# Patient Record
Sex: FEMALE | Race: WHITE | Employment: FULL TIME | URBAN - NONMETROPOLITAN AREA
[De-identification: names, ages, dates, MRNs, and addresses within clinical notes are randomized per-mention and may not be internally consistent; named-entity substitution may affect disease eponyms.]

---

## 2016-03-03 LAB — HBA1C MFR BLD: 6.3 %

## 2016-09-15 LAB
CHOLESTEROL, TOTAL: 173 MG/DL
CHOLESTEROL/HDL RATIO: 2.9
HDLC SERPL-MCNC: 60 MG/DL (ref 35–70)
LDL CHOLESTEROL CALCULATED: 87 MG/DL (ref 0–160)
TRIGL SERPL-MCNC: 128 MG/DL
VLDLC SERPL CALC-MCNC: NORMAL MG/DL

## 2017-01-17 ENCOUNTER — OFFICE VISIT (OUTPATIENT)
Dept: FAMILY MEDICINE CLINIC | Age: 57
End: 2017-01-17

## 2017-01-17 VITALS
HEIGHT: 63 IN | DIASTOLIC BLOOD PRESSURE: 86 MMHG | WEIGHT: 206.8 LBS | SYSTOLIC BLOOD PRESSURE: 128 MMHG | HEART RATE: 95 BPM | OXYGEN SATURATION: 93 % | BODY MASS INDEX: 36.64 KG/M2

## 2017-01-17 DIAGNOSIS — J20.9 ACUTE BRONCHITIS, UNSPECIFIED ORGANISM: Primary | ICD-10-CM

## 2017-01-17 DIAGNOSIS — E11.9 TYPE 2 DIABETES MELLITUS WITHOUT COMPLICATION, WITHOUT LONG-TERM CURRENT USE OF INSULIN (HCC): ICD-10-CM

## 2017-01-17 DIAGNOSIS — J44.9 CHRONIC OBSTRUCTIVE PULMONARY DISEASE, UNSPECIFIED COPD TYPE (HCC): ICD-10-CM

## 2017-01-17 DIAGNOSIS — Z87.891 SMOKING HISTORY: ICD-10-CM

## 2017-01-17 PROCEDURE — 99203 OFFICE O/P NEW LOW 30 MIN: CPT | Performed by: FAMILY MEDICINE

## 2017-01-17 RX ORDER — NICOTINE 21 MG/24HR
1 PATCH, TRANSDERMAL 24 HOURS TRANSDERMAL EVERY 24 HOURS
Qty: 30 PATCH | Refills: 0 | COMMUNITY
Start: 2017-01-17 | End: 2017-06-20 | Stop reason: ALTCHOICE

## 2017-06-20 ENCOUNTER — OFFICE VISIT (OUTPATIENT)
Dept: FAMILY MEDICINE CLINIC | Age: 57
End: 2017-06-20
Payer: COMMERCIAL

## 2017-06-20 VITALS
HEIGHT: 63 IN | HEART RATE: 84 BPM | SYSTOLIC BLOOD PRESSURE: 142 MMHG | OXYGEN SATURATION: 94 % | BODY MASS INDEX: 36.14 KG/M2 | DIASTOLIC BLOOD PRESSURE: 88 MMHG | WEIGHT: 204 LBS

## 2017-06-20 DIAGNOSIS — Z12.11 ENCOUNTER FOR SCREENING COLONOSCOPY: ICD-10-CM

## 2017-06-20 DIAGNOSIS — Z12.39 SCREENING FOR BREAST CANCER: ICD-10-CM

## 2017-06-20 DIAGNOSIS — N39.42 URINARY INCONTINENCE WITHOUT SENSORY AWARENESS: ICD-10-CM

## 2017-06-20 DIAGNOSIS — S60.131D CONTUSION OF RIGHT MIDDLE FINGER WITH DAMAGE TO NAIL, SUBSEQUENT ENCOUNTER: ICD-10-CM

## 2017-06-20 DIAGNOSIS — K58.0 IRRITABLE BOWEL SYNDROME WITH DIARRHEA: ICD-10-CM

## 2017-06-20 DIAGNOSIS — E04.9 THYROID GOITER: ICD-10-CM

## 2017-06-20 DIAGNOSIS — Z13.1 SCREENING FOR DIABETES MELLITUS: ICD-10-CM

## 2017-06-20 DIAGNOSIS — Z76.89 ENCOUNTER TO ESTABLISH CARE: ICD-10-CM

## 2017-06-20 DIAGNOSIS — J44.9 CHRONIC OBSTRUCTIVE PULMONARY DISEASE, UNSPECIFIED COPD TYPE (HCC): ICD-10-CM

## 2017-06-20 DIAGNOSIS — J45.20 MILD INTERMITTENT ASTHMA WITHOUT COMPLICATION: ICD-10-CM

## 2017-06-20 DIAGNOSIS — E11.9 TYPE 2 DIABETES MELLITUS WITHOUT COMPLICATION, WITHOUT LONG-TERM CURRENT USE OF INSULIN (HCC): ICD-10-CM

## 2017-06-20 DIAGNOSIS — Z72.0 TOBACCO ABUSE: ICD-10-CM

## 2017-06-20 DIAGNOSIS — Z00.00 ROUTINE HEALTH MAINTENANCE: Primary | ICD-10-CM

## 2017-06-20 DIAGNOSIS — J30.2 SEASONAL ALLERGIC RHINITIS, UNSPECIFIED ALLERGIC RHINITIS TRIGGER: ICD-10-CM

## 2017-06-20 PROCEDURE — 99215 OFFICE O/P EST HI 40 MIN: CPT | Performed by: NURSE PRACTITIONER

## 2017-06-20 RX ORDER — MONTELUKAST SODIUM 10 MG/1
10 TABLET ORAL NIGHTLY
Qty: 30 TABLET | Refills: 1 | Status: SHIPPED | OUTPATIENT
Start: 2017-06-20 | End: 2017-08-23 | Stop reason: SDUPTHER

## 2017-06-20 RX ORDER — VENLAFAXINE HYDROCHLORIDE 150 MG/1
150 TABLET, EXTENDED RELEASE ORAL
Qty: 30 TABLET | Refills: 1 | Status: SHIPPED | OUTPATIENT
Start: 2017-06-20 | End: 2017-07-25

## 2017-06-20 RX ORDER — VENLAFAXINE HYDROCHLORIDE 150 MG/1
150 TABLET, EXTENDED RELEASE ORAL
COMMUNITY
End: 2017-06-20 | Stop reason: SDUPTHER

## 2017-06-20 ASSESSMENT — ENCOUNTER SYMPTOMS
CHEST TIGHTNESS: 0
ABDOMINAL PAIN: 0
COUGH: 0
DIARRHEA: 1
VOMITING: 0
TROUBLE SWALLOWING: 0
CONSTIPATION: 0
SINUS PRESSURE: 0
EYES NEGATIVE: 1
NAUSEA: 0
SHORTNESS OF BREATH: 0
RESPIRATORY NEGATIVE: 1
ALLERGIC/IMMUNOLOGIC NEGATIVE: 1

## 2017-06-21 ENCOUNTER — TELEPHONE (OUTPATIENT)
Dept: FAMILY MEDICINE CLINIC | Age: 57
End: 2017-06-21

## 2017-06-21 DIAGNOSIS — E78.2 MIXED HYPERLIPIDEMIA: ICD-10-CM

## 2017-06-21 DIAGNOSIS — E11.9 CONTROLLED TYPE 2 DIABETES MELLITUS WITHOUT COMPLICATION, WITHOUT LONG-TERM CURRENT USE OF INSULIN (HCC): Primary | ICD-10-CM

## 2017-06-21 RX ORDER — EZETIMIBE 10 MG/1
10 TABLET ORAL DAILY
Qty: 30 TABLET | Refills: 3 | Status: SHIPPED | OUTPATIENT
Start: 2017-06-21 | End: 2017-08-23 | Stop reason: SDUPTHER

## 2017-07-19 DIAGNOSIS — Z12.39 BREAST CANCER SCREENING: Primary | ICD-10-CM

## 2017-07-25 ENCOUNTER — TELEPHONE (OUTPATIENT)
Dept: SURGERY | Age: 57
End: 2017-07-25

## 2017-07-25 ENCOUNTER — INITIAL CONSULT (OUTPATIENT)
Dept: SURGERY | Age: 57
End: 2017-07-25
Payer: COMMERCIAL

## 2017-07-25 VITALS
DIASTOLIC BLOOD PRESSURE: 80 MMHG | RESPIRATION RATE: 16 BRPM | SYSTOLIC BLOOD PRESSURE: 132 MMHG | TEMPERATURE: 98 F | BODY MASS INDEX: 35.4 KG/M2 | WEIGHT: 199.8 LBS | HEIGHT: 63 IN | HEART RATE: 80 BPM

## 2017-07-25 DIAGNOSIS — E11.9 TYPE 2 DIABETES MELLITUS WITHOUT COMPLICATION, WITHOUT LONG-TERM CURRENT USE OF INSULIN (HCC): ICD-10-CM

## 2017-07-25 DIAGNOSIS — K58.0 IRRITABLE BOWEL SYNDROME WITH DIARRHEA: ICD-10-CM

## 2017-07-25 DIAGNOSIS — J42 CHRONIC BRONCHITIS, UNSPECIFIED CHRONIC BRONCHITIS TYPE (HCC): ICD-10-CM

## 2017-07-25 DIAGNOSIS — K21.9 GASTROESOPHAGEAL REFLUX DISEASE, ESOPHAGITIS PRESENCE NOT SPECIFIED: Primary | ICD-10-CM

## 2017-07-25 PROCEDURE — 99204 OFFICE O/P NEW MOD 45 MIN: CPT | Performed by: SURGERY

## 2017-07-25 RX ORDER — VENLAFAXINE HYDROCHLORIDE 150 MG/1
CAPSULE, EXTENDED RELEASE ORAL
COMMUNITY
Start: 2017-06-20 | End: 2017-08-23 | Stop reason: SDUPTHER

## 2017-07-25 ASSESSMENT — ENCOUNTER SYMPTOMS
SHORTNESS OF BREATH: 0
ABDOMINAL DISTENTION: 1
DIARRHEA: 1
HEARTBURN: 1
EYES NEGATIVE: 1
VOICE CHANGE: 1
WHEEZING: 0
CHEST TIGHTNESS: 0
NAUSEA: 1
WATER BRASH: 1
TROUBLE SWALLOWING: 1
BACK PAIN: 1
HOARSE VOICE: 1
BLOOD IN STOOL: 0
SORE THROAT: 0
ABDOMINAL PAIN: 1
CHOKING: 1

## 2017-08-07 ENCOUNTER — OFFICE VISIT (OUTPATIENT)
Dept: OBGYN | Age: 57
End: 2017-08-07
Payer: COMMERCIAL

## 2017-08-07 VITALS
HEART RATE: 80 BPM | BODY MASS INDEX: 35.08 KG/M2 | DIASTOLIC BLOOD PRESSURE: 76 MMHG | SYSTOLIC BLOOD PRESSURE: 114 MMHG | WEIGHT: 198 LBS | HEIGHT: 63 IN

## 2017-08-07 DIAGNOSIS — K52.9 CHRONIC DIARRHEA: ICD-10-CM

## 2017-08-07 DIAGNOSIS — N39.46 MIXED INCONTINENCE: Primary | ICD-10-CM

## 2017-08-07 LAB
-: ABNORMAL
AMORPHOUS: ABNORMAL
BACTERIA: ABNORMAL
BILIRUBIN URINE: NEGATIVE
CASTS UA: ABNORMAL /LPF (ref 0–2)
COLOR: ABNORMAL
COMMENT UA: ABNORMAL
CRYSTALS, UA: ABNORMAL /HPF
EPITHELIAL CELLS UA: ABNORMAL /HPF (ref 0–5)
GLUCOSE URINE: NEGATIVE
KETONES, URINE: NEGATIVE
LEUKOCYTE ESTERASE, URINE: NEGATIVE
MUCUS: ABNORMAL
NITRITE, URINE: NEGATIVE
OTHER OBSERVATIONS UA: ABNORMAL
PH UA: 5.5 (ref 5–6)
PROTEIN UA: NEGATIVE
RBC UA: ABNORMAL /HPF (ref 0–4)
RENAL EPITHELIAL, UA: ABNORMAL /HPF
SPECIFIC GRAVITY UA: 1.02 (ref 1.01–1.02)
TRICHOMONAS: ABNORMAL
TURBIDITY: ABNORMAL
URINE HGB: ABNORMAL
UROBILINOGEN, URINE: NORMAL
WBC UA: ABNORMAL /HPF (ref 0–4)
YEAST: ABNORMAL

## 2017-08-07 PROCEDURE — 81003 URINALYSIS AUTO W/O SCOPE: CPT | Performed by: OBSTETRICS & GYNECOLOGY

## 2017-08-07 PROCEDURE — 81001 URINALYSIS AUTO W/SCOPE: CPT | Performed by: OBSTETRICS & GYNECOLOGY

## 2017-08-07 PROCEDURE — 99203 OFFICE O/P NEW LOW 30 MIN: CPT | Performed by: OBSTETRICS & GYNECOLOGY

## 2017-08-23 DIAGNOSIS — J45.20 MILD INTERMITTENT ASTHMA WITHOUT COMPLICATION: ICD-10-CM

## 2017-08-23 DIAGNOSIS — E11.9 CONTROLLED TYPE 2 DIABETES MELLITUS WITHOUT COMPLICATION, WITHOUT LONG-TERM CURRENT USE OF INSULIN (HCC): ICD-10-CM

## 2017-08-23 DIAGNOSIS — J30.2 SEASONAL ALLERGIC RHINITIS, UNSPECIFIED ALLERGIC RHINITIS TRIGGER: ICD-10-CM

## 2017-08-23 DIAGNOSIS — E78.2 MIXED HYPERLIPIDEMIA: ICD-10-CM

## 2017-08-23 DIAGNOSIS — J44.9 CHRONIC OBSTRUCTIVE PULMONARY DISEASE, UNSPECIFIED COPD TYPE (HCC): ICD-10-CM

## 2017-08-23 RX ORDER — MONTELUKAST SODIUM 10 MG/1
10 TABLET ORAL NIGHTLY
Qty: 30 TABLET | Refills: 3 | Status: SHIPPED | OUTPATIENT
Start: 2017-08-23 | End: 2017-08-24 | Stop reason: SDUPTHER

## 2017-08-23 RX ORDER — EZETIMIBE 10 MG/1
10 TABLET ORAL DAILY
Qty: 30 TABLET | Refills: 3 | Status: SHIPPED | OUTPATIENT
Start: 2017-08-23 | End: 2017-08-24 | Stop reason: SDUPTHER

## 2017-08-23 RX ORDER — VENLAFAXINE HYDROCHLORIDE 150 MG/1
150 CAPSULE, EXTENDED RELEASE ORAL DAILY
Qty: 30 CAPSULE | Refills: 3 | Status: SHIPPED | OUTPATIENT
Start: 2017-08-23 | End: 2017-11-06 | Stop reason: SDUPTHER

## 2017-08-24 DIAGNOSIS — J30.2 SEASONAL ALLERGIC RHINITIS, UNSPECIFIED ALLERGIC RHINITIS TRIGGER: ICD-10-CM

## 2017-08-24 DIAGNOSIS — E11.9 CONTROLLED TYPE 2 DIABETES MELLITUS WITHOUT COMPLICATION, WITHOUT LONG-TERM CURRENT USE OF INSULIN (HCC): ICD-10-CM

## 2017-08-24 DIAGNOSIS — J45.20 MILD INTERMITTENT ASTHMA WITHOUT COMPLICATION: ICD-10-CM

## 2017-08-24 DIAGNOSIS — E78.2 MIXED HYPERLIPIDEMIA: ICD-10-CM

## 2017-08-24 DIAGNOSIS — J44.9 CHRONIC OBSTRUCTIVE PULMONARY DISEASE, UNSPECIFIED COPD TYPE (HCC): ICD-10-CM

## 2017-08-24 RX ORDER — MONTELUKAST SODIUM 10 MG/1
10 TABLET ORAL NIGHTLY
Qty: 30 TABLET | Refills: 3 | Status: SHIPPED | OUTPATIENT
Start: 2017-08-24 | End: 2018-05-01 | Stop reason: ALTCHOICE

## 2017-08-24 RX ORDER — EZETIMIBE 10 MG/1
10 TABLET ORAL DAILY
Qty: 30 TABLET | Refills: 3 | Status: SHIPPED | OUTPATIENT
Start: 2017-08-24 | End: 2018-05-01

## 2017-10-19 ENCOUNTER — TELEPHONE (OUTPATIENT)
Dept: FAMILY MEDICINE CLINIC | Age: 57
End: 2017-10-19

## 2017-11-04 DIAGNOSIS — F32.A DEPRESSION, UNSPECIFIED DEPRESSION TYPE: Primary | ICD-10-CM

## 2017-11-06 PROBLEM — F32.A DEPRESSION: Status: ACTIVE | Noted: 2017-11-06

## 2017-11-06 RX ORDER — VENLAFAXINE HYDROCHLORIDE 150 MG/1
CAPSULE, EXTENDED RELEASE ORAL
Qty: 30 CAPSULE | Refills: 1 | Status: SHIPPED | OUTPATIENT
Start: 2017-11-21 | End: 2018-02-05 | Stop reason: SDUPTHER

## 2017-11-15 ENCOUNTER — TELEPHONE (OUTPATIENT)
Dept: FAMILY MEDICINE CLINIC | Age: 57
End: 2017-11-15

## 2018-02-05 DIAGNOSIS — F32.A DEPRESSION, UNSPECIFIED DEPRESSION TYPE: ICD-10-CM

## 2018-02-05 RX ORDER — VENLAFAXINE HYDROCHLORIDE 150 MG/1
CAPSULE, EXTENDED RELEASE ORAL
Qty: 30 CAPSULE | Refills: 1 | Status: SHIPPED | OUTPATIENT
Start: 2018-02-05 | End: 2018-04-26 | Stop reason: SDUPTHER

## 2018-03-21 ENCOUNTER — OFFICE VISIT (OUTPATIENT)
Dept: PRIMARY CARE CLINIC | Age: 58
End: 2018-03-21

## 2018-03-21 VITALS
TEMPERATURE: 98.1 F | SYSTOLIC BLOOD PRESSURE: 138 MMHG | HEART RATE: 80 BPM | WEIGHT: 185 LBS | DIASTOLIC BLOOD PRESSURE: 80 MMHG | HEIGHT: 63 IN | BODY MASS INDEX: 32.78 KG/M2 | OXYGEN SATURATION: 96 %

## 2018-03-21 DIAGNOSIS — J01.40 ACUTE NON-RECURRENT PANSINUSITIS: Primary | ICD-10-CM

## 2018-03-21 DIAGNOSIS — B37.31 VAGINAL YEAST INFECTION: ICD-10-CM

## 2018-03-21 PROCEDURE — 99213 OFFICE O/P EST LOW 20 MIN: CPT | Performed by: NURSE PRACTITIONER

## 2018-03-21 RX ORDER — PREDNISONE 20 MG/1
20 TABLET ORAL DAILY
Qty: 5 TABLET | Refills: 0 | Status: SHIPPED | OUTPATIENT
Start: 2018-03-21 | End: 2018-03-26

## 2018-03-21 RX ORDER — DOXYCYCLINE HYCLATE 100 MG
100 TABLET ORAL 2 TIMES DAILY
Qty: 20 TABLET | Refills: 0 | Status: SHIPPED | OUTPATIENT
Start: 2018-03-21 | End: 2018-03-31

## 2018-03-21 RX ORDER — FLUCONAZOLE 150 MG/1
150 TABLET ORAL ONCE
Qty: 1 TABLET | Refills: 1 | Status: SHIPPED | OUTPATIENT
Start: 2018-03-21 | End: 2018-03-21

## 2018-03-21 ASSESSMENT — ENCOUNTER SYMPTOMS
SHORTNESS OF BREATH: 0
DIARRHEA: 0
SORE THROAT: 1
RHINORRHEA: 1
NAUSEA: 0
SINUS COMPLAINT: 1
COUGH: 1
VOMITING: 0
WHEEZING: 1
SINUS PRESSURE: 1

## 2018-03-21 NOTE — PROGRESS NOTES
[facility]             [address] [facility], [place], [address]                        Telephone [phone]             Fax [phone]  1960  MRN:  [id number]   Date of visit:  3/21/2018    Subjective:    Claribel Agosto is a 61 y.o.  female who presents to [facility] today (3/21/2018) for evaluation of:    Chief Complaint   Patient presents with    Sinus Problem     x2 months. worsening. ear pain. head congestion. Sinus Problem   This is a new problem. The current episode started more than 1 month ago (X 2 months). The problem has been gradually worsening since onset. There has been no fever. Her pain is at a severity of 6/10. Associated symptoms include congestion, coughing, ear pain (left ear), headaches, sinus pressure, sneezing and a sore throat (scratchy). Pertinent negatives include no chills or shortness of breath. Treatments tried: tylenol sinus. The treatment provided mild relief.        She has the following problem list:  Patient Active Problem List   Diagnosis    Gastroesophageal reflux disease    Irritable bowel syndrome with diarrhea    Chronic bronchitis (HCC)    Type 2 diabetes mellitus without complication, without long-term current use of insulin (HCC)    Depression        Current medications are:  Current Outpatient Prescriptions   Medication Sig Dispense Refill    predniSONE (DELTASONE) 20 MG tablet Take 1 tablet by mouth daily for 5 days 5 tablet 0    doxycycline hyclate (VIBRA-TABS) 100 MG tablet Take 1 tablet by mouth 2 times daily for 10 days 20 tablet 0    fluconazole (DIFLUCAN) 150 MG tablet Take 1 tablet by mouth once for 1 dose 1 tablet 1    venlafaxine (EFFEXOR XR) 150 MG extended release capsule TAKE ONE CAPSULE BY MOUTH ONCE DAILY WITH BREAKFAST 30 capsule 1    polyethylene glycol (GOLYTELY) 236 g solution Take per instructions supplied by office 1 Bottle 0    albuterol sulfate HFA (PROVENTIL HFA) 108 (90 BASE) MCG/ACT inhaler Inhale 2 puffs into the lungs every 4 hours as needed for Wheezing or Shortness of Breath (Space out to every 6 hours as symptoms improve) Space out to every 6 hours as symptoms improve. 1 Inhaler 0    losartan (COZAAR) 50 MG tablet Take 50 mg by mouth daily      omeprazole (PRILOSEC) 20 MG capsule Take 40 mg by mouth daily      montelukast (SINGULAIR) 10 MG tablet Take 1 tablet by mouth nightly 30 tablet 3    metFORMIN (GLUCOPHAGE) 1000 MG tablet Take 1 tablet by mouth 2 times daily (with meals) 60 tablet 3    ezetimibe (ZETIA) 10 MG tablet Take 1 tablet by mouth daily 30 tablet 3     No current facility-administered medications for this visit. She is allergic to pcn [penicillins] and sulfa antibiotics. .    She  reports that she has been smoking Cigarettes. She has a 45.00 pack-year smoking history. She has never used smokeless tobacco.      Objective:    Vitals:    03/21/18 1454   BP: 138/80   Pulse: 80   Temp: 98.1 °F (36.7 °C)   SpO2: 96%     Body mass index is 32.77 kg/m². Review of Systems   Constitutional: Positive for appetite change and fatigue. Negative for chills and fever. HENT: Positive for congestion, ear pain (left ear), postnasal drip, rhinorrhea, sinus pressure, sneezing and sore throat (scratchy). Respiratory: Positive for cough and wheezing. Negative for shortness of breath. Cardiovascular: Negative. Gastrointestinal: Negative for diarrhea, nausea and vomiting. Musculoskeletal: Negative for myalgias. Neurological: Positive for headaches. Physical Exam   Constitutional: She is oriented to person, place, and time. She appears well-developed and well-nourished. HENT:   Head: Normocephalic. Right Ear: Tympanic membrane normal.   Left Ear: Tympanic membrane normal.   Nose: Mucosal edema and rhinorrhea present.  Right sinus exhibits maxillary sinus tenderness and frontal sinus

## 2018-04-26 DIAGNOSIS — F32.A DEPRESSION, UNSPECIFIED DEPRESSION TYPE: ICD-10-CM

## 2018-04-26 RX ORDER — VENLAFAXINE HYDROCHLORIDE 150 MG/1
CAPSULE, EXTENDED RELEASE ORAL
Qty: 30 CAPSULE | Refills: 1 | Status: SHIPPED | OUTPATIENT
Start: 2018-04-26 | End: 2018-06-28 | Stop reason: SDUPTHER

## 2018-05-01 ENCOUNTER — OFFICE VISIT (OUTPATIENT)
Dept: FAMILY MEDICINE CLINIC | Age: 58
End: 2018-05-01
Payer: COMMERCIAL

## 2018-05-01 VITALS
BODY MASS INDEX: 32.25 KG/M2 | HEART RATE: 86 BPM | OXYGEN SATURATION: 96 % | HEIGHT: 63 IN | WEIGHT: 182 LBS | DIASTOLIC BLOOD PRESSURE: 82 MMHG | SYSTOLIC BLOOD PRESSURE: 130 MMHG

## 2018-05-01 DIAGNOSIS — G56.03 BILATERAL CARPAL TUNNEL SYNDROME: ICD-10-CM

## 2018-05-01 DIAGNOSIS — Z11.59 ENCOUNTER FOR HEPATITIS C SCREENING TEST FOR LOW RISK PATIENT: ICD-10-CM

## 2018-05-01 DIAGNOSIS — Z11.4 ENCOUNTER FOR SCREENING FOR HIV: ICD-10-CM

## 2018-05-01 DIAGNOSIS — E11.9 TYPE 2 DIABETES MELLITUS WITHOUT COMPLICATION, WITHOUT LONG-TERM CURRENT USE OF INSULIN (HCC): Primary | ICD-10-CM

## 2018-05-01 DIAGNOSIS — R73.09 ELEVATED HEMOGLOBIN A1C: ICD-10-CM

## 2018-05-01 DIAGNOSIS — Z00.00 ROUTINE HEALTH MAINTENANCE: ICD-10-CM

## 2018-05-01 DIAGNOSIS — E78.00 ELEVATED CHOLESTEROL: ICD-10-CM

## 2018-05-01 PROCEDURE — 3017F COLORECTAL CA SCREEN DOC REV: CPT | Performed by: NURSE PRACTITIONER

## 2018-05-01 PROCEDURE — 2022F DILAT RTA XM EVC RTNOPTHY: CPT | Performed by: NURSE PRACTITIONER

## 2018-05-01 PROCEDURE — 4004F PT TOBACCO SCREEN RCVD TLK: CPT | Performed by: NURSE PRACTITIONER

## 2018-05-01 PROCEDURE — G8427 DOCREV CUR MEDS BY ELIG CLIN: HCPCS | Performed by: NURSE PRACTITIONER

## 2018-05-01 PROCEDURE — 99214 OFFICE O/P EST MOD 30 MIN: CPT | Performed by: NURSE PRACTITIONER

## 2018-05-01 PROCEDURE — 3046F HEMOGLOBIN A1C LEVEL >9.0%: CPT | Performed by: NURSE PRACTITIONER

## 2018-05-01 PROCEDURE — G8417 CALC BMI ABV UP PARAM F/U: HCPCS | Performed by: NURSE PRACTITIONER

## 2018-05-01 ASSESSMENT — ENCOUNTER SYMPTOMS
COUGH: 0
CONSTIPATION: 0
ABDOMINAL PAIN: 0
GASTROINTESTINAL NEGATIVE: 1
EYES NEGATIVE: 1
SHORTNESS OF BREATH: 0
VOMITING: 0
TROUBLE SWALLOWING: 0
DIARRHEA: 0
CHEST TIGHTNESS: 0
NAUSEA: 0
SINUS PRESSURE: 0
ALLERGIC/IMMUNOLOGIC NEGATIVE: 1

## 2018-05-01 ASSESSMENT — PATIENT HEALTH QUESTIONNAIRE - PHQ9
SUM OF ALL RESPONSES TO PHQ QUESTIONS 1-9: 2
1. LITTLE INTEREST OR PLEASURE IN DOING THINGS: 1
2. FEELING DOWN, DEPRESSED OR HOPELESS: 1
SUM OF ALL RESPONSES TO PHQ9 QUESTIONS 1 & 2: 2

## 2018-05-05 ENCOUNTER — HOSPITAL ENCOUNTER (OUTPATIENT)
Dept: LAB | Age: 58
Setting detail: SPECIMEN
Discharge: HOME | End: 2018-05-05
Payer: COMMERCIAL

## 2018-05-05 DIAGNOSIS — R73.09 ELEVATED HEMOGLOBIN A1C: ICD-10-CM

## 2018-05-05 DIAGNOSIS — Z11.59 ENCOUNTER FOR HEPATITIS C SCREENING TEST FOR LOW RISK PATIENT: ICD-10-CM

## 2018-05-05 DIAGNOSIS — E78.00 ELEVATED CHOLESTEROL: ICD-10-CM

## 2018-05-05 DIAGNOSIS — Z00.00 ROUTINE HEALTH MAINTENANCE: ICD-10-CM

## 2018-05-05 DIAGNOSIS — Z11.4 ENCOUNTER FOR SCREENING FOR HIV: ICD-10-CM

## 2018-05-05 DIAGNOSIS — E11.9 TYPE 2 DIABETES MELLITUS WITHOUT COMPLICATION, WITHOUT LONG-TERM CURRENT USE OF INSULIN (HCC): ICD-10-CM

## 2018-05-05 LAB
ALBUMIN SERPL-MCNC: 4.3 G/DL (ref 3.5–5.2)
ALBUMIN/GLOBULIN RATIO: 1.5 (ref 1–2.5)
ALP BLD-CCNC: 79 U/L (ref 35–104)
ALT SERPL-CCNC: 10 U/L (ref 5–33)
ANION GAP SERPL CALCULATED.3IONS-SCNC: 10 MMOL/L (ref 9–17)
AST SERPL-CCNC: 14 U/L
BILIRUB SERPL-MCNC: 0.36 MG/DL (ref 0.3–1.2)
BUN BLDV-MCNC: 11 MG/DL (ref 6–20)
BUN/CREAT BLD: 19 (ref 9–20)
CALCIUM SERPL-MCNC: 9.6 MG/DL (ref 8.6–10.4)
CHLORIDE BLD-SCNC: 101 MMOL/L (ref 98–107)
CHOLESTEROL/HDL RATIO: 2.9
CHOLESTEROL: 224 MG/DL
CO2: 28 MMOL/L (ref 20–31)
CREAT SERPL-MCNC: 0.57 MG/DL (ref 0.5–0.9)
CREATININE URINE: 212.3 MG/DL (ref 28–217)
ESTIMATED AVERAGE GLUCOSE: 126 MG/DL
GFR AFRICAN AMERICAN: >60 ML/MIN
GFR NON-AFRICAN AMERICAN: >60 ML/MIN
GFR SERPL CREATININE-BSD FRML MDRD: ABNORMAL ML/MIN/{1.73_M2}
GFR SERPL CREATININE-BSD FRML MDRD: ABNORMAL ML/MIN/{1.73_M2}
GLUCOSE BLD-MCNC: 111 MG/DL (ref 70–99)
HBA1C MFR BLD: 6 % (ref 4.8–5.9)
HCT VFR BLD CALC: 45.4 % (ref 36–46)
HDLC SERPL-MCNC: 76 MG/DL
HEMOGLOBIN: 15.4 G/DL (ref 12–16)
HEPATITIS C ANTIBODY: NONREACTIVE
HIV AG/AB: NONREACTIVE
LDL CHOLESTEROL: 132 MG/DL (ref 0–130)
MCH RBC QN AUTO: 30.7 PG (ref 26–34)
MCHC RBC AUTO-ENTMCNC: 33.9 G/DL (ref 31–37)
MCV RBC AUTO: 90.5 FL (ref 80–100)
MICROALBUMIN/CREAT 24H UR: 41 MG/L
MICROALBUMIN/CREAT UR-RTO: 19 MCG/MG CREAT
NRBC AUTOMATED: NORMAL PER 100 WBC
PDW BLD-RTO: 13.6 % (ref 11–14.5)
PLATELET # BLD: 246 K/UL (ref 140–450)
PMV BLD AUTO: 9.4 FL (ref 6–12)
POTASSIUM SERPL-SCNC: 4.6 MMOL/L (ref 3.7–5.3)
RBC # BLD: 5.01 M/UL (ref 4–5.2)
SODIUM BLD-SCNC: 139 MMOL/L (ref 135–144)
THYROXINE, FREE: 1.14 NG/DL (ref 0.93–1.7)
TOTAL PROTEIN: 7.2 G/DL (ref 6.4–8.3)
TRIGL SERPL-MCNC: 81 MG/DL
TSH SERPL DL<=0.05 MIU/L-ACNC: 0.82 MIU/L (ref 0.3–5)
VLDLC SERPL CALC-MCNC: ABNORMAL MG/DL (ref 1–30)
WBC # BLD: 7.4 K/UL (ref 3.5–11)

## 2018-05-05 PROCEDURE — 82570 ASSAY OF URINE CREATININE: CPT

## 2018-05-05 PROCEDURE — 84439 ASSAY OF FREE THYROXINE: CPT

## 2018-05-05 PROCEDURE — 83036 HEMOGLOBIN GLYCOSYLATED A1C: CPT

## 2018-05-05 PROCEDURE — 86803 HEPATITIS C AB TEST: CPT

## 2018-05-05 PROCEDURE — 36415 COLL VENOUS BLD VENIPUNCTURE: CPT

## 2018-05-05 PROCEDURE — 80061 LIPID PANEL: CPT

## 2018-05-05 PROCEDURE — 85027 COMPLETE CBC AUTOMATED: CPT

## 2018-05-05 PROCEDURE — 87389 HIV-1 AG W/HIV-1&-2 AB AG IA: CPT

## 2018-05-05 PROCEDURE — 80053 COMPREHEN METABOLIC PANEL: CPT

## 2018-05-05 PROCEDURE — 84443 ASSAY THYROID STIM HORMONE: CPT

## 2018-05-05 PROCEDURE — 82043 UR ALBUMIN QUANTITATIVE: CPT

## 2018-05-10 ENCOUNTER — OFFICE VISIT (OUTPATIENT)
Dept: PRIMARY CARE CLINIC | Age: 58
End: 2018-05-10
Payer: COMMERCIAL

## 2018-05-10 VITALS
RESPIRATION RATE: 12 BRPM | HEIGHT: 63 IN | WEIGHT: 181.6 LBS | OXYGEN SATURATION: 97 % | DIASTOLIC BLOOD PRESSURE: 60 MMHG | TEMPERATURE: 98.4 F | SYSTOLIC BLOOD PRESSURE: 120 MMHG | BODY MASS INDEX: 32.18 KG/M2 | HEART RATE: 90 BPM

## 2018-05-10 DIAGNOSIS — J32.9 VIRAL SINUSITIS: Primary | ICD-10-CM

## 2018-05-10 DIAGNOSIS — J02.9 SORE THROAT: ICD-10-CM

## 2018-05-10 DIAGNOSIS — B97.89 VIRAL SINUSITIS: Primary | ICD-10-CM

## 2018-05-10 LAB — S PYO AG THROAT QL: NORMAL

## 2018-05-10 PROCEDURE — 4004F PT TOBACCO SCREEN RCVD TLK: CPT | Performed by: NURSE PRACTITIONER

## 2018-05-10 PROCEDURE — G8427 DOCREV CUR MEDS BY ELIG CLIN: HCPCS | Performed by: NURSE PRACTITIONER

## 2018-05-10 PROCEDURE — 3017F COLORECTAL CA SCREEN DOC REV: CPT | Performed by: NURSE PRACTITIONER

## 2018-05-10 PROCEDURE — 99213 OFFICE O/P EST LOW 20 MIN: CPT | Performed by: NURSE PRACTITIONER

## 2018-05-10 PROCEDURE — 87880 STREP A ASSAY W/OPTIC: CPT | Performed by: NURSE PRACTITIONER

## 2018-05-10 PROCEDURE — G8417 CALC BMI ABV UP PARAM F/U: HCPCS | Performed by: NURSE PRACTITIONER

## 2018-05-10 RX ORDER — LORATADINE 10 MG/1
10 TABLET ORAL DAILY
Qty: 30 TABLET | Refills: 1 | Status: SHIPPED | OUTPATIENT
Start: 2018-05-10 | End: 2018-06-09

## 2018-05-10 RX ORDER — METHYLPREDNISOLONE 4 MG/1
TABLET ORAL
Qty: 1 KIT | Refills: 0 | Status: SHIPPED | OUTPATIENT
Start: 2018-05-10 | End: 2018-10-31 | Stop reason: ALTCHOICE

## 2018-05-10 ASSESSMENT — ENCOUNTER SYMPTOMS
RHINORRHEA: 1
VOMITING: 0
COUGH: 1
CHEST TIGHTNESS: 0
SINUS PRESSURE: 1
CONSTIPATION: 0
ABDOMINAL PAIN: 1
SHORTNESS OF BREATH: 0
SORE THROAT: 1
SINUS PAIN: 1
EYES NEGATIVE: 1
DIARRHEA: 0
ALLERGIC/IMMUNOLOGIC NEGATIVE: 1
NAUSEA: 0
TROUBLE SWALLOWING: 0

## 2018-06-28 DIAGNOSIS — F32.A DEPRESSION, UNSPECIFIED DEPRESSION TYPE: ICD-10-CM

## 2018-06-28 RX ORDER — VENLAFAXINE HYDROCHLORIDE 150 MG/1
CAPSULE, EXTENDED RELEASE ORAL
Qty: 30 CAPSULE | Refills: 1 | Status: SHIPPED | OUTPATIENT
Start: 2018-06-28 | End: 2018-08-29 | Stop reason: SDUPTHER

## 2018-07-19 DIAGNOSIS — E11.9 CONTROLLED TYPE 2 DIABETES MELLITUS WITHOUT COMPLICATION, WITHOUT LONG-TERM CURRENT USE OF INSULIN (HCC): ICD-10-CM

## 2018-08-29 DIAGNOSIS — F32.A DEPRESSION, UNSPECIFIED DEPRESSION TYPE: ICD-10-CM

## 2018-08-29 RX ORDER — VENLAFAXINE HYDROCHLORIDE 150 MG/1
CAPSULE, EXTENDED RELEASE ORAL
Qty: 30 CAPSULE | Refills: 1 | Status: SHIPPED | OUTPATIENT
Start: 2018-08-29 | End: 2018-10-31 | Stop reason: SDUPTHER

## 2018-10-31 ENCOUNTER — OFFICE VISIT (OUTPATIENT)
Dept: FAMILY MEDICINE CLINIC | Age: 58
End: 2018-10-31
Payer: COMMERCIAL

## 2018-10-31 VITALS
BODY MASS INDEX: 32.78 KG/M2 | WEIGHT: 185 LBS | HEIGHT: 63 IN | OXYGEN SATURATION: 96 % | DIASTOLIC BLOOD PRESSURE: 78 MMHG | HEART RATE: 91 BPM | SYSTOLIC BLOOD PRESSURE: 124 MMHG

## 2018-10-31 DIAGNOSIS — Z00.00 ROUTINE HEALTH MAINTENANCE: Primary | ICD-10-CM

## 2018-10-31 DIAGNOSIS — B96.89 ACUTE BACTERIAL SINUSITIS: ICD-10-CM

## 2018-10-31 DIAGNOSIS — G56.03 CARPAL TUNNEL SYNDROME, BILATERAL: ICD-10-CM

## 2018-10-31 DIAGNOSIS — F32.A DEPRESSION, UNSPECIFIED DEPRESSION TYPE: ICD-10-CM

## 2018-10-31 DIAGNOSIS — K21.9 GASTROESOPHAGEAL REFLUX DISEASE, ESOPHAGITIS PRESENCE NOT SPECIFIED: ICD-10-CM

## 2018-10-31 DIAGNOSIS — G89.29 CHRONIC MIDLINE THORACIC BACK PAIN: ICD-10-CM

## 2018-10-31 DIAGNOSIS — J30.9 ALLERGIC RHINITIS, UNSPECIFIED SEASONALITY, UNSPECIFIED TRIGGER: ICD-10-CM

## 2018-10-31 DIAGNOSIS — Z72.0 TOBACCO USE: ICD-10-CM

## 2018-10-31 DIAGNOSIS — Z23 FLU VACCINE NEED: ICD-10-CM

## 2018-10-31 DIAGNOSIS — J01.90 ACUTE BACTERIAL SINUSITIS: ICD-10-CM

## 2018-10-31 DIAGNOSIS — E11.9 TYPE 2 DIABETES MELLITUS WITHOUT COMPLICATION, WITHOUT LONG-TERM CURRENT USE OF INSULIN (HCC): ICD-10-CM

## 2018-10-31 DIAGNOSIS — M54.6 CHRONIC MIDLINE THORACIC BACK PAIN: ICD-10-CM

## 2018-10-31 DIAGNOSIS — T14.8XXA WOUND OF SKIN: ICD-10-CM

## 2018-10-31 DIAGNOSIS — M54.2 NECK PAIN: ICD-10-CM

## 2018-10-31 PROCEDURE — G8417 CALC BMI ABV UP PARAM F/U: HCPCS | Performed by: NURSE PRACTITIONER

## 2018-10-31 PROCEDURE — 3017F COLORECTAL CA SCREEN DOC REV: CPT | Performed by: NURSE PRACTITIONER

## 2018-10-31 PROCEDURE — 4004F PT TOBACCO SCREEN RCVD TLK: CPT | Performed by: NURSE PRACTITIONER

## 2018-10-31 PROCEDURE — 90471 IMMUNIZATION ADMIN: CPT | Performed by: NURSE PRACTITIONER

## 2018-10-31 PROCEDURE — 2022F DILAT RTA XM EVC RTNOPTHY: CPT | Performed by: NURSE PRACTITIONER

## 2018-10-31 PROCEDURE — G8484 FLU IMMUNIZE NO ADMIN: HCPCS | Performed by: NURSE PRACTITIONER

## 2018-10-31 PROCEDURE — G8427 DOCREV CUR MEDS BY ELIG CLIN: HCPCS | Performed by: NURSE PRACTITIONER

## 2018-10-31 PROCEDURE — 99203 OFFICE O/P NEW LOW 30 MIN: CPT | Performed by: NURSE PRACTITIONER

## 2018-10-31 PROCEDURE — 99386 PREV VISIT NEW AGE 40-64: CPT | Performed by: NURSE PRACTITIONER

## 2018-10-31 PROCEDURE — 3044F HG A1C LEVEL LT 7.0%: CPT | Performed by: NURSE PRACTITIONER

## 2018-10-31 PROCEDURE — 90686 IIV4 VACC NO PRSV 0.5 ML IM: CPT | Performed by: NURSE PRACTITIONER

## 2018-10-31 RX ORDER — VARENICLINE TARTRATE 25 MG
KIT ORAL
Qty: 1 EACH | Refills: 0 | Status: SHIPPED | OUTPATIENT
Start: 2018-10-31 | End: 2019-01-31

## 2018-10-31 RX ORDER — VENLAFAXINE HYDROCHLORIDE 150 MG/1
CAPSULE, EXTENDED RELEASE ORAL
Qty: 90 CAPSULE | Refills: 2 | Status: SHIPPED | OUTPATIENT
Start: 2018-10-31 | End: 2019-04-30 | Stop reason: SDUPTHER

## 2018-10-31 RX ORDER — CLARITHROMYCIN 500 MG/1
500 TABLET, COATED ORAL 2 TIMES DAILY
Qty: 20 TABLET | Refills: 0 | Status: SHIPPED | OUTPATIENT
Start: 2018-10-31 | End: 2018-11-10

## 2018-10-31 RX ORDER — FLUTICASONE PROPIONATE 50 MCG
2 SPRAY, SUSPENSION (ML) NASAL DAILY
Qty: 1 BOTTLE | Refills: 11 | Status: SHIPPED | OUTPATIENT
Start: 2018-10-31 | End: 2020-03-17 | Stop reason: SDUPTHER

## 2018-10-31 ASSESSMENT — ENCOUNTER SYMPTOMS
TROUBLE SWALLOWING: 0
SHORTNESS OF BREATH: 0
CONSTIPATION: 0
SINUS PRESSURE: 0
VOMITING: 0
ABDOMINAL PAIN: 0
CHEST TIGHTNESS: 0
ALLERGIC/IMMUNOLOGIC NEGATIVE: 1
EYES NEGATIVE: 1
COUGH: 1
GASTROINTESTINAL NEGATIVE: 1
NAUSEA: 0
DIARRHEA: 0

## 2018-12-27 ENCOUNTER — OFFICE VISIT (OUTPATIENT)
Dept: PRIMARY CARE CLINIC | Age: 58
End: 2018-12-27
Payer: COMMERCIAL

## 2018-12-27 VITALS
WEIGHT: 187 LBS | SYSTOLIC BLOOD PRESSURE: 134 MMHG | DIASTOLIC BLOOD PRESSURE: 80 MMHG | BODY MASS INDEX: 33.13 KG/M2 | HEIGHT: 63 IN | RESPIRATION RATE: 18 BRPM | TEMPERATURE: 98 F | HEART RATE: 86 BPM | OXYGEN SATURATION: 98 %

## 2018-12-27 DIAGNOSIS — K52.9 GASTROENTERITIS: Primary | ICD-10-CM

## 2018-12-27 PROCEDURE — G8417 CALC BMI ABV UP PARAM F/U: HCPCS | Performed by: NURSE PRACTITIONER

## 2018-12-27 PROCEDURE — G8427 DOCREV CUR MEDS BY ELIG CLIN: HCPCS | Performed by: NURSE PRACTITIONER

## 2018-12-27 PROCEDURE — G8482 FLU IMMUNIZE ORDER/ADMIN: HCPCS | Performed by: NURSE PRACTITIONER

## 2018-12-27 PROCEDURE — 4004F PT TOBACCO SCREEN RCVD TLK: CPT | Performed by: NURSE PRACTITIONER

## 2018-12-27 PROCEDURE — 99213 OFFICE O/P EST LOW 20 MIN: CPT | Performed by: NURSE PRACTITIONER

## 2018-12-27 PROCEDURE — 3017F COLORECTAL CA SCREEN DOC REV: CPT | Performed by: NURSE PRACTITIONER

## 2018-12-27 RX ORDER — ONDANSETRON 4 MG/1
4 TABLET, FILM COATED ORAL EVERY 8 HOURS PRN
Qty: 20 TABLET | Refills: 0 | Status: SHIPPED | OUTPATIENT
Start: 2018-12-27 | End: 2019-04-30 | Stop reason: ALTCHOICE

## 2018-12-27 ASSESSMENT — ENCOUNTER SYMPTOMS
DIARRHEA: 0
RESPIRATORY NEGATIVE: 1
NAUSEA: 1
ABDOMINAL PAIN: 1

## 2018-12-27 NOTE — LETTER
[address]  Phone: [phone]  Fax: 41 [facility], APRN - CNP        December 27, 2018     Patient: Benitez Cortez   YOB: 1960   Date of Visit: 12/27/2018       To Whom it May Concern:    Benitez Cortez was seen in my clinic on 12/27/2018. She may return to work on 12/28/18. Please excuse her absence on 12/26-12/28/18    If you have any questions or concerns, please don't hesitate to call.     Sincerely,         Ming Ochoa, ETHAN - CNP

## 2019-01-31 ENCOUNTER — OFFICE VISIT (OUTPATIENT)
Dept: FAMILY MEDICINE CLINIC | Age: 59
End: 2019-01-31
Payer: COMMERCIAL

## 2019-01-31 VITALS
HEART RATE: 76 BPM | RESPIRATION RATE: 12 BRPM | DIASTOLIC BLOOD PRESSURE: 80 MMHG | HEIGHT: 63 IN | TEMPERATURE: 96.9 F | WEIGHT: 189.2 LBS | OXYGEN SATURATION: 98 % | SYSTOLIC BLOOD PRESSURE: 138 MMHG | BODY MASS INDEX: 33.52 KG/M2

## 2019-01-31 DIAGNOSIS — G56.03 BILATERAL CARPAL TUNNEL SYNDROME: Primary | ICD-10-CM

## 2019-01-31 DIAGNOSIS — M54.12 CERVICAL RADICULOPATHY: ICD-10-CM

## 2019-01-31 PROCEDURE — 99214 OFFICE O/P EST MOD 30 MIN: CPT | Performed by: NURSE PRACTITIONER

## 2019-01-31 PROCEDURE — G8417 CALC BMI ABV UP PARAM F/U: HCPCS | Performed by: NURSE PRACTITIONER

## 2019-01-31 PROCEDURE — 4004F PT TOBACCO SCREEN RCVD TLK: CPT | Performed by: NURSE PRACTITIONER

## 2019-01-31 PROCEDURE — G8482 FLU IMMUNIZE ORDER/ADMIN: HCPCS | Performed by: NURSE PRACTITIONER

## 2019-01-31 PROCEDURE — 3017F COLORECTAL CA SCREEN DOC REV: CPT | Performed by: NURSE PRACTITIONER

## 2019-01-31 PROCEDURE — G8427 DOCREV CUR MEDS BY ELIG CLIN: HCPCS | Performed by: NURSE PRACTITIONER

## 2019-01-31 RX ORDER — METHYLPREDNISOLONE 4 MG/1
TABLET ORAL
Qty: 1 KIT | Refills: 0 | Status: SHIPPED | OUTPATIENT
Start: 2019-01-31 | End: 2019-04-23 | Stop reason: ALTCHOICE

## 2019-01-31 RX ORDER — CYCLOBENZAPRINE HCL 10 MG
10 TABLET ORAL NIGHTLY PRN
Qty: 30 TABLET | Refills: 0 | Status: SHIPPED | OUTPATIENT
Start: 2019-01-31 | End: 2019-02-10

## 2019-01-31 ASSESSMENT — ENCOUNTER SYMPTOMS
GASTROINTESTINAL NEGATIVE: 1
RESPIRATORY NEGATIVE: 1

## 2019-02-12 ENCOUNTER — HOSPITAL ENCOUNTER (OUTPATIENT)
Dept: PHYSICAL THERAPY | Age: 59
Setting detail: THERAPIES SERIES
Discharge: HOME | End: 2019-02-12
Payer: COMMERCIAL

## 2019-03-01 ENCOUNTER — HOSPITAL ENCOUNTER (EMERGENCY)
Age: 59
Discharge: HOME | End: 2019-03-01
Attending: EMERGENCY MEDICINE
Payer: COMMERCIAL

## 2019-03-01 VITALS
SYSTOLIC BLOOD PRESSURE: 128 MMHG | HEART RATE: 98 BPM | DIASTOLIC BLOOD PRESSURE: 77 MMHG | OXYGEN SATURATION: 97 % | TEMPERATURE: 97 F | RESPIRATION RATE: 14 BRPM

## 2019-03-01 DIAGNOSIS — R21 RASH AND OTHER NONSPECIFIC SKIN ERUPTION: Primary | ICD-10-CM

## 2019-03-01 PROCEDURE — 99282 EMERGENCY DEPT VISIT SF MDM: CPT

## 2019-03-01 PROCEDURE — 6370000000 HC RX 637 (ALT 250 FOR IP): Performed by: EMERGENCY MEDICINE

## 2019-03-01 RX ORDER — PREDNISONE 50 MG/1
50 TABLET ORAL DAILY
Qty: 4 TABLET | Refills: 0 | Status: SHIPPED | OUTPATIENT
Start: 2019-03-01 | End: 2019-03-05

## 2019-03-01 RX ORDER — HYDROXYZINE HYDROCHLORIDE 25 MG/1
25 TABLET, FILM COATED ORAL EVERY 6 HOURS PRN
Qty: 20 TABLET | Refills: 0 | Status: SHIPPED | OUTPATIENT
Start: 2019-03-01 | End: 2019-03-11

## 2019-03-01 RX ORDER — DIPHENHYDRAMINE HCL 25 MG
25 TABLET ORAL ONCE
Status: COMPLETED | OUTPATIENT
Start: 2019-03-01 | End: 2019-03-01

## 2019-03-01 RX ORDER — PREDNISONE 20 MG/1
60 TABLET ORAL ONCE
Status: COMPLETED | OUTPATIENT
Start: 2019-03-01 | End: 2019-03-01

## 2019-03-01 RX ADMIN — DIPHENHYDRAMINE HCL 25 MG: 25 TABLET ORAL at 00:26

## 2019-03-01 RX ADMIN — PREDNISONE 60 MG: 20 TABLET ORAL at 00:26

## 2019-03-01 ASSESSMENT — PAIN DESCRIPTION - ORIENTATION: ORIENTATION: RIGHT

## 2019-03-01 ASSESSMENT — PAIN DESCRIPTION - LOCATION: LOCATION: HEAD

## 2019-03-01 ASSESSMENT — PAIN DESCRIPTION - PAIN TYPE: TYPE: ACUTE PAIN

## 2019-03-01 ASSESSMENT — PAIN SCALES - GENERAL
PAINLEVEL_OUTOF10: 6
PAINLEVEL_OUTOF10: 7

## 2019-03-01 ASSESSMENT — PAIN - FUNCTIONAL ASSESSMENT: PAIN_FUNCTIONAL_ASSESSMENT: 0-10

## 2019-03-13 ENCOUNTER — HOSPITAL ENCOUNTER (OUTPATIENT)
Dept: PHYSICAL THERAPY | Age: 59
Setting detail: THERAPIES SERIES
Discharge: HOME | End: 2019-03-13
Payer: COMMERCIAL

## 2019-03-13 PROCEDURE — 97162 PT EVAL MOD COMPLEX 30 MIN: CPT | Performed by: PHYSICAL THERAPIST

## 2019-03-13 PROCEDURE — 97110 THERAPEUTIC EXERCISES: CPT | Performed by: PHYSICAL THERAPIST

## 2019-03-13 ASSESSMENT — PAIN - FUNCTIONAL ASSESSMENT: PAIN_FUNCTIONAL_ASSESSMENT: PREVENTS OR INTERFERES WITH MANY ACTIVE NOT PASSIVE ACTIVITIES

## 2019-03-13 ASSESSMENT — PAIN DESCRIPTION - FREQUENCY: FREQUENCY: INTERMITTENT

## 2019-03-13 ASSESSMENT — PAIN DESCRIPTION - PROGRESSION: CLINICAL_PROGRESSION: GRADUALLY WORSENING

## 2019-03-13 ASSESSMENT — PAIN DESCRIPTION - PAIN TYPE: TYPE: CHRONIC PAIN

## 2019-03-13 ASSESSMENT — PAIN DESCRIPTION - LOCATION: LOCATION: NECK;SHOULDER

## 2019-03-13 ASSESSMENT — PAIN SCALES - GENERAL: PAINLEVEL_OUTOF10: 7

## 2019-03-13 ASSESSMENT — PAIN DESCRIPTION - ONSET: ONSET: ON-GOING

## 2019-03-18 ENCOUNTER — HOSPITAL ENCOUNTER (OUTPATIENT)
Dept: PHYSICAL THERAPY | Age: 59
Setting detail: THERAPIES SERIES
Discharge: HOME | End: 2019-03-18
Payer: COMMERCIAL

## 2019-03-19 DIAGNOSIS — E11.9 CONTROLLED TYPE 2 DIABETES MELLITUS WITHOUT COMPLICATION, WITHOUT LONG-TERM CURRENT USE OF INSULIN (HCC): ICD-10-CM

## 2019-04-17 ENCOUNTER — HOSPITAL ENCOUNTER (OUTPATIENT)
Dept: NEUROLOGY | Age: 59
Discharge: HOME | End: 2019-04-17
Payer: COMMERCIAL

## 2019-04-17 DIAGNOSIS — G56.03 BILATERAL CARPAL TUNNEL SYNDROME: ICD-10-CM

## 2019-04-17 PROCEDURE — 95912 NRV CNDJ TEST 11-12 STUDIES: CPT

## 2019-04-17 PROCEDURE — 95886 MUSC TEST DONE W/N TEST COMP: CPT

## 2019-04-17 NOTE — PROGRESS NOTES
EMG/NCS bilateral Uppers Completed    PCP: ETHAN Solomon CNP    Ordering: ETHAN Solomon CNP    Interpreting Physician: Camille Davalos, Neurologist    Technician: Yessica Kim LPN

## 2019-04-18 NOTE — PROCEDURES
potentials appear within normal limits. Compound muscle action potentials of the right and left median nerves  show normal amplitude and borderline distal latencies and  low-to-borderline conduction velocities. Compound muscle action potentials of the right and left ulnar nerves  show low amplitude with normal distal latencies and conduction  velocities. Proximal conductions as measured by the F responses were slightly  prolonged in the median nerves and normal in the ulnar nerves  bilaterally. Nerve  Conductions   Results  Were  Personally  Reviewed and  Analysed. Abnormal  Nerve  Conductions  Were  Personally  Repeated,  Verified, reconfirmed  And  Updated as needed  appropriately. Please    See   Wave  Forms   And    Details  Of     Nerve  Conduction   Studies   For  Additional  Information          Extensive   Needle  Electromyography  Was personally  Performed  In  Both      Upper  Extremities     In  The  Following  Muscles :    Deltoid,  Biceps  Brachii,  Triceps . Pronator  Teres,  Flexor Carpi Ulnaris,    Ext. Digitorum Communis,  FDI (Hand)           Extensive  Needle  Electromyography  Shows  No Abnormality with :      A) Normal  insertional  Activity. There  Is  Absence  Of   P  Waves,  Fibrillations,  Fasciculations and        Other  Spontaneous   Activity. B) Voluntary  Motor unit  Potentials    Show :    Normal  Effort,  Recruitment, amplitude,  Duration. No Polyphasia  Noted. IMPRESSION:      1. There is electrodiagnostic evidence of moderate, median  mononeuropathy at wrist (carpal tunnel syndrome) bilaterally. 2.  There is electrodiagnostic evidence of mild ulnar motor neuropathies  at elbows bilaterally. 3.  There is no definite electrodiagnostic evidence of lower cervical  radiculopathy, plexopathy or peripheral polyneuropathy involving  examined both upper extremities.         Further clinical correlation and followup recommended.           Seema Fletcher MD  Board Certified Neurologist    D: 04/17/2019 10:08:46       T: 04/17/2019 11:05:06     [id number]  Job#: [id number]     Doc#: [id number]    CC:  Kelly Del Castillo

## 2019-04-19 DIAGNOSIS — G56.03 CARPAL TUNNEL SYNDROME, BILATERAL: Primary | ICD-10-CM

## 2019-04-23 ENCOUNTER — HOSPITAL ENCOUNTER (OUTPATIENT)
Dept: CT IMAGING | Age: 59
Discharge: HOME | End: 2019-04-25
Payer: COMMERCIAL

## 2019-04-23 ENCOUNTER — HOSPITAL ENCOUNTER (OUTPATIENT)
Dept: LAB | Age: 59
Discharge: HOME | End: 2019-04-23
Payer: COMMERCIAL

## 2019-04-23 ENCOUNTER — OFFICE VISIT (OUTPATIENT)
Dept: FAMILY MEDICINE CLINIC | Age: 59
End: 2019-04-23
Payer: COMMERCIAL

## 2019-04-23 VITALS
HEIGHT: 63 IN | SYSTOLIC BLOOD PRESSURE: 134 MMHG | RESPIRATION RATE: 20 BRPM | HEART RATE: 82 BPM | DIASTOLIC BLOOD PRESSURE: 88 MMHG | OXYGEN SATURATION: 97 % | WEIGHT: 182.8 LBS | BODY MASS INDEX: 32.39 KG/M2 | TEMPERATURE: 97.9 F

## 2019-04-23 DIAGNOSIS — R10.9 RIGHT SIDED ABDOMINAL PAIN: ICD-10-CM

## 2019-04-23 DIAGNOSIS — R30.0 DYSURIA: Primary | ICD-10-CM

## 2019-04-23 DIAGNOSIS — R10.9 RIGHT FLANK PAIN: ICD-10-CM

## 2019-04-23 DIAGNOSIS — R10.9 RIGHT FLANK PAIN: Primary | ICD-10-CM

## 2019-04-23 DIAGNOSIS — R10.2 PELVIC PAIN IN FEMALE: ICD-10-CM

## 2019-04-23 DIAGNOSIS — R30.0 DYSURIA: ICD-10-CM

## 2019-04-23 LAB
-: NORMAL
AMORPHOUS: NORMAL
BACTERIA: NORMAL
BILIRUBIN URINE: NEGATIVE
CASTS UA: NORMAL /LPF (ref 0–2)
COLOR: ABNORMAL
COMMENT UA: ABNORMAL
CRYSTALS, UA: NORMAL /HPF
EPITHELIAL CELLS UA: NORMAL /HPF (ref 0–5)
GLUCOSE URINE: NEGATIVE
KETONES, URINE: NEGATIVE
LEUKOCYTE ESTERASE, URINE: NEGATIVE
MUCUS: NORMAL
NITRITE, URINE: NEGATIVE
OTHER OBSERVATIONS UA: NORMAL
PH UA: 6 (ref 5–6)
PROTEIN UA: NEGATIVE
RBC UA: NORMAL /HPF (ref 0–4)
RENAL EPITHELIAL, UA: NORMAL /HPF
SPECIFIC GRAVITY UA: 1 (ref 1.01–1.02)
TRICHOMONAS: NORMAL
TURBIDITY: ABNORMAL
URINE HGB: ABNORMAL
UROBILINOGEN, URINE: NORMAL
WBC UA: NORMAL /HPF (ref 0–4)
YEAST: NORMAL

## 2019-04-23 PROCEDURE — 4004F PT TOBACCO SCREEN RCVD TLK: CPT | Performed by: NURSE PRACTITIONER

## 2019-04-23 PROCEDURE — 3017F COLORECTAL CA SCREEN DOC REV: CPT | Performed by: NURSE PRACTITIONER

## 2019-04-23 PROCEDURE — 99214 OFFICE O/P EST MOD 30 MIN: CPT | Performed by: NURSE PRACTITIONER

## 2019-04-23 PROCEDURE — G8417 CALC BMI ABV UP PARAM F/U: HCPCS | Performed by: NURSE PRACTITIONER

## 2019-04-23 PROCEDURE — G8427 DOCREV CUR MEDS BY ELIG CLIN: HCPCS | Performed by: NURSE PRACTITIONER

## 2019-04-23 PROCEDURE — 81001 URINALYSIS AUTO W/SCOPE: CPT

## 2019-04-23 PROCEDURE — 74176 CT ABD & PELVIS W/O CONTRAST: CPT

## 2019-04-23 ASSESSMENT — ENCOUNTER SYMPTOMS
NAUSEA: 0
VOMITING: 0
DIARRHEA: 0
GASTROINTESTINAL NEGATIVE: 1
CONSTIPATION: 0

## 2019-04-23 NOTE — LETTER
[address] [place] [address]  Phone: [phone]  Fax: [phone]    ETHAN Briceno CNP        April 23, 2019     Patient: Brandi Barrientos   YOB: 1960   Date of Visit: 4/23/2019       To Whom It May Concern: It is my medical opinion that Brandi Barrientos may return to work on 4/24/2019. If you have any questions or concerns, please don't hesitate to call.     Sincerely,        ETHAN Briceno CNP

## 2019-04-23 NOTE — PROGRESS NOTES
[facility]    Subjective:     Patient ID: Earline Mcdowell is a 60 y.o. y.o. female. Urinary Tract Infection    This is a new problem. The current episode started yesterday. The problem has been unchanged. The quality of the pain is described as aching and burning (cramping). The pain is at a severity of 6/10. The pain is moderate. There has been no fever. Associated symptoms include chills, flank pain (right side), frequency and urgency. Pertinent negatives include no hematuria, nausea or vomiting. Past Medical History:   Diagnosis Date    Allergic rhinitis     Arthritis     Asthma     Bronchitis     [facility] disease) [facility]     Depression     Diabetes mellitus ([facility]     Headache     Hyperlipidemia     Hypertension     Incontinence     Irritable bowel syndrome     Kidney stone     Osteoarthritis     Pneumonia     Type 2 diabetes mellitus without complication ([facility] 2298    Ulcer        Past Surgical History:   Procedure Laterality Date    CHOLECYSTECTOMY, LAPAROSCOPIC  1997    COLONOSCOPY  7/11/208    Serated polyp (1)    COLONOSCOPY  08/02/2017    [id number]    HYSTERECTOMY, TOTAL ABDOMINAL  1986    with Right oopherectomy still has left ovary    LAPAROSCOPY  2008    Diagnostic for pain - no findings    MECKEL DIVERTICULUM EXCISION  1970    TONSILLECTOMY      UPPER GASTROINTESTINAL ENDOSCOPY  08/02/2017    [id number]       Family History   Adopted: Yes   Family history unknown:  Yes          Allergies   Allergen Reactions    Pcn [Penicillins]      \"can't breath\"    Sulfa Antibiotics      \"can't breathe\"       Current Outpatient Medications   Medication Sig Dispense Refill    metFORMIN (GLUCOPHAGE) 1000 MG tablet TAKE 1 TABLET BY MOUTH TWICE DAILY WITH  MEALS 60 tablet 3    ondansetron (ZOFRAN) 4 MG tablet Take 1 tablet by mouth every 8 hours as needed for Nausea 20 tablet 0    venlafaxine (EFFEXOR XR) 150 MG extended release capsule TAKE 1 CAPSULE BY MOUTH ONCE DAILY WITH BREAKFAST 90 capsule 2    fluticasone (FLONASE) 50 MCG/ACT nasal spray 2 sprays by Nasal route daily 1 Bottle 11    omeprazole (PRILOSEC) 20 MG capsule Take 40 mg by mouth daily       No current facility-administered medications for this visit. Review of Systems   Constitutional: Positive for chills. Negative for fever. HENT: Negative for congestion. Gastrointestinal: Negative. Negative for constipation, diarrhea, nausea and vomiting. Genitourinary: Positive for dysuria, flank pain (right side), frequency, pelvic pain (and pressure. states that it feels like menstrual cramps. ) and urgency. Negative for dyspareunia and hematuria. Cramping  States that urine looks dark   Musculoskeletal: Negative for myalgias. Neurological: Negative. Psychiatric/Behavioral: Negative. Objective:       /88 (Site: Right Upper Arm, Position: Sitting, Cuff Size: Medium Adult)   Pulse 82   Temp 97.9 °F (36.6 °C) (Tympanic)   Resp 20   Ht 5' 2.99\" (1.6 m)   Wt 182 lb 12.8 oz (82.9 kg)   SpO2 97%   Breastfeeding? No   BMI 32.39 kg/m²     Physical Exam   Constitutional: She is oriented to person, place, and time. Vital signs are normal. She appears well-developed and well-nourished. She is active and cooperative. HENT:   Head: Normocephalic and atraumatic. Right Ear: External ear normal.   Left Ear: External ear normal.   Nose: Nose normal.   Eyes: Pupils are equal, round, and reactive to light. EOM are normal.   Cardiovascular: Normal rate and regular rhythm. Pulmonary/Chest: Effort normal and breath sounds normal.   Abdominal: Soft. Normal appearance and bowel sounds are normal.       Area of pain. Tenderness noted only right side of ABD no pelvic tenderness. . ABS all four quadrants. States that her bowel are normal     Musculoskeletal: Normal range of motion. Thoracic back: She exhibits tenderness. Back:    Neurological: She is alert and oriented to person, place, and time. Skin: Skin is warm and dry. Psychiatric: She has a normal mood and affect. Her behavior is normal.   Nursing note and vitals reviewed. Hospital Outpatient Visit on 04/23/2019   Component Date Value Ref Range Status    Color, UA 04/23/2019 NOT REPORTED  YELLOW Final    Turbidity UA 04/23/2019 NOT REPORTED  CLEAR Final    Glucose, Ur 04/23/2019 NEGATIVE  NEGATIVE Final    Bilirubin Urine 04/23/2019 NEGATIVE  NEGATIVE Final    Ketones, Urine 04/23/2019 NEGATIVE  NEGATIVE Final    Specific Gravity, UA 04/23/2019 1.005* 1.010 - 1.025 Final    Urine Hgb 04/23/2019 TRACE* NEGATIVE Final    pH, UA 04/23/2019 6.0  5.0 - 6.0 Final    Protein, UA 04/23/2019 NEGATIVE  NEGATIVE Final    Urobilinogen, Urine 04/23/2019 Normal  Normal Final    Nitrite, Urine 04/23/2019 NEGATIVE  NEGATIVE Final    Leukocyte Esterase, Urine 04/23/2019 NEGATIVE  NEGATIVE Final    Urinalysis Comments 04/23/2019 NOT REPORTED   Final    - 04/23/2019        Final    WBC, UA 04/23/2019 None  0 - 4 /HPF Final    RBC, UA 04/23/2019 0 TO 4  0 - 4 /HPF Final    Casts UA 04/23/2019 NOT REPORTED  0 - 2 /LPF Final    Crystals UA 04/23/2019 NOT REPORTED  None /HPF Final    Epithelial Cells UA 04/23/2019 None  0 - 5 /HPF Final    Renal Epithelial, Urine 04/23/2019 NOT REPORTED  0 /HPF Final    Bacteria, UA 04/23/2019 None  None Final    Mucus, UA 04/23/2019 NOT REPORTED  None Final    Trichomonas, UA 04/23/2019 NOT REPORTED  None Final    Amorphous, UA 04/23/2019 NOT REPORTED  None Final    Other Observations UA 04/23/2019 NOT REPORTED  NOT REQ.  Final    Yeast, UA 04/23/2019 NOT REPORTED  None Final     Ct Abdomen Pelvis Wo Contrast Additional Contrast? None    Result Date: 4/23/2019  EXAMINATION: CT OF THE ABDOMEN AND PELVIS WITHOUT CONTRAST 4/23/2019 12:48 pm TECHNIQUE: CT of the abdomen and pelvis was performed without the administration of intravenous contrast. Multiplanar reformatted images are provided for review. Dose modulation, iterative reconstruction, and/or weight based adjustment of the mA/kV was utilized to reduce the radiation dose to as low as reasonably achievable. COMPARISON: None. HISTORY: ORDERING SYSTEM PROVIDED HISTORY: Right flank pain TECHNOLOGIST PROVIDED HISTORY: flank pain right side, ABD pain on right side, history of kidney stones Ordering Physician Provided Reason for Exam: C/o right flank pain and mid lower abd pain Acuity: Acute Type of Exam: Initial FINDINGS: LOWER CHEST: No focal abnormality. KIDNEYS AND URINARY TRACT: No renal calculi are identified. There is no evidence for hydronephrosis. The ureters are of normal course and caliber. ORGANS: Lack of intravenous contrast limits evaluation of the solid organs and bowel. The gallbladder is surgically absent. The liver, pancreas, spleen, and adrenals reveal no acute abnormality. GI/BOWEL: No bowel dilatation or wall thickening. The appendix is not visualized, however no secondary signs of acute appendicitis are appreciated. Moderate stool burden. PELVIS: No acute findings. The uterus and ovaries are not visualized. PERITONEUM/RETROPERITONEUM: No lymphadenopathy is noted. No free air or free fluid. The aorta is normal in caliber. Calcified atheromatous plaque is present. BONES/SOFT TISSUES: No acute osseous abnormality is identified. Advanced facet arthropathy in the lower lumbar spine with degenerative minimal anterolisthesis of L4. No acute abnormality identified. No renal calculi. Assessment & Plan:      1. Right flank pain    - CT ABDOMEN PELVIS WO CONTRAST Additional Contrast? None; Future    2. Right sided abdominal pain    - CT ABDOMEN PELVIS WO CONTRAST Additional Contrast? None; Future    3. Pelvic pain in female    - CT ABDOMEN PELVIS WO CONTRAST Additional Contrast? None; Future    Discussed CT and testing results.  Did offer gen surgery consult she declined

## 2019-04-30 ENCOUNTER — OFFICE VISIT (OUTPATIENT)
Dept: FAMILY MEDICINE CLINIC | Age: 59
End: 2019-04-30
Payer: COMMERCIAL

## 2019-04-30 ENCOUNTER — OFFICE VISIT (OUTPATIENT)
Dept: ORTHOPEDIC SURGERY | Age: 59
End: 2019-04-30
Payer: COMMERCIAL

## 2019-04-30 VITALS
WEIGHT: 183.8 LBS | HEIGHT: 63 IN | DIASTOLIC BLOOD PRESSURE: 84 MMHG | TEMPERATURE: 97 F | RESPIRATION RATE: 12 BRPM | OXYGEN SATURATION: 98 % | BODY MASS INDEX: 32.57 KG/M2 | HEART RATE: 88 BPM | SYSTOLIC BLOOD PRESSURE: 132 MMHG

## 2019-04-30 VITALS
SYSTOLIC BLOOD PRESSURE: 132 MMHG | HEIGHT: 62 IN | DIASTOLIC BLOOD PRESSURE: 76 MMHG | WEIGHT: 189 LBS | BODY MASS INDEX: 34.78 KG/M2 | HEART RATE: 80 BPM

## 2019-04-30 DIAGNOSIS — Z87.891 PERSONAL HISTORY OF TOBACCO USE, PRESENTING HAZARDS TO HEALTH: ICD-10-CM

## 2019-04-30 DIAGNOSIS — E11.9 CONTROLLED TYPE 2 DIABETES MELLITUS WITHOUT COMPLICATION, WITHOUT LONG-TERM CURRENT USE OF INSULIN (HCC): Primary | ICD-10-CM

## 2019-04-30 DIAGNOSIS — F32.A DEPRESSION, UNSPECIFIED DEPRESSION TYPE: ICD-10-CM

## 2019-04-30 DIAGNOSIS — Z72.0 TOBACCO USE: ICD-10-CM

## 2019-04-30 DIAGNOSIS — G56.03 BILATERAL CARPAL TUNNEL SYNDROME: Primary | ICD-10-CM

## 2019-04-30 DIAGNOSIS — Z13.220 LIPID SCREENING: ICD-10-CM

## 2019-04-30 PROCEDURE — G8427 DOCREV CUR MEDS BY ELIG CLIN: HCPCS | Performed by: NURSE PRACTITIONER

## 2019-04-30 PROCEDURE — 99214 OFFICE O/P EST MOD 30 MIN: CPT | Performed by: NURSE PRACTITIONER

## 2019-04-30 PROCEDURE — 4004F PT TOBACCO SCREEN RCVD TLK: CPT | Performed by: NURSE PRACTITIONER

## 2019-04-30 PROCEDURE — 3017F COLORECTAL CA SCREEN DOC REV: CPT | Performed by: NURSE PRACTITIONER

## 2019-04-30 PROCEDURE — G0296 VISIT TO DETERM LDCT ELIG: HCPCS | Performed by: NURSE PRACTITIONER

## 2019-04-30 PROCEDURE — 3046F HEMOGLOBIN A1C LEVEL >9.0%: CPT | Performed by: NURSE PRACTITIONER

## 2019-04-30 PROCEDURE — 99203 OFFICE O/P NEW LOW 30 MIN: CPT | Performed by: NURSE PRACTITIONER

## 2019-04-30 PROCEDURE — G8417 CALC BMI ABV UP PARAM F/U: HCPCS | Performed by: NURSE PRACTITIONER

## 2019-04-30 PROCEDURE — 2022F DILAT RTA XM EVC RTNOPTHY: CPT | Performed by: NURSE PRACTITIONER

## 2019-04-30 RX ORDER — VARENICLINE TARTRATE 0.5 MG/1
.5-1 TABLET, FILM COATED ORAL SEE ADMIN INSTRUCTIONS
Qty: 57 TABLET | Refills: 0 | Status: SHIPPED | OUTPATIENT
Start: 2019-04-30 | End: 2019-07-11

## 2019-04-30 RX ORDER — VENLAFAXINE HYDROCHLORIDE 150 MG/1
CAPSULE, EXTENDED RELEASE ORAL
Qty: 90 CAPSULE | Refills: 3 | Status: SHIPPED | OUTPATIENT
Start: 2019-04-30 | End: 2019-05-03 | Stop reason: ALTCHOICE

## 2019-04-30 ASSESSMENT — PATIENT HEALTH QUESTIONNAIRE - PHQ9
SUM OF ALL RESPONSES TO PHQ QUESTIONS 1-9: 0
2. FEELING DOWN, DEPRESSED OR HOPELESS: 0
SUM OF ALL RESPONSES TO PHQ9 QUESTIONS 1 & 2: 0
1. LITTLE INTEREST OR PLEASURE IN DOING THINGS: 0
SUM OF ALL RESPONSES TO PHQ QUESTIONS 1-9: 0

## 2019-04-30 ASSESSMENT — ENCOUNTER SYMPTOMS
RESPIRATORY NEGATIVE: 1
GASTROINTESTINAL NEGATIVE: 1

## 2019-04-30 NOTE — PROGRESS NOTES
[facility]    Subjective:     Patient ID: Debra Lopez is a 60 y.o. y.o. female. Patient in for office for 6 month visit for depression, diabetes, and nicotine dependence. I have reviewed the patient's medical history in detail and updated the computerized patient record. She voices no concerns at this visit. She continues to smokes cigarettes. She is due for CT lung screening. She is interested in trying Chantix again. She continues on effexor for depression and is well controlled on this medication. Diabetes   She presents for her follow-up diabetic visit. She has type 2 diabetes mellitus. No MedicAlert identification noted. Her disease course has been stable (last A1c was 6. labs are due). There are no hypoglycemic associated symptoms. There are no diabetic associated symptoms. There are no hypoglycemic complications. Symptoms are stable. Risk factors for coronary artery disease include dyslipidemia. Current diabetic treatment includes oral agent (monotherapy). She is compliant with treatment all of the time. Her weight is stable. When asked about meal planning, she reported none. She has not had a previous visit with a dietitian. She rarely participates in exercise. Home blood sugar record trend: does not check. An ACE inhibitor/angiotensin II receptor blocker is not being taken (patient choice). She does not see a podiatrist (foot exam due today). Eye exam is not current (needs to schedule).         Past Medical History:   Diagnosis Date    Allergic rhinitis     Arthritis     Asthma     Bronchitis     COPD (chronic obstructive pulmonary disease) ([address]     Depression     Diabetes mellitus ([address]     Headache     Hyperlipidemia     Hypertension     Incontinence     Irritable bowel syndrome     Kidney stone     Osteoarthritis     Pneumonia     Type 2 diabetes mellitus without complication ([address] 2679    Ulcer        Past Surgical History:   Procedure Laterality Date    CHOLECYSTECTOMY, LAPAROSCOPIC  1997    COLONOSCOPY  7/11/208    Serated polyp (1)    COLONOSCOPY  08/02/2017    [id number]    HYSTERECTOMY, TOTAL ABDOMINAL  1986    with Right oopherectomy still has left ovary    LAPAROSCOPY  2008    Diagnostic for pain - no findings    MECKEL DIVERTICULUM EXCISION  1970    TONSILLECTOMY      UPPER GASTROINTESTINAL ENDOSCOPY  08/02/2017    [id number]       Family History   Adopted: Yes   Family history unknown: Yes          Allergies   Allergen Reactions    Pcn [Penicillins]      \"can't breath\"    Sulfa Antibiotics      \"can't breathe\"       Current Outpatient Medications   Medication Sig Dispense Refill    venlafaxine (EFFEXOR XR) 150 MG extended release capsule TAKE 1 CAPSULE BY MOUTH ONCE DAILY WITH BREAKFAST 90 capsule 3    metFORMIN (GLUCOPHAGE) 1000 MG tablet TAKE 1 TABLET BY MOUTH TWICE DAILY WITH  MEALS 180 tablet 1    varenicline (CHANTIX) 0.5 MG tablet Take 1-2 tablets by mouth See Admin Instructions 0.5mg DAILY for 3 days followed by 0.5mg TWICE DAILY for 4 days followed by 1mg DAILY 57 tablet 0    fluticasone (FLONASE) 50 MCG/ACT nasal spray 2 sprays by Nasal route daily 1 Bottle 11    omeprazole (PRILOSEC) 20 MG capsule Take 40 mg by mouth daily       No current facility-administered medications for this visit. Review of Systems   Constitutional: Negative. HENT: Negative. Eyes:        Wears glasses   Respiratory: Negative. Cardiovascular: Negative. Gastrointestinal: Negative. Musculoskeletal: Negative. Neurological: Negative. Psychiatric/Behavioral: Negative. Objective:       /84 (Site: Right Upper Arm, Position: Sitting, Cuff Size: Medium Adult)   Pulse 88   Temp 97 °F (36.1 °C) (Tympanic)   Resp 12   Ht 5' 2.99\" (1.6 m)   Wt 183 lb 12.8 oz (83.4 kg)   SpO2 98%   BMI 32.57 kg/m²     Physical Exam   Constitutional: She is oriented to person, place, and time.  Vital signs are normal. She appears well-developed and well-nourished. She is cooperative. HENT:   Head: Normocephalic and atraumatic. Right Ear: External ear normal.   Left Ear: External ear normal.   Nose: Nose normal.   Mouth/Throat: Oropharynx is clear and moist.   Eyes: Pupils are equal, round, and reactive to light. Conjunctivae and EOM are normal.   Neck: Normal range of motion. Cardiovascular: Normal rate and regular rhythm. Pulmonary/Chest: Effort normal and breath sounds normal.   Abdominal: Soft. Bowel sounds are normal.   Musculoskeletal: Normal range of motion. Patient had a diabetic foot exam today. No open areas or ulcerations noted. Fine filament testing to the entire dorsal and plantar aspect of the foot reveals good sensation in all areas. No cyanosis. +2/4 pedal pulses, symmetric bilaterally     Feet:   Right Foot:   Protective Sensation: 5 sites tested. 5 sites sensed. Skin Integrity: Negative for ulcer, blister, skin breakdown or erythema. Left Foot:   Protective Sensation: 5 sites tested. 5 sites sensed. Skin Integrity: Negative for ulcer, blister, skin breakdown or erythema. Neurological: She is alert and oriented to person, place, and time. Skin: Skin is warm and dry. Psychiatric: She has a normal mood and affect. Her behavior is normal. Judgment and thought content normal.   Nursing note and vitals reviewed. Alayna received counseling on the following healthy behaviors: nutrition, exercise, medication adherence and tobacco cessation  Reviewed prior labs and health maintenance  Continue current medications, diet and exercise. Discussed use, benefit, and side effects of prescribed medications. Barriers to medication compliance addressed. Patient given educational materials - see patient instructions  Was a self-tracking handout given in paper form or via [employer]?  No: trial chantix    Requested Prescriptions     Signed Prescriptions Disp Refills    venlafaxine (EFFEXOR XR) 150 MG extended release capsule 90 capsule 3     Sig: TAKE 1 CAPSULE BY MOUTH ONCE DAILY WITH BREAKFAST    metFORMIN (GLUCOPHAGE) 1000 MG tablet 180 tablet 1     Sig: TAKE 1 TABLET BY MOUTH TWICE DAILY WITH  MEALS    varenicline (CHANTIX) 0.5 MG tablet 57 tablet 0     Sig: Take 1-2 tablets by mouth See Admin Instructions 0.5mg DAILY for 3 days followed by 0.5mg TWICE DAILY for 4 days followed by 1mg DAILY       All patient questions answered. Patient voiced understanding. Quality Measures    Body mass index is 32.57 kg/m². Elevated. Weight control planned discussed Healthy diet and regular exercise. BP: 132/84 Blood pressure is normal. Treatment plan consists of No treatment change needed. Lab Results   Component Value Date    LDLCALC 87 09/15/2016    LDLCHOLESTEROL 132 (H) 05/05/2018    (goal LDL reduction with dx if diabetes is 50% LDL reduction)      PHQ Scores 4/30/2019 5/1/2018   PHQ2 Score 0 2   PHQ9 Score 0 2     Interpretation of Total Score Depression Severity: 1-4 = Minimal depression, 5-9 = Mild depression, 10-14 = Moderate depression, 15-19 = Moderately severe depression, 20-27 = Severe depression      Assessment & Plan:      1. Controlled type 2 diabetes mellitus without complication, without long-term current use of insulin (HCC)-chronic stable    - metFORMIN (GLUCOPHAGE) 1000 MG tablet; TAKE 1 TABLET BY MOUTH TWICE DAILY WITH  MEALS  Dispense: 180 tablet; Refill: 1  -  DIABETES FOOT EXAM  - CBC Auto Differential; Future  - Comprehensive Metabolic Panel; Future  - Hemoglobin A1C; Future  - Microalbumin, Ur; Future    2. Tobacco use-chronic    - SD VISIT TO DISCUSS LUNG CA SCREEN W LDCT  - CT Lung Screening (Annual); Future  - varenicline (CHANTIX) 0.5 MG tablet; Take 1-2 tablets by mouth See Admin Instructions 0.5mg DAILY for 3 days followed by 0.5mg TWICE DAILY for 4 days followed by 1mg DAILY  Dispense: 57 tablet; Refill: 0    3.  Depression, unspecified depression type-chronic stable on medication    - venlafaxine (EFFEXOR XR) 150 MG extended release capsule; TAKE 1 CAPSULE BY MOUTH ONCE DAILY WITH BREAKFAST  Dispense: 90 capsule; Refill: 3    4. Lipid screening    - Lipid Panel; Future    Answered all of the patient's questions. Agrees with plan of care. Follow up in 6 months or sooner if needed.       Naren Ramirez, ETHAN - CNP   4/30/2019 7:47 AM

## 2019-04-30 NOTE — LETTER
[address] ORTHOPEDICS  [facility]  Phone: [phone]  Fax: [phone]    ETHAN Paris CNP        April 30, 2019     Patient: Arpan Rangel   YOB: 1960   Date of Visit: 4/30/2019       To Whom it May Concern:    Arpan Rangel was seen in my clinic on 4/30/2019. If you have any questions or concerns, please don't hesitate to call.     Sincerely,         ETHAN Paris CNP

## 2019-04-30 NOTE — PROGRESS NOTES
Chronic Disease Visit Information    BP Readings from Last 3 Encounters:   04/23/19 134/88   03/01/19 128/77   01/31/19 138/80          Hemoglobin A1C (%)   Date Value   05/05/2018 6.0 (H)   06/21/2017 6.3 (H)   03/03/2016 6.3     Microalb/Crt. Ratio (mcg/mg creat)   Date Value   05/05/2018 19     LDL Cholesterol (mg/dL)   Date Value   05/05/2018 132 (H)     LDL Calculated (mg/dL)   Date Value   09/15/2016 87     HDL (mg/dL)   Date Value   05/05/2018 76     BUN (mg/dL)   Date Value   05/05/2018 11     CREATININE (mg/dL)   Date Value   05/05/2018 0.57     Glucose (mg/dL)   Date Value   05/05/2018 111 (H)            Have you changed or started any medications since your last visit including any over-the-counter medicines, vitamins, or herbal medicines? no   Are you having any side effects from any of your medications? -  no  Have you stopped taking any of your medications? Is so, why? -  no    Have you seen any other physician or provider since your last visit? No  Have you had any other diagnostic tests since your last visit? No  Have you been seen in the emergency room and/or had an admission to a hospital since we last saw you? No  Have you had your annual diabetic retinal (eye) exam? No  Have you had your routine dental cleaning in the past 6 months? no    Have you activated your [employer] account? If not, what are your barriers?  Yes     Patient Care Team:  ETHAN Sanchez CNP as PCP - General (Family Medicine)  ETHAN Sanchez CNP as Nurse Practitioner (Family Medicine)         Medical History Review  Past Medical, Family, and Social History reviewed and does contribute to the patient presenting condition    Health Maintenance   Topic Date Due    Diabetic retinal exam  11/13/1969    Hepatitis B Vaccine (1 of 3 - Risk 3-dose series) 11/13/1978    Low dose CT lung screening  11/13/2014    Diabetic foot exam  05/01/2019    A1C test (Diabetic or Prediabetic)  05/05/2019    Diabetic microalbuminuria test  05/05/2019    Lipid screen  05/05/2019    DTaP/Tdap/Td vaccine (1 - Tdap) 05/01/2019 (Originally 11/13/1978)    Shingles Vaccine (1 of 2) 05/01/2019 (Originally 11/13/2009)    Breast cancer screen  07/18/2019    Colon cancer screen colonoscopy  08/02/2022    Flu vaccine  Completed    Pneumococcal 0-64 years Vaccine  Completed    Hepatitis C screen  Completed    HIV screen  Completed       Low Dose CT (LDCT) Lung Screening criteria met   Age 55-77   Pack year smoking >30   Still smoking or less than 15 year since quit   No sign or symptoms of lung cancer   > 11 months since last LDCT     Risks and benefits of lung cancer screening with LDCT scans discussed:    Significance of positive screen - False-positive LDCT results often occur. 95% of all positive results do not lead to a diagnosis of cancer. Usually further imaging can resolve most false-positive results; however, some patients may require invasive procedures. Over diagnosis risk - 10% to 12% of screen-detected lung cancer cases are over diagnosed--that is, the cancer would not have been detected in the patient's lifetime without the screening. Need for follow up screens annually to continue lung cancer screening effectiveness     Risks associated with radiation from annual LDCT- Radiation exposure is about the same as for a mammogram, which is about 1/3 of the annual background radiation exposure from everyday life. Starting screening at age 54 is not likely to increase cancer risk from radiation exposure. Patients with comorbidities resulting in life expectancy of < 10 years, or that would preclude treatment of an abnormality identified on CT, should not be screened due to lack of benefit.     To obtain maximal benefit from this screening, smoking cessation and long-term abstinence from smoking is critical

## 2019-04-30 NOTE — PATIENT INSTRUCTIONS
Patient Education        Deciding About Using Medicines To Quit Smoking  How can you decide about using medicines to quit smoking? What are the medicines you can use? Your doctor may prescribe varenicline (Chantix) or bupropion (Zyban). These medicines can help you cope with cravings for tobacco. They are pills that don't contain nicotine. You also can use nicotine replacement products. These do contain nicotine. There are many types. · Gum and lozenges slowly release nicotine into your mouth. · Patches stick to your skin. They slowly release nicotine into your bloodstream.  · An inhaler has a novak that contains nicotine. You breathe in a puff of nicotine vapor through your mouth and throat. · Nasal spray releases a mist that contains nicotine. What are key points about this decision? · Using medicines can double your chances of quitting smoking. They can ease cravings and withdrawal symptoms. · Getting counseling along with using medicine can raise your chances of quitting even more. · If you smoke fewer than 5 cigarettes a day, you may not need medicines to help you quit smoking. · These medicines have less nicotine than cigarettes. And by itself, nicotine is not nearly as harmful as smoking. The tars, carbon monoxide, and other toxic chemicals in tobacco cause the harmful effects. · The side effects of nicotine replacement products depend on the type of product. For example, a patch can make your skin red and itchy. Medicines in pill form can make you sick to your stomach. They can also cause dry mouth and trouble sleeping. For most people, the side effects are not bad enough to make them stop using the products. Why might you choose to use medicines to quit smoking? · You have tried on your own to stop smoking, but you were not able to stop. · You smoke more than 5 cigarettes a day. · You want to increase your chances of quitting smoking.   · You want to reduce your cravings and withdrawal symptoms. · You feel the benefits of medicine outweigh the side effects. Why might you choose not to use medicine? · You want to try quitting on your own by stopping all at once (\"cold turkey\"). · You want to cut back slowly on the number of cigarettes you smoke. · You smoke fewer than 5 cigarettes a day. · You do not like using medicine. · You feel the side effects of medicines outweigh the benefits. · You are worried about the cost of medicines. Your decision  Thinking about the facts and your feelings can help you make a decision that is right for you. Be sure you understand the benefits and risks of your options, and think about what else you need to do before you make the decision. Where can you learn more? Go to https://[email or web address].[employer]. org and sign in to your [employer] account. Enter E384 in the [employer] box to learn more about \"Deciding About Using Medicines To Quit Smoking. \"     If you do not have an account, please click on the \"Sign Up Now\" link. Current as of: September 26, 2018  Content Version: 11.9  © 2602-0168 [employer], [employer]. Care instructions adapted under license by [facility] ([place] [facility]). If you have questions about a medical condition or this instruction, always ask your healthcare professional. [address] any warranty or liability for your use of this information. What is lung cancer screening? Lung cancer screening is a way in which doctors check the lungs for early signs of cancer in people who have no symptoms of lung cancer. A low-dose CT scan uses much less radiation than a normal CT scan and shows a more detailed image of the lungs than a standard X-ray. The goal of lung cancer screening is to find cancer early, before it has a chance to grow, spread, or cause problems.   One large study found that smokers who were screened with low-dose CT scans were less likely to die of lung cancer than those who were screened with standard X-ray. Below is a summary of the things you need to know regarding screening for lung cancer with low-dose computed tomography (LDCT). This is a screening program that involves routine annual screening with LDCT studies of the lung. The LDCTs are done using low-dose radiation that is not thought to increase your cancer risk. If you have other serious medical conditions (other cancers, congestive heart failure) that limit your life expectancy to less than 10 years, you should not undergo lung cancer screening with LDCT. The chance is 20%-60% that the LDCT result will show abnormalities. This would require additional testing which could include repeat imaging or even invasive procedures. Most (about 95%) of \"abnormal\" LDCT results are false in the sense that no lung cancer is ultimately found. Additionally, some (about 10%) of the cancers found would not affect your life expectancy, even if undetected and untreated. If you are still smoking, the single most important thing that you can do to reduce your risk of dying of lung cancer is to quit. For this screening to be covered by Medicare and most other insurers, strict criteria must be met. If you do not meet these criteria, but still wish to undergo LDCT testing, you will be required to sign a waiver indicating your willingness to pay for the scan.

## 2019-05-01 NOTE — PROGRESS NOTES
[facility] [address]  Count includes the [facility]  Dept: [phone]  Loc: [phone]    Date of Service:  4/30/2019    Oliver Shin  YOB: 1960  MRN: [id number]    HISTORY AND PHYSICAL     CHIEF COMPLAINT:  The patient is here for bilateral carpal tunnel syndrome. Oliver Shin is a 60 y.o. female who comes in today with complaints of bilateral carpal tunnel syndrome. She states that this has been ongoing for years now. It has recently worsened. She was seen by her family doctor where she has tried some wrist braces. She also had an EMG done of the right upper extremity which did show evidence of moderate median mononeuropathy at the wrists bilaterally. She is right-handed. She states that the right hand has numbness and tingling pain worse than the left. She does describe some numbness, tingling and pain through the first through third fingers. She does state the pain wakes her up at nighttime. She also has symptoms while driving. She does do repetitive motions at work which she feels is also a factor. The patient states she would like to proceed with a surgery done under a local and would like this done next week. The patient states she is to the point where she is dropping things and has altered sensations to bilateral hands. She does note pain, numbness and tingling that will radiate up the arm and notices mainly through fingers one through three.     Allergies   Allergen Reactions    Pcn [Penicillins]      \"can't breath\"    Sulfa Antibiotics      \"can't breathe\"     Current Outpatient Medications   Medication Sig Dispense Refill    venlafaxine (EFFEXOR XR) 150 MG extended release capsule TAKE 1 CAPSULE BY MOUTH ONCE DAILY WITH BREAKFAST 90 capsule 3    metFORMIN (GLUCOPHAGE) 1000 MG tablet TAKE 1 TABLET BY MOUTH TWICE DAILY WITH  MEALS 180 tablet 1    varenicline (CHANTIX) 0.5 MG tablet Take 1-2 tablets by mouth See Admin Instructions 0.5mg chest pain or palpitations. Lungs:  Denies any wheezing or asthma. GI: Denies any nausea or vomiting. : Denies any hematuria or dysuria. PHYSICAL EXAM:  General:  This is a well-developed, well-nourished female, alert and oriented x3, calm, cooperative, in no acute distress. HEENT:  Atraumatic, normocephalic. Heart:  Regular rate and rhythm. Lungs:  No audible rhonchi or wheezes. Abdomen:  Soft, nontender. On orthopedic exam of bilateral upper extremities, the patient does have positive Tinel's, positive Phalen's worse on right than left. Wrist flexion and extension is intact. Finger flexion and extension is intact. Altered sensation noted to bilateral hands. IMPRESSION:     Bilateral carpal tunnel syndrome    -  Primary     PLAN:  At this time, options were discussed with the patient. She does have a positive EMG. The patient is not interested in further use of braces or injections. She would like to proceed with bilateral carpal tunnel syndrome. She would like to proceed with the right side first.  We will plan to get her scheduled on May 7 for carpal tunnel release under a local.  Risks and benefits were discussed with the patient. Consent obtained. We will see her back for right carpal tunnel release. Tonja Abbasi am personally transcribing for ETHAN Akins CNP 5/1/19 at 9:23 AM.    [facility] ETHAN Rodarte CNP, personally performed the services described in this document as transcribed by Comfort Barrios, and it is both accurate and complete.     Electronically signed by ETHAN Akins CNP on 5/2/2019 at 7:53 AM

## 2019-05-03 ENCOUNTER — OFFICE VISIT (OUTPATIENT)
Dept: FAMILY MEDICINE CLINIC | Age: 59
End: 2019-05-03
Payer: COMMERCIAL

## 2019-05-03 VITALS
SYSTOLIC BLOOD PRESSURE: 136 MMHG | HEART RATE: 82 BPM | DIASTOLIC BLOOD PRESSURE: 88 MMHG | HEIGHT: 63 IN | TEMPERATURE: 98.5 F | OXYGEN SATURATION: 97 % | BODY MASS INDEX: 32.11 KG/M2 | RESPIRATION RATE: 16 BRPM | WEIGHT: 181.2 LBS

## 2019-05-03 DIAGNOSIS — F32.A DEPRESSION, UNSPECIFIED DEPRESSION TYPE: Primary | ICD-10-CM

## 2019-05-03 DIAGNOSIS — M54.5 CHRONIC MIDLINE LOW BACK PAIN, WITH SCIATICA PRESENCE UNSPECIFIED: ICD-10-CM

## 2019-05-03 DIAGNOSIS — R15.9 URINARY AND BOWEL INCONTINENCE: ICD-10-CM

## 2019-05-03 DIAGNOSIS — G89.29 CHRONIC MIDLINE LOW BACK PAIN, WITH SCIATICA PRESENCE UNSPECIFIED: ICD-10-CM

## 2019-05-03 DIAGNOSIS — R32 URINARY AND BOWEL INCONTINENCE: ICD-10-CM

## 2019-05-03 PROCEDURE — 4004F PT TOBACCO SCREEN RCVD TLK: CPT | Performed by: NURSE PRACTITIONER

## 2019-05-03 PROCEDURE — 3017F COLORECTAL CA SCREEN DOC REV: CPT | Performed by: NURSE PRACTITIONER

## 2019-05-03 PROCEDURE — G8427 DOCREV CUR MEDS BY ELIG CLIN: HCPCS | Performed by: NURSE PRACTITIONER

## 2019-05-03 PROCEDURE — G8417 CALC BMI ABV UP PARAM F/U: HCPCS | Performed by: NURSE PRACTITIONER

## 2019-05-03 PROCEDURE — 99214 OFFICE O/P EST MOD 30 MIN: CPT | Performed by: NURSE PRACTITIONER

## 2019-05-03 RX ORDER — DULOXETIN HYDROCHLORIDE 60 MG/1
60 CAPSULE, DELAYED RELEASE ORAL DAILY
Qty: 90 CAPSULE | Refills: 1 | Status: SHIPPED | OUTPATIENT
Start: 2019-05-03 | End: 2019-07-11

## 2019-05-03 ASSESSMENT — PATIENT HEALTH QUESTIONNAIRE - PHQ9
SUM OF ALL RESPONSES TO PHQ9 QUESTIONS 1 & 2: 2
1. LITTLE INTEREST OR PLEASURE IN DOING THINGS: 0
SUM OF ALL RESPONSES TO PHQ QUESTIONS 1-9: 2
SUM OF ALL RESPONSES TO PHQ QUESTIONS 1-9: 2
2. FEELING DOWN, DEPRESSED OR HOPELESS: 2

## 2019-05-03 ASSESSMENT — ENCOUNTER SYMPTOMS: BACK PAIN: 1

## 2019-05-03 NOTE — PROGRESS NOTES
[facility]    Subjective:     Patient ID: Sary Boyd is a 60 y.o. y.o. female. HPI Patient in for office for concerns with depression. She takes effexor for depression. She feels like this is not helping anymore. She states that she used to be on Cymbalta and that worked better but it was more expensive so that is why she was put on the Effexor. She is very tearful today. She recently lost her mother in Feb and is having some relationship stressors and it have really brought her down. She also has a concern regarding her bowel and bladder control. She cannot control either of these. It affect her at work. She cannot feel when she urinates and defecates. She states that she has to wear an attends every day. She has had EMG testing in her back years ago. She wonders if this is stemming from her chronic back pain. She saw OB/GYN for this and she was supposed to see urology and GI. Past Medical History:   Diagnosis Date    Allergic rhinitis     Arthritis     Asthma     Bronchitis     COPD (chronic obstructive pulmonary disease) ([address]     Depression     Diabetes mellitus ([address]     Headache     Hyperlipidemia     Hypertension     Incontinence     Irritable bowel syndrome     Kidney stone     Osteoarthritis     Pneumonia     Type 2 diabetes mellitus without complication ([address] 3396    Ulcer        Past Surgical History:   Procedure Laterality Date    CHOLECYSTECTOMY, LAPAROSCOPIC  1997    COLONOSCOPY  7/11/208    Serated polyp (1)    COLONOSCOPY  08/02/2017    [id number]    HYSTERECTOMY, TOTAL ABDOMINAL  1986    with Right oopherectomy still has left ovary    LAPAROSCOPY  2008    Diagnostic for pain - no findings    MECKEL DIVERTICULUM EXCISION  1970    TONSILLECTOMY      UPPER GASTROINTESTINAL ENDOSCOPY  08/02/2017    [id number]       Family History   Adopted: Yes   Family history unknown:  Yes          Allergies   Allergen Reactions    Pcn [Penicillins] \"can't breath\"    Sulfa Antibiotics      \"can't breathe\"       Current Outpatient Medications   Medication Sig Dispense Refill    DULoxetine (CYMBALTA) 60 MG extended release capsule Take 1 capsule by mouth daily 90 capsule 1    metFORMIN (GLUCOPHAGE) 1000 MG tablet TAKE 1 TABLET BY MOUTH TWICE DAILY WITH  MEALS 180 tablet 1    Naproxen Sodium (ALEVE PO) Take by mouth daily as needed      fluticasone (FLONASE) 50 MCG/ACT nasal spray 2 sprays by Nasal route daily 1 Bottle 11    omeprazole (PRILOSEC) 20 MG capsule Take 40 mg by mouth daily      varenicline (CHANTIX) 0.5 MG tablet Take 1-2 tablets by mouth See Admin Instructions 0.5mg DAILY for 3 days followed by 0.5mg TWICE DAILY for 4 days followed by 1mg DAILY 57 tablet 0     No current facility-administered medications for this visit. Review of Systems   Constitutional: Negative. Genitourinary:        Bowel and bladder incontinence     Musculoskeletal: Positive for back pain and gait problem. Psychiatric/Behavioral: Negative for self-injury, sleep disturbance and suicidal ideas. Feels more down and depressed           Objective:       /88 (Site: Right Upper Arm, Position: Sitting, Cuff Size: Medium Adult)   Pulse 82   Temp 98.5 °F (36.9 °C) (Tympanic)   Resp 16   Ht 5' 3\" (1.6 m)   Wt 181 lb 3.2 oz (82.2 kg)   SpO2 97%   BMI 32.10 kg/m²     Physical Exam   Constitutional: She is oriented to person, place, and time. Vital signs are normal. She appears well-developed and well-nourished. She is cooperative. HENT:   Head: Normocephalic and atraumatic. Nose: Nose normal.   Eyes: Pupils are equal, round, and reactive to light. EOM are normal.   Neck: Normal range of motion. Pulmonary/Chest: Effort normal and breath sounds normal.   Musculoskeletal: Normal range of motion. Lumbar back: She exhibits pain and spasm. Back:         Legs:  Areas of pain, states that it feels like spasms and cramping at times.  She denies sciatica pain. Neurological: She is alert and oriented to person, place, and time. Skin: Skin is warm and dry. Psychiatric: Her speech is normal and behavior is normal. Judgment and thought content normal. Her mood appears anxious. Cognition and memory are normal. She exhibits a depressed mood. Very tearful today. She does not like to talk about her problems. Nursing note and vitals reviewed. Assessment & Plan:      1. Depression, unspecified depression type-  Will stop effexor. Offered referral to behavioral health. She will think about this and let us know  - DULoxetine (CYMBALTA) 60 MG extended release capsule; Take 1 capsule by mouth daily  Dispense: 90 capsule; Refill: 1    2. Urinary and bowel incontinence-chronic worsening  Will start with back referral. She will consider going back to OB/GYN if this is not related to her back. - Zahraa Bentley MD, Orthopedic Surgery, [place]    3.  Chronic midline low back pain, with sciatica presence unspecified-    - Zahraa Bentley MD, Orthopedic Surgery, [address], APRN - CNP   5/3/2019 11:55 AM

## 2019-05-07 ENCOUNTER — HOSPITAL ENCOUNTER (OUTPATIENT)
Age: 59
Setting detail: OUTPATIENT SURGERY
Discharge: HOME | End: 2019-05-07
Attending: ORTHOPAEDIC SURGERY | Admitting: ORTHOPAEDIC SURGERY
Payer: COMMERCIAL

## 2019-05-07 VITALS
DIASTOLIC BLOOD PRESSURE: 75 MMHG | TEMPERATURE: 98.2 F | RESPIRATION RATE: 16 BRPM | WEIGHT: 181.25 LBS | HEIGHT: 63 IN | SYSTOLIC BLOOD PRESSURE: 148 MMHG | BODY MASS INDEX: 32.11 KG/M2 | HEART RATE: 80 BPM | OXYGEN SATURATION: 96 %

## 2019-05-07 DIAGNOSIS — Z98.890 S/P CARPAL TUNNEL RELEASE: Primary | ICD-10-CM

## 2019-05-07 PROBLEM — G56.01 CARPAL TUNNEL SYNDROME OF RIGHT WRIST: Status: ACTIVE | Noted: 2019-05-07

## 2019-05-07 LAB — GLUCOSE BLD-MCNC: 125 MG/DL (ref 65–105)

## 2019-05-07 PROCEDURE — 82947 ASSAY GLUCOSE BLOOD QUANT: CPT

## 2019-05-07 PROCEDURE — 7100000010 HC PHASE II RECOVERY - FIRST 15 MIN: Performed by: ORTHOPAEDIC SURGERY

## 2019-05-07 PROCEDURE — 2709999900 HC NON-CHARGEABLE SUPPLY: Performed by: ORTHOPAEDIC SURGERY

## 2019-05-07 PROCEDURE — 3600000002 HC SURGERY LEVEL 2 BASE: Performed by: ORTHOPAEDIC SURGERY

## 2019-05-07 PROCEDURE — 7100000011 HC PHASE II RECOVERY - ADDTL 15 MIN: Performed by: ORTHOPAEDIC SURGERY

## 2019-05-07 PROCEDURE — 3600000012 HC SURGERY LEVEL 2 ADDTL 15MIN: Performed by: ORTHOPAEDIC SURGERY

## 2019-05-07 PROCEDURE — 64721 CARPAL TUNNEL SURGERY: CPT | Performed by: ORTHOPAEDIC SURGERY

## 2019-05-07 PROCEDURE — 2500000003 HC RX 250 WO HCPCS: Performed by: ORTHOPAEDIC SURGERY

## 2019-05-07 RX ORDER — HYDROCODONE BITARTRATE AND ACETAMINOPHEN 5; 325 MG/1; MG/1
1-2 TABLET ORAL
Qty: 30 TABLET | Refills: 0 | Status: SHIPPED | OUTPATIENT
Start: 2019-05-07 | End: 2019-05-14

## 2019-05-07 RX ORDER — ONDANSETRON 2 MG/ML
4 INJECTION INTRAMUSCULAR; INTRAVENOUS EVERY 6 HOURS PRN
Status: CANCELLED | OUTPATIENT
Start: 2019-05-07

## 2019-05-07 RX ORDER — MORPHINE SULFATE 4 MG/ML
4 INJECTION, SOLUTION INTRAMUSCULAR; INTRAVENOUS
Status: CANCELLED | OUTPATIENT
Start: 2019-05-07

## 2019-05-07 RX ORDER — HYDROCODONE BITARTRATE AND ACETAMINOPHEN 5; 325 MG/1; MG/1
1 TABLET ORAL EVERY 4 HOURS PRN
Status: CANCELLED | OUTPATIENT
Start: 2019-05-07

## 2019-05-07 RX ORDER — HYDROCODONE BITARTRATE AND ACETAMINOPHEN 5; 325 MG/1; MG/1
2 TABLET ORAL EVERY 4 HOURS PRN
Status: CANCELLED | OUTPATIENT
Start: 2019-05-07

## 2019-05-07 RX ORDER — MORPHINE SULFATE 2 MG/ML
2 INJECTION, SOLUTION INTRAMUSCULAR; INTRAVENOUS
Status: CANCELLED | OUTPATIENT
Start: 2019-05-07

## 2019-05-07 ASSESSMENT — PAIN SCALES - GENERAL
PAINLEVEL_OUTOF10: 0
PAINLEVEL_OUTOF10: 0

## 2019-05-07 ASSESSMENT — PAIN - FUNCTIONAL ASSESSMENT: PAIN_FUNCTIONAL_ASSESSMENT: 0-10

## 2019-05-07 NOTE — OP NOTE
[facility]  Post-Operative Note    Patient Name:  Angela Steele  MRN:  [id number] YOB: 1960  Admission Date:  5/7/2019    Surgery Date:  5/7/2019    Pre-operative Diagnosis: Right   Carpal Tunnel Syndrome      Post-operative Diagnosis: Same    Procedure: Carpal Tunnel release    Surgeon: Carmen Franco MD    Assistants:      Anesthesia:   Local      Estimated Blood Loss: Mininal    Complications:  None    Specimens: None       INDICATIONS: Angela Steele is a 60y.o.-year-old who has had wrist pain and numbness in the Right  hand for quite some time. There is EMG proven carpal tunnel syndrome. Night splinting and other non-operative care has failed. We have discussed a carpal tunnel release and patient has elected to proceed.     NARRATIVE SUMMARY: The patient taken to the operative suite after receiving an injection of buffered lidocaine in the pre-op holding area. The Right  upper extremity was then prepped and draped in normal sterile fashion with a non-sterile tourniquet. Timeout was taken. Consent was confirmed. Additional local anesthetic was injected. Tourniquet was inflated to 250 mmHg. I made a 2.5 cm incision over the transverse carpal ligament with skin knife, followed by electrocautery, very superficially. Dissection continued down to the transverse carpal ligament. It was divided along with palmaris brevis. A complete release was performed proximally and distally with Metzenbaum scissors. There was no recurrent motor branch noted within the field. Wounds were irrigated, closed with nylon, Dermabond and dry dressings. The patient was then returned to the recovery room in good condition.     POSTOPERATIVE PLAN: Weightbearing as tolerated. Activities as tolerated.  See the patient back in 2-3 weeks for suture removal.           Flash Shaw MD

## 2019-05-07 NOTE — PROGRESS NOTES
Discharge instructions reviewed with patient and spouse - both voice understanding of instructions. Stood @ bedside without weakness or difficulty. Preparing for discharge.

## 2019-05-09 DIAGNOSIS — M54.50 LUMBAR SPINE PAIN: Primary | ICD-10-CM

## 2019-05-15 ENCOUNTER — TELEPHONE (OUTPATIENT)
Dept: FAMILY MEDICINE CLINIC | Age: 59
End: 2019-05-15

## 2019-05-22 ENCOUNTER — TELEPHONE (OUTPATIENT)
Dept: FAMILY MEDICINE CLINIC | Age: 59
End: 2019-05-22

## 2019-05-28 ENCOUNTER — OFFICE VISIT (OUTPATIENT)
Dept: ORTHOPEDIC SURGERY | Age: 59
End: 2019-05-28
Payer: COMMERCIAL

## 2019-05-28 VITALS
WEIGHT: 181 LBS | DIASTOLIC BLOOD PRESSURE: 80 MMHG | HEIGHT: 63 IN | OXYGEN SATURATION: 94 % | SYSTOLIC BLOOD PRESSURE: 130 MMHG | HEART RATE: 80 BPM | BODY MASS INDEX: 32.07 KG/M2

## 2019-05-28 DIAGNOSIS — M19.049 CMC ARTHRITIS: Primary | ICD-10-CM

## 2019-05-28 PROCEDURE — 99212 OFFICE O/P EST SF 10 MIN: CPT | Performed by: PHYSICIAN ASSISTANT

## 2019-05-28 PROCEDURE — G8427 DOCREV CUR MEDS BY ELIG CLIN: HCPCS | Performed by: PHYSICIAN ASSISTANT

## 2019-05-28 PROCEDURE — G8417 CALC BMI ABV UP PARAM F/U: HCPCS | Performed by: PHYSICIAN ASSISTANT

## 2019-05-28 PROCEDURE — 20550 NJX 1 TENDON SHEATH/LIGAMENT: CPT | Performed by: PHYSICIAN ASSISTANT

## 2019-05-28 PROCEDURE — 3017F COLORECTAL CA SCREEN DOC REV: CPT | Performed by: PHYSICIAN ASSISTANT

## 2019-05-28 PROCEDURE — 4004F PT TOBACCO SCREEN RCVD TLK: CPT | Performed by: PHYSICIAN ASSISTANT

## 2019-05-29 NOTE — PROGRESS NOTES
[facility] [address]  [facility]  Dept: Alejandro Avalos: [phone]    Date of Service:  5/28/2019    Carlton Post  YOB: 1960  MRN: [id number]      SUBJECTIVE:  Carlton Post is a 60 y.o. well known to our practice who is a couple weeks out from right hand carpal tunnel release. She states that the numbness in her hand is completely gone. Now she has this pain at the base of her thumb. She states that she has had this for a long time. She has a diagnosis of basilar thumb joint arthritis from years ago. Her major complaint is thumb pain. Anytime she tries to reach for things or grasp things she has a severe sharp stabbing thumb pain. She is satisfied that the recent carpal tunnel surgery has helped her though with the numbness. On exam today, this is a 60 y.o. in no acute distress. She is alert, oriented, pleasant, cooperative. Right hand was inspected. Carpal tunnel incision is well healed. Stitches removed without complications or difficulty. Dry sterile bandage was applied. She does have severe pain to palpation at the base of the thumb. Positive basilar thumb grind test here today. She has pain in the first dorsal compartment. She has a mildly positive Finkelstein's test here today as well. RADIOLOGY:  X-rays reviewed from three years ago. She does have significant basilar thumb joint arthritis in the right hand thumb. IMPRESSION:    1. Stable a couple weeks out from right hand carpal tunnel release. 2. Right thumb basilar thumb joint osteoarthritis. PLAN:  At this point, I think she would benefit from a basilar thumb joint injection. She agreed. PROCEDURE NOTE:  After verbal consent was obtained, utilizing sterile technique the base of the right thumb was painted with Betadine solution and wiped free with an alcohol pad.   A 25-gauge needle was used to introduce 1 mL of 0.5% Marcaine and 40 mg of Kenalog into the basilar thumb joint. This was well tolerated. Good hemostasis noted. Dry sterile bandage was applied. See how she does following this carpal tunnel release and this basilar thumb joint injection. If she does not completely improve we may recommend a hand-based thumb spica splint to help calm down her basilar thumb joint arthritis. Bandar Downey am personally transcribing for Ford Moore PA-C 5/29/19 at 1:51 PM.    I, Ford Moore PA-C, personally performed the services described in this document as transcribed by Valentina Barkley, and it is both accurate and complete.     Electronically signed by Ford Moore PA-C on 6/4/2019 at 10:50 AM

## 2019-06-04 RX ORDER — TRIAMCINOLONE ACETONIDE 40 MG/ML
40 INJECTION, SUSPENSION INTRA-ARTICULAR; INTRAMUSCULAR ONCE
Status: COMPLETED | OUTPATIENT
Start: 2019-06-04 | End: 2019-06-04

## 2019-06-04 RX ORDER — BUPIVACAINE HYDROCHLORIDE 5 MG/ML
1 INJECTION, SOLUTION PERINEURAL ONCE
Status: COMPLETED | OUTPATIENT
Start: 2019-06-04 | End: 2019-06-04

## 2019-06-04 RX ADMIN — BUPIVACAINE HYDROCHLORIDE 5 MG: 5 INJECTION, SOLUTION PERINEURAL at 13:15

## 2019-06-04 RX ADMIN — TRIAMCINOLONE ACETONIDE 40 MG: 40 INJECTION, SUSPENSION INTRA-ARTICULAR; INTRAMUSCULAR at 13:16

## 2019-06-11 ENCOUNTER — OFFICE VISIT (OUTPATIENT)
Dept: ORTHOPEDIC SURGERY | Age: 59
End: 2019-06-11

## 2019-06-11 VITALS
HEIGHT: 63 IN | DIASTOLIC BLOOD PRESSURE: 70 MMHG | WEIGHT: 181 LBS | BODY MASS INDEX: 32.07 KG/M2 | SYSTOLIC BLOOD PRESSURE: 122 MMHG | HEART RATE: 88 BPM

## 2019-06-11 DIAGNOSIS — M19.049 CMC ARTHRITIS: Primary | ICD-10-CM

## 2019-06-11 DIAGNOSIS — G56.03 BILATERAL CARPAL TUNNEL SYNDROME: ICD-10-CM

## 2019-06-11 PROCEDURE — 99024 POSTOP FOLLOW-UP VISIT: CPT | Performed by: PHYSICIAN ASSISTANT

## 2019-06-11 RX ORDER — METHYLPREDNISOLONE 4 MG/1
TABLET ORAL
Qty: 1 KIT | Refills: 0 | Status: SHIPPED | OUTPATIENT
Start: 2019-06-11 | End: 2019-06-17

## 2019-06-11 NOTE — LETTER
[address] ORTHOPEDICS  [facility]  Phone: [phone]  Fax: [phone]    Dyana Szymanski        June 11, 2019     Patient: Jessica Orantes   YOB: 1960   Date of Visit: 6/11/2019       To Whom it May Concern:    Jessica Orantes was seen in my clinic on 6/11/2019. She may return to work on 6-12-19. Please excuse from work on 6-11-19. If you have any questions or concerns, please don't hesitate to call.     Sincerely,         Frankie Gonsalez PA-C

## 2019-06-12 NOTE — PROGRESS NOTES
Subjective:      Patient ID: Vicky Lam is a 60 y.o. female.     HPI    Review of Systems    Objective:   Physical Exam    Assessment:      ***      Plan:      ***        Norah Smith

## 2019-06-12 NOTE — PROGRESS NOTES
[facility] Marce Kahn  [facility]  Dept: [phone]  Loc: [phone]    Date of Service:  6/11/2019    Brianna Silva  YOB: 1960  MRN: [id number]      Brianna Silva is a pleasant 60 y.o. female who comes in today. She is approximately 6 weeks out from right carpal tunnel release. She stated that the numbness is completely gone at this time, but she is still noted to have significant pain within the [facility] joint. She did undergo injection at last visit on 05/28/2019 which she stated did not help at all. She has been having some increasing pain in this thumb and hand with pain radiating down the first dorsal compartment. PHYSICAL EXAM:  This is a 60 y.o. female, alert and oriented x3, cooperative, in no acute distress. Sensation intact. Neurovascularly intact. She does have the ability to make a full fist at this time. She does have significant tenderness to palpation around the first dorsal compartment as well as the base of the right thumb. Pain with Perfecto Robert. Pain with [facility] joint grind. She does have pain along the incision. X-RAYS:  None obtained at this time. IMPRESSION:    1. Six weeks out from right carpal tunnel release, status post pillar pain. 2. Right basilar thumb joint osteoarthritis. 3. First dorsal compartment synovitis. PLAN:  At this time, I feel that this patient would benefit from undergoing a Medrol Dosepak, hand therapy as well as getting set up for an ortho splint for [facility] joint arthritis. If this is not improving her pain we will discuss further treatment options at that time. Alicia García am personally transcribing for Carson Bentley PA-C 6/12/19 at 1:38 PM.    I, Carson Bentley PA-C, personally performed the services described in this document as transcribed by Noe López, and it is both accurate and complete.     Electronically signed by Carson Bentley PA-C on 6/13/2019

## 2019-07-11 ENCOUNTER — OFFICE VISIT (OUTPATIENT)
Dept: FAMILY MEDICINE CLINIC | Age: 59
End: 2019-07-11
Payer: COMMERCIAL

## 2019-07-11 VITALS
SYSTOLIC BLOOD PRESSURE: 130 MMHG | OXYGEN SATURATION: 98 % | RESPIRATION RATE: 12 BRPM | TEMPERATURE: 97.8 F | HEART RATE: 76 BPM | DIASTOLIC BLOOD PRESSURE: 84 MMHG | BODY MASS INDEX: 31.54 KG/M2 | WEIGHT: 178 LBS | HEIGHT: 63 IN

## 2019-07-11 DIAGNOSIS — R32 URINARY AND BOWEL INCONTINENCE: Primary | ICD-10-CM

## 2019-07-11 DIAGNOSIS — K52.9 CHRONIC DIARRHEA OF UNKNOWN ORIGIN: ICD-10-CM

## 2019-07-11 DIAGNOSIS — B96.89 ACUTE BACTERIAL SINUSITIS: ICD-10-CM

## 2019-07-11 DIAGNOSIS — J01.90 ACUTE BACTERIAL SINUSITIS: ICD-10-CM

## 2019-07-11 DIAGNOSIS — R15.9 URINARY AND BOWEL INCONTINENCE: Primary | ICD-10-CM

## 2019-07-11 DIAGNOSIS — R61 EXCESSIVE SWEATING: ICD-10-CM

## 2019-07-11 DIAGNOSIS — T36.95XA ANTIBIOTIC-INDUCED YEAST INFECTION: ICD-10-CM

## 2019-07-11 DIAGNOSIS — B37.9 ANTIBIOTIC-INDUCED YEAST INFECTION: ICD-10-CM

## 2019-07-11 PROCEDURE — G8417 CALC BMI ABV UP PARAM F/U: HCPCS | Performed by: NURSE PRACTITIONER

## 2019-07-11 PROCEDURE — 4004F PT TOBACCO SCREEN RCVD TLK: CPT | Performed by: NURSE PRACTITIONER

## 2019-07-11 PROCEDURE — 3017F COLORECTAL CA SCREEN DOC REV: CPT | Performed by: NURSE PRACTITIONER

## 2019-07-11 PROCEDURE — G8427 DOCREV CUR MEDS BY ELIG CLIN: HCPCS | Performed by: NURSE PRACTITIONER

## 2019-07-11 PROCEDURE — 99214 OFFICE O/P EST MOD 30 MIN: CPT | Performed by: NURSE PRACTITIONER

## 2019-07-11 RX ORDER — FLUCONAZOLE 150 MG/1
TABLET ORAL
Qty: 2 TABLET | Refills: 0 | Status: SHIPPED | OUTPATIENT
Start: 2019-07-11 | End: 2019-07-24 | Stop reason: ALTCHOICE

## 2019-07-11 RX ORDER — AZITHROMYCIN 250 MG/1
TABLET, FILM COATED ORAL
Qty: 1 PACKET | Refills: 0 | Status: SHIPPED | OUTPATIENT
Start: 2019-07-11 | End: 2019-07-24 | Stop reason: ALTCHOICE

## 2019-07-11 ASSESSMENT — PATIENT HEALTH QUESTIONNAIRE - PHQ9
2. FEELING DOWN, DEPRESSED OR HOPELESS: 0
SUM OF ALL RESPONSES TO PHQ QUESTIONS 1-9: 1
1. LITTLE INTEREST OR PLEASURE IN DOING THINGS: 1
SUM OF ALL RESPONSES TO PHQ QUESTIONS 1-9: 1
SUM OF ALL RESPONSES TO PHQ9 QUESTIONS 1 & 2: 1

## 2019-07-11 ASSESSMENT — ENCOUNTER SYMPTOMS
SORE THROAT: 1
SINUS PAIN: 1
DIARRHEA: 1
RHINORRHEA: 1
SINUS PRESSURE: 1
ABDOMINAL PAIN: 1

## 2019-07-11 NOTE — PROGRESS NOTES
Right Upper Arm, Position: Sitting, Cuff Size: Medium Adult)   Pulse 76   Temp 97.8 °F (36.6 °C) (Tympanic)   Resp 12   Ht 5' 2.99\" (1.6 m)   Wt 178 lb (80.7 kg)   SpO2 98%   Breastfeeding? No   BMI 31.54 kg/m²     Physical Exam   Constitutional: She is oriented to person, place, and time. Vital signs are normal. She appears well-developed and well-nourished. She is active and cooperative. HENT:   Head: Normocephalic and atraumatic. Right Ear: Hearing and external ear normal. A middle ear effusion is present. Left Ear: Hearing and external ear normal. A middle ear effusion is present. Nose: Right sinus exhibits maxillary sinus tenderness and frontal sinus tenderness. Left sinus exhibits maxillary sinus tenderness and frontal sinus tenderness. Mouth/Throat: Uvula is midline and mucous membranes are normal. Posterior oropharyngeal erythema present. Fluid noted behind TM bilat, left worse than right, PND+   Eyes: EOM are normal.   Neck: Normal range of motion. Cardiovascular: Normal rate, regular rhythm and normal heart sounds. Pulmonary/Chest: Effort normal and breath sounds normal.   Abdominal: Soft. Bowel sounds are normal. She exhibits no distension and no mass. There is no tenderness. There is no rebound. No hernia. Musculoskeletal: Normal range of motion. Neurological: She is alert and oriented to person, place, and time. Skin: Skin is warm and dry. Psychiatric: She has a normal mood and affect. Her behavior is normal. Judgment and thought content normal.   Nursing note and vitals reviewed. Assessment & Plan:      1. Urinary and bowel incontinence    - Culture Stool; Future  - Enriqueta Dan MD, Gastroenterology, [address] [place] [address]    2. Chronic diarrhea of unknown origin    - Culture Stool; Future  - Enriqueta Dan MD, Gastroenterology, [address] [place] [address]    3. Acute bacterial sinusitis-new  Advised patient to continue supportive care.  They also need to increase fluids and rest.

## 2019-07-23 ENCOUNTER — TELEPHONE (OUTPATIENT)
Dept: FAMILY MEDICINE CLINIC | Age: 59
End: 2019-07-23

## 2019-07-23 NOTE — TELEPHONE ENCOUNTER
Spoke with pt, she states FMLA was brought to insurance office. Pt wants to know why she cannot get FMLA paperwork sent in. Writer advised that this would not be filled out, that if pt is having issues she can come to office and have a visit and we can give her a work excuse for that day. Pt is due to see specialist next week. Pt requesting work dated for all of last week and to return to work tomorrow. Spoke with [facility], agreeable to write note excusing pt from July 15-22nd. Return 23rd of July. Advised for further notes, patient will need office visits. Pt agreeable and voiced understanding. Thank you!

## 2019-07-24 ENCOUNTER — OFFICE VISIT (OUTPATIENT)
Dept: INTERNAL MEDICINE CLINIC | Age: 59
End: 2019-07-24
Payer: COMMERCIAL

## 2019-07-24 ENCOUNTER — NURSE ONLY (OUTPATIENT)
Dept: LAB | Age: 59
End: 2019-07-24

## 2019-07-24 VITALS
HEIGHT: 63 IN | SYSTOLIC BLOOD PRESSURE: 142 MMHG | HEART RATE: 100 BPM | DIASTOLIC BLOOD PRESSURE: 88 MMHG | WEIGHT: 182.5 LBS | BODY MASS INDEX: 32.34 KG/M2

## 2019-07-24 DIAGNOSIS — R19.7 DIARRHEA, UNSPECIFIED TYPE: Primary | ICD-10-CM

## 2019-07-24 DIAGNOSIS — R19.7 DIARRHEA, UNSPECIFIED TYPE: ICD-10-CM

## 2019-07-24 LAB — SEDIMENTATION RATE, ERYTHROCYTE: 15 MM/HR (ref 0–20)

## 2019-07-24 PROCEDURE — G8417 CALC BMI ABV UP PARAM F/U: HCPCS | Performed by: INTERNAL MEDICINE

## 2019-07-24 PROCEDURE — G8427 DOCREV CUR MEDS BY ELIG CLIN: HCPCS | Performed by: INTERNAL MEDICINE

## 2019-07-24 PROCEDURE — 99203 OFFICE O/P NEW LOW 30 MIN: CPT | Performed by: INTERNAL MEDICINE

## 2019-07-24 PROCEDURE — 3017F COLORECTAL CA SCREEN DOC REV: CPT | Performed by: INTERNAL MEDICINE

## 2019-07-24 PROCEDURE — 4004F PT TOBACCO SCREEN RCVD TLK: CPT | Performed by: INTERNAL MEDICINE

## 2019-07-24 RX ORDER — SUCRALFATE 1 G/1
1 TABLET ORAL 4 TIMES DAILY
Qty: 120 TABLET | Refills: 3 | Status: SHIPPED | OUTPATIENT
Start: 2019-07-24 | End: 2021-01-06

## 2019-07-27 LAB — ENDOMYSIAL IGA ANTIBODY: NORMAL

## 2019-07-29 ENCOUNTER — TELEPHONE (OUTPATIENT)
Dept: FAMILY MEDICINE CLINIC | Age: 59
End: 2019-07-29

## 2019-08-13 ENCOUNTER — OFFICE VISIT (OUTPATIENT)
Dept: FAMILY MEDICINE CLINIC | Age: 59
End: 2019-08-13
Payer: COMMERCIAL

## 2019-08-13 VITALS
SYSTOLIC BLOOD PRESSURE: 142 MMHG | HEART RATE: 78 BPM | DIASTOLIC BLOOD PRESSURE: 84 MMHG | BODY MASS INDEX: 31.18 KG/M2 | WEIGHT: 176 LBS | HEIGHT: 63 IN | TEMPERATURE: 98.5 F

## 2019-08-13 DIAGNOSIS — H61.23 BILATERAL IMPACTED CERUMEN: Primary | ICD-10-CM

## 2019-08-13 DIAGNOSIS — H66.002 ACUTE SUPPURATIVE OTITIS MEDIA OF LEFT EAR WITHOUT SPONTANEOUS RUPTURE OF TYMPANIC MEMBRANE, RECURRENCE NOT SPECIFIED: ICD-10-CM

## 2019-08-13 DIAGNOSIS — R51.9 SINUS HEADACHE: ICD-10-CM

## 2019-08-13 PROCEDURE — 4004F PT TOBACCO SCREEN RCVD TLK: CPT | Performed by: NURSE PRACTITIONER

## 2019-08-13 PROCEDURE — G0444 DEPRESSION SCREEN ANNUAL: HCPCS | Performed by: NURSE PRACTITIONER

## 2019-08-13 PROCEDURE — 99214 OFFICE O/P EST MOD 30 MIN: CPT | Performed by: NURSE PRACTITIONER

## 2019-08-13 PROCEDURE — 3017F COLORECTAL CA SCREEN DOC REV: CPT | Performed by: NURSE PRACTITIONER

## 2019-08-13 PROCEDURE — 69209 REMOVE IMPACTED EAR WAX UNI: CPT | Performed by: NURSE PRACTITIONER

## 2019-08-13 PROCEDURE — G8427 DOCREV CUR MEDS BY ELIG CLIN: HCPCS | Performed by: NURSE PRACTITIONER

## 2019-08-13 PROCEDURE — G8417 CALC BMI ABV UP PARAM F/U: HCPCS | Performed by: NURSE PRACTITIONER

## 2019-08-13 RX ORDER — OFLOXACIN 3 MG/ML
5 SOLUTION AURICULAR (OTIC) 2 TIMES DAILY
Qty: 1 BOTTLE | Refills: 0 | Status: SHIPPED | OUTPATIENT
Start: 2019-08-13 | End: 2019-09-23 | Stop reason: ALTCHOICE

## 2019-08-13 RX ORDER — VENLAFAXINE HYDROCHLORIDE 150 MG/1
150 CAPSULE, EXTENDED RELEASE ORAL DAILY
COMMUNITY
Start: 2019-07-30 | End: 2019-11-21 | Stop reason: SDUPTHER

## 2019-08-13 RX ORDER — CETIRIZINE HYDROCHLORIDE, PSEUDOEPHEDRINE HYDROCHLORIDE 5; 120 MG/1; MG/1
1 TABLET, FILM COATED, EXTENDED RELEASE ORAL 2 TIMES DAILY
Qty: 60 TABLET | Refills: 0 | Status: SHIPPED | OUTPATIENT
Start: 2019-08-13 | End: 2019-09-12

## 2019-08-13 ASSESSMENT — PATIENT HEALTH QUESTIONNAIRE - PHQ9
1. LITTLE INTEREST OR PLEASURE IN DOING THINGS: 0
8. MOVING OR SPEAKING SO SLOWLY THAT OTHER PEOPLE COULD HAVE NOTICED. OR THE OPPOSITE, BEING SO FIGETY OR RESTLESS THAT YOU HAVE BEEN MOVING AROUND A LOT MORE THAN USUAL: 0
5. POOR APPETITE OR OVEREATING: 0
6. FEELING BAD ABOUT YOURSELF - OR THAT YOU ARE A FAILURE OR HAVE LET YOURSELF OR YOUR FAMILY DOWN: 0
9. THOUGHTS THAT YOU WOULD BE BETTER OFF DEAD, OR OF HURTING YOURSELF: 0
4. FEELING TIRED OR HAVING LITTLE ENERGY: 3
3. TROUBLE FALLING OR STAYING ASLEEP: 3
SUM OF ALL RESPONSES TO PHQ QUESTIONS 1-9: 9
7. TROUBLE CONCENTRATING ON THINGS, SUCH AS READING THE NEWSPAPER OR WATCHING TELEVISION: 0
SUM OF ALL RESPONSES TO PHQ QUESTIONS 1-9: 9
SUM OF ALL RESPONSES TO PHQ9 QUESTIONS 1 & 2: 3
10. IF YOU CHECKED OFF ANY PROBLEMS, HOW DIFFICULT HAVE THESE PROBLEMS MADE IT FOR YOU TO DO YOUR WORK, TAKE CARE OF THINGS AT HOME, OR GET ALONG WITH OTHER PEOPLE: 0
2. FEELING DOWN, DEPRESSED OR HOPELESS: 3

## 2019-08-13 ASSESSMENT — ENCOUNTER SYMPTOMS
RESPIRATORY NEGATIVE: 1
SWOLLEN GLANDS: 1

## 2019-08-13 NOTE — PROGRESS NOTES
(FLOXIN OTIC) 0.3 % otic solution Place 5 drops in ear(s) 2 times daily 1 Bottle 0    metFORMIN (GLUCOPHAGE) 1000 MG tablet TAKE 1 TABLET BY MOUTH TWICE DAILY WITH  MEALS 180 tablet 1    Naproxen Sodium (ALEVE PO) Take by mouth daily as needed      fluticasone (FLONASE) 50 MCG/ACT nasal spray 2 sprays by Nasal route daily 1 Bottle 11    venlafaxine (EFFEXOR XR) 150 MG extended release capsule Take 150 mg by mouth daily      sucralfate (CARAFATE) 1 GM tablet Take 1 tablet by mouth 4 times daily 120 tablet 3     No current facility-administered medications for this visit. Review of Systems   Constitutional: Positive for chills (one day). Negative for activity change, appetite change and fever. HENT: Positive for ear pain (fullness bilat). Respiratory: Negative. Cardiovascular: Negative. Neurological: Positive for headaches. Objective:       BP (!) 142/84 (Site: Right Upper Arm, Position: Sitting, Cuff Size: Large Adult)   Pulse 78   Temp 98.5 °F (36.9 °C) (Tympanic)   Ht 5' 3\" (1.6 m)   Wt 176 lb (79.8 kg)   BMI 31.18 kg/m²     Physical Exam   Constitutional: She is oriented to person, place, and time. Vital signs are normal. She appears well-developed and well-nourished. She is active and cooperative. Cerumen impaction bilat   HENT:   Head: Normocephalic and atraumatic. Right Ear: External ear normal. Decreased hearing is noted. Left Ear: External ear normal. Decreased hearing is noted. Nose: Nose normal.   Mouth/Throat: Oropharynx is clear and moist.   She does mention that she has noticed decreased hearing at work. After irrigation, patient canal and TM are tender and erythema noted (left side). right WNL. Eyes: Pupils are equal, round, and reactive to light. Conjunctivae and EOM are normal.   Neck: Normal range of motion. Cardiovascular: Normal rate, regular rhythm and normal heart sounds.    Pulmonary/Chest: Effort normal and breath sounds normal.

## 2019-08-20 ENCOUNTER — TELEPHONE (OUTPATIENT)
Dept: FAMILY MEDICINE CLINIC | Age: 59
End: 2019-08-20

## 2019-09-12 ENCOUNTER — OFFICE VISIT (OUTPATIENT)
Dept: PRIMARY CARE CLINIC | Age: 59
End: 2019-09-12
Payer: COMMERCIAL

## 2019-09-12 VITALS
BODY MASS INDEX: 31.21 KG/M2 | DIASTOLIC BLOOD PRESSURE: 78 MMHG | SYSTOLIC BLOOD PRESSURE: 126 MMHG | TEMPERATURE: 98.6 F | WEIGHT: 176.2 LBS | OXYGEN SATURATION: 98 % | HEART RATE: 84 BPM

## 2019-09-12 DIAGNOSIS — J01.40 ACUTE PANSINUSITIS, RECURRENCE NOT SPECIFIED: Primary | ICD-10-CM

## 2019-09-12 DIAGNOSIS — Z87.42 HISTORY OF VAGINITIS: ICD-10-CM

## 2019-09-12 PROCEDURE — G8417 CALC BMI ABV UP PARAM F/U: HCPCS | Performed by: FAMILY MEDICINE

## 2019-09-12 PROCEDURE — 99213 OFFICE O/P EST LOW 20 MIN: CPT | Performed by: FAMILY MEDICINE

## 2019-09-12 PROCEDURE — G8427 DOCREV CUR MEDS BY ELIG CLIN: HCPCS | Performed by: FAMILY MEDICINE

## 2019-09-12 PROCEDURE — 4004F PT TOBACCO SCREEN RCVD TLK: CPT | Performed by: FAMILY MEDICINE

## 2019-09-12 PROCEDURE — 3017F COLORECTAL CA SCREEN DOC REV: CPT | Performed by: FAMILY MEDICINE

## 2019-09-12 RX ORDER — AZITHROMYCIN 250 MG/1
250 TABLET, FILM COATED ORAL SEE ADMIN INSTRUCTIONS
Qty: 6 TABLET | Refills: 0 | Status: SHIPPED | OUTPATIENT
Start: 2019-09-12 | End: 2019-09-17

## 2019-09-12 RX ORDER — PREDNISONE 20 MG/1
20 TABLET ORAL 2 TIMES DAILY
Qty: 6 TABLET | Refills: 0 | Status: SHIPPED | OUTPATIENT
Start: 2019-09-12 | End: 2019-09-15

## 2019-09-12 RX ORDER — FLUCONAZOLE 150 MG/1
TABLET ORAL
Qty: 2 TABLET | Refills: 0 | Status: SHIPPED | OUTPATIENT
Start: 2019-09-12 | End: 2019-09-15

## 2019-09-16 ENCOUNTER — OFFICE VISIT (OUTPATIENT)
Dept: PRIMARY CARE CLINIC | Age: 59
End: 2019-09-16
Payer: COMMERCIAL

## 2019-09-16 VITALS
BODY MASS INDEX: 31.4 KG/M2 | SYSTOLIC BLOOD PRESSURE: 114 MMHG | WEIGHT: 177.2 LBS | OXYGEN SATURATION: 97 % | HEIGHT: 63 IN | TEMPERATURE: 98.5 F | HEART RATE: 83 BPM | DIASTOLIC BLOOD PRESSURE: 78 MMHG | RESPIRATION RATE: 16 BRPM

## 2019-09-16 DIAGNOSIS — H91.90 HEARING LOSS, UNSPECIFIED HEARING LOSS TYPE, UNSPECIFIED LATERALITY: ICD-10-CM

## 2019-09-16 DIAGNOSIS — R09.89 CHRONIC SINUS COMPLAINTS: Primary | ICD-10-CM

## 2019-09-16 DIAGNOSIS — H92.03 OTALGIA OF BOTH EARS: ICD-10-CM

## 2019-09-16 PROCEDURE — 4004F PT TOBACCO SCREEN RCVD TLK: CPT | Performed by: NURSE PRACTITIONER

## 2019-09-16 PROCEDURE — G8417 CALC BMI ABV UP PARAM F/U: HCPCS | Performed by: NURSE PRACTITIONER

## 2019-09-16 PROCEDURE — G8427 DOCREV CUR MEDS BY ELIG CLIN: HCPCS | Performed by: NURSE PRACTITIONER

## 2019-09-16 PROCEDURE — 99214 OFFICE O/P EST MOD 30 MIN: CPT | Performed by: NURSE PRACTITIONER

## 2019-09-16 PROCEDURE — 3017F COLORECTAL CA SCREEN DOC REV: CPT | Performed by: NURSE PRACTITIONER

## 2019-09-16 RX ORDER — VARENICLINE TARTRATE
KIT
Refills: 0 | COMMUNITY
Start: 2019-09-12 | End: 2020-01-15 | Stop reason: ALTCHOICE

## 2019-09-16 ASSESSMENT — ENCOUNTER SYMPTOMS
SINUS COMPLAINT: 1
RESPIRATORY NEGATIVE: 1
COUGH: 0

## 2019-09-20 ENCOUNTER — HOSPITAL ENCOUNTER (OUTPATIENT)
Age: 59
Setting detail: SPECIMEN
Discharge: HOME | End: 2019-09-20
Payer: COMMERCIAL

## 2019-09-20 PROCEDURE — 87449 NOS EACH ORGANISM AG IA: CPT

## 2019-09-20 PROCEDURE — 83993 ASSAY FOR CALPROTECTIN FECAL: CPT

## 2019-09-20 PROCEDURE — 87324 CLOSTRIDIUM AG IA: CPT

## 2019-09-21 ENCOUNTER — HOSPITAL ENCOUNTER (OUTPATIENT)
Age: 59
Setting detail: SPECIMEN
Discharge: HOME | End: 2019-09-21
Payer: COMMERCIAL

## 2019-09-21 DIAGNOSIS — R19.7 DIARRHEA, UNSPECIFIED TYPE: ICD-10-CM

## 2019-09-21 LAB
C DIFF AG + TOXIN: NEGATIVE
SPECIMEN DESCRIPTION: NORMAL

## 2019-09-23 ENCOUNTER — OFFICE VISIT (OUTPATIENT)
Dept: INTERNAL MEDICINE CLINIC | Age: 59
End: 2019-09-23
Payer: COMMERCIAL

## 2019-09-23 VITALS
HEIGHT: 63 IN | HEART RATE: 76 BPM | BODY MASS INDEX: 31.36 KG/M2 | WEIGHT: 177 LBS | DIASTOLIC BLOOD PRESSURE: 80 MMHG | SYSTOLIC BLOOD PRESSURE: 144 MMHG

## 2019-09-23 DIAGNOSIS — K21.9 GASTROESOPHAGEAL REFLUX DISEASE WITHOUT ESOPHAGITIS: Primary | ICD-10-CM

## 2019-09-23 DIAGNOSIS — D12.6 ADENOMATOUS POLYP OF COLON, UNSPECIFIED PART OF COLON: ICD-10-CM

## 2019-09-23 PROCEDURE — 99213 OFFICE O/P EST LOW 20 MIN: CPT | Performed by: INTERNAL MEDICINE

## 2019-09-23 PROCEDURE — G8417 CALC BMI ABV UP PARAM F/U: HCPCS | Performed by: INTERNAL MEDICINE

## 2019-09-23 PROCEDURE — G8427 DOCREV CUR MEDS BY ELIG CLIN: HCPCS | Performed by: INTERNAL MEDICINE

## 2019-09-23 PROCEDURE — 4004F PT TOBACCO SCREEN RCVD TLK: CPT | Performed by: INTERNAL MEDICINE

## 2019-09-23 PROCEDURE — 3017F COLORECTAL CA SCREEN DOC REV: CPT | Performed by: INTERNAL MEDICINE

## 2019-09-23 RX ORDER — PANTOPRAZOLE SODIUM 40 MG/1
40 TABLET, DELAYED RELEASE ORAL DAILY
Qty: 90 TABLET | Refills: 3 | Status: SHIPPED | OUTPATIENT
Start: 2019-09-23 | End: 2021-01-07 | Stop reason: SDUPTHER

## 2019-09-23 RX ORDER — POLYETHYLENE GLYCOL 3350 17 G/17G
250 POWDER, FOR SOLUTION ORAL DAILY
Qty: 250 BOTTLE | Refills: 0 | Status: SHIPPED | OUTPATIENT
Start: 2019-09-23 | End: 2019-09-24

## 2019-09-23 NOTE — PROGRESS NOTES
systemic disease with no functional limitations       ASSESSMENT:     Assessment  Patient is a 60 y.o. female here for colonoscopy with anesthesia for diarrhea and colon polyps  And EGD for gerd       PLAN:     1 Plan Procedure options, risks and benefits reviewed with patient who  expresses understanding. EGD and Colonoscopy 10/8/19 at 1230h      2   Stop Asprin and Vit E 4 days before and use 1/2 dose of DM meds day of prep and procedure If glucose 100-150 on day of prep or procedure use 1/2 usual dose of diabetic meds, If greater than 150 use full dose and if less than 100 do not take any diabetic meds.

## 2019-09-24 ENCOUNTER — OFFICE VISIT (OUTPATIENT)
Dept: PRIMARY CARE CLINIC | Age: 59
End: 2019-09-24
Payer: COMMERCIAL

## 2019-09-24 VITALS
HEIGHT: 63 IN | DIASTOLIC BLOOD PRESSURE: 64 MMHG | RESPIRATION RATE: 18 BRPM | SYSTOLIC BLOOD PRESSURE: 122 MMHG | BODY MASS INDEX: 30.87 KG/M2 | OXYGEN SATURATION: 96 % | TEMPERATURE: 98.6 F | WEIGHT: 174.2 LBS | HEART RATE: 93 BPM

## 2019-09-24 DIAGNOSIS — R11.0 NAUSEA: ICD-10-CM

## 2019-09-24 DIAGNOSIS — K52.9 ACUTE GASTROENTERITIS: Primary | ICD-10-CM

## 2019-09-24 LAB — CALPROTECTIN, FECAL: <16 UG/G

## 2019-09-24 PROCEDURE — G8427 DOCREV CUR MEDS BY ELIG CLIN: HCPCS | Performed by: NURSE PRACTITIONER

## 2019-09-24 PROCEDURE — 4004F PT TOBACCO SCREEN RCVD TLK: CPT | Performed by: NURSE PRACTITIONER

## 2019-09-24 PROCEDURE — 3017F COLORECTAL CA SCREEN DOC REV: CPT | Performed by: NURSE PRACTITIONER

## 2019-09-24 PROCEDURE — G8417 CALC BMI ABV UP PARAM F/U: HCPCS | Performed by: NURSE PRACTITIONER

## 2019-09-24 PROCEDURE — 99213 OFFICE O/P EST LOW 20 MIN: CPT | Performed by: NURSE PRACTITIONER

## 2019-09-24 RX ORDER — ONDANSETRON 4 MG/1
4 TABLET, ORALLY DISINTEGRATING ORAL EVERY 8 HOURS PRN
Qty: 10 TABLET | Refills: 0 | Status: SHIPPED | OUTPATIENT
Start: 2019-09-24 | End: 2019-09-29

## 2019-09-24 ASSESSMENT — ENCOUNTER SYMPTOMS
NAUSEA: 1
DIARRHEA: 1
RHINORRHEA: 0
WHEEZING: 0
SHORTNESS OF BREATH: 0
SORE THROAT: 0
SINUS PRESSURE: 0
VOMITING: 0
CHEST TIGHTNESS: 0
BLOATING: 0
COUGH: 1
ABDOMINAL PAIN: 1

## 2019-09-24 ASSESSMENT — PATIENT HEALTH QUESTIONNAIRE - PHQ9
2. FEELING DOWN, DEPRESSED OR HOPELESS: 0
1. LITTLE INTEREST OR PLEASURE IN DOING THINGS: 0
SUM OF ALL RESPONSES TO PHQ QUESTIONS 1-9: 0
SUM OF ALL RESPONSES TO PHQ9 QUESTIONS 1 & 2: 0
SUM OF ALL RESPONSES TO PHQ QUESTIONS 1-9: 0

## 2019-09-24 NOTE — PROGRESS NOTES
Phenylephrine-Acetaminophen (TYLENOL SINUS CONGESTION/PAIN PO) Take by mouth      pantoprazole (PROTONIX) 40 MG tablet Take 1 tablet by mouth daily 90 tablet 3    CHANTIX STARTING MONTH ISHA 0.5 MG X 11 & 1 MG X 42 tablet TAKE AS DIRECTED BY MOUTH  0    venlafaxine (EFFEXOR XR) 150 MG extended release capsule Take 150 mg by mouth daily      sucralfate (CARAFATE) 1 GM tablet Take 1 tablet by mouth 4 times daily 120 tablet 3    metFORMIN (GLUCOPHAGE) 1000 MG tablet TAKE 1 TABLET BY MOUTH TWICE DAILY WITH  MEALS 180 tablet 1    Naproxen Sodium (ALEVE PO) Take by mouth daily as needed      fluticasone (FLONASE) 50 MCG/ACT nasal spray 2 sprays by Nasal route daily 1 Bottle 11    bisacodyl (DULCOLAX) 5 MG EC tablet Take 2 tablets by mouth daily Take day before colonoscopy in the afternoon (Patient not taking: Reported on 9/24/2019) 2 tablet 0    polyethylene glycol (GLYCOLAX) powder Take 250 g by mouth daily for 1 dose Mix in 2 quarts water Drink 8 oz  x 4 starting at 4pm day before and again  at 5 am day of exam . (Patient not taking: Reported on 9/24/2019) 250 Bottle 0     No current facility-administered medications for this visit. She is allergic to pcn [penicillins] and sulfa antibiotics. .    She  reports that she has been smoking cigarettes. She started smoking about 48 years ago. She has a 45.00 pack-year smoking history. She has never used smokeless tobacco.      Objective:    Vitals:    09/24/19 1748   BP: 122/64   Pulse: 93   Resp: 18   Temp: 98.6 °F (37 °C)   SpO2: 96%     Body mass index is 30.86 kg/m². Review of Systems   Constitutional: Positive for appetite change, chills and fatigue. Negative for fever. HENT: Positive for congestion. Negative for postnasal drip, rhinorrhea, sinus pressure and sore throat. Respiratory: Positive for cough. Negative for chest tightness, shortness of breath and wheezing. Cardiovascular: Negative.     Gastrointestinal: Positive for abdominal pain (low

## 2019-09-26 ENCOUNTER — TELEPHONE (OUTPATIENT)
Dept: PRIMARY CARE CLINIC | Age: 59
End: 2019-09-26

## 2019-10-08 ENCOUNTER — ANESTHESIA EVENT (OUTPATIENT)
Dept: ENDOSCOPY | Age: 59
End: 2019-10-08
Payer: COMMERCIAL

## 2019-10-08 ENCOUNTER — HOSPITAL ENCOUNTER (OUTPATIENT)
Age: 59
Setting detail: OUTPATIENT SURGERY
Discharge: HOME | End: 2019-10-08
Attending: INTERNAL MEDICINE | Admitting: INTERNAL MEDICINE
Payer: COMMERCIAL

## 2019-10-08 ENCOUNTER — ANESTHESIA (OUTPATIENT)
Dept: ENDOSCOPY | Age: 59
End: 2019-10-08
Payer: COMMERCIAL

## 2019-10-08 VITALS
SYSTOLIC BLOOD PRESSURE: 180 MMHG | OXYGEN SATURATION: 100 % | RESPIRATION RATE: 6 BRPM | DIASTOLIC BLOOD PRESSURE: 99 MMHG

## 2019-10-08 VITALS
BODY MASS INDEX: 31.18 KG/M2 | WEIGHT: 176 LBS | SYSTOLIC BLOOD PRESSURE: 155 MMHG | DIASTOLIC BLOOD PRESSURE: 87 MMHG | TEMPERATURE: 97.4 F | HEART RATE: 72 BPM | OXYGEN SATURATION: 99 % | RESPIRATION RATE: 16 BRPM | HEIGHT: 63 IN

## 2019-10-08 PROCEDURE — 2709999900 HC NON-CHARGEABLE SUPPLY: Performed by: INTERNAL MEDICINE

## 2019-10-08 PROCEDURE — 88305 TISSUE EXAM BY PATHOLOGIST: CPT

## 2019-10-08 PROCEDURE — 86677 HELICOBACTER PYLORI ANTIBODY: CPT

## 2019-10-08 PROCEDURE — 6360000002 HC RX W HCPCS: Performed by: SPECIALIST

## 2019-10-08 PROCEDURE — 2500000003 HC RX 250 WO HCPCS: Performed by: SPECIALIST

## 2019-10-08 PROCEDURE — 3700000001 HC ADD 15 MINUTES (ANESTHESIA): Performed by: INTERNAL MEDICINE

## 2019-10-08 PROCEDURE — 45380 COLONOSCOPY AND BIOPSY: CPT | Performed by: INTERNAL MEDICINE

## 2019-10-08 PROCEDURE — 7100000001 HC PACU RECOVERY - ADDTL 15 MIN: Performed by: INTERNAL MEDICINE

## 2019-10-08 PROCEDURE — 2580000003 HC RX 258: Performed by: INTERNAL MEDICINE

## 2019-10-08 PROCEDURE — 3700000000 HC ANESTHESIA ATTENDED CARE: Performed by: INTERNAL MEDICINE

## 2019-10-08 PROCEDURE — 43239 EGD BIOPSY SINGLE/MULTIPLE: CPT | Performed by: INTERNAL MEDICINE

## 2019-10-08 PROCEDURE — 3609012400 HC EGD TRANSORAL BIOPSY SINGLE/MULTIPLE: Performed by: INTERNAL MEDICINE

## 2019-10-08 PROCEDURE — 3609010300 HC COLONOSCOPY W/BIOPSY SINGLE/MULTIPLE: Performed by: INTERNAL MEDICINE

## 2019-10-08 PROCEDURE — 3609017100 HC EGD: Performed by: INTERNAL MEDICINE

## 2019-10-08 PROCEDURE — 7100000000 HC PACU RECOVERY - FIRST 15 MIN: Performed by: INTERNAL MEDICINE

## 2019-10-08 RX ORDER — LIDOCAINE HYDROCHLORIDE 20 MG/ML
INJECTION, SOLUTION INFILTRATION; PERINEURAL PRN
Status: DISCONTINUED | OUTPATIENT
Start: 2019-10-08 | End: 2019-10-08 | Stop reason: SDUPTHER

## 2019-10-08 RX ORDER — GLYCOPYRROLATE 1 MG/5 ML
SYRINGE (ML) INTRAVENOUS PRN
Status: DISCONTINUED | OUTPATIENT
Start: 2019-10-08 | End: 2019-10-08 | Stop reason: SDUPTHER

## 2019-10-08 RX ORDER — SODIUM CHLORIDE 450 MG/100ML
INJECTION, SOLUTION INTRAVENOUS CONTINUOUS
Status: DISCONTINUED | OUTPATIENT
Start: 2019-10-08 | End: 2019-10-08 | Stop reason: HOSPADM

## 2019-10-08 RX ORDER — PROPOFOL 10 MG/ML
INJECTION, EMULSION INTRAVENOUS PRN
Status: DISCONTINUED | OUTPATIENT
Start: 2019-10-08 | End: 2019-10-08 | Stop reason: SDUPTHER

## 2019-10-08 RX ORDER — ONDANSETRON 2 MG/ML
INJECTION INTRAMUSCULAR; INTRAVENOUS PRN
Status: DISCONTINUED | OUTPATIENT
Start: 2019-10-08 | End: 2019-10-08 | Stop reason: SDUPTHER

## 2019-10-08 RX ADMIN — PROPOFOL 100 MG: 10 INJECTION, EMULSION INTRAVENOUS at 12:47

## 2019-10-08 RX ADMIN — PROPOFOL 200 MG: 10 INJECTION, EMULSION INTRAVENOUS at 12:22

## 2019-10-08 RX ADMIN — LIDOCAINE HYDROCHLORIDE 100 MG: 20 INJECTION, SOLUTION INFILTRATION; PERINEURAL at 12:22

## 2019-10-08 RX ADMIN — SODIUM CHLORIDE: 4.5 INJECTION, SOLUTION INTRAVENOUS at 11:32

## 2019-10-08 RX ADMIN — Medication 0.2 MG: at 12:21

## 2019-10-08 RX ADMIN — ONDANSETRON 4 MG: 2 INJECTION INTRAMUSCULAR; INTRAVENOUS at 12:59

## 2019-10-08 RX ADMIN — PROPOFOL 100 MG: 10 INJECTION, EMULSION INTRAVENOUS at 12:32

## 2019-10-08 ASSESSMENT — PAIN - FUNCTIONAL ASSESSMENT: PAIN_FUNCTIONAL_ASSESSMENT: 0-10

## 2019-10-08 ASSESSMENT — ENCOUNTER SYMPTOMS
DYSPNEA ACTIVITY LEVEL: AFTER AMBULATING 1 FLIGHT OF STAIRS
SHORTNESS OF BREATH: 1

## 2019-10-08 ASSESSMENT — PAIN SCALES - GENERAL
PAINLEVEL_OUTOF10: 0
PAINLEVEL_OUTOF10: 0

## 2019-10-08 ASSESSMENT — COPD QUESTIONNAIRES: CAT_SEVERITY: MILD

## 2019-10-08 ASSESSMENT — LIFESTYLE VARIABLES: SMOKING_STATUS: 1

## 2019-10-09 LAB — UREASE-CLO-C. PYLORI: NEGATIVE

## 2019-10-10 ENCOUNTER — TELEPHONE (OUTPATIENT)
Dept: INTERNAL MEDICINE CLINIC | Age: 59
End: 2019-10-10

## 2019-10-11 ENCOUNTER — TELEPHONE (OUTPATIENT)
Dept: INTERNAL MEDICINE CLINIC | Age: 59
End: 2019-10-11

## 2019-10-21 ENCOUNTER — OFFICE VISIT (OUTPATIENT)
Dept: INTERNAL MEDICINE CLINIC | Age: 59
End: 2019-10-21
Payer: COMMERCIAL

## 2019-10-21 VITALS
HEIGHT: 63 IN | DIASTOLIC BLOOD PRESSURE: 80 MMHG | WEIGHT: 178.4 LBS | HEART RATE: 91 BPM | SYSTOLIC BLOOD PRESSURE: 148 MMHG | BODY MASS INDEX: 31.61 KG/M2

## 2019-10-21 DIAGNOSIS — K58.0 IRRITABLE BOWEL SYNDROME WITH DIARRHEA: Primary | ICD-10-CM

## 2019-10-21 PROCEDURE — G8484 FLU IMMUNIZE NO ADMIN: HCPCS | Performed by: INTERNAL MEDICINE

## 2019-10-21 PROCEDURE — 99213 OFFICE O/P EST LOW 20 MIN: CPT | Performed by: INTERNAL MEDICINE

## 2019-10-21 PROCEDURE — G8427 DOCREV CUR MEDS BY ELIG CLIN: HCPCS | Performed by: INTERNAL MEDICINE

## 2019-10-21 PROCEDURE — G8417 CALC BMI ABV UP PARAM F/U: HCPCS | Performed by: INTERNAL MEDICINE

## 2019-10-21 PROCEDURE — 3017F COLORECTAL CA SCREEN DOC REV: CPT | Performed by: INTERNAL MEDICINE

## 2019-10-21 PROCEDURE — 4004F PT TOBACCO SCREEN RCVD TLK: CPT | Performed by: INTERNAL MEDICINE

## 2019-10-21 RX ORDER — DICYCLOMINE HCL 20 MG
20 TABLET ORAL EVERY 6 HOURS PRN
Qty: 120 TABLET | Refills: 3 | Status: SHIPPED | OUTPATIENT
Start: 2019-10-21 | End: 2021-04-30

## 2019-10-21 RX ORDER — ONDANSETRON 8 MG/1
8 TABLET, ORALLY DISINTEGRATING ORAL EVERY 8 HOURS PRN
COMMUNITY
End: 2021-04-30

## 2019-10-21 SDOH — HEALTH STABILITY: MENTAL HEALTH: HOW OFTEN DO YOU HAVE A DRINK CONTAINING ALCOHOL?: NEVER

## 2019-11-21 ENCOUNTER — OFFICE VISIT (OUTPATIENT)
Dept: FAMILY MEDICINE CLINIC | Age: 59
End: 2019-11-21
Payer: COMMERCIAL

## 2019-11-21 VITALS
HEIGHT: 63 IN | DIASTOLIC BLOOD PRESSURE: 72 MMHG | TEMPERATURE: 97.1 F | RESPIRATION RATE: 16 BRPM | HEART RATE: 93 BPM | SYSTOLIC BLOOD PRESSURE: 138 MMHG | OXYGEN SATURATION: 96 % | BODY MASS INDEX: 31.93 KG/M2 | WEIGHT: 180.2 LBS

## 2019-11-21 DIAGNOSIS — F32.A DEPRESSION, UNSPECIFIED DEPRESSION TYPE: Primary | ICD-10-CM

## 2019-11-21 PROCEDURE — G8484 FLU IMMUNIZE NO ADMIN: HCPCS | Performed by: NURSE PRACTITIONER

## 2019-11-21 PROCEDURE — 3017F COLORECTAL CA SCREEN DOC REV: CPT | Performed by: NURSE PRACTITIONER

## 2019-11-21 PROCEDURE — G0444 DEPRESSION SCREEN ANNUAL: HCPCS | Performed by: NURSE PRACTITIONER

## 2019-11-21 PROCEDURE — G8417 CALC BMI ABV UP PARAM F/U: HCPCS | Performed by: NURSE PRACTITIONER

## 2019-11-21 PROCEDURE — 99214 OFFICE O/P EST MOD 30 MIN: CPT | Performed by: NURSE PRACTITIONER

## 2019-11-21 PROCEDURE — 4004F PT TOBACCO SCREEN RCVD TLK: CPT | Performed by: NURSE PRACTITIONER

## 2019-11-21 PROCEDURE — G8427 DOCREV CUR MEDS BY ELIG CLIN: HCPCS | Performed by: NURSE PRACTITIONER

## 2019-11-21 RX ORDER — VENLAFAXINE HYDROCHLORIDE 37.5 MG/1
37.5 CAPSULE, EXTENDED RELEASE ORAL DAILY
Qty: 30 CAPSULE | Refills: 5 | Status: SHIPPED | OUTPATIENT
Start: 2019-11-21 | End: 2020-06-05

## 2019-11-21 RX ORDER — VENLAFAXINE HYDROCHLORIDE 150 MG/1
150 CAPSULE, EXTENDED RELEASE ORAL DAILY
Qty: 30 CAPSULE | Refills: 5 | Status: SHIPPED | OUTPATIENT
Start: 2019-11-21 | End: 2020-03-17

## 2019-11-21 ASSESSMENT — PATIENT HEALTH QUESTIONNAIRE - PHQ9
9. THOUGHTS THAT YOU WOULD BE BETTER OFF DEAD, OR OF HURTING YOURSELF: 0
SUM OF ALL RESPONSES TO PHQ QUESTIONS 1-9: 18
1. LITTLE INTEREST OR PLEASURE IN DOING THINGS: 3
2. FEELING DOWN, DEPRESSED OR HOPELESS: 3
SUM OF ALL RESPONSES TO PHQ9 QUESTIONS 1 & 2: 6
10. IF YOU CHECKED OFF ANY PROBLEMS, HOW DIFFICULT HAVE THESE PROBLEMS MADE IT FOR YOU TO DO YOUR WORK, TAKE CARE OF THINGS AT HOME, OR GET ALONG WITH OTHER PEOPLE: 2
8. MOVING OR SPEAKING SO SLOWLY THAT OTHER PEOPLE COULD HAVE NOTICED. OR THE OPPOSITE, BEING SO FIGETY OR RESTLESS THAT YOU HAVE BEEN MOVING AROUND A LOT MORE THAN USUAL: 3
7. TROUBLE CONCENTRATING ON THINGS, SUCH AS READING THE NEWSPAPER OR WATCHING TELEVISION: 3
5. POOR APPETITE OR OVEREATING: 0
6. FEELING BAD ABOUT YOURSELF - OR THAT YOU ARE A FAILURE OR HAVE LET YOURSELF OR YOUR FAMILY DOWN: 0
3. TROUBLE FALLING OR STAYING ASLEEP: 3
4. FEELING TIRED OR HAVING LITTLE ENERGY: 3
SUM OF ALL RESPONSES TO PHQ QUESTIONS 1-9: 18

## 2020-01-07 ENCOUNTER — OFFICE VISIT (OUTPATIENT)
Dept: PRIMARY CARE CLINIC | Age: 60
End: 2020-01-07
Payer: COMMERCIAL

## 2020-01-07 VITALS
OXYGEN SATURATION: 96 % | RESPIRATION RATE: 18 BRPM | HEART RATE: 88 BPM | TEMPERATURE: 97.9 F | BODY MASS INDEX: 31.89 KG/M2 | SYSTOLIC BLOOD PRESSURE: 130 MMHG | WEIGHT: 180 LBS | DIASTOLIC BLOOD PRESSURE: 90 MMHG | HEIGHT: 63 IN

## 2020-01-07 PROCEDURE — G8427 DOCREV CUR MEDS BY ELIG CLIN: HCPCS | Performed by: PHYSICIAN ASSISTANT

## 2020-01-07 PROCEDURE — 99213 OFFICE O/P EST LOW 20 MIN: CPT | Performed by: PHYSICIAN ASSISTANT

## 2020-01-07 PROCEDURE — 3017F COLORECTAL CA SCREEN DOC REV: CPT | Performed by: PHYSICIAN ASSISTANT

## 2020-01-07 PROCEDURE — 4004F PT TOBACCO SCREEN RCVD TLK: CPT | Performed by: PHYSICIAN ASSISTANT

## 2020-01-07 PROCEDURE — 99212 OFFICE O/P EST SF 10 MIN: CPT | Performed by: PHYSICIAN ASSISTANT

## 2020-01-07 PROCEDURE — G8417 CALC BMI ABV UP PARAM F/U: HCPCS | Performed by: PHYSICIAN ASSISTANT

## 2020-01-07 PROCEDURE — G8484 FLU IMMUNIZE NO ADMIN: HCPCS | Performed by: PHYSICIAN ASSISTANT

## 2020-01-07 RX ORDER — DOXYCYCLINE HYCLATE 100 MG
100 TABLET ORAL 2 TIMES DAILY
Qty: 20 TABLET | Refills: 0 | Status: SHIPPED | OUTPATIENT
Start: 2020-01-07 | End: 2020-03-17 | Stop reason: SDUPTHER

## 2020-01-07 RX ORDER — BENZONATATE 200 MG/1
200 CAPSULE ORAL 3 TIMES DAILY PRN
Qty: 15 CAPSULE | Refills: 1 | Status: SHIPPED | OUTPATIENT
Start: 2020-01-07 | End: 2020-01-14

## 2020-01-07 RX ORDER — FLUCONAZOLE 150 MG/1
TABLET ORAL
Qty: 2 TABLET | Refills: 0 | Status: SHIPPED | OUTPATIENT
Start: 2020-01-07 | End: 2020-02-17

## 2020-01-07 ASSESSMENT — ENCOUNTER SYMPTOMS
COUGH: 1
SINUS COMPLAINT: 1
SINUS PRESSURE: 1
GASTROINTESTINAL NEGATIVE: 1

## 2020-01-07 NOTE — LETTER
[facility]  [address]  Phone: [phone]  Fax:   [place], [place]        January 7, 2020     Patient: Ceci Rubin   YOB: 1960   Date of Visit: 1/7/2020       To Whom It May Concern: It is my medical opinion that Allegra Barber may return to work on 1/9/2020. If you have any questions or concerns, please don't hesitate to call.     Sincerely,        Deep Ok, PA

## 2020-01-07 NOTE — PATIENT INSTRUCTIONS

## 2020-01-15 ENCOUNTER — OFFICE VISIT (OUTPATIENT)
Dept: PRIMARY CARE CLINIC | Age: 60
End: 2020-01-15
Payer: COMMERCIAL

## 2020-01-15 VITALS
BODY MASS INDEX: 31.89 KG/M2 | TEMPERATURE: 98.2 F | OXYGEN SATURATION: 93 % | HEART RATE: 76 BPM | HEIGHT: 63 IN | SYSTOLIC BLOOD PRESSURE: 118 MMHG | WEIGHT: 180 LBS | DIASTOLIC BLOOD PRESSURE: 66 MMHG

## 2020-01-15 PROCEDURE — G8484 FLU IMMUNIZE NO ADMIN: HCPCS | Performed by: FAMILY MEDICINE

## 2020-01-15 PROCEDURE — 99213 OFFICE O/P EST LOW 20 MIN: CPT | Performed by: FAMILY MEDICINE

## 2020-01-15 PROCEDURE — G8427 DOCREV CUR MEDS BY ELIG CLIN: HCPCS | Performed by: FAMILY MEDICINE

## 2020-01-15 PROCEDURE — 3017F COLORECTAL CA SCREEN DOC REV: CPT | Performed by: FAMILY MEDICINE

## 2020-01-15 PROCEDURE — G8417 CALC BMI ABV UP PARAM F/U: HCPCS | Performed by: FAMILY MEDICINE

## 2020-01-15 PROCEDURE — 4004F PT TOBACCO SCREEN RCVD TLK: CPT | Performed by: FAMILY MEDICINE

## 2020-01-15 ASSESSMENT — ENCOUNTER SYMPTOMS
EYES NEGATIVE: 1
SORE THROAT: 0
GASTROINTESTINAL NEGATIVE: 1
ALLERGIC/IMMUNOLOGIC NEGATIVE: 1
RESPIRATORY NEGATIVE: 1
SHORTNESS OF BREATH: 0
RHINORRHEA: 1
WHEEZING: 0
COUGH: 0

## 2020-01-15 NOTE — PROGRESS NOTES
TONSILLECTOMY      UPPER GASTROINTESTINAL ENDOSCOPY  08/02/2017    [id number]    UPPER GASTROINTESTINAL ENDOSCOPY Left 10/8/2019    EGD BIOPSY performed by Mandy Hernandez MD at [facility]         Review of Systems   Constitutional: Negative. HENT: Positive for congestion, postnasal drip and rhinorrhea. Negative for sore throat. Eyes: Negative. Respiratory: Negative. Negative for cough, shortness of breath and wheezing. Cardiovascular: Positive for chest pain (sharp, stabbing, left sided pain, intermittent). Gastrointestinal: Negative. Endocrine: Negative. Genitourinary: Negative. Musculoskeletal: Negative. Skin: Negative. Allergic/Immunologic: Negative. Neurological: Positive for headaches. Negative for light-headedness and numbness. Hematological: Negative. Negative for adenopathy. Psychiatric/Behavioral: Negative. Prior to Visit Medications    Medication Sig Taking? Authorizing Provider   doxycycline hyclate (VIBRA-TABS) 100 MG tablet Take 1 tablet by mouth 2 times daily for 10 days Yes ARMINDA Thorpe   fluconazole (DIFLUCAN) 150 MG tablet Take 1 po now. Repeta in 72 hours PRN vaginitis.  Yes ARMINDA Thorpe   venlafaxine (EFFEXOR XR) 37.5 MG extended release capsule Take 1 capsule by mouth daily Yes Latoya Ledbetter, APRN - CNP   ondansetron (ZOFRAN-ODT) 8 MG TBDP disintegrating tablet Place 8 mg under the tongue every 8 hours as needed for Nausea or Vomiting Yes Historical Provider, MD   dicyclomine (BENTYL) 20 MG tablet Take 1 tablet by mouth every 6 hours as needed (bowel spasm) Yes Mandy Hernandez MD   pantoprazole (PROTONIX) 40 MG tablet Take 1 tablet by mouth daily Yes Mandy Hernandez MD   sucralfate (CARAFATE) 1 GM tablet Take 1 tablet by mouth 4 times daily  Patient taking differently: Take 1 g by mouth 2 times daily  Yes aMndy Hernandez MD   metFORMIN (GLUCOPHAGE) 1000 MG tablet TAKE 1 TABLET BY MOUTH TWICE DAILY WITH  MEALS Yes Tenderness: There is no tenderness. There is no rebound. Musculoskeletal: Normal range of motion. General: No tenderness. Skin:     General: Skin is warm and dry. Findings: No erythema or rash. Neurological:      Mental Status: She is alert and oriented to person, place, and time. Psychiatric:         Behavior: Behavior normal.         Thought Content: Thought content normal.         Judgment: Judgment normal.     /66 (Site: Left Upper Arm, Position: Sitting, Cuff Size: Large Adult)   Pulse 76   Temp 98.2 °F (36.8 °C) (Tympanic)   Ht 5' 2.99\" (1.6 m)   Wt 180 lb (81.6 kg)   SpO2 93%   BMI 31.89 kg/m²       ASSESSMENT/PLAN:  Encounter Diagnosis   Name Primary?  New daily persistent headache Yes     5 days of headache over the top of her head. No nausea or vomiting. Sense of excessive fatigue/tiredness. Recent uri with sinusitis symptoms , treating with doxycycline at present. Suspect inflammatory symptoms for recent uri and complicating sinusitis symptoms. Cont. antibiotic and start aleve bid with food. Increase fluids and rest.  Work note.  (she has missed work mon. -wed due to headache). Recheck prn persistent or progressing headache, worsening sinus pain and drainage, fever, or other concerns. An electronic signature was used to authenticate this note.     --Didi Garza MD on 1/15/2020 at 10:41 AM

## 2020-02-17 ENCOUNTER — OFFICE VISIT (OUTPATIENT)
Dept: FAMILY MEDICINE CLINIC | Age: 60
End: 2020-02-17
Payer: COMMERCIAL

## 2020-02-17 VITALS
HEART RATE: 96 BPM | HEIGHT: 62 IN | RESPIRATION RATE: 16 BRPM | BODY MASS INDEX: 32.94 KG/M2 | SYSTOLIC BLOOD PRESSURE: 142 MMHG | OXYGEN SATURATION: 98 % | TEMPERATURE: 97.8 F | DIASTOLIC BLOOD PRESSURE: 70 MMHG | WEIGHT: 179 LBS

## 2020-02-17 LAB
INFLUENZA A ANTIBODY: ABNORMAL
INFLUENZA B ANTIBODY: ABNORMAL

## 2020-02-17 PROCEDURE — G8427 DOCREV CUR MEDS BY ELIG CLIN: HCPCS | Performed by: FAMILY MEDICINE

## 2020-02-17 PROCEDURE — G8417 CALC BMI ABV UP PARAM F/U: HCPCS | Performed by: FAMILY MEDICINE

## 2020-02-17 PROCEDURE — G8484 FLU IMMUNIZE NO ADMIN: HCPCS | Performed by: FAMILY MEDICINE

## 2020-02-17 PROCEDURE — 4004F PT TOBACCO SCREEN RCVD TLK: CPT | Performed by: FAMILY MEDICINE

## 2020-02-17 PROCEDURE — 87804 INFLUENZA ASSAY W/OPTIC: CPT | Performed by: FAMILY MEDICINE

## 2020-02-17 PROCEDURE — 99213 OFFICE O/P EST LOW 20 MIN: CPT | Performed by: FAMILY MEDICINE

## 2020-02-17 PROCEDURE — 3017F COLORECTAL CA SCREEN DOC REV: CPT | Performed by: FAMILY MEDICINE

## 2020-02-17 RX ORDER — FLUCONAZOLE 150 MG/1
150 TABLET ORAL ONCE
Qty: 1 TABLET | Refills: 0 | Status: SHIPPED | OUTPATIENT
Start: 2020-02-17 | End: 2020-02-17

## 2020-02-17 RX ORDER — AZITHROMYCIN 250 MG/1
250 TABLET, FILM COATED ORAL SEE ADMIN INSTRUCTIONS
Qty: 6 TABLET | Refills: 0 | Status: SHIPPED | OUTPATIENT
Start: 2020-02-17 | End: 2020-02-22

## 2020-02-17 NOTE — PROGRESS NOTES
Take by mouth daily as needed      fluticasone (FLONASE) 50 MCG/ACT nasal spray 2 sprays by Nasal route daily (Patient taking differently: 2 sprays by Nasal route as needed ) 1 Bottle 11    venlafaxine (EFFEXOR XR) 150 MG extended release capsule Take 1 capsule by mouth daily 30 capsule 5     No current facility-administered medications for this visit. Allergies   Allergen Reactions    Pcn [Penicillins]      \"can't breath\"    Sulfa Antibiotics      \"can't breathe\"       PHYSICAL EXAM:  VS:  BP (!) 142/70   Pulse 96   Temp 97.8 °F (36.6 °C)   Resp 16   Ht 5' 2\" (1.575 m)   Wt 179 lb (81.2 kg)   SpO2 98%   BMI 32.74 kg/m²   General:  Alert and oriented, NAD  HEENT:  TMs, ELIZABETH, EOMI, Conjunctivae clear,Throat currently clear. NECK:  Supple without adenopathy or thyromegaly, no carotid bruits  LUNGS:  CTA all fields  HEART:  RRR without Murmur  ABDOMEN:  Soft and nontender without palpable abnormalities  EXTREMITIES:  No edema, no calf tenderness    ASSESSMENT/PLAN:     Diagnosis Orders   1. Bronchitis     2. Cough  POCT Influenza A/B       Orders Placed This Encounter   Procedures    POCT Influenza A/B     Requested Prescriptions      No prescriptions requested or ordered in this encounter   Rafat Sat. OTC cough meds  Diflucan single dose to use if has vaginal yeast infection. Rapid flu was negative. Quit smoking  F/U with PCP  Return if symptoms worsen or fail to improve.     Electronically signed by Nori Gibson MD

## 2020-02-17 NOTE — LETTER
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 If you have any questions or concerns, please don't hesitate to call.     Sincerely,        Emmanuelle Durham MD

## 2020-03-17 ENCOUNTER — E-VISIT (OUTPATIENT)
Dept: FAMILY MEDICINE CLINIC | Age: 60
End: 2020-03-17
Payer: COMMERCIAL

## 2020-03-17 ENCOUNTER — OFFICE VISIT (OUTPATIENT)
Dept: PRIMARY CARE CLINIC | Age: 60
End: 2020-03-17
Payer: COMMERCIAL

## 2020-03-17 VITALS
HEART RATE: 93 BPM | SYSTOLIC BLOOD PRESSURE: 138 MMHG | WEIGHT: 181 LBS | RESPIRATION RATE: 16 BRPM | HEIGHT: 63 IN | TEMPERATURE: 98.1 F | DIASTOLIC BLOOD PRESSURE: 80 MMHG | BODY MASS INDEX: 32.07 KG/M2 | OXYGEN SATURATION: 96 %

## 2020-03-17 PROCEDURE — 3017F COLORECTAL CA SCREEN DOC REV: CPT | Performed by: FAMILY MEDICINE

## 2020-03-17 PROCEDURE — G8417 CALC BMI ABV UP PARAM F/U: HCPCS | Performed by: FAMILY MEDICINE

## 2020-03-17 PROCEDURE — G8484 FLU IMMUNIZE NO ADMIN: HCPCS | Performed by: FAMILY MEDICINE

## 2020-03-17 PROCEDURE — 99214 OFFICE O/P EST MOD 30 MIN: CPT | Performed by: FAMILY MEDICINE

## 2020-03-17 PROCEDURE — G8427 DOCREV CUR MEDS BY ELIG CLIN: HCPCS | Performed by: FAMILY MEDICINE

## 2020-03-17 PROCEDURE — 99422 OL DIG E/M SVC 11-20 MIN: CPT | Performed by: NURSE PRACTITIONER

## 2020-03-17 PROCEDURE — 4004F PT TOBACCO SCREEN RCVD TLK: CPT | Performed by: FAMILY MEDICINE

## 2020-03-17 RX ORDER — DOXYCYCLINE HYCLATE 100 MG
100 TABLET ORAL 2 TIMES DAILY
Qty: 20 TABLET | Refills: 0 | Status: SHIPPED | OUTPATIENT
Start: 2020-03-17 | End: 2020-06-02 | Stop reason: ALTCHOICE

## 2020-03-17 RX ORDER — FLUCONAZOLE 150 MG/1
TABLET ORAL
Qty: 2 TABLET | Refills: 0 | Status: SHIPPED | OUTPATIENT
Start: 2020-03-17 | End: 2020-06-02 | Stop reason: ALTCHOICE

## 2020-03-17 RX ORDER — FLUTICASONE PROPIONATE 50 MCG
2 SPRAY, SUSPENSION (ML) NASAL DAILY
Qty: 1 BOTTLE | Refills: 11 | Status: SHIPPED | OUTPATIENT
Start: 2020-03-17 | End: 2021-01-26 | Stop reason: SDUPTHER

## 2020-03-17 RX ORDER — VENLAFAXINE HYDROCHLORIDE 150 MG/1
CAPSULE, EXTENDED RELEASE ORAL
COMMUNITY
Start: 2020-03-02 | End: 2020-06-05

## 2020-03-17 RX ORDER — DOXYCYCLINE HYCLATE 100 MG
100 TABLET ORAL 2 TIMES DAILY
Qty: 20 TABLET | Refills: 0 | Status: SHIPPED | OUTPATIENT
Start: 2020-03-17 | End: 2020-03-17

## 2020-03-17 ASSESSMENT — ENCOUNTER SYMPTOMS
WHEEZING: 0
HOARSE VOICE: 1
CONSTIPATION: 0
COUGH: 1
NAUSEA: 0
DIARRHEA: 0
TROUBLE SWALLOWING: 0
SINUS PRESSURE: 1
SHORTNESS OF BREATH: 0
CHEST TIGHTNESS: 0
SORE THROAT: 1
CHOKING: 0
SINUS COMPLAINT: 1
EYE DISCHARGE: 1

## 2020-03-17 ASSESSMENT — LIFESTYLE VARIABLES
SMOKING_YEARS: 45
SMOKING_STATUS: YES

## 2020-03-17 NOTE — PROGRESS NOTES
diarrhea and nausea. Skin: Negative for rash. Allergic/Immunologic: Negative for environmental allergies. Neurological: Positive for headaches. Objective:      Physical Exam  Vitals signs and nursing note reviewed. Constitutional:       General: She is not in acute distress. Appearance: She is well-developed. HENT:      Right Ear: Tympanic membrane, ear canal and external ear normal.      Left Ear: Tympanic membrane, ear canal and external ear normal.      Nose: Mucosal edema present. Right Sinus: No maxillary sinus tenderness or frontal sinus tenderness. Left Sinus: No maxillary sinus tenderness or frontal sinus tenderness. Mouth/Throat:      Pharynx: No oropharyngeal exudate. Eyes:      Conjunctiva/sclera: Conjunctivae normal.   Neck:      Musculoskeletal: Normal range of motion and neck supple. Cardiovascular:      Rate and Rhythm: Normal rate and regular rhythm. Heart sounds: Normal heart sounds. No murmur. Pulmonary:      Effort: Pulmonary effort is normal. No respiratory distress. Breath sounds: Normal breath sounds. No wheezing or rales. Lymphadenopathy:      Cervical: No cervical adenopathy. Skin:     General: Skin is warm and dry. Findings: No rash. Neurological:      Mental Status: She is alert and oriented to person, place, and time. Psychiatric:         Behavior: Behavior normal.         Judgment: Judgment normal.       /80   Pulse 93   Temp 98.1 °F (36.7 °C)   Resp 16   Ht 5' 3\" (1.6 m)   Wt 181 lb (82.1 kg)   SpO2 96%   BMI 32.06 kg/m²     Assessment:       Diagnosis Orders   1. Acute bacterial sinusitis               Plan:        Sinusitis: New; I will treat with doxycycline. she was also advised to use flonase, nasal saline and mucinex for her congestion. Return if symptoms worsen or fail to improve.     Orders Placed This Encounter   Medications    doxycycline hyclate (VIBRA-TABS) 100 MG tablet     Sig: Take 1 tablet by mouth 2 times daily     Dispense:  20 tablet     Refill:  0    fluconazole (DIFLUCAN) 150 MG tablet     Sig: Take 1 pill now, 1 pill in a week. Dispense:  2 tablet     Refill:  0       Patientgiven educational materials - see patient instructions. Discussed use, benefit,and side effects of prescribed medications. All patient questions answered. Ptvoiced understanding. Reviewed health maintenance. Instructed to continue currentmedications, diet and exercise. Patient agreed with treatment plan. Follow up asdirected.      Electronically signed by Eren Baugh MD on 3/17/2020 at 10:40 AM

## 2020-06-02 ENCOUNTER — HOSPITAL ENCOUNTER (OUTPATIENT)
Dept: GENERAL RADIOLOGY | Age: 60
Discharge: HOME | End: 2020-06-04
Payer: COMMERCIAL

## 2020-06-02 ENCOUNTER — OFFICE VISIT (OUTPATIENT)
Dept: FAMILY MEDICINE CLINIC | Age: 60
End: 2020-06-02
Payer: COMMERCIAL

## 2020-06-02 ENCOUNTER — HOSPITAL ENCOUNTER (OUTPATIENT)
Dept: LAB | Age: 60
Discharge: HOME | End: 2020-06-02
Payer: COMMERCIAL

## 2020-06-02 VITALS
HEIGHT: 63 IN | OXYGEN SATURATION: 98 % | SYSTOLIC BLOOD PRESSURE: 132 MMHG | BODY MASS INDEX: 33.7 KG/M2 | HEART RATE: 80 BPM | DIASTOLIC BLOOD PRESSURE: 80 MMHG | TEMPERATURE: 97.5 F | WEIGHT: 190.2 LBS | RESPIRATION RATE: 18 BRPM

## 2020-06-02 LAB
ABSOLUTE EOS #: 0.13 K/UL (ref 0–0.44)
ABSOLUTE IMMATURE GRANULOCYTE: 0.03 K/UL (ref 0–0.3)
ABSOLUTE LYMPH #: 2.36 K/UL (ref 1.1–3.7)
ABSOLUTE MONO #: 0.53 K/UL (ref 0.1–1.2)
ALBUMIN SERPL-MCNC: 4.3 G/DL (ref 3.5–5.2)
ALBUMIN/GLOBULIN RATIO: 1.6 (ref 1–2.5)
ALP BLD-CCNC: 85 U/L (ref 35–104)
ALT SERPL-CCNC: 9 U/L (ref 5–33)
AMYLASE: 22 U/L (ref 28–100)
ANION GAP SERPL CALCULATED.3IONS-SCNC: 16 MMOL/L (ref 9–17)
AST SERPL-CCNC: 11 U/L
BASOPHILS # BLD: 1 % (ref 0–2)
BASOPHILS ABSOLUTE: 0.09 K/UL (ref 0–0.2)
BILIRUB SERPL-MCNC: 0.14 MG/DL (ref 0.3–1.2)
BUN BLDV-MCNC: 13 MG/DL (ref 8–23)
BUN/CREAT BLD: 20 (ref 9–20)
CALCIUM SERPL-MCNC: 10 MG/DL (ref 8.6–10.4)
CHLORIDE BLD-SCNC: 96 MMOL/L (ref 98–107)
CHOLESTEROL/HDL RATIO: 2.9
CHOLESTEROL: 227 MG/DL
CO2: 26 MMOL/L (ref 20–31)
CREAT SERPL-MCNC: 0.66 MG/DL (ref 0.5–0.9)
DIFFERENTIAL TYPE: ABNORMAL
EOSINOPHILS RELATIVE PERCENT: 1 % (ref 1–4)
GFR AFRICAN AMERICAN: >60 ML/MIN
GFR NON-AFRICAN AMERICAN: >60 ML/MIN
GFR SERPL CREATININE-BSD FRML MDRD: ABNORMAL ML/MIN/{1.73_M2}
GFR SERPL CREATININE-BSD FRML MDRD: ABNORMAL ML/MIN/{1.73_M2}
GLUCOSE BLD-MCNC: 128 MG/DL (ref 70–99)
HCT VFR BLD CALC: 44.7 % (ref 36.3–47.1)
HDLC SERPL-MCNC: 78 MG/DL
HEMOGLOBIN: 14.7 G/DL (ref 11.9–15.1)
IMMATURE GRANULOCYTES: 0 %
LDL CHOLESTEROL: 134 MG/DL (ref 0–130)
LIPASE: 36 U/L (ref 13–60)
LYMPHOCYTES # BLD: 26 % (ref 24–43)
MCH RBC QN AUTO: 30.2 PG (ref 25.2–33.5)
MCHC RBC AUTO-ENTMCNC: 32.9 G/DL (ref 25.2–33.5)
MCV RBC AUTO: 91.8 FL (ref 82.6–102.9)
MONOCYTES # BLD: 6 % (ref 3–12)
NRBC AUTOMATED: 0 PER 100 WBC
PDW BLD-RTO: 13.1 % (ref 11.8–14.4)
PLATELET # BLD: 290 K/UL (ref 138–453)
PLATELET ESTIMATE: ABNORMAL
PMV BLD AUTO: 10.9 FL (ref 8.1–13.5)
POTASSIUM SERPL-SCNC: 4.5 MMOL/L (ref 3.7–5.3)
RBC # BLD: 4.87 M/UL (ref 3.95–5.11)
RBC # BLD: ABNORMAL 10*6/UL
SEG NEUTROPHILS: 66 % (ref 36–65)
SEGMENTED NEUTROPHILS ABSOLUTE COUNT: 5.95 K/UL (ref 1.5–8.1)
SODIUM BLD-SCNC: 138 MMOL/L (ref 135–144)
TOTAL PROTEIN: 7 G/DL (ref 6.4–8.3)
TRIGL SERPL-MCNC: 74 MG/DL
VLDLC SERPL CALC-MCNC: ABNORMAL MG/DL (ref 1–30)
WBC # BLD: 9.1 K/UL (ref 3.5–11.3)
WBC # BLD: ABNORMAL 10*3/UL

## 2020-06-02 PROCEDURE — 82150 ASSAY OF AMYLASE: CPT

## 2020-06-02 PROCEDURE — 99214 OFFICE O/P EST MOD 30 MIN: CPT | Performed by: NURSE PRACTITIONER

## 2020-06-02 PROCEDURE — 3017F COLORECTAL CA SCREEN DOC REV: CPT | Performed by: NURSE PRACTITIONER

## 2020-06-02 PROCEDURE — 4004F PT TOBACCO SCREEN RCVD TLK: CPT | Performed by: NURSE PRACTITIONER

## 2020-06-02 PROCEDURE — 80061 LIPID PANEL: CPT

## 2020-06-02 PROCEDURE — 74019 RADEX ABDOMEN 2 VIEWS: CPT

## 2020-06-02 PROCEDURE — 85025 COMPLETE CBC W/AUTO DIFF WBC: CPT

## 2020-06-02 PROCEDURE — G8427 DOCREV CUR MEDS BY ELIG CLIN: HCPCS | Performed by: NURSE PRACTITIONER

## 2020-06-02 PROCEDURE — 80053 COMPREHEN METABOLIC PANEL: CPT

## 2020-06-02 PROCEDURE — 36415 COLL VENOUS BLD VENIPUNCTURE: CPT

## 2020-06-02 PROCEDURE — G8417 CALC BMI ABV UP PARAM F/U: HCPCS | Performed by: NURSE PRACTITIONER

## 2020-06-02 PROCEDURE — 83690 ASSAY OF LIPASE: CPT

## 2020-06-02 ASSESSMENT — ENCOUNTER SYMPTOMS
CONSTIPATION: 0
RESPIRATORY NEGATIVE: 1
NAUSEA: 0
DIARRHEA: 1
VOMITING: 0

## 2020-06-02 ASSESSMENT — PATIENT HEALTH QUESTIONNAIRE - PHQ9
SUM OF ALL RESPONSES TO PHQ QUESTIONS 1-9: 1
SUM OF ALL RESPONSES TO PHQ QUESTIONS 1-9: 1
SUM OF ALL RESPONSES TO PHQ9 QUESTIONS 1 & 2: 1
1. LITTLE INTEREST OR PLEASURE IN DOING THINGS: 1
2. FEELING DOWN, DEPRESSED OR HOPELESS: 0

## 2020-06-05 RX ORDER — VENLAFAXINE HYDROCHLORIDE 37.5 MG/1
CAPSULE, EXTENDED RELEASE ORAL
Qty: 30 CAPSULE | Refills: 3 | Status: SHIPPED | OUTPATIENT
Start: 2020-06-05 | End: 2020-10-12

## 2020-06-05 RX ORDER — VENLAFAXINE HYDROCHLORIDE 150 MG/1
CAPSULE, EXTENDED RELEASE ORAL
Qty: 30 CAPSULE | Refills: 3 | Status: SHIPPED | OUTPATIENT
Start: 2020-06-05 | End: 2020-10-12

## 2020-06-05 NOTE — TELEPHONE ENCOUNTER
Alayna called requesting a refill of the below medication which has been pended for you:     Requested Prescriptions     Pending Prescriptions Disp Refills    venlafaxine (EFFEXOR XR) 37.5 MG extended release capsule [Pharmacy Med Name: Venlafaxine HCl ER 37.5 MG Oral Capsule Extended Release 24 Hour] 30 capsule 0     Sig: Take 1 capsule by mouth once daily    venlafaxine (EFFEXOR XR) 150 MG extended release capsule [Pharmacy Med Name: Venlafaxine HCl  MG Oral Capsule Extended Release 24 Hour] 30 capsule 0     Sig: Take 1 capsule by mouth once daily       Last Appointment Date: 6/2/2020  Next Appointment Date: Visit date not found    Allergies   Allergen Reactions    Pcn [Penicillins] Anaphylaxis     \"can't breath\"    Sulfa Antibiotics Anaphylaxis     \"can't breathe\"

## 2020-07-20 ENCOUNTER — OFFICE VISIT (OUTPATIENT)
Dept: FAMILY MEDICINE CLINIC | Age: 60
End: 2020-07-20
Payer: COMMERCIAL

## 2020-07-20 VITALS
WEIGHT: 184.4 LBS | OXYGEN SATURATION: 97 % | SYSTOLIC BLOOD PRESSURE: 138 MMHG | DIASTOLIC BLOOD PRESSURE: 74 MMHG | TEMPERATURE: 97.3 F | HEART RATE: 104 BPM | RESPIRATION RATE: 20 BRPM | HEIGHT: 68 IN | BODY MASS INDEX: 27.95 KG/M2

## 2020-07-20 PROCEDURE — 4004F PT TOBACCO SCREEN RCVD TLK: CPT | Performed by: NURSE PRACTITIONER

## 2020-07-20 PROCEDURE — G8417 CALC BMI ABV UP PARAM F/U: HCPCS | Performed by: NURSE PRACTITIONER

## 2020-07-20 PROCEDURE — 99213 OFFICE O/P EST LOW 20 MIN: CPT | Performed by: NURSE PRACTITIONER

## 2020-07-20 PROCEDURE — G8427 DOCREV CUR MEDS BY ELIG CLIN: HCPCS | Performed by: NURSE PRACTITIONER

## 2020-07-20 PROCEDURE — 3017F COLORECTAL CA SCREEN DOC REV: CPT | Performed by: NURSE PRACTITIONER

## 2020-07-20 RX ORDER — METHYLPREDNISOLONE 4 MG/1
TABLET ORAL
Qty: 1 KIT | Refills: 0 | Status: SHIPPED | OUTPATIENT
Start: 2020-07-20 | End: 2020-10-20

## 2020-07-20 ASSESSMENT — ENCOUNTER SYMPTOMS
RESPIRATORY NEGATIVE: 1
GASTROINTESTINAL NEGATIVE: 1

## 2020-07-20 ASSESSMENT — PATIENT HEALTH QUESTIONNAIRE - PHQ9
SUM OF ALL RESPONSES TO PHQ QUESTIONS 1-9: 0
SUM OF ALL RESPONSES TO PHQ QUESTIONS 1-9: 0
SUM OF ALL RESPONSES TO PHQ9 QUESTIONS 1 & 2: 0
1. LITTLE INTEREST OR PLEASURE IN DOING THINGS: 0
2. FEELING DOWN, DEPRESSED OR HOPELESS: 0

## 2020-07-20 NOTE — PROGRESS NOTES
Anaphylaxis     \"can't breath\"    Sulfa Antibiotics Anaphylaxis     \"can't breathe\"       Current Outpatient Medications   Medication Sig Dispense Refill    triamcinolone (KENALOG) 0.1 % ointment Apply topically 2 times daily for 7 days 1 Tube 0    methylPREDNISolone (MEDROL DOSEPACK) 4 MG tablet Take as directed 1 kit 0    venlafaxine (EFFEXOR XR) 37.5 MG extended release capsule Take 1 capsule by mouth once daily 30 capsule 3    venlafaxine (EFFEXOR XR) 150 MG extended release capsule Take 1 capsule by mouth once daily 30 capsule 3    fluticasone (FLONASE) 50 MCG/ACT nasal spray 2 sprays by Nasal route daily 1 Bottle 11    ondansetron (ZOFRAN-ODT) 8 MG TBDP disintegrating tablet Place 8 mg under the tongue every 8 hours as needed for Nausea or Vomiting      dicyclomine (BENTYL) 20 MG tablet Take 1 tablet by mouth every 6 hours as needed (bowel spasm) 120 tablet 3    pantoprazole (PROTONIX) 40 MG tablet Take 1 tablet by mouth daily 90 tablet 3    sucralfate (CARAFATE) 1 GM tablet Take 1 tablet by mouth 4 times daily (Patient taking differently: Take 1 g by mouth 2 times daily ) 120 tablet 3    metFORMIN (GLUCOPHAGE) 1000 MG tablet TAKE 1 TABLET BY MOUTH TWICE DAILY WITH  MEALS 180 tablet 1    Naproxen Sodium (ALEVE PO) Take by mouth daily as needed       No current facility-administered medications for this visit. Review of Systems   Constitutional: Negative. Negative for activity change, appetite change and fever. Respiratory: Negative. Cardiovascular: Negative. Gastrointestinal: Negative. Musculoskeletal: Negative. Skin: Positive for rash (red raised on inner thighs and arms and neck, itchiness ).          Objective:       /74 (Site: Right Upper Arm, Position: Sitting, Cuff Size: Medium Adult)   Pulse 104   Temp 97.3 °F (36.3 °C) (Infrared)   Resp 20   Ht 5' 8\" (1.727 m)   Wt 184 lb 6.4 oz (83.6 kg)   SpO2 97%   Breastfeeding No   BMI 28.04 kg/m²     Physical

## 2020-07-24 ENCOUNTER — E-VISIT (OUTPATIENT)
Dept: FAMILY MEDICINE CLINIC | Age: 60
End: 2020-07-24
Payer: COMMERCIAL

## 2020-07-24 PROCEDURE — 99421 OL DIG E/M SVC 5-10 MIN: CPT | Performed by: NURSE PRACTITIONER

## 2020-10-12 ENCOUNTER — OFFICE VISIT (OUTPATIENT)
Dept: PRIMARY CARE CLINIC | Age: 60
End: 2020-10-12
Payer: COMMERCIAL

## 2020-10-12 VITALS
DIASTOLIC BLOOD PRESSURE: 86 MMHG | HEIGHT: 63 IN | BODY MASS INDEX: 32.5 KG/M2 | RESPIRATION RATE: 18 BRPM | WEIGHT: 183.4 LBS | OXYGEN SATURATION: 96 % | TEMPERATURE: 97.4 F | SYSTOLIC BLOOD PRESSURE: 130 MMHG | HEART RATE: 104 BPM

## 2020-10-12 PROCEDURE — G8484 FLU IMMUNIZE NO ADMIN: HCPCS | Performed by: NURSE PRACTITIONER

## 2020-10-12 PROCEDURE — G8926 SPIRO NO PERF OR DOC: HCPCS | Performed by: NURSE PRACTITIONER

## 2020-10-12 PROCEDURE — 3023F SPIROM DOC REV: CPT | Performed by: NURSE PRACTITIONER

## 2020-10-12 PROCEDURE — 99213 OFFICE O/P EST LOW 20 MIN: CPT | Performed by: NURSE PRACTITIONER

## 2020-10-12 PROCEDURE — G8417 CALC BMI ABV UP PARAM F/U: HCPCS | Performed by: NURSE PRACTITIONER

## 2020-10-12 PROCEDURE — 3017F COLORECTAL CA SCREEN DOC REV: CPT | Performed by: NURSE PRACTITIONER

## 2020-10-12 PROCEDURE — G8427 DOCREV CUR MEDS BY ELIG CLIN: HCPCS | Performed by: NURSE PRACTITIONER

## 2020-10-12 PROCEDURE — 4004F PT TOBACCO SCREEN RCVD TLK: CPT | Performed by: NURSE PRACTITIONER

## 2020-10-12 RX ORDER — ALBUTEROL SULFATE 90 UG/1
2 AEROSOL, METERED RESPIRATORY (INHALATION) EVERY 4 HOURS PRN
Qty: 1 INHALER | Refills: 0 | Status: SHIPPED | OUTPATIENT
Start: 2020-10-12 | End: 2021-06-14 | Stop reason: SDUPTHER

## 2020-10-12 RX ORDER — PREDNISONE 20 MG/1
20 TABLET ORAL 2 TIMES DAILY
Qty: 10 TABLET | Refills: 0 | Status: SHIPPED | OUTPATIENT
Start: 2020-10-12 | End: 2020-10-17

## 2020-10-12 RX ORDER — VENLAFAXINE HYDROCHLORIDE 150 MG/1
CAPSULE, EXTENDED RELEASE ORAL
Qty: 30 CAPSULE | Refills: 0 | Status: SHIPPED | OUTPATIENT
Start: 2020-10-12 | End: 2020-11-13 | Stop reason: SDUPTHER

## 2020-10-12 RX ORDER — VENLAFAXINE HYDROCHLORIDE 37.5 MG/1
CAPSULE, EXTENDED RELEASE ORAL
Qty: 30 CAPSULE | Refills: 0 | Status: SHIPPED | OUTPATIENT
Start: 2020-10-12 | End: 2020-11-13 | Stop reason: SDUPTHER

## 2020-10-12 ASSESSMENT — ENCOUNTER SYMPTOMS
SINUS PRESSURE: 0
WHEEZING: 1
GASTROINTESTINAL NEGATIVE: 1
COUGH: 1
SORE THROAT: 1
SHORTNESS OF BREATH: 0
RHINORRHEA: 1
CHEST TIGHTNESS: 0

## 2020-10-12 ASSESSMENT — PATIENT HEALTH QUESTIONNAIRE - PHQ9
SUM OF ALL RESPONSES TO PHQ QUESTIONS 1-9: 0
1. LITTLE INTEREST OR PLEASURE IN DOING THINGS: 0
2. FEELING DOWN, DEPRESSED OR HOPELESS: 0
SUM OF ALL RESPONSES TO PHQ9 QUESTIONS 1 & 2: 0
SUM OF ALL RESPONSES TO PHQ QUESTIONS 1-9: 0

## 2020-10-12 NOTE — LETTER
[address] [facility]  [address] [facility] [address]  Phone: [phone]  Fax: [phone]    ETHAN Bravo CNP        October 12, 2020     Patient: Kay Frey   YOB: 1960   Date of Visit: 10/12/2020       To Whom it May Concern:    Maryam Orellana was seen in my clinic on 10/12/2020. She may return to work on 10/14/20. If you have any questions or concerns, please don't hesitate to call.     Sincerely,         ETHAN Bravo CNP

## 2020-10-12 NOTE — PATIENT INSTRUCTIONS
Patient Education        Chronic Obstructive Pulmonary Disease (COPD): Care Instructions  Your Care Instructions     Chronic obstructive pulmonary disease (COPD) is a general term for a group of lung diseases, including emphysema and chronic bronchitis. People with COPD have decreased airflow in and out of the lungs, which makes it hard to breathe. The airways also can get clogged with thick mucus. Cigarette smoking is a major cause of COPD. Although there is no cure for COPD, you can slow its progress. Following your treatment plan and taking care of yourself can help you feel better and live longer. Follow-up care is a key part of your treatment and safety. Be sure to make and go to all appointments, and call your doctor if you are having problems. It's also a good idea to know your test results and keep a list of the medicines you take. How can you care for yourself at home? Staying healthy    · Do not smoke. This is the most important step you can take to prevent more damage to your lungs. If you need help quitting, talk to your doctor about stop-smoking programs and medicines. These can increase your chances of quitting for good.     · Avoid colds and flu. Get a pneumococcal vaccine shot. If you have had one before, ask your doctor whether you need a second dose. Get the flu vaccine every fall. If you must be around people with colds or the flu, wash your hands often.     · Avoid secondhand smoke, air pollution, and high altitudes. Also avoid cold, dry air and hot, humid air. Stay at home with your windows closed when air pollution is bad. Medicines and oxygen therapy    · Take your medicines exactly as prescribed. Call your doctor if you think you are having a problem with your medicine. You may be taking medicines such as:  ? Bronchodilators. These help open your airways and make breathing easier. They are either short-acting (work for 6 to 9 hours) or long-acting (work for 24 hours).  You inhale most bronchodilators, so they start to act quickly. Always carry your quick-relief inhaler with you in case you need it while you are away from home. ? Corticosteroids (prednisone, budesonide). These reduce airway inflammation. They come in pill or inhaled form. You must take these medicines every day for them to work well.     · Ask your doctor or pharmacist if a spacer is right for you. A spacer may help you get more inhaled medicine to your lungs. If you use one, ask how to use it properly.     · Do not take any vitamins, over-the-counter medicine, or herbal products without talking to your doctor first.     · If your doctor prescribed antibiotics, take them as directed. Do not stop taking them just because you feel better. You need to take the full course of antibiotics.     · If you use oxygen therapy, use the flow rate your doctor has recommended. Don't change it without talking to your doctor first. Oxygen therapy boosts the amount of oxygen in your blood and helps you breathe easier. Activity    · Get regular exercise. Walking is an easy way to get exercise. Start out slowly, and walk a little more each day.     · Pay attention to your breathing. You are exercising too hard if you can't talk while you exercise.     · Take short rest breaks when doing household chores and other activities.     · Learn breathing methods--such as breathing through pursed lips--to help you become less short of breath.     · If your doctor has not set you up with a pulmonary rehabilitation program, ask if rehab is right for you. Rehab includes exercise programs, education about your disease and how to manage it, help with diet and other changes, and emotional support. Diet    · Eat regular, healthy meals. Use bronchodilators about 1 hour before you eat to make it easier to eat. Eat several small meals instead of three large ones.  Drink beverages at the end of the meal. Avoid foods that are hard to chew.     · Eat foods that contain protein so you don't lose muscle mass.     · Talk with your doctor if you gain too much weight or if you lose weight without trying. Mental health    · Talk to your family, friends, or a therapist about your feelings. Some people feel frightened, angry, hopeless, helpless, and even guilty. Talking openly about bad feelings can help you cope. If these feelings last, talk to your doctor. When should you call for help? Call 911 anytime you think you may need emergency care. For example, call if:    · You have severe trouble breathing. Call your doctor now or seek immediate medical care if:    · You have new or worse trouble breathing.     · You cough up blood.     · You have a fever. Watch closely for changes in your health, and be sure to contact your doctor if:    · You cough more deeply or more often, especially if you notice more mucus or a change in the color of your mucus.     · You have new or worse swelling in your legs or belly.     · You are not getting better as expected. Where can you learn more? Go to https://[employer].mSnap. org and sign in to your [employer] account. Enter N728 in the Cleankeys box to learn more about \"Chronic Obstructive Pulmonary Disease (COPD): Care Instructions. \"     If you do not have an account, please click on the \"Sign Up Now\" link. Current as of: February 24, 2020               Content Version: 12.6  © 4433-7793 Healthwise, Incorporated. Care instructions adapted under license by [facility] ([facility]). If you have questions about a medical condition or this instruction, always ask your healthcare professional. [address] any warranty or liability for your use of this information. Patient Education        Learning About Coronavirus [phone])  Coronavirus [phone]): Overview  What is coronavirus ([id number])? The coronavirus disease (COVID-19) is caused by a virus.  It is an illness that was first found in December 2019. It has since spread worldwide. The virus can cause fever, cough, and trouble breathing. In severe cases, it can cause pneumonia and make it hard to breathe without help. It can cause death. This virus spreads person-to-person through droplets from coughing and sneezing. It can also spread when you are close to someone who is infected. And it can spread when you touch something that has the virus on it, such as a doorknob or a tabletop. Coronaviruses are a large group of viruses. They cause the common cold. They also cause more serious illnesses like [place] respiratory syndrome (MERS) and severe acute respiratory syndrome (SARS). COVID-19 is caused by a novel coronavirus. That means it's a new type that has not been seen in people before. How is COVID-19 treated? Mild illness can be treated at home, but more serious illness needs to be treated in the hospital. Treatment may include medicines to reduce symptoms, plus breathing support such as oxygen therapy or a ventilator. Other treatments, such as antiviral medicines, may help people who have COVID-19. What can you do to protect yourself from COVID-19? The best way to protect yourself from getting sick is to:  · Avoid areas where there is an outbreak. · Avoid contact with people who may be infected. · Avoid crowds and try to stay at least 6 feet away from other people. · Wash your hands often, especially after you cough or sneeze. Use soap and water, and scrub for at least 20 seconds. If soap and water aren't available, use an alcohol-based hand . · Avoid touching your mouth, nose, and eyes. What can you do to avoid spreading the virus to others? To help avoid spreading the virus to others:  · Wash your hands often with soap or alcohol-based hand sanitizers. · Cover your mouth with a tissue when you cough or sneeze. Then throw the tissue in the trash. · Use a disinfectant to clean things that you touch often.  These include doorknobs, remote controls, phones, and handles on your refrigerator and microwave. And don't forget countertops, tabletops, bathrooms, and computer keyboards. · Wear a cloth face cover if you have to go to public areas. If you know or suspect that you have COVID-19:  · Stay home. Don't go to school, work, or public areas. And don't use public transportation, ride-shares, or taxis unless you have no choice. · Leave your home only if you need to get medical care or testing. But call the doctor's office first so they know you're coming. And wear a face cover. · Limit contact with people in your home. If possible, stay in a separate bedroom and use a separate bathroom. · Wear a face cover whenever you're around other people. It can help stop the spread of the virus when you cough or sneeze. · Clean and disinfect your home every day. Use household  and disinfectant wipes or sprays. Take special care to clean things that you grab with your hands. · Self-isolate until it's safe to be around others again. ? If you have symptoms, it's safe when you haven't had a fever for 3 days and your symptoms have improved and it's been at least 10 days since your symptoms started. ? If you were exposed to the virus but don't have symptoms, it's safe to be around others 14 days after exposure. ? Talk to your doctor about whether you also need testing, especially if you have a weakened immune system. When to call for help  Call 911 anytime you think you may need emergency care. For example, call if:  · You have severe trouble breathing. (You can't talk at all.)  · You have constant chest pain or pressure. · You are severely dizzy or lightheaded. · You are confused or can't think clearly. · Your face and lips have a blue color. · You passed out (lost consciousness) or are very hard to wake up. Call your doctor now if you develop symptoms such as:  · Shortness of breath. · Fever. · Cough.   If you need to get care, call ahead to the doctor's office for instructions before you go. Make sure you wear a face cover to prevent exposing other people to the virus. Where can you get the latest information? The following health organizations are tracking and studying this virus. Their websites contain the most up-to-date information. Rodger Cole also learn what to do if you think you may have been exposed to the virus. · U.S. Centers for Disease Control and Prevention (CDC): The CDC provides updated news about the disease and travel advice. The website also tells you how to prevent the spread of infection. www.cdc.gov  · World Health Organization [facility]): WHO offers information about the virus outbreaks. WHO also has travel advice. www.who.int  Current as of: July 10, 2020               Content Version: 12.6  © 2006-2020 [employer]. Care instructions adapted under license by [facility] ([facility]). If you have questions about a medical condition or this instruction, always ask your healthcare professional. [place] [address] any warranty or liability for your use of this information. Patient Education        Coronavirus ([id number]-85): Care Instructions  Overview  The coronavirus disease (COVID-19) is caused by a virus. Symptoms may include a fever, a cough, and shortness of breath. It mainly spreads person-to-person through droplets from coughing and sneezing. The virus also can spread when people are in close contact with someone who is infected. Most people have mild symptoms and can take care of themselves at home. If their symptoms get worse, they may need care in a hospital. Treatment may include medicines to reduce symptoms, plus breathing support such as oxygen therapy or a ventilator. It's important to not spread the virus to others. If you have COVID-19, wear a face cover anytime you are around other people. You need to isolate yourself while you are sick.  Leave your home only if you need to get medical care or testing. Follow-up care is a key part of your treatment and safety. Be sure to make and go to all appointments, and call your doctor if you are having problems. It's also a good idea to know your test results and keep a list of the medicines you take. How can you care for yourself at home? · Get extra rest. It can help you feel better. · Drink plenty of fluids. This helps replace fluids lost from fever. Fluids also help ease a scratchy throat. Water, soup, fruit juice, and hot tea with lemon are good choices. · Take acetaminophen (such as Tylenol) to reduce a fever. It may also help with muscle aches. Read and follow all instructions on the label. · Use petroleum jelly on sore skin. This can help if the skin around your nose and lips becomes sore from rubbing a lot with tissues. Tips for self-isolation  · Limit contact with people in your home. If possible, stay in a separate bedroom and use a separate bathroom. · Wear a cloth face cover when you are around other people. It can help stop the spread of the virus when you cough or sneeze. · If you have to leave home, avoid crowds and try to stay at least 6 feet away from other people. · Avoid contact with pets and other animals. · Cover your mouth and nose with a tissue when you cough or sneeze. Then throw it in the trash right away. · Wash your hands often, especially after you cough or sneeze. Use soap and water, and scrub for at least 20 seconds. If soap and water aren't available, use an alcohol-based hand . · Don't share personal household items. These include bedding, towels, cups and glasses, and eating utensils. · [address] in the warmest water allowed for the fabric type, and dry it completely. It's okay to wash other people's laundry with yours. · Clean and disinfect your home every day. Use household  and disinfectant wipes or sprays. Take special care to clean things that you grab with your hands.  These include doorknobs, remote controls, phones, and handles on your refrigerator and microwave. And don't forget countertops, tabletops, bathrooms, and computer keyboards. When you can end self-isolation  · If you know or suspect that you have COVID-19, stay in self-isolation until:  ? You haven't had a fever for 3 days, and  ? Your symptoms have improved, and  ? It's been at least 10 days since your symptoms started. · Talk to your doctor about whether you also need testing, especially if you have a weakened immune system. When should you call for help? Call 911 anytime you think you may need emergency care. For example, call if you have life-threatening symptoms, such as:    · You have severe trouble breathing. (You can't talk at all.)     · You have constant chest pain or pressure.     · You are severely dizzy or lightheaded.     · You are confused or can't think clearly.     · Your face and lips have a blue color.     · You pass out (lose consciousness) or are very hard to wake up. Call your doctor now or seek immediate medical care if:    · You have moderate trouble breathing. (You can't speak a full sentence.)     · You are coughing up blood (more than about 1 teaspoon).     · You have signs of low blood pressure. These include feeling lightheaded; being too weak to stand; and having cold, pale, clammy skin. Watch closely for changes in your health, and be sure to contact your doctor if:    · Your symptoms get worse.     · You are not getting better as expected. Call before you go to the doctor's office. Follow their instructions. And wear a cloth face cover. Current as of: July 10, 2020               Content Version: 12.6  © 4535-2515 [employer]. Care instructions adapted under license by [facility].  If you have questions about a medical condition or this instruction, always ask your healthcare professional. [place] [address] any warranty or liability for your use of this information. Patient Education        Viral Respiratory Infection: Care Instructions  Your Care Instructions     Viruses are very small organisms. They grow in number after they enter your body. There are many types that cause different illnesses, such as colds and the mumps. The symptoms of a viral respiratory infection often start quickly. They include a fever, sore throat, and runny nose. You may also just not feel well. Or you may not want to eat much. Most viral respiratory infections are not serious. They usually get better with time and self-care. Antibiotics are not used to treat a viral infection. That's because antibiotics will not help cure a viral illness. In some cases, antiviral medicine can help your body fight a serious viral infection. Follow-up care is a key part of your treatment and safety. Be sure to make and go to all appointments, and call your doctor if you are having problems. It's also a good idea to know your test results and keep a list of the medicines you take. How can you care for yourself at home? · Rest as much as possible until you feel better. · Be safe with medicines. Take your medicine exactly as prescribed. Call your doctor if you think you are having a problem with your medicine. You will get more details on the specific medicine your doctor prescribes. · Take an over-the-counter pain medicine, such as acetaminophen (Tylenol), ibuprofen (Advil, Motrin), or naproxen (Aleve), as needed for pain and fever. Read and follow all instructions on the label. Do not give aspirin to anyone younger than 20. It has been linked to Reye syndrome, a serious illness. · Drink plenty of fluids, enough so that your urine is light yellow or clear like water. Hot fluids, such as tea or soup, may help relieve congestion in your nose and throat.  If you have kidney, heart, or liver disease and have to limit fluids, talk with your doctor before you increase the amount of fluids your use of this information.

## 2020-10-12 NOTE — PROGRESS NOTES
[facility]             [address], [address]                        Telephone [phone]             Fax [phone]  1960  QWR:[id number]   Date of visit:  10/12/2020    Subjective:    Boo Baeza is a 60 y.o.  female who presents to [facility] today (10/12/2020) for evaluation of:    Chief Complaint   Patient presents with    Cough     Productive, St, congestion, runny nose, fever, headache all started late friday night        Cough   This is a new problem. The current episode started in the past 7 days (10/09/20). The problem has been gradually worsening. The problem occurs every few minutes. The cough is productive of sputum. Associated symptoms include chills, ear pain (bilateral), a fever (low grade for 1 day), headaches (sinus headache), nasal congestion, postnasal drip, rhinorrhea, a sore throat and wheezing. Pertinent negatives include no chest pain, myalgias, rash or shortness of breath. Nothing aggravates the symptoms. Treatments tried: Tylenol cold and sinus medicine. The treatment provided mild relief. Her past medical history is significant for COPD and environmental allergies.        She has the following problem list:  Patient Active Problem List   Diagnosis    Gastroesophageal reflux disease    Irritable bowel syndrome with diarrhea    [facility] bronch[facility] ([facility])    Type 2 diabetes mellitus without complication, without long-term current use of insulin ([facility])    Depression    Tobacco use    Allergic rhinitis    Carpal tunnel syndrome of right wrist        Current medications are:  Current Outpatient Medications   Medication Sig Dispense Refill    albuterol sulfate HFA (PROVENTIL HFA) 108 (90 Base) MCG/ACT inhaler Inhale 2 puffs into the lungs every 4 hours as needed for Wheezing 1 Inhaler 0    Spacer/Aero-Holding Chambers KLEBER 1 Device by Does not apply route daily as needed (as needed with inhaler) 1 Device 0    predniSONE (DELTASONE) 20 MG tablet Take 1 tablet by mouth 2 times daily for 5 days 10 tablet 0    venlafaxine (EFFEXOR XR) 37.5 MG extended release capsule Take 1 capsule by mouth once daily 30 capsule 3    venlafaxine (EFFEXOR XR) 150 MG extended release capsule Take 1 capsule by mouth once daily 30 capsule 3    fluticasone (FLONASE) 50 MCG/ACT nasal spray 2 sprays by Nasal route daily 1 Bottle 11    ondansetron (ZOFRAN-ODT) 8 MG TBDP disintegrating tablet Place 8 mg under the tongue every 8 hours as needed for Nausea or Vomiting      dicyclomine (BENTYL) 20 MG tablet Take 1 tablet by mouth every 6 hours as needed (bowel spasm) 120 tablet 3    pantoprazole (PROTONIX) 40 MG tablet Take 1 tablet by mouth daily 90 tablet 3    Naproxen Sodium (ALEVE PO) Take by mouth daily as needed      methylPREDNISolone (MEDROL DOSEPACK) 4 MG tablet Take as directed (Patient not taking: Reported on 10/12/2020) 1 kit 0    sucralfate (CARAFATE) 1 GM tablet Take 1 tablet by mouth 4 times daily (Patient not taking: Reported on 10/12/2020) 120 tablet 3    metFORMIN (GLUCOPHAGE) 1000 MG tablet TAKE 1 TABLET BY MOUTH TWICE DAILY WITH  MEALS (Patient not taking: Reported on 10/12/2020) 180 tablet 1     No current facility-administered medications for this visit. She is allergic to pcn [penicillins] and sulfa antibiotics. .    She  reports that she has been smoking cigarettes. She started smoking about 49 years ago. She has a 45.00 pack-year smoking history. She has never used smokeless tobacco.      Objective:    Vitals:    10/12/20 1011   BP: 130/86   Pulse: 104   Resp: 18   Temp: 97.4 °F (36.3 °C)   TempSrc: Tympanic   SpO2: 96%   Weight: 183 lb 6.4 oz (83.2 kg)   Height: 5' 3\" (1.6 m)     Body mass index is 32.49 kg/m². Review of Systems   Constitutional: Positive for appetite change, chills, fatigue and fever (low grade for 1 day).    HENT: Positive for congestion, ear pain (bilateral), postnasal drip, rhinorrhea and sore throat. Negative for sinus pressure. Respiratory: Positive for cough and wheezing. Negative for chest tightness and shortness of breath. Cardiovascular: Negative. Negative for chest pain. Gastrointestinal: Negative. Musculoskeletal: Negative for myalgias. Skin: Negative for rash. Allergic/Immunologic: Positive for environmental allergies. Neurological: Positive for headaches (sinus headache). Physical Exam  Vitals signs and nursing note reviewed. Constitutional:       Appearance: She is well-developed. HENT:      Head: Normocephalic. Jaw: There is normal jaw occlusion. Right Ear: Ear canal and external ear normal. A middle ear effusion is present. Left Ear: Ear canal and external ear normal. A middle ear effusion is present. Nose: Congestion and rhinorrhea present. Rhinorrhea is clear. Right Turbinates: Swollen (erythema). Left Turbinates: Swollen (erythema). Right Sinus: Frontal sinus tenderness present. No maxillary sinus tenderness. Left Sinus: Frontal sinus tenderness present. No maxillary sinus tenderness. Mouth/Throat:      Lips: Pink. Mouth: Mucous membranes are moist.      Pharynx: Uvula midline. Posterior oropharyngeal erythema present. Eyes:      Pupils: Pupils are equal, round, and reactive to light. Neck:      Musculoskeletal: Normal range of motion and neck supple. Cardiovascular:      Rate and Rhythm: Normal rate and regular rhythm. Heart sounds: Normal heart sounds. Pulmonary:      Effort: Pulmonary effort is normal.      Breath sounds: Normal air entry. Examination of the right-upper field reveals wheezing. Examination of the left-upper field reveals wheezing. Wheezing and rhonchi (scattered bilateral) present. Comments: Moist cough noted  Lymphadenopathy:      Cervical: No cervical adenopathy.    Skin:     General: Skin is warm and

## 2020-10-20 ENCOUNTER — OFFICE VISIT (OUTPATIENT)
Dept: PRIMARY CARE CLINIC | Age: 60
End: 2020-10-20
Payer: COMMERCIAL

## 2020-10-20 ENCOUNTER — HOSPITAL ENCOUNTER (OUTPATIENT)
Dept: CT IMAGING | Age: 60
Discharge: HOME | End: 2020-10-22
Payer: COMMERCIAL

## 2020-10-20 VITALS
TEMPERATURE: 98.3 F | RESPIRATION RATE: 18 BRPM | WEIGHT: 183 LBS | OXYGEN SATURATION: 97 % | HEIGHT: 63 IN | BODY MASS INDEX: 32.43 KG/M2 | DIASTOLIC BLOOD PRESSURE: 84 MMHG | SYSTOLIC BLOOD PRESSURE: 136 MMHG | HEART RATE: 92 BPM

## 2020-10-20 PROCEDURE — G8417 CALC BMI ABV UP PARAM F/U: HCPCS | Performed by: FAMILY MEDICINE

## 2020-10-20 PROCEDURE — 99214 OFFICE O/P EST MOD 30 MIN: CPT | Performed by: FAMILY MEDICINE

## 2020-10-20 PROCEDURE — G8427 DOCREV CUR MEDS BY ELIG CLIN: HCPCS | Performed by: FAMILY MEDICINE

## 2020-10-20 PROCEDURE — 74176 CT ABD & PELVIS W/O CONTRAST: CPT

## 2020-10-20 PROCEDURE — 6360000004 HC RX CONTRAST MEDICATION: Performed by: FAMILY MEDICINE

## 2020-10-20 PROCEDURE — 4004F PT TOBACCO SCREEN RCVD TLK: CPT | Performed by: FAMILY MEDICINE

## 2020-10-20 PROCEDURE — 3017F COLORECTAL CA SCREEN DOC REV: CPT | Performed by: FAMILY MEDICINE

## 2020-10-20 PROCEDURE — G8484 FLU IMMUNIZE NO ADMIN: HCPCS | Performed by: FAMILY MEDICINE

## 2020-10-20 RX ORDER — GABAPENTIN 100 MG/1
100 CAPSULE ORAL 2 TIMES DAILY
Qty: 60 CAPSULE | Refills: 0 | Status: SHIPPED | OUTPATIENT
Start: 2020-10-20 | End: 2021-01-06

## 2020-10-20 RX ADMIN — IOHEXOL 50 ML: 240 INJECTION, SOLUTION INTRATHECAL; INTRAVASCULAR; INTRAVENOUS; ORAL at 10:02

## 2020-10-20 ASSESSMENT — ENCOUNTER SYMPTOMS
EYE REDNESS: 0
DIARRHEA: 1
COUGH: 0
RHINORRHEA: 0
SHORTNESS OF BREATH: 0
NAUSEA: 1
SINUS PRESSURE: 0
BACK PAIN: 1
ABDOMINAL PAIN: 1
CONSTIPATION: 0
TROUBLE SWALLOWING: 0
SORE THROAT: 0
WHEEZING: 0
EYE DISCHARGE: 0
VOMITING: 0

## 2020-10-20 ASSESSMENT — PATIENT HEALTH QUESTIONNAIRE - PHQ9
SUM OF ALL RESPONSES TO PHQ QUESTIONS 1-9: 0
1. LITTLE INTEREST OR PLEASURE IN DOING THINGS: 0
SUM OF ALL RESPONSES TO PHQ9 QUESTIONS 1 & 2: 0
SUM OF ALL RESPONSES TO PHQ QUESTIONS 1-9: 0
2. FEELING DOWN, DEPRESSED OR HOPELESS: 0
SUM OF ALL RESPONSES TO PHQ QUESTIONS 1-9: 0

## 2020-10-20 NOTE — LETTER
[facility] A department of [place] [address]  Phone: [phone]  Fax: [phone] [address]ra Flowers,       October 20, 2020    Patient:   Mynor Ford  Date of Birth   1960  Date of visit   10/20/2020        To Whom it May Concern:    \  Luis Alonzo was seen in my clinic on 10/20/2020. Please excuse from work 10/20/20 due to acute medical issues. If you have any questions or concerns, please don't hesitate to call.       Sincerely,      Karmen Roca DO

## 2020-10-20 NOTE — PROGRESS NOTES
10/20/2020     Rob Hunter (:  1960) is a 60 y.o. female, here for evaluation of the following medical concerns:    Abdominal Pain   This is a new problem. The current episode started 1 to 4 weeks ago (for 2 weeks has had pinching type pain to the left upper quadrant of the abdomen. Then yesterday developed diarrhea. Has had at least 10 episodes over the last 24 hours). The problem occurs constantly. The problem has been gradually worsening. The pain is located in the LUQ. Quality: pinching burning pain. Associated symptoms include anorexia (yesterday developed decreased appetite), diarrhea, myalgias and nausea (started yesterday). Pertinent negatives include no arthralgias, constipation, dysuria, fever, frequency, headaches, hematuria or vomiting. Treatments tried: aleve for the pain in the left upper quadrant. The treatment provided mild relief. Her past medical history is significant for irritable bowel syndrome. Did review patient's med list, allergies, social history,pmhx and pshx today as noted in the record. Review of Systems   Constitutional: Negative for chills, fatigue and fever. HENT: Negative for congestion, ear pain, postnasal drip, rhinorrhea, sinus pressure, sore throat and trouble swallowing. Eyes: Negative for discharge and redness. Respiratory: Negative for cough, shortness of breath and wheezing. Cardiovascular: Negative for chest pain. Gastrointestinal: Positive for abdominal pain, anorexia (yesterday developed decreased appetite), diarrhea and nausea (started yesterday). Negative for constipation and vomiting. Genitourinary: Negative for dysuria, flank pain, frequency, hematuria and urgency. Musculoskeletal: Positive for back pain and myalgias. Negative for arthralgias and neck pain. Skin: Negative for rash and wound. Allergic/Immunologic: Negative for environmental allergies.    Neurological: Negative for dizziness, weakness, light-headedness and headaches. Hematological: Negative for adenopathy. Psychiatric/Behavioral: Negative. Prior to Visit Medications    Medication Sig Taking?  Authorizing Provider   venlafaxine (EFFEXOR XR) 150 MG extended release capsule Take 1 capsule by mouth once daily Yes ARMINDA Lara   venlafaxine (EFFEXOR XR) 37.5 MG extended release capsule Take 1 capsule by mouth once daily Yes ARMINDA Lara   albuterol sulfate HFA (PROVENTIL HFA) 108 (90 Base) MCG/ACT inhaler Inhale 2 puffs into the lungs every 4 hours as needed for Wheezing Yes ETHAN Amos CNP   Spacer/Aero-Holding Gattis Boss 1 Device by Does not apply route daily as needed (as needed with inhaler) Yes ETHAN Amos CNP   fluticasone (FLONASE) 50 MCG/ACT nasal spray 2 sprays by Nasal route daily Yes ETHAN Madden CNP   ondansetron (ZOFRAN-ODT) 8 MG TBDP disintegrating tablet Place 8 mg under the tongue every 8 hours as needed for Nausea or Vomiting Yes Historical Provider, MD   dicyclomine (BENTYL) 20 MG tablet Take 1 tablet by mouth every 6 hours as needed (bowel spasm) Yes Manjula Vazquez MD   pantoprazole (PROTONIX) 40 MG tablet Take 1 tablet by mouth daily Yes Manjula Vazquez MD   Naproxen Sodium (ALEVE PO) Take by mouth daily as needed Yes Historical Provider, MD   sucralfate (CARAFATE) 1 GM tablet Take 1 tablet by mouth 4 times daily  Patient not taking: Reported on 10/12/2020  Manjula Vazquez MD   metFORMIN (GLUCOPHAGE) 1000 MG tablet TAKE 1 TABLET BY MOUTH TWICE DAILY WITH  MEALS  Patient not taking: Reported on 10/12/2020  Gerardine ETHAN Martinez CNP        Social History     Tobacco Use    Smoking status: Current Every Day Smoker     Packs/day: 1.50     Years: 30.00     Pack years: 45.00     Types: Cigarettes     Start date: 1/1/1971    Smokeless tobacco: Never Used   Substance Use Topics    Alcohol use: No     Frequency: Never        Vitals:    10/20/20 0843   BP: 136/84   Site: Left Upper Arm   Position: Sitting   Cuff Size: Medium Adult   Pulse: 92   Resp: 18   Temp: 98.3 °F (36.8 °C)   TempSrc: Tympanic   SpO2: 97%   Weight: 183 lb (83 kg)   Height: 5' 3\" (1.6 m)     Estimated body mass index is 32.42 kg/m² as calculated from the following:    Height as of this encounter: 5' 3\" (1.6 m). Weight as of this encounter: 183 lb (83 kg). Physical Exam  Vitals signs and nursing note reviewed. Constitutional:       General: She is not in acute distress. Appearance: Normal appearance. She is well-developed. She is not diaphoretic. HENT:      Head: Normocephalic and atraumatic. Nose: Nose normal.   Eyes:      General:         Right eye: No discharge. Left eye: No discharge. Conjunctiva/sclera: Conjunctivae normal.   Neck:      Musculoskeletal: Normal range of motion and neck supple. No neck rigidity. Cardiovascular:      Rate and Rhythm: Normal rate and regular rhythm. Pulmonary:      Effort: Pulmonary effort is normal. No respiratory distress. Breath sounds: Normal breath sounds. No wheezing. Abdominal:      General: Bowel sounds are normal. There is no distension. Palpations: Abdomen is soft. There is no mass. Tenderness: There is abdominal tenderness (left upper and lower quadrant pain with palpation. Moderate in severity. ). There is no guarding or rebound. Musculoskeletal:         General: Tenderness present. No swelling, deformity or signs of injury. Comments: Left sided paravertebral muscular tenderness and tensionwith palpation to the lower thoracic and upper lumbar region. Lymphadenopathy:      Cervical: No cervical adenopathy. Skin:     General: Skin is warm and dry. Neurological:      Mental Status: She is alert and oriented to person, place, and time. Psychiatric:         Behavior: Behavior normal.         Thought Content:  Thought content normal.         Judgment: Judgment normal.         ASSESSMENT/PLAN:  Encounter Diagnoses   Name Primary?  Partial small bowel obstruction (HCC) Yes    Left sided abdominal pain     Diarrhea, unspecified type        Orders Placed This Encounter   Medications    gabapentin (NEURONTIN) 100 MG capsule     Sig: Take 1 capsule by mouth 2 times daily for 30 days. Intended supply: 30 days     Dispense:  60 capsule     Refill:  0     Orders Placed This Encounter   Procedures    CT ABDOMEN PELVIS WO CONTRAST Additional Contrast? Oral     Standing Status:   Future     Number of Occurrences:   1     Standing Expiration Date:   10/20/2021     Order Specific Question:   Additional Contrast?     Answer:   Oral     Order Specific Question:   Reason for exam:     Answer:   left sided abdominal pain     CT ABDOMEN PELVIS WO CONTRAST Additional Contrast? Oral  Narrative: EXAMINATION:  CT OF THE ABDOMEN AND PELVIS WITHOUT CONTRAST 10/20/2020 10:18 am    TECHNIQUE:  CT of the abdomen and pelvis was performed without the administration of  intravenous contrast. Multiplanar reformatted images are provided for review. Dose modulation, iterative reconstruction, and/or weight based adjustment of  the mA/kV was utilized to reduce the radiation dose to as low as reasonably  achievable. COMPARISON:  23 April 2019    HISTORY:  ORDERING SYSTEM PROVIDED HISTORY: Left sided abdominal pain  TECHNOLOGIST PROVIDED HISTORY:    Reason for Exam: C/o LUQ pain with diarrhea. Hx hysterectomy,  cholecystectomy, partial bowel removed  Acuity: Acute  Type of Exam: Initial    FINDINGS:  Lower Chest: No acute abnormality in the included lung bases. Organs: The liver, spleen, pancreas, and kidneys demonstrate no acute  abnormality. There is no change in a hypodense lesion in the right adrenal,  2.7 x 1.6 cm, or bulky-appearing left adrenal gland. Hounsfield numbers over  the right adrenal mass are negative consistent with adenoma. Gallbladder  surgically absent. GI/Bowel: A few scattered colonic diverticula are present. No free fluid or  free air. Stomach is empty, limiting assessment. No bowel wall thickening  is evident. Mild dilatation of jejunal loops is noted with transition zone  in the left lower quadrant in the left mid pelvis. Findings compatible with  low-grade partial small bowel obstruction. Pelvis: No bladder abnormality. No appendicitis. No free pelvic fluid. No  inguinal adenopathy. Peritoneum/Retroperitoneum: No aortic aneurysm, retroperitoneal or mesenteric  adenopathy. Bones/Soft Tissues: Mild facet arthropathy lower lumbar spine. No acute  osseous or soft tissue abnormality. Lower Chest: No acute abnormality in the included lung bases. Impression: Mild distension of proximal small bowel loops with slight transition zone  left mid pelvis consistent with low-grade partial small bowel obstruction. No bowel wall thickening. Stomach is decompressed limiting assessment. I opted not to do a CBC today as this likely would be skewed due to recent course of steroid. CT of the abdomen and pelvis did show low grade partial bowel obstruction. Would recommend bowel rest.  Would hydrate and take tylenol as needed for abdominal pain. Question if pain in left flank and upper quadrant may be more musculoskeletal in nature. This has been persistent for 3 weeks and is worse with standing and activity. Partial small bowel obstruction is likely related to the acute GI symptoms that have developed in the last 24 hours, but the pinching burning pain may be more of a nerve impingement. I believe there are two separate issues. Will treat this pain with gabapentin as ordered to see if this will calm down any nerve irritation. Controlled Substance Monitoring:    Acute and Chronic Pain Monitoring:   RX Monitoring 10/20/2020   Periodic Controlled Substance Monitoring Possible medication side effects, risk of tolerance/dependence & alternative treatments discussed. ;No signs of potential drug abuse or diversion identified. Patient is to return if further problems or symptoms arise or if no improvement in any of her symptoms at this time. (Please note that portions of this note were completed with a voice recognition program. Efforts were made to edit the dictations but occasionally words are mis-transcribed.)        No follow-ups on file. An electronic signature was used to authenticate this note.     --Devorah Olsen, DO on 10/20/2020 at 8:50 AM

## 2020-10-21 ENCOUNTER — E-VISIT (OUTPATIENT)
Dept: FAMILY MEDICINE CLINIC | Age: 60
End: 2020-10-21

## 2020-10-26 ENCOUNTER — TELEPHONE (OUTPATIENT)
Dept: FAMILY MEDICINE CLINIC | Age: 60
End: 2020-10-26

## 2020-11-13 ENCOUNTER — TELEPHONE (OUTPATIENT)
Dept: FAMILY MEDICINE CLINIC | Age: 60
End: 2020-11-13

## 2020-11-13 RX ORDER — VENLAFAXINE HYDROCHLORIDE 37.5 MG/1
CAPSULE, EXTENDED RELEASE ORAL
Qty: 30 CAPSULE | Refills: 3 | Status: SHIPPED | OUTPATIENT
Start: 2020-11-13 | End: 2021-03-22 | Stop reason: SDUPTHER

## 2020-11-13 RX ORDER — VENLAFAXINE HYDROCHLORIDE 150 MG/1
CAPSULE, EXTENDED RELEASE ORAL
Qty: 30 CAPSULE | Refills: 3 | Status: SHIPPED | OUTPATIENT
Start: 2020-11-13 | End: 2021-03-22 | Stop reason: SDUPTHER

## 2020-11-17 NOTE — TELEPHONE ENCOUNTER
Last Appt:  6/20/2017  Next Appt:   No Showed 12/20/2017-called pt to reschedule follow up appt.-pt states that she has no insurance at this time, that's why she has no showed her speciality appts as well as ours, and has not completed her labs as well. Please advise, thank you!     Med verified in Epic
[[email or web address]] : 32 year old male here with complaints of chest discomfort for one day.  Patients active medications, allergies and issues were all reviewed with the patient at time of visit.\par \par

## 2021-01-06 ENCOUNTER — HOSPITAL ENCOUNTER (OUTPATIENT)
Dept: GENERAL RADIOLOGY | Age: 61
Discharge: HOME | End: 2021-01-08
Payer: COMMERCIAL

## 2021-01-06 ENCOUNTER — OFFICE VISIT (OUTPATIENT)
Dept: PRIMARY CARE CLINIC | Age: 61
End: 2021-01-06
Payer: COMMERCIAL

## 2021-01-06 VITALS
HEIGHT: 63 IN | DIASTOLIC BLOOD PRESSURE: 84 MMHG | HEART RATE: 76 BPM | OXYGEN SATURATION: 95 % | BODY MASS INDEX: 31.89 KG/M2 | SYSTOLIC BLOOD PRESSURE: 130 MMHG | RESPIRATION RATE: 18 BRPM | TEMPERATURE: 97 F | WEIGHT: 180 LBS

## 2021-01-06 DIAGNOSIS — M25.561 ACUTE BILATERAL KNEE PAIN: ICD-10-CM

## 2021-01-06 DIAGNOSIS — M25.532 ACUTE PAIN OF LEFT WRIST: Primary | ICD-10-CM

## 2021-01-06 DIAGNOSIS — S80.01XA CONTUSION OF RIGHT KNEE, INITIAL ENCOUNTER: ICD-10-CM

## 2021-01-06 DIAGNOSIS — S80.02XA CONTUSION OF LEFT KNEE, INITIAL ENCOUNTER: ICD-10-CM

## 2021-01-06 DIAGNOSIS — M25.562 ACUTE BILATERAL KNEE PAIN: ICD-10-CM

## 2021-01-06 DIAGNOSIS — S63.502A LEFT WRIST SPRAIN, INITIAL ENCOUNTER: ICD-10-CM

## 2021-01-06 DIAGNOSIS — M25.532 ACUTE PAIN OF LEFT WRIST: ICD-10-CM

## 2021-01-06 DIAGNOSIS — S16.1XXA ACUTE STRAIN OF NECK MUSCLE, INITIAL ENCOUNTER: ICD-10-CM

## 2021-01-06 PROCEDURE — 3017F COLORECTAL CA SCREEN DOC REV: CPT | Performed by: NURSE PRACTITIONER

## 2021-01-06 PROCEDURE — G8427 DOCREV CUR MEDS BY ELIG CLIN: HCPCS | Performed by: NURSE PRACTITIONER

## 2021-01-06 PROCEDURE — 73562 X-RAY EXAM OF KNEE 3: CPT

## 2021-01-06 PROCEDURE — 4004F PT TOBACCO SCREEN RCVD TLK: CPT | Performed by: NURSE PRACTITIONER

## 2021-01-06 PROCEDURE — 99214 OFFICE O/P EST MOD 30 MIN: CPT | Performed by: NURSE PRACTITIONER

## 2021-01-06 PROCEDURE — 73110 X-RAY EXAM OF WRIST: CPT

## 2021-01-06 PROCEDURE — G8482 FLU IMMUNIZE ORDER/ADMIN: HCPCS | Performed by: NURSE PRACTITIONER

## 2021-01-06 PROCEDURE — L3908 WHO COCK-UP NONMOLDE PRE OTS: HCPCS | Performed by: NURSE PRACTITIONER

## 2021-01-06 PROCEDURE — 99214 OFFICE O/P EST MOD 30 MIN: CPT

## 2021-01-06 PROCEDURE — G8417 CALC BMI ABV UP PARAM F/U: HCPCS | Performed by: NURSE PRACTITIONER

## 2021-01-06 ASSESSMENT — PATIENT HEALTH QUESTIONNAIRE - PHQ9
1. LITTLE INTEREST OR PLEASURE IN DOING THINGS: 0
SUM OF ALL RESPONSES TO PHQ9 QUESTIONS 1 & 2: 0

## 2021-01-06 ASSESSMENT — ENCOUNTER SYMPTOMS: SHORTNESS OF BREATH: 0

## 2021-01-06 NOTE — PROGRESS NOTES
[address]  [address]  Dept: [phone]  Dept Fax: [phone]: [phone]        CHIEF COMPLAINT       Chief Complaint   Patient presents with    Fall     at work this afternoon [facility]; bilateral knees, left heel of hand and wrist, neck and right ankle pain; tried to catch self with left hand       Nurses Notes reviewed and I agree except as noted in the HPI. HISTORY OF PRESENT ILLNESS   Birdie Conley is a 63 y.o. female who presents to [facility] today (1/6/2021) for evaluation of:   Pt here for evaluation of bilateral knee pain, left wrist pain, and neck pain. Pt states she fell at work, was bent over pushing acosta cart when cart caught on doorway; pt continued forward, hitting knees on ground, catching self with wrist.     Fall  The accident occurred 1 to 3 hours ago. The fall occurred while walking. She landed on concrete. There was no blood loss. The point of impact was the left knee, right knee and left wrist. The pain is present in the neck, left knee, right knee and left wrist. The pain is at a severity of 7/10. The symptoms are aggravated by ambulation and movement. Pertinent negatives include no numbness or tingling. She has tried nothing for the symptoms. The treatment provided no relief. REVIEW OF SYSTEMS     Review of Systems   Respiratory: Negative for shortness of breath. Cardiovascular: Negative for chest pain. Musculoskeletal: Positive for arthralgias (left wrist pain, bilateral knee pain), neck pain and neck stiffness. Neurological: Negative for tingling and numbness.        PAST MEDICAL HISTORY         Diagnosis Date    Allergic rhinitis     Arthritis     Asthma     Bronchitis     COPD [facility]chronic obstructive [facility] disease) [facility]     Depression     Diabetes mellitus ([facility]     Headache     Hyperlipidemia     Hypertension     Incontinence     Irritable bowel syndrome     Kidney stone     Osteoarthritis     Pneumonia     Type 2 diabetes mellitus without complication [facility] 0593    Ulcer        SURGICAL HISTORY     Patient  has a past surgical history that includes Tonsillectomy; Colonoscopy (7/11/208); Meckel's diverticulum excision (1970); Cholecystectomy, laparoscopic (1997); Hysterectomy, total abdominal (1986); laparoscopy (2008); Colonoscopy (08/02/2017); Upper gastrointestinal endoscopy (08/02/2017); Carpal tunnel release (Right, 5/7/2019); Colonoscopy (Left, 10/8/2019); and Upper gastrointestinal endoscopy (Left, 10/8/2019). CURRENT MEDICATIONS       Outpatient Medications Prior to Visit   Medication Sig Dispense Refill    venlafaxine (EFFEXOR XR) 150 MG extended release capsule Take 1 capsule by mouth once daily 30 capsule 3    venlafaxine (EFFEXOR XR) 37.5 MG extended release capsule Take 1 capsule by mouth once daily 30 capsule 3    albuterol sulfate HFA (PROVENTIL HFA) 108 (90 Base) MCG/ACT inhaler Inhale 2 puffs into the lungs every 4 hours as needed for Wheezing 1 Inhaler 0    Spacer/Aero-Holding Chambers KLEBER 1 Device by Does not apply route daily as needed (as needed with inhaler) 1 Device 0    fluticasone (FLONASE) 50 MCG/ACT nasal spray 2 sprays by Nasal route daily 1 Bottle 11    ondansetron (ZOFRAN-ODT) 8 MG TBDP disintegrating tablet Place 8 mg under the tongue every 8 hours as needed for Nausea or Vomiting      dicyclomine (BENTYL) 20 MG tablet Take 1 tablet by mouth every 6 hours as needed (bowel spasm) 120 tablet 3    pantoprazole (PROTONIX) 40 MG tablet Take 1 tablet by mouth daily 90 tablet 3    Naproxen Sodium (ALEVE PO) Take by mouth daily as needed      gabapentin (NEURONTIN) 100 MG capsule Take 1 capsule by mouth 2 times daily for 30 days.  Intended supply: 30 days (Patient not taking: Reported on 1/6/2021) 60 capsule 0    sucralfate (CARAFATE) 1 GM tablet Take 1 tablet by mouth 4 times daily (Patient not taking: Reported on 10/12/2020) 120 tablet 3    metFORMIN (GLUCOPHAGE) 1000 MG tablet TAKE 1 TABLET BY MOUTH TWICE DAILY WITH  MEALS (Patient not taking: Reported on 10/12/2020) 180 tablet 1     No facility-administered medications prior to visit. ALLERGIES     Patient is is allergic to pcn [penicillins] and sulfa antibiotics. FAMILY HISTORY     Patient's She was adopted. Family history is unknown by patient. SOCIAL HISTORY     Patient  reports that she has been smoking cigarettes. She started smoking about 50 years ago. She has a 30.00 pack-year smoking history. She has never used smokeless tobacco. She reports that she does not drink alcohol or use drugs. PHYSICAL EXAM     VITALS  BP: 130/84, Temp: 97 °F (36.1 °C), Pulse: 76, Resp: 18, SpO2: 95 %  Physical Exam  Vitals signs reviewed. Constitutional:       General: She is not in acute distress. Appearance: Normal appearance. She is not ill-appearing. Neck:      Musculoskeletal: Normal range of motion. Cardiovascular:      Rate and Rhythm: Normal rate and regular rhythm. Heart sounds: Normal heart sounds. No murmur. Pulmonary:      Effort: Pulmonary effort is normal. No respiratory distress. Breath sounds: Normal breath sounds. No rhonchi. Musculoskeletal:      Left wrist: She exhibits tenderness (Tenderness with palpation palmar side base of left thumb). She exhibits normal range of motion, no crepitus and no deformity. Right knee: She exhibits normal range of motion, no effusion, no ecchymosis, no deformity, no erythema, no LCL laxity, normal patellar mobility and no MCL laxity. Tenderness found. Lateral joint line and patellar tendon tenderness noted. Left knee: She exhibits normal range of motion, no effusion, no ecchymosis, no deformity, no erythema, no LCL laxity, normal patellar mobility and no MCL laxity. Tenderness found.  Lateral joint line and patellar tendon tenderness noted.      Cervical back: She exhibits tenderness (Tenderness noted with palpation over bilateral cervical musculature). Arms:         Legs:       Comments: Abrasion noted to right knee. Right knee tender with palpation over lateral aspect, patellar tendon area, and directly over patella. Left knee tender over lateral aspect, patellar tendon area, and directly over patella. Skin:     General: Skin is warm and dry. Capillary Refill: Capillary refill takes less than 2 seconds. Neurological:      General: No focal deficit present. Mental Status: She is alert. DIAGNOSTIC RESULTS   Labs:No results found for this visit on 01/06/21.     IMAGING:  Narrative   EXAMINATION:   3 XRAY VIEWS OF THE LEFT WRIST; THREE XRAY VIEWS OF THE LEFT KNEE; THREE XRAY   VIEWS OF THE RIGHT KNEE       1/6/2021 4:58 pm       COMPARISON:   None.       HISTORY:   ORDERING SYSTEM PROVIDED HISTORY: Acute pain of left wrist   TECHNOLOGIST PROVIDED HISTORY:   Left wrist pain, bilateral knee pain.  Fall at work appx 2:30p   Reason for Exam: Pt fell landing on knees and tried to catch herself with   left hand; states radial sided left wrist pain; bilateral knee pain around   patella   Acuity: Acute   Type of Exam: Initial; ORDERING SYSTEM PROVIDED HISTORY: Acute bilateral knee   pain   TECHNOLOGIST PROVIDED HISTORY:   Bilateral knee pain post fall at work appx 2:30p   Reason for Exam: Pt fell landing on knees and tried to catch herself with   left hand; states radial sided left wrist pain; bilateral knee pain around   patella   Acuity: Acute   Type of Exam: Initial       FINDINGS:   Left wrist: No acute fracture.  Severe narrowing of the 1st carpometacarpal   joint with subchondral sclerosis and osteophytes.       Right knee: No acute fracture.  No significant joint space narrowing.  No   joint effusion.       Left knee: No acute fracture.  No significant joint space narrowing.  No   joint effusion.         sure to make and go to all appointments, and call your doctor if you are having problems. It's also a good idea to know your test results and keep a list of the medicines you take. How can you care for yourself at home? · If you were given a neck brace (cervical collar) to limit neck motion, wear it as instructed for as many days as your doctor tells you to. Do not wear it longer than you were told to. Wearing a brace for too long can make neck stiffness worse and weaken the neck muscles. · You can try using heat or ice to see if it helps. ? Try using a heating pad on a low or medium setting for 15 to 20 minutes every 2 to 3 hours. Try a warm shower in place of one session with the heating pad. You can also buy single-use heat wraps that last up to 8 hours. ? You can also try an ice pack for 10 to 15 minutes every 2 to 3 hours. · Take pain medicines exactly as directed. ? If the doctor gave you a prescription medicine for pain, take it as prescribed. ? If you are not taking a prescription pain medicine, ask your doctor if you can take an over-the-counter medicine. · Gently rub the area to relieve pain and help with blood flow. Do not massage the area if it hurts to do so. · Do not do anything that makes the pain worse. Take it easy for a couple of days. You can do your usual activities if they do not hurt your neck or put it at risk for more stress or injury. · Try sleeping on a special neck pillow. Place it under your neck, not under your head. Placing a tightly rolled-up towel under your neck while you sleep will also work. If you use a neck pillow or rolled towel, do not use your regular pillow at the same time. · To prevent future neck pain, do exercises to stretch and strengthen your neck and back. Learn how to use good posture, safe lifting techniques, and proper body mechanics. When should you call for help? Call 911 anytime you think you may need emergency care.  For example, call if:    · You are unable to move an arm or a leg at all. Call your doctor now or seek immediate medical care if:    · You have new or worse symptoms in your arms, legs, chest, belly, or buttocks. Symptoms may include:  ? Numbness or tingling. ? Weakness. ? Pain.     · You lose bladder or bowel control. Watch closely for changes in your health, and be sure to contact your doctor if:    · You are not getting better as expected. Where can you learn more? Go to https://Stop Being Watched.Worldcoo. org and sign in to your [employer] account. Enter M253 in the [facility] box to learn more about \"Neck Strain: Care Instructions. \"     If you do not have an account, please click on the \"Sign Up Now\" link. Current as of: March 2, 2020               Content Version: 12.6  © 7811-0724 [employer]. Care instructions adapted under license by [facility] If you have questions about a medical condition or this instruction, always ask your healthcare professional. [place] [address] any warranty or liability for your use of this information. Patient Education        Neck Strain or Sprain: Rehab Exercises  Introduction  Here are some examples of exercises for you to try. The exercises may be suggested for a condition or for rehabilitation. Start each exercise slowly. Ease off the exercises if you start to have pain. You will be told when to start these exercises and which ones will work best for you. How to do the exercises  Neck rotation   1. Sit in a firm chair, or stand up straight. 2. Keeping your chin level, turn your head to the right, and hold for 15 to 30 seconds. 3. Turn your head to the left and hold for 15 to 30 seconds. 4. Repeat 2 to 4 times to each side. Neck stretches   1. Look straight ahead, and tip your right ear to your right shoulder. Do not let your left shoulder rise up as you tip your head to the right. 2. Hold for 15 to 30 seconds.   3. Tilt your head to the left. Do not let your right shoulder rise up as you tip your head to the left. 4. Hold for 15 to 30 seconds. 5. Repeat 2 to 4 times to each side. Forward neck flexion   1. Sit in a firm chair, or stand up straight. 2. Bend your head forward. 3. Hold for 15 to 30 seconds. 4. Repeat 2 to 4 times. Lateral (side) bend strengthening   1. With your right hand, place your first two fingers on your right temple. 2. Start to bend your head to the side while using gentle pressure from your fingers to keep your head from bending. 3. Hold for about 6 seconds. 4. Repeat 8 to 12 times. 5. Switch hands and repeat the same exercise on your left side. Forward bend strengthening   1. Place your first two fingers of either hand on your forehead. 2. Start to bend your head forward while using gentle pressure from your fingers to keep your head from bending. 3. Hold for about 6 seconds. 4. Repeat 8 to 12 times. Neutral position strengthening   1. Using one hand, place your fingertips on the back of your head at the top of your neck. 2. Start to bend your head backward while using gentle pressure from your fingers to keep your head from bending. 3. Hold for about 6 seconds. 4. Repeat 8 to 12 times. Chin tuck   1. Lie on the floor with a rolled-up towel under your neck. Your head should be touching the floor. 2. Slowly bring your chin toward your chest.  3. Hold for a count of 6, and then relax for up to 10 seconds. 4. Repeat 8 to 12 times. Follow-up care is a key part of your treatment and safety. Be sure to make and go to all appointments, and call your doctor if you are having problems. It's also a good idea to know your test results and keep a list of the medicines you take. Where can you learn more? Go to https://Quixhopermelindaeb.Real Food Blends. org and sign in to your [employer]) account. Enter M679 in the [employer]er box to learn more about \"Neck Strain or Sprain: Rehab Exercises. \" If you do not have an account, please click on the \"Sign Up Now\" link. Current as of: March 2, 2020               Content Version: 12.6  © 2006-2020 [employer] Uset[employer]. Care instructions adapted under license by [facility] ([facility]). If you have questions about a medical condition or this instruction, always ask your healthcare professional. [address] any warranty or liability for your use of this information. Patient Education        Knee Pain or Injury: Care Instructions  Your Care Instructions     Injuries are a common cause of knee problems. Sudden (acute) injuries may be caused by a direct blow to the knee. They can also be caused by abnormal twisting, bending, or falling on the knee. Pain, bruising, or swelling may be severe, and may start within minutes of the injury. Overuse is another cause of knee pain. Other causes are climbing stairs, kneeling, and other activities that use the knee. Everyday wear and tear, especially as you get older, also can cause knee pain. Rest, along with home treatment, often relieves pain and allows your knee to heal. If you have a serious knee injury, you may need tests and treatment. Follow-up care is a key part of your treatment and safety. Be sure to make and go to all appointments, and call your doctor if you are having problems. It's also a good idea to know your test results and keep a list of the medicines you take. How can you care for yourself at home? · Be safe with medicines. Read and follow all instructions on the label. ? If the doctor gave you a prescription medicine for pain, take it as prescribed. ? If you are not taking a prescription pain medicine, ask your doctor if you can take an over-the-counter medicine. · Rest and protect your knee. Take a break from any activity that may cause pain. · Put ice or a cold pack on your knee for 10 to 20 minutes at a time. Put a thin cloth between the ice and your skin.   · Prop up a sore knee on a pillow when you ice it or anytime you sit or lie down for the next 3 days. Try to keep it above the level of your heart. This will help reduce swelling. · If your knee is not swollen, you can put moist heat, a heating pad, or a warm cloth on your knee. · If your doctor recommends an elastic bandage, sleeve, or other type of support for your knee, wear it as directed. · Follow your doctor's instructions about how much weight you can put on your leg. Use a cane, crutches, or a walker as instructed. · Follow your doctor's instructions about activity during your healing process. If you can do mild exercise, slowly increase your activity. · Reach and stay at a healthy weight. Extra weight can strain the joints, especially the knees and hips, and make the pain worse. Losing even a few pounds may help. When should you call for help? Call 911 anytime you think you may need emergency care. For example, call if:    · You have symptoms of a blood clot in your lung (called a pulmonary embolism). These may include:  ? Sudden chest pain. ? Trouble breathing. ? Coughing up blood. Call your doctor now or seek immediate medical care if:    · You have severe or increasing pain.     · Your leg or foot turns cold or changes color.     · You cannot stand or put weight on your knee.     · Your knee looks twisted or bent out of shape.     · You cannot move your knee.     · You have signs of infection, such as:  ? Increased pain, swelling, warmth, or redness. ? Red streaks leading from the knee. ? Pus draining from a place on your knee. ? A fever.     · You have signs of a blood clot in your leg (called a deep vein thrombosis), such as:  ? Pain in your calf, back of the knee, thigh, or groin. ? Redness and swelling in your leg or groin.    Watch closely for changes in your health, and be sure to contact your doctor if:    · You have tingling, weakness, or numbness in your knee.     · You have any new symptoms, such as swelling.     · You have bruises from a knee injury that last longer than 2 weeks.     · You do not get better as expected. Where can you learn more? Go to https://WebSafetypeottoReaqua Systems.Group-IB. org and sign in to your [employer] account. Enter K195 in the [employer] box to learn more about \"Knee Pain or Injury: Care Instructions. \"     If you do not have an account, please click on the \"Sign Up Now\" link. Current as of: June 26, 2019               Content Version: 12.6  © 1141-4254 [employer]. Care instructions adapted under license by [facility] ([facility]). If you have questions about a medical condition or this instruction, always ask your healthcare professional. [address] any warranty or liability for your use of this information. Patient Education        Wrist Sprain: Care Instructions  Your Care Instructions     Your wrist hurts because you have stretched or torn ligaments, which connect the bones in your wrist.  Wrist sprains usually take from 2 to 10 weeks to heal, but some take longer. Usually, the more pain you have, the more severe your wrist sprain is and the longer it will take to heal. You can heal faster and regain strength in your wrist with good home treatment. Follow-up care is a key part of your treatment and safety. Be sure to make and go to all appointments, and call your doctor if you are having problems. It's also a good idea to know your test results and keep a list of the medicines you take. How can you care for yourself at home? · Prop up your arm on a pillow when you ice it or anytime you sit or lie down for the next 3 days. Try to keep your wrist above the level of your heart. This will help reduce swelling. · Put ice or cold packs on your wrist for 10 to 20 minutes at a time. Try to do this every 1 to 2 hours for the next 3 days (when you are awake) or until the swelling goes down.  Put a thin cloth between the ice pack and your skin. · After 2 or 3 days, if your swelling is gone, apply a heating pad set on low or a warm cloth to your wrist. This helps keep your wrist flexible. Some doctors suggest that you go back and forth between hot and cold. · If you have an elastic bandage, keep it on for the next 24 to 36 hours. The bandage should be snug but not so tight that it causes numbness or tingling. To rewrap the wrist, wrap the bandage around the hand a few times, beginning at the fingers. Then wrap it around the hand between the thumb and index finger, ending by circling the wrist several times. · If your doctor gave you a splint or brace, wear it as directed to protect your wrist until it has healed. · Take pain medicines exactly as directed. ? If the doctor gave you a prescription medicine for pain, take it as prescribed. ? If you are not taking a prescription pain medicine, ask your doctor if you can take an over-the-counter medicine. · Try not to use your injured wrist and hand. When should you call for help? Call your doctor now or seek immediate medical care if:    · Your hand or fingers are cool or pale or change color. Watch closely for changes in your health, and be sure to contact your doctor if:    · Your pain gets worse.     · Your wrist has not improved after 1 week. Where can you learn more? Go to https://Cyberaermelindaeb.TBi Connect. org and sign in to your Dropbox account. Enter P002 in the [facility] box to learn more about \"Wrist Sprain: Care Instructions. \"     If you do not have an account, please click on the \"Sign Up Now\" link. Current as of: March 2, 2020               Content Version: 12.6  © 5513-3711 [employer]fernum Productions [employer], Incorporated. Care instructions adapted under license by [facility] [employer] [facility] ([facility]).  If you have questions about a medical condition or this instruction, always ask your healthcare professional. [address] any warranty or liability for your use of this information. Patient Education        Wrist Sprain: Rehab Exercises  Introduction  Here are some examples of exercises for you to try. The exercises may be suggested for a condition or for rehabilitation. Start each exercise slowly. Ease off the exercises if you start to have pain. You will be told when to start these exercises and which ones will work best for you. How to do the exercises  Resisted wrist extension   1. Sit leaning forward with your legs slightly spread. Then place your forearm on your thigh with your affected hand and wrist in front of your knee. 2. Grasp one end of an exercise band with your palm down. Step on the other end.  3. Slowly bend your wrist upward for a count of 2. Then lower your wrist slowly to a count of 5.  4. Repeat 8 to 12 times. Resisted wrist flexion   1. Sit leaning forward with your legs slightly spread. Then place your forearm on your thigh with your affected hand and wrist in front of your knee. 2. Grasp one end of an exercise band with your palm up. Step on the other end.  3. Slowly bend your wrist upward for a count of 2. Then lower your wrist slowly to a count of 5.  4. Repeat 8 to 12 times. Resisted radial deviation   1. Sit leaning forward with your legs slightly spread. Then place your forearm on your thigh with your affected hand and wrist in front of your knee. 2. Grasp one end of an exercise band with your hand facing toward your other thigh. Step on the other end.  3. Slowly bend your wrist upward for a count of 2. Then lower your wrist slowly to a count of 5.  4. Repeat 8 to 12 times. Resisted ulnar deviation   1. Sit leaning forward with your legs slightly spread. Then place your forearm on your thigh with your affected hand and wrist by the inside of your knee. 2. Grasp one end of an exercise band with your palm down. Step on the other end with the foot opposite the hand holding the band.   3. Slowly bend your wrist outward and toward your knee for a count of 2. Then slowly move your wrist back to the starting position to a count of 5.  4. Repeat 8 to 12 times. Resisted forearm pronation   1. Sit leaning forward with your legs slightly spread. Then place your forearm on your thigh with your affected hand and wrist in front of your knee. 2. Grasp one end of an exercise band with your palm up. Step on the other end. 3. Keeping your wrist straight, roll your palm inward toward your thigh for a count of 2. Then slowly move your wrist back to the starting position to a count of 5.  4. Repeat 8 to 12 times. Resisted supination   1. Sit leaning forward with your legs slightly spread. Then place your forearm on your thigh with your affected hand and wrist in front of your knee. 2. Grasp one end of an exercise band with your palm down. Step on the other end. 3. Keeping your wrist straight, roll your palm outward and away from your thigh for a count of 2. Then slowly move your wrist back to the starting position to a count of 5.  4. Repeat 8 to 12 times. Follow-up care is a key part of your treatment and safety. Be sure to make and go to all appointments, and call your doctor if you are having problems. It's also a good idea to know your test results and keep a list of the medicines you take. Where can you learn more? Go to [email or web address]://[email or web address].[email or web address] org and sign in to your 3D Forms account. Enter S110 in the [employer] box to learn more about \"Wrist Sprain: Rehab Exercises. \"     If you do not have an account, please click on the \"Sign Up Now\" link. Current as of: March 2, 2020               Content Version: 12.6  © 3445-2694 [employer] Inc[employer]. Care instructions adapted under license by [facility] ([facility]). If you have questions about a medical condition or this instruction, always ask your healthcare professional. [address] any warranty or liability for your use of this information. Orders Placed This Encounter   Procedures    XR WRIST LEFT (MIN 3 VIEWS)     Standing Status:   Future     Number of Occurrences:   1     Standing Expiration Date:   2/6/2021     Order Specific Question:   Reason for exam:     Answer:   Left wrist pain, bilateral knee pain.   Fall at work appx 2:30p    XR KNEE LEFT (3 VIEWS)     Standing Status:   Future     Number of Occurrences:   1     Standing Expiration Date:   2/6/2021     Order Specific Question:   Reason for exam:     Answer:   Bilateral knee pain post fall at work appx 2:30p    XR KNEE RIGHT (3 VIEWS)     Standing Status:   Future     Number of Occurrences:   1     Standing Expiration Date:   2/6/2021     Order Specific Question:   Reason for exam:     Answer:   Bilateral knee pain post fall at work appx 2:30p     Outpatient Encounter Medications as of 1/6/2021   Medication Sig Dispense Refill    venlafaxine (EFFEXOR XR) 150 MG extended release capsule Take 1 capsule by mouth once daily 30 capsule 3    venlafaxine (EFFEXOR XR) 37.5 MG extended release capsule Take 1 capsule by mouth once daily 30 capsule 3    albuterol sulfate HFA (PROVENTIL HFA) 108 (90 Base) MCG/ACT inhaler Inhale 2 puffs into the lungs every 4 hours as needed for Wheezing 1 Inhaler 0    Spacer/Aero-Holding Chambers KLEBER 1 Device by Does not apply route daily as needed (as needed with inhaler) 1 Device 0    fluticasone (FLONASE) 50 MCG/ACT nasal spray 2 sprays by Nasal route daily 1 Bottle 11    ondansetron (ZOFRAN-ODT) 8 MG TBDP disintegrating tablet Place 8 mg under the tongue every 8 hours as needed for Nausea or Vomiting      dicyclomine (BENTYL) 20 MG tablet Take 1 tablet by mouth every 6 hours as needed (bowel spasm) 120 tablet 3    pantoprazole (PROTONIX) 40 MG tablet Take 1 tablet by mouth daily 90 tablet 3    Naproxen Sodium (ALEVE PO) Take by mouth daily as needed      [DISCONTINUED] gabapentin (NEURONTIN) 100 MG capsule Take 1 capsule by mouth 2 times daily for 30 days. Intended supply: 30 days (Patient not taking: Reported on 1/6/2021) 60 capsule 0    [DISCONTINUED] sucralfate (CARAFATE) 1 GM tablet Take 1 tablet by mouth 4 times daily (Patient not taking: Reported on 10/12/2020) 120 tablet 3    [DISCONTINUED] metFORMIN (GLUCOPHAGE) 1000 MG tablet TAKE 1 TABLET BY MOUTH TWICE DAILY WITH  MEALS (Patient not taking: Reported on 10/12/2020) 180 tablet 1     No facility-administered encounter medications on file as of 1/6/2021. Return if symptoms worsen or fail to improve.                 Electronically signed by ETHAN Thacker NP on 1/6/2021 at 5:58 PM

## 2021-01-06 NOTE — PATIENT INSTRUCTIONS
No fractures seen on xray of left wrist and bilateral knees. Start naproxen every 12 hours for pain. May use tylenol also for breakthrough pain. Wear splint on wrist to provide comfort and support. May wear ace wrap or neoprene knee sleeves for comfort and support. Ice areas 3-4 times daily 15 minutes at a time. Rest.  Gentle activity and stretches. If symptoms worsen, please follow up with PCP or return to clinic. Patient Education        Neck Strain: Care Instructions  Your Care Instructions     You have strained the muscles and ligaments in your neck. A sudden, awkward movement can strain the neck. This often occurs with falls or car accidents or during certain sports. Everyday activities like working on a computer or sleeping can also cause neck strain if they force you to hold your neck in an awkward position for a long time. It is common for neck pain to get worse for a day or two after an injury, but it should start to feel better after that. You may have more pain and stiffness for several days before it gets better. This is expected. It may take a few weeks or longer for it to heal completely. Good home treatment can help you get better faster and avoid future neck problems. Follow-up care is a key part of your treatment and safety. Be sure to make and go to all appointments, and call your doctor if you are having problems. It's also a good idea to know your test results and keep a list of the medicines you take. How can you care for yourself at home? · If you were given a neck brace (cervical collar) to limit neck motion, wear it as instructed for as many days as your doctor tells you to. Do not wear it longer than you were told to. Wearing a brace for too long can make neck stiffness worse and weaken the neck muscles. · You can try using heat or ice to see if it helps. ? Try using a heating pad on a low or medium setting for 15 to 20 minutes every 2 to 3 hours.  Try a warm shower in place please click on the \"Sign Up Now\" link. Current as of: March 2, 2020               Content Version: 12.6  © 2006-2020 [employer]. Care instructions adapted under license by [facility] ([facility]). If you have questions about a medical condition or this instruction, always ask your healthcare professional. [address] any warranty or liability for your use of this information. Patient Education        Neck Strain or Sprain: Rehab Exercises  Introduction  Here are some examples of exercises for you to try. The exercises may be suggested for a condition or for rehabilitation. Start each exercise slowly. Ease off the exercises if you start to have pain. You will be told when to start these exercises and which ones will work best for you. How to do the exercises  Neck rotation   1. Sit in a firm chair, or stand up straight. 2. Keeping your chin level, turn your head to the right, and hold for 15 to 30 seconds. 3. Turn your head to the left and hold for 15 to 30 seconds. 4. Repeat 2 to 4 times to each side. Neck stretches   1. Look straight ahead, and tip your right ear to your right shoulder. Do not let your left shoulder rise up as you tip your head to the right. 2. Hold for 15 to 30 seconds. 3. Tilt your head to the left. Do not let your right shoulder rise up as you tip your head to the left. 4. Hold for 15 to 30 seconds. 5. Repeat 2 to 4 times to each side. Forward neck flexion   1. Sit in a firm chair, or stand up straight. 2. Bend your head forward. 3. Hold for 15 to 30 seconds. 4. Repeat 2 to 4 times. Lateral (side) bend strengthening   1. With your right hand, place your first two fingers on your right temple. 2. Start to bend your head to the side while using gentle pressure from your fingers to keep your head from bending. 3. Hold for about 6 seconds. 4. Repeat 8 to 12 times. 5. Switch hands and repeat the same exercise on your left side.     Forward and stay at a healthy weight. Extra weight can strain the joints, especially the knees and hips, and make the pain worse. Losing even a few pounds may help. When should you call for help? Call 911 anytime you think you may need emergency care. For example, call if:    · You have symptoms of a blood clot in your lung (called a pulmonary embolism). These may include:  ? Sudden chest pain. ? Trouble breathing. ? Coughing up blood. Call your doctor now or seek immediate medical care if:    · You have severe or increasing pain.     · Your leg or foot turns cold or changes color.     · You cannot stand or put weight on your knee.     · Your knee looks twisted or bent out of shape.     · You cannot move your knee.     · You have signs of infection, such as:  ? Increased pain, swelling, warmth, or redness. ? Red streaks leading from the knee. ? Pus draining from a place on your knee. ? A fever.     · You have signs of a blood clot in your leg (called a deep vein thrombosis), such as:  ? Pain in your calf, back of the knee, thigh, or groin. ? Redness and swelling in your leg or groin. Watch closely for changes in your health, and be sure to contact your doctor if:    · You have tingling, weakness, or numbness in your knee.     · You have any new symptoms, such as swelling.     · You have bruises from a knee injury that last longer than 2 weeks.     · You do not get better as expected. Where can you learn more? Go to https://Seven Generations Energymisty.[employer]. org and sign in to your VirtualSharp Software account. Enter K195 in the [facility] box to learn more about \"Knee Pain or Injury: Care Instructions. \"     If you do not have an account, please click on the \"Sign Up Now\" link. Current as of: June 26, 2019               Content Version: 12.6  © 8746-6300 [employer], Incorporated. Care instructions adapted under license by [facility] ([facility]).  If you have questions about a medical condition or this instruction, always ask your healthcare professional. [place] [address] any warranty or liability for your use of this information. Patient Education        Wrist Sprain: Care Instructions  Your Care Instructions     Your wrist hurts because you have stretched or torn ligaments, which connect the bones in your wrist.  Wrist sprains usually take from 2 to 10 weeks to heal, but some take longer. Usually, the more pain you have, the more severe your wrist sprain is and the longer it will take to heal. You can heal faster and regain strength in your wrist with good home treatment. Follow-up care is a key part of your treatment and safety. Be sure to make and go to all appointments, and call your doctor if you are having problems. It's also a good idea to know your test results and keep a list of the medicines you take. How can you care for yourself at home? · Prop up your arm on a pillow when you ice it or anytime you sit or lie down for the next 3 days. Try to keep your wrist above the level of your heart. This will help reduce swelling. · Put ice or cold packs on your wrist for 10 to 20 minutes at a time. Try to do this every 1 to 2 hours for the next 3 days (when you are awake) or until the swelling goes down. Put a thin cloth between the ice pack and your skin. · After 2 or 3 days, if your swelling is gone, apply a heating pad set on low or a warm cloth to your wrist. This helps keep your wrist flexible. Some doctors suggest that you go back and forth between hot and cold. · If you have an elastic bandage, keep it on for the next 24 to 36 hours. The bandage should be snug but not so tight that it causes numbness or tingling. To rewrap the wrist, wrap the bandage around the hand a few times, beginning at the fingers. Then wrap it around the hand between the thumb and index finger, ending by circling the wrist several times.   · If your doctor gave you a splint or brace, wear it as directed to protect your wrist until it has healed. · Take pain medicines exactly as directed. ? If the doctor gave you a prescription medicine for pain, take it as prescribed. ? If you are not taking a prescription pain medicine, ask your doctor if you can take an over-the-counter medicine. · Try not to use your injured wrist and hand. When should you call for help? Call your doctor now or seek immediate medical care if:    · Your hand or fingers are cool or pale or change color. Watch closely for changes in your health, and be sure to contact your doctor if:    · Your pain gets worse.     · Your wrist has not improved after 1 week. Where can you learn more? Go to https://Social Realitypepiceweb."Lumesis[employer] Inc.". org and sign in to your [employer] account. Enter B076 in the [employer](M[employer]) box to learn more about \"Wrist Sprain: Care Instructions. \"     If you do not have an account, please click on the \"Sign Up Now\" link. Current as of: March 2, 2020               Content Version: 12.6  © 2006-2020 Sun[employer]. Care instructions adapted under license by [facility] ([facility]). If you have questions about a medical condition or this instruction, always ask your healthcare professional. [place] [address] any warranty or liability for your use of this information. Patient Education        Wrist Sprain: Rehab Exercises  Introduction  Here are some examples of exercises for you to try. The exercises may be suggested for a condition or for rehabilitation. Start each exercise slowly. Ease off the exercises if you start to have pain. You will be told when to start these exercises and which ones will work best for you. How to do the exercises  Resisted wrist extension   1. Sit leaning forward with your legs slightly spread. Then place your forearm on your thigh with your affected hand and wrist in front of your knee. 2. Grasp one end of an exercise band with your palm down.  Step on the other end.  3. Slowly bend your wrist upward for a count of 2. Then lower your wrist slowly to a count of 5.  4. Repeat 8 to 12 times. Resisted wrist flexion   1. Sit leaning forward with your legs slightly spread. Then place your forearm on your thigh with your affected hand and wrist in front of your knee. 2. Grasp one end of an exercise band with your palm up. Step on the other end.  3. Slowly bend your wrist upward for a count of 2. Then lower your wrist slowly to a count of 5.  4. Repeat 8 to 12 times. Resisted radial deviation   1. Sit leaning forward with your legs slightly spread. Then place your forearm on your thigh with your affected hand and wrist in front of your knee. 2. Grasp one end of an exercise band with your hand facing toward your other thigh. Step on the other end.  3. Slowly bend your wrist upward for a count of 2. Then lower your wrist slowly to a count of 5.  4. Repeat 8 to 12 times. Resisted ulnar deviation   1. Sit leaning forward with your legs slightly spread. Then place your forearm on your thigh with your affected hand and wrist by the inside of your knee. 2. Grasp one end of an exercise band with your palm down. Step on the other end with the foot opposite the hand holding the band. 3. Slowly bend your wrist outward and toward your knee for a count of 2. Then slowly move your wrist back to the starting position to a count of 5.  4. Repeat 8 to 12 times. Resisted forearm pronation   1. Sit leaning forward with your legs slightly spread. Then place your forearm on your thigh with your affected hand and wrist in front of your knee. 2. Grasp one end of an exercise band with your palm up. Step on the other end. 3. Keeping your wrist straight, roll your palm inward toward your thigh for a count of 2. Then slowly move your wrist back to the starting position to a count of 5.  4. Repeat 8 to 12 times. Resisted supination   1. Sit leaning forward with your legs slightly spread.  Then place your forearm on your thigh with your affected hand and wrist in front of your knee. 2. Grasp one end of an exercise band with your palm down. Step on the other end. 3. Keeping your wrist straight, roll your palm outward and away from your thigh for a count of 2. Then slowly move your wrist back to the starting position to a count of 5.  4. Repeat 8 to 12 times. Follow-up care is a key part of your treatment and safety. Be sure to make and go to all appointments, and call your doctor if you are having problems. It's also a good idea to know your test results and keep a list of the medicines you take. Where can you learn more? Go to [email or web address]. org and sign in to your [employer] account. Enter S110 in the [employer] box to learn more about \"Wrist Sprain: Rehab Exercises. \"     If you do not have an account, please click on the \"Sign Up Now\" link. Current as of: March 2, 2020               Content Version: 12.6  © 7915-6692 Ecrio, Incorporated. Care instructions adapted under license by [facility] ([facility]). If you have questions about a medical condition or this instruction, always ask your healthcare professional. [place] [address] any warranty or liability for your use of this information.

## 2021-01-07 ENCOUNTER — HOSPITAL ENCOUNTER (OUTPATIENT)
Dept: LAB | Age: 61
Discharge: HOME | End: 2021-01-07
Payer: COMMERCIAL

## 2021-01-07 ENCOUNTER — OFFICE VISIT (OUTPATIENT)
Dept: FAMILY MEDICINE CLINIC | Age: 61
End: 2021-01-07
Payer: COMMERCIAL

## 2021-01-07 VITALS
DIASTOLIC BLOOD PRESSURE: 70 MMHG | HEART RATE: 83 BPM | SYSTOLIC BLOOD PRESSURE: 138 MMHG | OXYGEN SATURATION: 98 % | WEIGHT: 190 LBS | HEIGHT: 63 IN | TEMPERATURE: 98.1 F | BODY MASS INDEX: 33.66 KG/M2

## 2021-01-07 DIAGNOSIS — Z23 NEED FOR TDAP VACCINATION: ICD-10-CM

## 2021-01-07 DIAGNOSIS — E11.9 TYPE 2 DIABETES MELLITUS WITHOUT COMPLICATION, WITHOUT LONG-TERM CURRENT USE OF INSULIN (HCC): ICD-10-CM

## 2021-01-07 DIAGNOSIS — M62.838 TRAPEZIUS MUSCLE SPASM: ICD-10-CM

## 2021-01-07 DIAGNOSIS — R29.6 FREQUENT FALLS: ICD-10-CM

## 2021-01-07 DIAGNOSIS — Z12.31 VISIT FOR SCREENING MAMMOGRAM: ICD-10-CM

## 2021-01-07 DIAGNOSIS — M21.379 FOOT-DROP, UNSPECIFIED LATERALITY: Primary | ICD-10-CM

## 2021-01-07 DIAGNOSIS — R53.82 CHRONIC FATIGUE: ICD-10-CM

## 2021-01-07 DIAGNOSIS — G47.19 EXCESSIVE DAYTIME SLEEPINESS: ICD-10-CM

## 2021-01-07 DIAGNOSIS — R13.19 ESOPHAGEAL DYSPHAGIA: ICD-10-CM

## 2021-01-07 LAB
ABSOLUTE EOS #: 0.18 K/UL (ref 0–0.44)
ABSOLUTE IMMATURE GRANULOCYTE: 0.03 K/UL (ref 0–0.3)
ABSOLUTE LYMPH #: 2.76 K/UL (ref 1.1–3.7)
ABSOLUTE MONO #: 0.6 K/UL (ref 0.1–1.2)
ANION GAP SERPL CALCULATED.3IONS-SCNC: 7 MMOL/L (ref 9–17)
BASOPHILS # BLD: 1 % (ref 0–2)
BASOPHILS ABSOLUTE: 0.07 K/UL (ref 0–0.2)
BUN BLDV-MCNC: 12 MG/DL (ref 8–23)
BUN/CREAT BLD: 18 (ref 9–20)
CALCIUM SERPL-MCNC: 9.7 MG/DL (ref 8.6–10.4)
CHLORIDE BLD-SCNC: 101 MMOL/L (ref 98–107)
CHOLESTEROL/HDL RATIO: 3.4
CHOLESTEROL: 237 MG/DL
CO2: 31 MMOL/L (ref 20–31)
CREAT SERPL-MCNC: 0.67 MG/DL (ref 0.5–0.9)
CREATININE URINE: 174.4 MG/DL (ref 28–217)
DIFFERENTIAL TYPE: NORMAL
EOSINOPHILS RELATIVE PERCENT: 2 % (ref 1–4)
GFR AFRICAN AMERICAN: >60 ML/MIN
GFR NON-AFRICAN AMERICAN: >60 ML/MIN
GFR SERPL CREATININE-BSD FRML MDRD: ABNORMAL ML/MIN/{1.73_M2}
GFR SERPL CREATININE-BSD FRML MDRD: ABNORMAL ML/MIN/{1.73_M2}
GLUCOSE BLD-MCNC: 102 MG/DL (ref 70–99)
HCT VFR BLD CALC: 44.2 % (ref 36.3–47.1)
HDLC SERPL-MCNC: 69 MG/DL
HEMOGLOBIN: 14.2 G/DL (ref 11.9–15.1)
IMMATURE GRANULOCYTES: 0 %
LDL CHOLESTEROL: 152 MG/DL (ref 0–130)
LYMPHOCYTES # BLD: 34 % (ref 24–43)
MCH RBC QN AUTO: 30.2 PG (ref 25.2–33.5)
MCHC RBC AUTO-ENTMCNC: 32.1 G/DL (ref 25.2–33.5)
MCV RBC AUTO: 94 FL (ref 82.6–102.9)
MICROALBUMIN/CREAT 24H UR: 16 MG/L
MICROALBUMIN/CREAT UR-RTO: 9 MCG/MG CREAT
MONOCYTES # BLD: 8 % (ref 3–12)
NRBC AUTOMATED: 0 PER 100 WBC
PDW BLD-RTO: 12.5 % (ref 11.8–14.4)
PLATELET # BLD: 273 K/UL (ref 138–453)
PLATELET ESTIMATE: NORMAL
PMV BLD AUTO: 10.6 FL (ref 8.1–13.5)
POTASSIUM SERPL-SCNC: 4.6 MMOL/L (ref 3.7–5.3)
RBC # BLD: 4.7 M/UL (ref 3.95–5.11)
RBC # BLD: NORMAL 10*6/UL
SEG NEUTROPHILS: 55 % (ref 36–65)
SEGMENTED NEUTROPHILS ABSOLUTE COUNT: 4.4 K/UL (ref 1.5–8.1)
SODIUM BLD-SCNC: 139 MMOL/L (ref 135–144)
TRIGL SERPL-MCNC: 82 MG/DL
TSH SERPL DL<=0.05 MIU/L-ACNC: 0.46 MIU/L (ref 0.3–5)
VITAMIN D 25-HYDROXY: 13.2 NG/ML (ref 30–100)
VLDLC SERPL CALC-MCNC: ABNORMAL MG/DL (ref 1–30)
WBC # BLD: 8 K/UL (ref 3.5–11.3)
WBC # BLD: NORMAL 10*3/UL

## 2021-01-07 PROCEDURE — 82043 UR ALBUMIN QUANTITATIVE: CPT

## 2021-01-07 PROCEDURE — 90471 IMMUNIZATION ADMIN: CPT | Performed by: FAMILY MEDICINE

## 2021-01-07 PROCEDURE — G8482 FLU IMMUNIZE ORDER/ADMIN: HCPCS | Performed by: FAMILY MEDICINE

## 2021-01-07 PROCEDURE — 20552 NJX 1/MLT TRIGGER POINT 1/2: CPT | Performed by: FAMILY MEDICINE

## 2021-01-07 PROCEDURE — 3017F COLORECTAL CA SCREEN DOC REV: CPT | Performed by: FAMILY MEDICINE

## 2021-01-07 PROCEDURE — 82570 ASSAY OF URINE CREATININE: CPT

## 2021-01-07 PROCEDURE — 2022F DILAT RTA XM EVC RTNOPTHY: CPT | Performed by: FAMILY MEDICINE

## 2021-01-07 PROCEDURE — 90715 TDAP VACCINE 7 YRS/> IM: CPT | Performed by: FAMILY MEDICINE

## 2021-01-07 PROCEDURE — 99214 OFFICE O/P EST MOD 30 MIN: CPT | Performed by: FAMILY MEDICINE

## 2021-01-07 PROCEDURE — 80048 BASIC METABOLIC PNL TOTAL CA: CPT

## 2021-01-07 PROCEDURE — 3044F HG A1C LEVEL LT 7.0%: CPT | Performed by: FAMILY MEDICINE

## 2021-01-07 PROCEDURE — G8427 DOCREV CUR MEDS BY ELIG CLIN: HCPCS | Performed by: FAMILY MEDICINE

## 2021-01-07 PROCEDURE — 80061 LIPID PANEL: CPT

## 2021-01-07 PROCEDURE — 36415 COLL VENOUS BLD VENIPUNCTURE: CPT

## 2021-01-07 PROCEDURE — 84443 ASSAY THYROID STIM HORMONE: CPT

## 2021-01-07 PROCEDURE — 83036 HEMOGLOBIN GLYCOSYLATED A1C: CPT

## 2021-01-07 PROCEDURE — G8417 CALC BMI ABV UP PARAM F/U: HCPCS | Performed by: FAMILY MEDICINE

## 2021-01-07 PROCEDURE — 4004F PT TOBACCO SCREEN RCVD TLK: CPT | Performed by: FAMILY MEDICINE

## 2021-01-07 PROCEDURE — 82306 VITAMIN D 25 HYDROXY: CPT

## 2021-01-07 PROCEDURE — 85025 COMPLETE CBC W/AUTO DIFF WBC: CPT

## 2021-01-07 RX ORDER — TRIAMCINOLONE ACETONIDE 40 MG/ML
20 INJECTION, SUSPENSION INTRA-ARTICULAR; INTRAMUSCULAR ONCE
Status: COMPLETED | OUTPATIENT
Start: 2021-01-07 | End: 2021-01-07

## 2021-01-07 RX ORDER — PANTOPRAZOLE SODIUM 40 MG/1
40 TABLET, DELAYED RELEASE ORAL DAILY
Qty: 90 TABLET | Refills: 3 | Status: SHIPPED | OUTPATIENT
Start: 2021-01-07 | End: 2022-09-20 | Stop reason: SDUPTHER

## 2021-01-07 RX ADMIN — TRIAMCINOLONE ACETONIDE 20 MG: 40 INJECTION, SUSPENSION INTRA-ARTICULAR; INTRAMUSCULAR at 12:09

## 2021-01-07 ASSESSMENT — ENCOUNTER SYMPTOMS
SHORTNESS OF BREATH: 0
TROUBLE SWALLOWING: 1
COUGH: 0
WHEEZING: 0
BACK PAIN: 1
CHEST TIGHTNESS: 0

## 2021-01-07 NOTE — PROGRESS NOTES
Procedures    Who cock-up nonmolde pre ots     Patient was prescribed a Procare Comfort Form Wrist brace. The left wrist will require stabilization / immobilization from this semi-rigid / rigid orthosis to improve their function. The orthosis will assist in protecting the affected area, provide functional support and facilitate healing. The patient was educated and fit by a healthcare professional with expert knowledge and specialization in brace application while under the direct supervision of the treating physician. Verbal and written instructions for the use of and application of this item were provided. They were instructed to contact the office immediately should the brace result in increased pain, decreased sensation, increased swelling or worsening of the condition.

## 2021-01-07 NOTE — PROGRESS NOTES
SH[address]  [address] [address]  Dept: [phone]  Dept Fax: [phone]  Loc: [phone]    Janette Mondragon is a 63 y.o. female who presents today for her medical conditions/complaints as noted below. Janette Mondragon is c/o of   Chief Complaint   Patient presents with    Establish Care     [facility] pt    Diabetes     pre-diabetes       HPI:      Here today to establish care. She would like to discuss multiple medical concerns listed below    Shoulder Pain   The pain is present in the left shoulder and neck. This is a chronic problem. The current episode started more than 1 year ago (2 years). There has been no history of extremity trauma. The problem occurs constantly. The problem has been gradually worsening. The quality of the pain is described as aching. The pain is at a severity of 4/10. The pain is moderate. Associated symptoms include a limited range of motion, numbness, stiffness and tingling. Pertinent negatives include no fever or joint locking. She has tried NSAIDS and acetaminophen (chiropractor, she did PT without much help) for the symptoms. The treatment provided no relief. Prediabetes: stable; she was on metformin in the past but she stopped it about 5 months ago because it was causing diarrhea. She has been checking her blood sugars occasionally and the last month and it was 104 fasting. She does not eat a very low carb diet. She occasionally gets hypoglycemic. She tends to go all day at work without eating. She is not having any issues with chest pain or shortness of breath. She gets a lot of exercise at work. She works for [employer] so she does a lot of walking and bending. She does not have any issues with numbness or tingling in her feet. She has a sebaceous cyst on the top of her head that she would like removed. Falling: new; she is falling a lot.  This  Sulfa Antibiotics Anaphylaxis     \"can't breathe\"    Metformin And Related Other (See Comments)     Sweating and diarrhea       Subjective:     Review of Systems   Constitutional: Positive for fatigue. Negative for activity change, appetite change, chills and fever. HENT: Positive for trouble swallowing. Eyes: Negative for visual disturbance. Respiratory: Negative for cough, chest tightness, shortness of breath and wheezing. Cardiovascular: Negative for chest pain, palpitations and leg swelling. Genitourinary: Negative for difficulty urinating. Musculoskeletal: Positive for arthralgias, back pain, gait problem and stiffness. Frequent falls   Neurological: Positive for tingling and numbness. Negative for dizziness, syncope, weakness (in right hand), light-headedness and headaches. Psychiatric/Behavioral: Negative for sleep disturbance. Objective:      Physical Exam  Vitals signs and nursing note reviewed. Constitutional:       General: She is not in acute distress. Appearance: She is well-developed. HENT:      Head:     Eyes:      Conjunctiva/sclera: Conjunctivae normal.   Neck:      Musculoskeletal: Normal range of motion and neck supple. Thyroid: No thyromegaly. Cardiovascular:      Rate and Rhythm: Normal rate and regular rhythm. Heart sounds: Normal heart sounds. No murmur. Pulmonary:      Effort: Pulmonary effort is normal. No respiratory distress. Breath sounds: Normal breath sounds. No wheezing. Musculoskeletal:      Cervical back: She exhibits decreased range of motion and tenderness. Back:       Right hand: She exhibits normal range of motion, no tenderness and no swelling. Decreased strength noted. Lymphadenopathy:      Cervical: No cervical adenopathy. Skin:     General: Skin is warm and dry. Findings: No erythema or rash. Neurological:      Mental Status: She is alert and oriented to person, place, and time. Monofilament Exam Reveals:  Pulses: normal  Edema:absent  Skin Lesions:normal    Right Foot:      Normal sensation at 1, 2, 3, 4, 5, 6, 7, 8, 9 and 10  Diminished sensation at 0  No sensation at 0       Left Foot:  Normal sensation at 1, 2, 3, 4, 5, 6, 7, 8, 9 and 10  Diminished sensation at 0  No sensation at 0            /70   Pulse 83   Temp 98.1 °F (36.7 °C)   Ht 5' 3\" (1.6 m)   Wt 190 lb (86.2 kg)   SpO2 98%   BMI 33.66 kg/m²     Assessment:       Diagnosis Orders   1. Foot-drop, unspecified laterality  MRI LUMBAR SPINE WO CONTRAST    MRI THORACIC SPINE WO CONTRAST   2. Frequent falls  MRI LUMBAR SPINE WO CONTRAST    MRI THORACIC SPINE WO CONTRAST   3. Type 2 diabetes mellitus without complication, without long-term current use of insulin (HCC)  Lipid Panel    Hemoglobin F8X    Basic Metabolic Panel    Microalbumin, Ur    HM DIABETES FOOT EXAM    Microalbumin, Ur   4. Chronic fatigue  CBC Auto Differential    TSH With Reflex Ft4    Vitamin D 25 Hydroxy   5. Excessive daytime sleepiness  Baseline Diagnostic Sleep Study    [address]   6. Trapezius muscle spasm  ME INJECT TRIGGER POINT, 1 OR 2    triamcinolone acetonide (KENALOG-40) injection 20 mg   7. Esophageal dysphagia  [facility] Speech Therapy - [place]    [place] MODIFIED BARIUM SWALLOW W VIDEO   8. Visit for screening mammogram  pantoprazole (PROTONIX) 40 MG tablet    SOURAV DIGITAL SCREEN W OR WO CAD BILATERAL   9. Need for Tdap vaccination  Tdap (age 6y and older) IM (Boostrix)             Plan:        Foot drop/frequent falls: new; I am very concerned that she has nerve damage in her low back causing this. I am even more concerned since she is also having incontience of bladder and bowel. I ordered an MRI to evaluate and will consider an EMG based on results. DM: stable; her a1c has been good recently so I will check labs and adjust dose as needed. Her foot exam was done today.      Fatigue: new; could be due to thyroid dysfunction, anemia, vit d deficiency or it could also be due to sleep apena so I ordered labs and put in a referral to the sleep center. Trapezius muscle spasm: new; seems to be the cause of her shoulder pain. I recommended using heat on the neck for 10 minutes three times a day and then doing her neck exercises after she uses the heat. I then told her to ice the neck afterwards. she was given both neck and shoulder exercises and given trigger point injections. A trigger point injection was performed at the site of maximal tenderness using 2% plain Lidocaine and 20mg of Kenalog in each trapezius muscle. This was well tolerated, and followed by moderate relief of pain. Dysphagia: new; I ordered a swallow study and speech therapy referral.     Health Maintenance reviewed - mammogram ordered, patient to schedule appointment, tetanus booster given. Return in about 2 weeks (around 1/21/2021) for sebaceous cyst removal.    Orders Placed This Encounter   Procedures    SUORAV DIGITAL SCREEN W OR WO CAD BILATERAL     Standing Status:   Future     Standing Expiration Date:   1/7/2022     Scheduling Instructions: On the day of the exam, the patient should not wear underarm deoderant, lotions, or powders on the breast or underarm areas. She should also wear a two piece outfit and be prepared to give information about prior breast procedures including mammograms, biopsies, or surgeries.     MRI LUMBAR SPINE WO CONTRAST     Standing Status:   Future     Standing Expiration Date:   1/7/2022     Order Specific Question:   Reason for exam:     Answer:   low back pain    MRI THORACIC SPINE WO CONTRAST     Standing Status:   Future     Standing Expiration Date:   1/7/2022    FL MODIFIED BARIUM SWALLOW W VIDEO     Standing Status:   Future     Standing Expiration Date:   1/9/2022     Order Specific Question:   Reason for exam:     Answer:   dysphagia    Tdap (age 6y and older) IM (Boostrix)    Lipid Panel     Standing Status:   Future     Number of Occurrences:   1     Standing Expiration Date:   1/7/2022     Order Specific Question:   Is Patient Fasting?/# of Hours     Answer:   12    Hemoglobin A1C     Standing Status:   Future     Number of Occurrences:   1     Standing Expiration Date:   7/7/2021    Basic Metabolic Panel     Please fast for 12 - 14 hours prior to blood draw. May take medication with a sip of water.      Standing Status:   Future     Number of Occurrences:   1     Standing Expiration Date:   7/7/2021    Microalbumin, Ur     Standing Status:   Future     Number of Occurrences:   1     Standing Expiration Date:   7/7/2021    CBC Auto Differential     Standing Status:   Future     Number of Occurrences:   1     Standing Expiration Date:   1/7/2022    TSH With Reflex Ft4     Standing Status:   Future     Number of Occurrences:   1     Standing Expiration Date:   1/7/2022    Vitamin D 25 Hydroxy     Standing Status:   Future     Number of Occurrences:   1     Standing Expiration Date:   1/7/2022   [facility] Speech Therapy - [place]     Referral Priority:   Routine     Referral Type:   Eval and Treat     Referral Reason:   Specialty Services Required     Requested Specialty:   Speech Pathology     Number of Visits Requested:   1    [facility]     Referral Priority:   Routine     Referral Type:   Eval and Treat     Referral Reason:   Specialty Services Required     Requested Specialty:   Sleep Medicine     Number of Visits Requested:   1    HM DIABETES FOOT EXAM    Baseline Diagnostic Sleep Study     Standing Status:   Future     Standing Expiration Date:   1/7/2022     Order Specific Question:   Adult or Pediatric     Answer:   Adult Study (>7 Years)     Order Specific Question:   Location For Sleep Study     Answer:   [place]     Order Specific Question:   Select Sleep Lab Location     Answer:   [facility]    ID INJECT TRIGGER POINT, 1 OR 2 Orders Placed This Encounter   Medications    pantoprazole (PROTONIX) 40 MG tablet     Sig: Take 1 tablet by mouth daily     Dispense:  90 tablet     Refill:  3    triamcinolone acetonide (KENALOG-40) injection 20 mg       Patientgiven educational materials - see patient instructions. Discussed use, benefit,and side effects of prescribed medications. All patient questions answered. Ptvoiced understanding. Reviewed health maintenance. Instructed to continue currentmedications, diet and exercise. Patient agreed with treatment plan. Follow up asdirected.      Electronically signed by Skylar Hernandez MD on 1/9/2021 at 1:10 PM

## 2021-01-08 LAB
ESTIMATED AVERAGE GLUCOSE: 137 MG/DL
HBA1C MFR BLD: 6.4 % (ref 4–6)

## 2021-01-09 RX ORDER — ERGOCALCIFEROL 1.25 MG/1
50000 CAPSULE ORAL WEEKLY
Qty: 12 CAPSULE | Refills: 1 | Status: SHIPPED | OUTPATIENT
Start: 2021-01-09 | End: 2021-09-02

## 2021-01-15 ENCOUNTER — HOSPITAL ENCOUNTER (OUTPATIENT)
Dept: MRI IMAGING | Age: 61
Discharge: HOME | End: 2021-01-17
Payer: COMMERCIAL

## 2021-01-15 ENCOUNTER — HOSPITAL ENCOUNTER (OUTPATIENT)
Dept: MAMMOGRAPHY | Age: 61
Discharge: HOME | End: 2021-01-17
Payer: COMMERCIAL

## 2021-01-15 DIAGNOSIS — R29.6 FREQUENT FALLS: ICD-10-CM

## 2021-01-15 DIAGNOSIS — Z12.31 VISIT FOR SCREENING MAMMOGRAM: ICD-10-CM

## 2021-01-15 DIAGNOSIS — M21.379 FOOT-DROP, UNSPECIFIED LATERALITY: ICD-10-CM

## 2021-01-15 PROCEDURE — 72146 MRI CHEST SPINE W/O DYE: CPT

## 2021-01-15 PROCEDURE — 72148 MRI LUMBAR SPINE W/O DYE: CPT

## 2021-01-15 PROCEDURE — 77063 BREAST TOMOSYNTHESIS BI: CPT

## 2021-01-18 DIAGNOSIS — R29.6 FREQUENT FALLS: ICD-10-CM

## 2021-01-18 DIAGNOSIS — M21.379 FOOT-DROP, UNSPECIFIED LATERALITY: Primary | ICD-10-CM

## 2021-01-19 ENCOUNTER — TELEPHONE (OUTPATIENT)
Dept: FAMILY MEDICINE CLINIC | Age: 61
End: 2021-01-19

## 2021-01-21 ENCOUNTER — HOSPITAL ENCOUNTER (OUTPATIENT)
Dept: SPEECH THERAPY | Age: 61
Setting detail: THERAPIES SERIES
Discharge: HOME | End: 2021-01-21
Payer: COMMERCIAL

## 2021-01-21 ENCOUNTER — HOSPITAL ENCOUNTER (OUTPATIENT)
Dept: GENERAL RADIOLOGY | Age: 61
Discharge: HOME | End: 2021-01-23
Payer: COMMERCIAL

## 2021-01-21 DIAGNOSIS — R13.10 DYSPHAGIA, UNSPECIFIED TYPE: Primary | ICD-10-CM

## 2021-01-21 DIAGNOSIS — R13.19 ESOPHAGEAL DYSPHAGIA: ICD-10-CM

## 2021-01-21 DIAGNOSIS — R13.13 PHARYNGEAL DYSPHAGIA: Primary | ICD-10-CM

## 2021-01-21 PROCEDURE — 2500000003 HC RX 250 WO HCPCS: Performed by: FAMILY MEDICINE

## 2021-01-21 PROCEDURE — 92611 MOTION FLUOROSCOPY/SWALLOW: CPT

## 2021-01-21 PROCEDURE — 74230 X-RAY XM SWLNG FUNCJ C+: CPT

## 2021-01-21 RX ADMIN — BARIUM SULFATE 140 ML: 980 POWDER, FOR SUSPENSION ORAL at 14:25

## 2021-01-21 NOTE — PROGRESS NOTES
SPEECH THERAPY  MODIFIED BARIUM SWALLOW STUDY    [x] Outpatient [facility]  [] Inpatient- [facility]    Patient: Birdie Conley  YOB: 1960  Gender: female  MRN:  [id number]  Referring Physician: Adal Deutsch Md  [address],  [address]  Diagnosis:  Dysphagia      Date of Study: 1/21/2021    History of Present Illness/Injury: Patient presents with concerns of coughing on thin liquids only. Per patient report, her symptoms have worsened over the past few months.      Past Medical History:   Diagnosis Date    Allergic rhinitis     Arthritis     Asthma     Bronchitis     [facility]     Depression     Diabetes mellitus [facility]     Headache     Hyperlipidemia     Hypertension     Incontinence     Irritable bowel syndrome     Kidney stone     Osteoarthritis     Pneumonia     Type 2 diabetes mellitus without complication ([facility] 1333    Ulcer        Reported Dysphagia Symptoms:  [x]Coughing []Food catching in throat  []Drooling  []Reflux               []Wet Vocal Quality  []Other:     Reason for referral:   [x]Assess physiology of swallow   [x]Determine appropriate diet, posture, or compensatory strategies  [x]Rule out aspiration    []Other:      Prior MBSS:  [x]No   []Yes    Date:      Results:      Pain  [x]No     []Yes      Location: N/A  Pain Rating (0-10 pain scale):   Pain Description: N/A      Current Diet: [] NPO [x] Regular diet [] Soft and bite sized (Dysphagia III)  [] Minced and moist (Dysphagia II)   [] Pureed (Dysphagia I)  [] Liquidised       Current Liquids: [x] Thin [] Mildly Thick (Nectar thick)  [] Moderately Thick (Honey thick) [] Extremely Thick (Spoon-Pudding)    Respiratory Status: [x] Independent [] Nasal Cannula [] Oxygen Mask        [] Tracheostomy [] Other:       [] Ventilator/Settings:    Behavioral Observation: [x] Alert   [x] Oriented   [] Confused  [] Lethargic      [] Dysarthric  [] Limited Direction Following  [] Agitated  [] Other:    Patient was evaluated using:   [x] Thin liquid   [] Mildly thick (Nectar thick) liquid  [] Moderately thick (Honey thick) liquid/Liquidised   [] Extremely thick (Pudding thick) liquid      [x] Solid [x] Soft and bite sized [] Minced and moist [] Puree  [] barium tablet      Oral Peripheral/Pharyngeal Mechanism Exam     Lingual Function   ROM [x]WFL              []Limited:    []elevation     []depression     []protrusion       []retraction     []lateralization     Strength [x]WFL  []Weak:   []left  []right      Sensation: [x]WFL  []Other:      Symmetry [x]WFL  []Deviation:     []left             []right      Coordination [x]WFL  []Other:      Labial Function   ROM [x]WFL    []Limited:    []protrusion      []retraction     []rounding     Strength [x]WFL  []Weak:   []left  []right      Sensation: [x]WFL  []Other:      Symmetry [x]WFL  []Deviation:     []left             []right      Coordination [x]WFL  []Other:       Drooling:  [x]No  []Yes:   []left []right    Facial Appearance   Facial Weakness  [x]No []Yes:   []left []right      Dentition   [x]natural, condition:    [x]good  []fair  []poor   []edentulous   []partials:   []upper []lower []loose []not worn for MBSS   [x]dentures:   [x]upper []lower []loose []not worn for MBSS        ORAL PREPARATION PHASE: [x] WFL  [] Impaired/Reduced   [] Slow mastication     [] Uncoordinated mastication    [] Decreased bolus control   [] Decreased bolus formation   [] Loss of bolus from lips / Anterior spillage   [] OTHER:       ORAL PHASE: [x] WFL              [] Impaired/Reduced   [] Residue in the anterior sulcus                   [] Residue in the lateral sulcus - right   [] Residue in the lateral sulcus - left               [] Uncoordinated AP movement   [] Slow AP movement                             [] Decreased lingual elevation   [x] Decreased tongue base retraction- inconsistent with all consistencies               [] Uncontrolled bolus/diffuse fall over tongue base   [] Piecemeal deglutition     [x] Base of tongue residue- Trace base of tongue residue with solids. Residue noted to be inconsistent with trials of different textures and liquids. [] OTHER:     ORAL PHASE MARKELL SCORE: (Dysphagia outcome and severity scale)  [] 7 = Normal in all situations  [x] 6 = WFL / Mod I; Normal diet; May have mild oral delay  [] 5 = Mild dysphagia; May have one diet consistency restricted; Mild oral residue -clears. [] 4 = Mild-Moderate dysphagia; May have one or two diet consistencies restricted; Oral residue clears with cue; Intermittent supervision or cueing. [] 3 = Moderate dysphagia; Total assist with strategies; Two or more consistencies restricted; Moderate oral residue clears with cue;   [] 2 = Moderately Severe dysphagia; Tolerates at least one consistency safely with total use of strategies; Severe oral residue and unable to clear or needs multiple cues; Non-oral nutrition. [] 1 = Severe dysphagia; NPO; Unable to tolerate any po safely; Severe oral loss of bolus or oral residue; Non-oral nutrition. PHARYNGEAL PHASE: [x] [facility]  [] Impaired   [] Absent Swallow                [] Delayed Swallow               [] Decreased airway protection   [] Decreased epiglottic inversion     [x] Decreased hyolaryngeal elevation- reduced hyoid protrusion. Inconsistent with trials of different textures and liquids. [x] Residue in the valleculae- trace residue in vallecula with thin liquids. [] Residue in the pyriform sinus    [] Cricopharyngeal dysfunction              [] Residue along posterior pharyngeal wall     [] Residue along the ariepiglottic folds    [x] Decreased pharyngeal contraction- inconsistent with thin liquids   [] OTHER:    PHARYNGEAL PHASE MARKELL SCORE: (Dysphagia outcome and severity scale)  [] 7 = Normal in all situations; Normal diet;  No strategies  [x] 6 = WFL / Mod I; May have mild pharyngeal delay or residue but clears spontaneously; No aspiration or laryngeal penetration  [] 5 = Mild dysphagia:  May need one consistency restricted; May have one or more of the following:  Aspiration with thin - cough to clear; Airway penetration midway to the vocal cords with one or more consistency or to the vocal folds with one consistency, but clears spontaneously; Residue in the pharynx clears spontaneously. [] 4 = Mild - Moderate dysphagia: One or two consistencies restricted; May exhibit one or more of the following:  Residue clears with cue; Aspiration of one consistency with weak or no reflexive cough; Laryngeal penetration to the vocal cords with cough with two consistencies; Laryngeal penetration to the vocal cords without cough on one consistency. [] 3 = Moderate dysphagia:  Two or more diet consistencies restricted; May exhibit one or more of the following: Moderate residue clears with cue; Airway penetration to the level of the vocal folds without cough with two or more consistencies; Aspiration with two consistencies with weak or no reflexive cough; Aspiration of one consistency, no cough and airway penetration with one consistency, no cough. [] 2 = Moderately Severe dysphagia: Tolerates at least one consistency safely with total use of strategies; Non-oral nutrition; Severe residue unable to clear; Aspiration with two or more consistencies with no cough; Aspiration with one or more consistency, no cough and airway penetration to the vocal folds with one or more consistency, no cough. [] 1 = Severe dysphagia:  NPO; Unable to tolerate any po safely; Severe residue unable to clear; Silent aspiration with two or more consistencies, non-functional cough;  OR Unable to achieve a swallow.         SIGNS AND SYMPTOMS OF LARYNGEAL PENETRATION / ASPIRATION:  [x] No evidence of laryngeal penetration  [x] No evidence of aspiration  [] Laryngeal penetration evident with:  [] Audible aspiration evident with:  [] Silent aspiration evident with:    PENETRATION-ASPIRATION SCALE (PAS):  [x] 1 = Material does not enter the airway  [] 2 = Material enters the airway, remains above the vocal folds, and is ejected from the airway  [] 3 = Material enters the airway, remains above the vocal folds, and is not ejected from airway  [] 4 = Material enters the airway, contacts the vocal folds, and is ejected from the airway  [] 5 = Material enters the airway, contacts the vocal folds, and is not ejected from the airway  [] 6 = Material enters the airway, passes below the vocal folds, and is ejected into the larynx or out of the airway  [] 7 = Material enters the airway, passes below the vocal folds, and is not ejected from the trachea despite effort  [] 8 = Material enters the airway, passes below the vocal folds, and no effort is made to eject    ESOPHAGEAL PHASE:   [] No significant findings  [] See Radiology Report for details  [x] Recommend further esophageal testing    ATTEMPTED TECHNIQUES:                                 Comments:  []Small bolus size [] Effective []Not Effective    []Straw [] Effective []Not Effective    []Cup [] Effective []Not Effective    []Chin tuck [] Effective []Not Effective    []Head Turn [] Effective []Not Effective    []Spoon presentations [] Effective []Not Effective    [] Volitional cough [] Effective []Not Effective    []Spontaneous cough [] Effective []Not Effective    []OTHER: [] Effective []Not Effective      DIAGNOSTIC IMPRESSIONS:   Patient presents with swallow WFL. Patient presents with inconsistent base of tongue retraction with solids, inconsistent hyoid protrusion with all consistencies and inconsistent pharyngeal contraction with thin liquids. This results in trace pharyngeal stasis. No penetration. No aspiration. Patient at minimal risk of aspiration and pulmonary compromise.  Esophagram is recommended to further assess esophageal swallow based on patient's symptoms regarding coughing immediately on liquids only and negative modified report. DIET RECOMMENDATIONS:    Diet:  [] NPO [x] Regular diet   [] Soft and bite sized (Dysphagia III)  [] Minced and moist (Dysphagia II)   [] Pureed (Dysphagia I)  [] Liquidised       Liquids: [x] Thin [] Mildly Thick (Nectar thick)  [] Moderately Thick (Honey thick) [] Extremely Thick (Spoon-Pudding)        STRATEGIES:   [x] Full upright position [x] Small bite/sip   [] No Straw   [] Multiple Swallow  [] Chin tuck   [] Head turn   [] Pulmonary monitoring [] Oral care after all meals  [] Supervision  [] Medication in applesauce   [] Direct 1:1 Supervision [] Spoon all liquids   [] Alternate solid / liquid [] Limit distractions   [] Monitor for fatigue [] PMV in place for all po   [] OTHER:    PATIENT STRENGTHS:  [x] Motivated [x] Cooperative   [] Good family support    [] Prior level of function  [] OTHER:    REHAB POTENTIAL:   [] Excellent [x] Good [] Fair  [] Poor    EDUCATION:   Learner: [x] Patient            [] Significant other                                   [] Son/Daughter [] Parent [] Other:     Education: [x] Reviewed results and recommendations of this evaluation     [] Reviewed diet and strategies     [] Reviewed signs, symptoms and risk of aspiration     [] Demonstrated how to thick liquid appropriately. [] Reviewed goals and Plan of Care     [] OTHER:     Method: [x] Discussion  [] Demonstration [] Hand-out     [] Other:     Evaluation of Education:     [x] Verbalizes understanding      [] Demonstrates with assistance     [] Demonstrates without assistance                                  []Needs further instruction      [] No evidence of learning                                    [] Family not present    PATIENT GOALS: [] Pt did not state; will further assess during treatment.      [] Return to the least restricted diet possible     [x] Return to previous level of function     [] Other:    PLAN / TREATMENT RECOMMENDATIONS:  [x] No further speech therapy services indicated. [] Speech Therapy evaluation to assess speech, language, cognition and/or voice  [] Skilled dysphagia treatment ___ times per week for ___ weeks. [] OTHER: Recommend esophagram to further assess esophageal swallow.   If esophagram negative, may consider ENT referral.         Electronically signed by: Brooklyn Catalan MA, CCC-SLP        Date: 1/21/2021

## 2021-01-25 ENCOUNTER — OFFICE VISIT (OUTPATIENT)
Dept: FAMILY MEDICINE CLINIC | Age: 61
End: 2021-01-25
Payer: COMMERCIAL

## 2021-01-25 VITALS
OXYGEN SATURATION: 98 % | HEIGHT: 63 IN | DIASTOLIC BLOOD PRESSURE: 80 MMHG | TEMPERATURE: 98 F | HEART RATE: 88 BPM | WEIGHT: 189 LBS | SYSTOLIC BLOOD PRESSURE: 132 MMHG | BODY MASS INDEX: 33.49 KG/M2

## 2021-01-25 DIAGNOSIS — L72.3 SEBACEOUS CYST: Primary | ICD-10-CM

## 2021-01-25 PROCEDURE — 3017F COLORECTAL CA SCREEN DOC REV: CPT | Performed by: FAMILY MEDICINE

## 2021-01-25 PROCEDURE — 11422 EXC H-F-NK-SP B9+MARG 1.1-2: CPT | Performed by: FAMILY MEDICINE

## 2021-01-25 PROCEDURE — G8482 FLU IMMUNIZE ORDER/ADMIN: HCPCS | Performed by: FAMILY MEDICINE

## 2021-01-25 PROCEDURE — 4004F PT TOBACCO SCREEN RCVD TLK: CPT | Performed by: FAMILY MEDICINE

## 2021-01-25 PROCEDURE — 99212 OFFICE O/P EST SF 10 MIN: CPT | Performed by: FAMILY MEDICINE

## 2021-01-25 PROCEDURE — G8427 DOCREV CUR MEDS BY ELIG CLIN: HCPCS | Performed by: FAMILY MEDICINE

## 2021-01-25 PROCEDURE — G8417 CALC BMI ABV UP PARAM F/U: HCPCS | Performed by: FAMILY MEDICINE

## 2021-01-25 NOTE — PROGRESS NOTES
[address] [address]  [address] [address]  Dept: [phone]  Dept Fax: [phone]  Loc: [phone]    Ishmael Wright is a 63 y.o. female who presents today for her medical conditions/complaints as noted below. Ishmael Wright is c/o of   Chief Complaint   Patient presents with    Cyst     sebaceous cyst removal; top of head, left sided       HPI:     HPI Here today for a sebaceous cyst removal.     She has a large sebaceous cyst on the top of her head that is bothering her. It is not painful or inflamed, but it is in the way. She would like to have it removed.       Past Medical History:   Diagnosis Date    Allergic rhinitis     Arthritis     Asthma     Bronchitis     [facility]     Depression     Diabetes mellitus ([facility]     Headache     Hyperlipidemia     Hypertension     Incontinence     Irritable bowel syndrome     Kidney stone     Osteoarthritis     Pneumonia     Type 2 diabetes mellitus without complication ([facility] 5378    Ulcer           Social History     Tobacco Use    Smoking status: Current Every Day Smoker     Packs/day: 1.00     Years: 30.00     Pack years: 30.00     Types: Cigarettes     Start date: 1/1/1971    Smokeless tobacco: Never Used   Substance Use Topics    Alcohol use: No     Frequency: Never     Current Outpatient Medications   Medication Sig Dispense Refill    vitamin D (ERGOCALCIFEROL) 1.25 MG (00439 UT) CAPS capsule Take 1 capsule by mouth once a week 12 capsule 1    pantoprazole (PROTONIX) 40 MG tablet Take 1 tablet by mouth daily 90 tablet 3    venlafaxine (EFFEXOR XR) 150 MG extended release capsule Take 1 capsule by mouth once daily 30 capsule 3    venlafaxine (EFFEXOR XR) 37.5 MG extended release capsule Take 1 capsule by mouth once daily 30 capsule 3    albuterol sulfate HFA (PROVENTIL HFA) 108 (90 Base) MCG/ACT inhaler Inhale 2 puffs into the lungs every 4 hours as needed for Wheezing 1 Inhaler 0    Spacer/Aero-Holding Chambers KLEBER 1 Device by Does not apply route daily as needed (as needed with inhaler) 1 Device 0    fluticasone (FLONASE) 50 MCG/ACT nasal spray 2 sprays by Nasal route daily 1 Bottle 11    ondansetron (ZOFRAN-ODT) 8 MG TBDP disintegrating tablet Place 8 mg under the tongue every 8 hours as needed for Nausea or Vomiting      dicyclomine (BENTYL) 20 MG tablet Take 1 tablet by mouth every 6 hours as needed (bowel spasm) 120 tablet 3    Naproxen Sodium (ALEVE PO) Take by mouth daily as needed       No current facility-administered medications for this visit. Allergies   Allergen Reactions    Pcn [Penicillins] Anaphylaxis     \"can't breath\"    Sulfa Antibiotics Anaphylaxis     \"can't breathe\"    Metformin And Related Other (See Comments)     Sweating and diarrhea       Subjective:     Review of Systems   Constitutional: Negative for activity change, appetite change, fatigue and fever. Skin:        Large sebaceous cyst on her head. Objective:      Physical Exam  Vitals signs and nursing note reviewed. Constitutional:       General: She is not in acute distress. Appearance: Normal appearance. She is obese. HENT:      Head:     Neurological:      Mental Status: She is alert. /80   Pulse 88   Temp 98 °F (36.7 °C)   Ht 5' 3\" (1.6 m)   Wt 189 lb (85.7 kg)   SpO2 98%   BMI 33.48 kg/m²     Assessment:       Diagnosis Orders   1. Sebaceous cyst  40677 - [place] EXC SKIN BENIG 1.1-2CM REMAINDR BODY             Plan:        Sebaceous cyst: it was removed today. Procedure: sebaceous cyst removal    Preop diagnosis: sebaceous cyst    Postop diagnosis: same    Description: A 1.5cm sebaceous cyst of the head was removed. The area was cleansed with betadine and alcohol. The area was anesthetized with 5ml of 1% lidocaine with epi.  An incision was made across the cyst using a 15mm blade. Hemostats were then used to extract the cyst and the entire capsule was removed. The wound was closed with 3 sutures. Suture material was a 4-O prolyene. The closed wound measured 1.3cm long. There were no complications and she tolerated the procedure well. Return in about 3 weeks (around 2/15/2021). Orders Placed This Encounter   Procedures    53655 - [place] EXC SKIN BENIG 1.1-2CM REMAINDR BODY       Patientgiven educational materials - see patient instructions. Discussed use, benefit,and side effects of prescribed medications. All patient questions answered. Ptvoiced understanding. Reviewed health maintenance. Instructed to continue currentmedications, diet and exercise. Patient agreed with treatment plan. Follow up asdirected.      Electronically signed by Severiano Bussing, MD on 1/25/2021 at 1:24 PM

## 2021-01-26 ENCOUNTER — HOSPITAL ENCOUNTER (OUTPATIENT)
Dept: NEUROLOGY | Age: 61
Discharge: HOME | End: 2021-01-26
Payer: COMMERCIAL

## 2021-01-26 DIAGNOSIS — R29.6 FREQUENT FALLS: ICD-10-CM

## 2021-01-26 DIAGNOSIS — M21.379 FOOT-DROP, UNSPECIFIED LATERALITY: ICD-10-CM

## 2021-01-26 DIAGNOSIS — J30.9 ALLERGIC RHINITIS, UNSPECIFIED SEASONALITY, UNSPECIFIED TRIGGER: ICD-10-CM

## 2021-01-26 PROCEDURE — 95886 MUSC TEST DONE W/N TEST COMP: CPT

## 2021-01-26 PROCEDURE — 95911 NRV CNDJ TEST 9-10 STUDIES: CPT

## 2021-01-26 RX ORDER — FLUTICASONE PROPIONATE 50 MCG
2 SPRAY, SUSPENSION (ML) NASAL DAILY
Qty: 1 BOTTLE | Refills: 3 | Status: SHIPPED | OUTPATIENT
Start: 2021-01-26 | End: 2021-04-30 | Stop reason: SDUPTHER

## 2021-01-26 NOTE — TELEPHONE ENCOUNTER
Alayna called requesting a refill of the below medication which has been pended for you:     Requested Prescriptions     Pending Prescriptions Disp Refills    fluticasone (FLONASE) 50 MCG/ACT nasal spray 1 Bottle 3     Si sprays by Nasal route daily       Last Appointment Date: 2021  Next Appointment Date: 3/18/2021    Allergies   Allergen Reactions    Pcn [Penicillins] Anaphylaxis     \"can't breath\"    Sulfa Antibiotics Anaphylaxis     \"can't breathe\"    Metformin And Related Other (See Comments)     Sweating and diarrhea

## 2021-01-26 NOTE — PROGRESS NOTES
EMG/NCS Bilateral    lower Completed    PCP: MD Ksenia Ambrocio Officer, MD    Interpreting Physician: Carine Helton.  Kelli Castano, Neurologist    Technician: Anjelica Cruz RN

## 2021-01-26 NOTE — PROCEDURES
Jhon [address]                 [address] [place] [address] Drive [address]                        EMG/NERVE CONDUCTION STUDIES REPORT      PATIENT NAME: Judie Espinal                    :        1960  MED REC NO:   [id number]                             ROOM:  ACCOUNT NO:   [de-identified]                           ADMIT DATE: 2021  PROVIDER:     Zulma Jones MD, F.A. A. N    DATE OF EM2021      REFERRING PROVIDER:  Nanette Groves MD      TECHNICAL SUMMARY:  The nature, purpose, goals, expectations and process  involved in the procedures of nerve conduction studies and needle  electromyography were reviewed, discussed, explained and verbal consent  was obtained from the patient. The patient's questions were answered  with reference to the above processes and procedures. There were no significant technical difficulties encountered during this  study and nerve conduction studies were performed under temperature  monitoring. CLINICAL DATA:        The patient is 63years old with a history of chronic  lower back pain, getting worse in the last 6 to 8 months, history of  right footdrop for the last 2 years, history of falling, gait  difficulties, balance problem, muscle cramps, bladder problems. The  patient also has a history of diet-controlled diabetes. The purpose of the study is to evaluate for mononeuropathy,  radiculopathy, plexopathy, peripheral polyneuropathy, myopathy. SUMMARY:        The sensory nerve action potentials of the right and left  sural and superficial peroneal nerves were not recordable bilaterally. Compound muscle action potentials of the right and left peroneal and  tibial nerves shows normal amplitude, distal latencies and low  conduction velocities bilaterally. Proximal conductions as measured by the F responses were not recordable  in both peroneal and tibial nerves. Tibial H responses were also not recordable. Nerve  Conductions   Results  Were  Personally  Reviewed and  Analysed. Abnormal  Nerve  Conductions  Were  Personally  Repeated,  Verified, reconfirmed  And  Updated as needed  appropriately. Please    See   Wave  Forms   And    Details  Of     Nerve  Conduction   Studies   For  Additional  Information             Extensive   Needle  Electromyography  Was personally  Performed  In  Both        Lower  Extremities     In  The  Following  Muscles :      Gluteus  Medius,   Vastus  Lateralis,  Internal  Hamstring,    External  Hamstring,   Anterior Tibialis,  Medial  Gastrocnemius,            Extensive  Needle  Electromyography  Shows  :      A) Normal  insertional  Activity. There  Is  Absence  Of   P  Waves,  Fibrillations,  Fasciculations and        Other  Spontaneous   Activity. B) Voluntary  Motor unit  Potentials    Show :    Normal  Effort. Decreased   Recruitment, Increased amplitude &  Duration. Polyphasic features  Noted  In  The  Above  Examined  muscles                  IMPRESSION:          1. There is electrodiagnostic evidence of mild-to-moderate, sensory       motor, peripheral polyneuropathy involving examined both lower        Extremities. 2. There is electrodiagnostic evidence of chronic         L4-L5 and L5-S1  Radiculopathy   bilaterally. Needle electromyography shows no active denervation changes. 3.  There is no definite electrodiagnostic evidence of inflammatory       myopathy involving examined both lower extremities. Further clinical correlation and followup recommended. Mckayla Delarosa MD, [address] [place] [address]     Board Certified in Neurology  & in  [address] [facility] ([employer]).         D: 01/26/2021 10:26:41       T: 01/26/2021 11:37:22     [email or web address]  Job#: [id number]     Doc#: [id number]    CC:  Kylee Uriarte

## 2021-01-27 ENCOUNTER — TELEPHONE (OUTPATIENT)
Dept: FAMILY MEDICINE CLINIC | Age: 61
End: 2021-01-27

## 2021-01-27 DIAGNOSIS — M21.379 FOOT-DROP, UNSPECIFIED LATERALITY: Primary | ICD-10-CM

## 2021-01-27 DIAGNOSIS — G89.29 CHRONIC MIDLINE LOW BACK PAIN WITHOUT SCIATICA: ICD-10-CM

## 2021-01-27 DIAGNOSIS — M54.50 CHRONIC MIDLINE LOW BACK PAIN WITHOUT SCIATICA: ICD-10-CM

## 2021-02-04 ENCOUNTER — OFFICE VISIT (OUTPATIENT)
Dept: PRIMARY CARE CLINIC | Age: 61
End: 2021-02-04
Payer: COMMERCIAL

## 2021-02-04 ENCOUNTER — HOSPITAL ENCOUNTER (OUTPATIENT)
Age: 61
Setting detail: SPECIMEN
Discharge: HOME | End: 2021-02-04
Payer: COMMERCIAL

## 2021-02-04 VITALS
WEIGHT: 191 LBS | HEART RATE: 71 BPM | DIASTOLIC BLOOD PRESSURE: 98 MMHG | TEMPERATURE: 97.1 F | SYSTOLIC BLOOD PRESSURE: 162 MMHG | BODY MASS INDEX: 33.83 KG/M2 | OXYGEN SATURATION: 98 %

## 2021-02-04 DIAGNOSIS — R09.81 SINUS CONGESTION: ICD-10-CM

## 2021-02-04 DIAGNOSIS — Z20.822 EXPOSURE TO COVID-19 VIRUS: ICD-10-CM

## 2021-02-04 DIAGNOSIS — R43.2 DECREASED SENSE OF TASTE: ICD-10-CM

## 2021-02-04 DIAGNOSIS — H92.03 ACUTE OTALGIA, BILATERAL: ICD-10-CM

## 2021-02-04 DIAGNOSIS — R68.83 CHILLS: ICD-10-CM

## 2021-02-04 DIAGNOSIS — R51.9 ACUTE NONINTRACTABLE HEADACHE, UNSPECIFIED HEADACHE TYPE: ICD-10-CM

## 2021-02-04 DIAGNOSIS — R53.83 FATIGUE, UNSPECIFIED TYPE: ICD-10-CM

## 2021-02-04 DIAGNOSIS — J44.9 CHRONIC OBSTRUCTIVE PULMONARY DISEASE, UNSPECIFIED COPD TYPE (HCC): ICD-10-CM

## 2021-02-04 DIAGNOSIS — R52 GENERALIZED BODY ACHES: ICD-10-CM

## 2021-02-04 DIAGNOSIS — Z20.822 EXPOSURE TO COVID-19 VIRUS: Primary | ICD-10-CM

## 2021-02-04 PROCEDURE — U0005 INFEC AGEN DETEC AMPLI PROBE: HCPCS

## 2021-02-04 PROCEDURE — G8926 SPIRO NO PERF OR DOC: HCPCS | Performed by: NURSE PRACTITIONER

## 2021-02-04 PROCEDURE — G8482 FLU IMMUNIZE ORDER/ADMIN: HCPCS | Performed by: NURSE PRACTITIONER

## 2021-02-04 PROCEDURE — U0003 INFECTIOUS AGENT DETECTION BY NUCLEIC ACID (DNA OR RNA); SEVERE ACUTE RESPIRATORY SYNDROME CORONAVIRUS 2 (SARS-COV-2) (CORONAVIRUS DISEASE [COVID-19]), AMPLIFIED PROBE TECHNIQUE, MAKING USE OF HIGH THROUGHPUT TECHNOLOGIES AS DESCRIBED BY CMS-2020-01-R: HCPCS

## 2021-02-04 PROCEDURE — 99214 OFFICE O/P EST MOD 30 MIN: CPT | Performed by: NURSE PRACTITIONER

## 2021-02-04 PROCEDURE — 3023F SPIROM DOC REV: CPT | Performed by: NURSE PRACTITIONER

## 2021-02-04 PROCEDURE — 3017F COLORECTAL CA SCREEN DOC REV: CPT | Performed by: NURSE PRACTITIONER

## 2021-02-04 PROCEDURE — G8427 DOCREV CUR MEDS BY ELIG CLIN: HCPCS | Performed by: NURSE PRACTITIONER

## 2021-02-04 PROCEDURE — G8417 CALC BMI ABV UP PARAM F/U: HCPCS | Performed by: NURSE PRACTITIONER

## 2021-02-04 PROCEDURE — 4004F PT TOBACCO SCREEN RCVD TLK: CPT | Performed by: NURSE PRACTITIONER

## 2021-02-04 RX ORDER — ALBUTEROL SULFATE 90 UG/1
2 AEROSOL, METERED RESPIRATORY (INHALATION) 4 TIMES DAILY PRN
Qty: 1 INHALER | Refills: 0 | Status: SHIPPED | OUTPATIENT
Start: 2021-02-04 | End: 2021-02-26

## 2021-02-04 NOTE — PROGRESS NOTES
[facility]  [facility]  [address] [facility] [address] [facility] [address], [address] Kristie Cook, [facility]  Phone: [phone]   Phone: [phone]  Fax: [phone]   Fax: [phone]      Date: 2/4/2021   Patient:  Vickey Branch   YOB: 1960 Age: 63 y.o. MRN: [id number]   PCP: Carla Vazquez MD       Subjective:    Chief Complaint   Patient presents with    Headache     Symptoms began 2/3/21, severe headache,chills, myalgia, nasal congestion, bilateral ear pain. Patient has tried otc sinus medicaiton and nasal spray, no relief. HPI: Patient presents with complaints of headache, chills, myalgia, sinus congestion, and bilateral otalgia for the past 1-2 days. They report associated fatigue and decreased sense of taste. They have tried otc sinus medication and a nasal spray without relief. She was exposed to someone who has been diagnosed with covid-19. They deny cough, shortness of breath, chest congestion, chest pain, loss of sense of smell, sore throat, eye irritation, mouth/lip sores or irritation, swelling of hands or feet, nausea, vomiting, diarrhea, abdominal pain, or new rash. They deny any underlying chronic: heart disease, kidney disease, or liver disease. They have not been tested for Covid-19 before. She has underlying COPD and is an active smoker. She uses an albuterol inhaler prn and has not needed it recently. All other review of systems negative. History is obtained from the patient and previous medical records, including any pertinent recent lab/imaging results in EMR and/or Care Everywhere (external results), encounter notes available in EMR, and encounter notes available in Care Everywhere (external notes). Significant family, medical, surgical, and social history reviewed.        Patient Active Problem List   Diagnosis    Gastroesophageal reflux disease    Irritable bowel syndrome with diarrhea    Chronic bronchitis ([facility]  Type 2 diabetes mellitus without complication, without long-term current use of insulin (HCC)    Depression    Tobacco use    Allergic rhinitis    Carpal tunnel syndrome of right wrist       Past Medical History:   Diagnosis Date    Allergic rhinitis     Arthritis     Asthma     Bronchitis     [facility]     Depression     Diabetes mellitus ([facility]     Headache     Hyperlipidemia     Hypertension     Incontinence     Irritable bowel syndrome     Kidney stone     Osteoarthritis     Pneumonia     Type 2 diabetes mellitus without complication ([facility] 8993    Ulcer          Objective:    Physical Exam:  Vitals: BP (!) 162/98   Pulse 71   Temp 97.1 °F (36.2 °C) (Temporal)   Wt 191 lb (86.6 kg)   SpO2 98%   BMI 33.83 kg/m²     LABS:  CBC:  No results for input(s): WBC, HGB, PLT in the last 72 hours. BMP:  No results for input(s): NA, K, CL, CO2, BUN, CREATININE, GLUCOSE in the last 72 hours. Hepatic:  No results for input(s): AST, ALT, ALB, BILITOT, ALKPHOS in the last 72 hours. Pertinent lab and radiology results reviewed.      General Appearance: well developed, well nourished, and in no acute distress  Skin: warm and dry, no rash, no erythema  Head: normocephalic and atraumatic  Eyes: sclerae white, conjunctivae normal  ENT: left external ear normal, right external ear normal, TMs wnl  nose without deformity, nasal mucosa and turbinates congested, sinuses non-tender  pharynx normal, no lymphadenopathy  Pulmonary/Chest: slightly diminished bilateral bases, otherwise clear to auscultation bilaterally -- no wheezes, rales or rhonchi, normal air movement, no respiratory distress  Cardiovascular: normal rate, regular rhythm, normal S1 and S2, distal pulses intact  Abdomen: rounded, soft, non-tender, non-distended, normal bowel sounds, no masses or organomegaly  Extremities: no cyanosis, no clubbing  Neurologic: no acute gross cranial nerve deficit, gait normal, and speech normal  Psychologic: alert, appropriate mood and affect for situation    Assessment and Plan:     Diagnosis Orders   1. Exposure to COVID-19 virus  COVID-19   2. Chills  COVID-19   3. Generalized body aches  COVID-19   4. Acute nonintractable headache, unspecified headache type  COVID-19   5. Sinus congestion  COVID-19   6. Acute otalgia, bilateral  COVID-19   7. Fatigue, unspecified type  COVID-19   8. Decreased sense of taste  COVID-19   9. Chronic obstructive pulmonary disease, unspecified COPD type ([facility]       Orders Placed This Encounter   Medications    albuterol sulfate HFA (VENTOLIN HFA) 108 (90 Base) MCG/ACT inhaler     Sig: Inhale 2 puffs into the lungs 4 times daily as needed for Wheezing     Dispense:  1 Inhaler     Refill:  0     Appears viral. COPD currently stable. Requesting a refill on her albuterol. Nasopharyngeal swab obtained today to test for COVID-19. Patient education provided including necessary self-quarantine until results received. Typical illness duration and progression reviewed. Treatment options discussed. Patient/caregiver will be called with COVID-19 results. Supportive care encouraged (hydrate, humidifier, tylenol prn following package instructions, saline nasal spray/sinus rinses prn, and warm salt water gargles prn). Encouraged hand hygiene, cover cough/sneeze, avoid touching face, and sanitize high-touch surfaces frequently. To ER if significant shortness of breath, signs of respiratory distress/failure develop, or unable to remain hydrated. Follow up with PCP, sooner as needed. Return or go to an urgent care or emergency room if symptoms worsen, fail to improve, or new symptoms arise. The use, risks, benefits, and side effects of prescribed or recommended medications were discussed. If any testing was ordered at this visit, the patient was educated regarding result interpretation.  All questions were answered and the patient/caregiver voiced understanding.          Electronically signed by AUSTIN Ibrahim, ROBERTO-BC on 2/4/2021 at 11:42 AM  Internal Medicine

## 2021-02-04 NOTE — PATIENT INSTRUCTIONS
Patient Education        Coronavirus (RYURJ-79): Care Instructions  Overview  The coronavirus disease (COVID-19) is caused by a virus. Symptoms may include a fever, a cough, and shortness of breath. It mainly spreads person-to-person through droplets from coughing and sneezing. The virus also can spread when people are in close contact with someone who is infected. Most people have mild symptoms and can take care of themselves at home. If their symptoms get worse, they may need care in a hospital. Treatment may include medicines to reduce symptoms, plus breathing support such as oxygen therapy or a ventilator. It's important to not spread the virus to others. If you have COVID-19, wear a face cover anytime you are around other people. You need to isolate yourself while you are sick. Leave your home only if you need to get medical care or testing. Follow-up care is a key part of your treatment and safety. Be sure to make and go to all appointments, and call your doctor if you are having problems. It's also a good idea to know your test results and keep a list of the medicines you take. How can you care for yourself at home? · Get extra rest. It can help you feel better. · Drink plenty of fluids. This helps replace fluids lost from fever. Fluids also help ease a scratchy throat. Water, soup, fruit juice, and hot tea with lemon are good choices. · Take acetaminophen (such as Tylenol) to reduce a fever. It may also help with muscle aches. Read and follow all instructions on the label. · Use petroleum jelly on sore skin. This can help if the skin around your nose and lips becomes sore from rubbing a lot with tissues. Tips for self-isolation  · Limit contact with people in your home. If possible, stay in a separate bedroom and use a separate bathroom. · Wear a cloth face cover when you are around other people. It can help stop the spread of the virus when you cough or sneeze.   · If you have to leave home, avoid crowds and try to stay at least 6 feet away from other people. · Avoid contact with pets and other animals. · Cover your mouth and nose with a tissue when you cough or sneeze. Then throw it in the trash right away. · Wash your hands often, especially after you cough or sneeze. Use soap and water, and scrub for at least 20 seconds. If soap and water aren't available, use an alcohol-based hand . · Don't share personal household items. These include bedding, towels, cups and glasses, and eating utensils. · [address] in the warmest water allowed for the fabric type, and dry it completely. It's okay to wash other people's laundry with yours. · Clean and disinfect your home every day. Use household  and disinfectant wipes or sprays. Take special care to clean things that you grab with your hands. These include doorknobs, remote controls, phones, and handles on your refrigerator and microwave. And don't forget countertops, tabletops, bathrooms, and computer keyboards. When you can end self-isolation  · If you know or suspect that you have COVID-19, stay in self-isolation until:  ? You haven't had a fever for 24 hours while not taking medicines to lower the fever, and  ? Your symptoms have improved, and  ? It's been at least 10 days since your symptoms started. · Talk to your doctor about whether you also need testing, especially if you have a weakened immune system. When should you call for help? Call 911 anytime you think you may need emergency care. For example, call if you have life-threatening symptoms, such as:    · You have severe trouble breathing. (You can't talk at all.)     · You have constant chest pain or pressure.     · You are severely dizzy or lightheaded.     · You are confused or can't think clearly.     · Your face and lips have a blue color.     · You pass out (lose consciousness) or are very hard to wake up.    Call your doctor now or seek immediate medical care if:    · You have moderate trouble breathing. (You can't speak a full sentence.)     · You are coughing up blood (more than about 1 teaspoon).     · You have signs of low blood pressure. These include feeling lightheaded; being too weak to stand; and having cold, pale, clammy skin. Watch closely for changes in your health, and be sure to contact your doctor if:    · Your symptoms get worse.     · You are not getting better as expected. Call before you go to the doctor's office. Follow their instructions. And wear a cloth face cover. Current as of: December 18, 2020               Content Version: 12.7  © 3565-7559 [employer]. Care instructions adapted under license by [facility] ([facility]). If you have questions about a medical condition or this instruction, always ask your healthcare professional. [address] any warranty or liability for your use of this information. Patient Education        Learning About Coronavirus [phone])  What is coronavirus (COVID-19)? COVID-19 is a disease caused by a new type of coronavirus. This illness was first found in December 2019. It has since spread worldwide. Coronaviruses are a large group of viruses. They cause the common cold. They also cause more serious illnesses like [place] respiratory syndrome (MERS) and severe acute respiratory syndrome (SARS). COVID-19 is caused by a novel coronavirus. That means it's a new type that has not been seen in people before. What are the symptoms? Coronavirus (COVID-19) symptoms may include:  · Fever. · Cough. · Trouble breathing. · Chills or repeated shaking with chills. · Muscle pain. · Headache. · Sore throat. · New loss of taste or smell. · Vomiting. · Diarrhea. In severe cases, COVID-19 can cause pneumonia and make it hard to breathe without help from a machine. It can cause death. How is it diagnosed?   COVID-19 is diagnosed with a viral test. This may also be called a PCR test or antigen test. It looks for evidence of the virus in your breathing passages or lungs (respiratory system). The test is most often done on a sample from the nose, throat, or lungs. It's sometimes done on a sample of saliva. One way a sample is collected is by putting a long swab into the back of your nose. How is it treated? Mild cases of COVID-19 can be treated at home. Serious cases need treatment in the hospital. Treatment may include medicines to reduce symptoms, plus breathing support such as oxygen therapy or a ventilator. Some people may be placed on their belly to help their oxygen levels. Treatments that may help people who have COVID-19 include:  Antiviral medicines. These medicines treat viral infections. Remdesivir is an example. Immune-based therapy. These medicines help the immune system fight COVID-19. One example is bamlanivimab. It's a monoclonal antibody. Blood thinners. These medicines help prevent blood clots. People with severe illness are at risk for blood clots. How can you protect yourself and others? The best way to protect yourself from getting sick is to:  · Avoid areas where there is an outbreak. · Avoid contact with people who may be infected. · Avoid crowds and try to stay at least 6 feet away from other people. · Wash your hands often, especially after you cough or sneeze. Use soap and water, and scrub for at least 20 seconds. If soap and water aren't available, use an alcohol-based hand . · Avoid touching your mouth, nose, and eyes. To help avoid spreading the virus to others:  · Stay home if you are sick or have been exposed to the virus. Don't go to school, work, or public areas. And don't use public transportation, ride-shares, or taxis unless you have no choice. · Wear a cloth face cover if you have to go to public areas. · Cover your mouth with a tissue when you cough or sneeze.  Then throw the tissue in the trash and wash your hands right away.  · If you're sick:  ? Leave your home only if you need to get medical care. But call the doctor's office first so they know you're coming. And wear a face cover. ? Wear the face cover whenever you're around other people. It can help stop the spread of the virus when you cough or sneeze. ? Limit contact with pets and people in your home. If possible, stay in a separate bedroom and use a separate bathroom. ? Clean and disinfect your home every day. Use household  and disinfectant wipes or sprays. Take special care to clean things that you grab with your hands. These include doorknobs, remote controls, phones, and handles on your refrigerator and microwave. And don't forget countertops, tabletops, bathrooms, and computer keyboards. When should you call for help? Call 911 anytime you think you may need emergency care. For example, call if you have life-threatening symptoms, such as:    · You have severe trouble breathing. (You can't talk at all.)     · You have constant chest pain or pressure.     · You are severely dizzy or lightheaded.     · You are confused or can't think clearly.     · Your face and lips have a blue color.     · You pass out (lose consciousness) or are very hard to wake up. Call your doctor now or seek immediate medical care if:    · You have moderate trouble breathing. (You can't speak a full sentence.)     · You are coughing up blood (more than about 1 teaspoon).     · You have signs of low blood pressure. These include feeling lightheaded; being too weak to stand; and having cold, pale, clammy skin. Watch closely for changes in your health, and be sure to contact your doctor if:    · Your symptoms get worse.     · You are not getting better as expected. Call before you go to the doctor's office. Follow their instructions. And wear a cloth face cover. Current as of: December 18, 2020               Content Version: 12.7  © 5180-1095 [employer], [facility].    Care instructions adapted under license by [facility] ([facility]). If you have questions about a medical condition or this instruction, always ask your healthcare professional. [address] any warranty or liability for your use of this information. Patient Education        COPD Exacerbation Plan: Care Instructions  Your Care Instructions     If you have chronic obstructive pulmonary disease (COPD), your usual shortness of breath could suddenly get worse. You may start coughing more and have more mucus. This flare-up is called a COPD exacerbation (say \"va-WGJ-[id number]-octavio\"). A lung infection or air pollution could set off an exacerbation. Sometimes it can happen after a quick change in temperature or being around chemicals. Work with your doctor to make a plan for dealing with an exacerbation. You can better manage it if you plan ahead. Follow-up care is a key part of your treatment and safety. Be sure to make and go to all appointments, and call your doctor if you are having problems. It's also a good idea to know your test results and keep a list of the medicines you take. How can you care for yourself at home? During an exacerbation  · Do not panic if you start to have one. Quick treatment at home may help you prevent serious breathing problems. If you have a COPD exacerbation plan that you developed with your doctor, follow it. · Take your medicines exactly as your doctor tells you.  ? Use your inhaler as directed by your doctor. If your symptoms do not get better after you use your medicine, have someone take you to the emergency room. Call an ambulance if necessary. ? With inhaled medicines, a spacer or a nebulizer may help you get more medicine to your lungs. Ask your doctor or pharmacist how to use them properly. Practice using the spacer in front of a mirror before you have an exacerbation. This may help you get the medicine into your lungs quickly.   ? If your doctor has given you steroid pills, take them as directed. ? Your doctor may have given you a prescription for antibiotics, which you can fill if you need it. ? Talk to your doctor if you have any problems with your medicine. And call your doctor if you have to use your antibiotic or steroid pills. Preventing an exacerbation  · Do not smoke. This is the most important step you can take to prevent more damage to your lungs and prevent problems. If you already smoke, it is never too late to stop. If you need help quitting, talk to your doctor about stop-smoking programs and medicines. These can increase your chances of quitting for good. · Take your daily medicines as prescribed. · Avoid colds and flu. ? Get a pneumococcal vaccine. ? Get a flu vaccine each year, as soon as it is available. Ask those you live or work with to do the same, so they will not get the flu and infect you. ? Try to stay away from people with colds or the flu. ? Wash your hands often. · Avoid secondhand smoke; air pollution; cold, dry air; hot, humid air; and high altitudes. Stay at home with your windows closed when air pollution is bad. · Learn breathing techniques for COPD, such as breathing through pursed lips. These techniques can help you breathe easier during an exacerbation. When should you call for help? Call 911 anytime you think you may need emergency care. For example, call if:    · You have severe trouble breathing.     · You have severe chest pain. Call your doctor now or seek immediate medical care if:    · You have new or worse shortness of breath.     · You develop new chest pain.     · You are coughing more deeply or more often, especially if you notice more mucus or a change in the color of your mucus.     · You cough up blood.     · You have new or increased swelling in your legs or belly.     · You have a fever.    Watch closely for changes in your health, and be sure to contact your doctor if:    · You need to use your antibiotic or steroid pills.     · Your symptoms are getting worse. Where can you learn more? Go to [email or web address] org and sign in to your [employer] account. Enter T133 in the [facility] box to learn more about \"COPD Exacerbation Plan: Care Instructions. \"     If you do not have an account, please click on the \"Sign Up Now\" link. Current as of: February 24, 2020               Content Version: 12.6  © 2006-2020 [employer]. Care instructions adapted under license by [facility] [place] ([facility]). If you have questions about a medical condition or this instruction, always ask your healthcare professional. [place] [address] any warranty or liability for your use of this information. Will notify you of COVID test results as soon as available. You should isoloate at home in an area away from family. If you must be around family members, please wear a mask. Quarantine at home until result is available. This means do not go to work/school, attend family gatherings, or invite others to your home until you know your test results.

## 2021-02-05 LAB
SARS-COV-2, RAPID: NORMAL
SARS-COV-2: NORMAL
SARS-COV-2: NOT DETECTED
SOURCE: NORMAL

## 2021-02-08 ENCOUNTER — TELEPHONE (OUTPATIENT)
Dept: FAMILY MEDICINE CLINIC | Age: 61
End: 2021-02-08

## 2021-02-08 NOTE — TELEPHONE ENCOUNTER
Pt calling stating she was seen in [facility] last Thursday by MB and was covid negative but pt is complaining of headache, lightheaded and body aches, pt uses clinic pharmacy, please advise at above number.

## 2021-02-08 NOTE — TELEPHONE ENCOUNTER
Spoke with PT and informed her that she has a virus and to treat the virus with Tylenol and OTC meds ,such as Zyrtec ,clariton and Flonase,and increase your fluids the virus has to run its course and can take up to 14-21 days. PT voiced understanding.

## 2021-02-08 NOTE — TELEPHONE ENCOUNTER
Use OTC medications to help with symptoms. Tylenol as needed for pain. Claritin or Zyrtec and Flonase daily for sinus symptoms. Increase fluid intake. Viral illnesses can have symptoms for 14-21 days. With her exposure to Covid-19, if symptoms are worse, she may have tested too early for Covid-19 and may need to be seen again and retested.

## 2021-02-09 ENCOUNTER — HOSPITAL ENCOUNTER (OUTPATIENT)
Age: 61
Setting detail: SPECIMEN
Discharge: HOME | End: 2021-02-09
Payer: COMMERCIAL

## 2021-02-09 ENCOUNTER — OFFICE VISIT (OUTPATIENT)
Dept: PRIMARY CARE CLINIC | Age: 61
End: 2021-02-09
Payer: COMMERCIAL

## 2021-02-09 VITALS
BODY MASS INDEX: 33.38 KG/M2 | DIASTOLIC BLOOD PRESSURE: 85 MMHG | HEART RATE: 90 BPM | RESPIRATION RATE: 16 BRPM | SYSTOLIC BLOOD PRESSURE: 127 MMHG | TEMPERATURE: 98.6 F | OXYGEN SATURATION: 98 % | HEIGHT: 63 IN | WEIGHT: 188.4 LBS

## 2021-02-09 DIAGNOSIS — M79.10 MYALGIA: ICD-10-CM

## 2021-02-09 DIAGNOSIS — R53.83 FATIGUE, UNSPECIFIED TYPE: ICD-10-CM

## 2021-02-09 DIAGNOSIS — J34.89 RHINORRHEA: ICD-10-CM

## 2021-02-09 DIAGNOSIS — B34.9 VIRAL ILLNESS: Primary | ICD-10-CM

## 2021-02-09 DIAGNOSIS — Z20.822 EXPOSURE TO COVID-19 VIRUS: ICD-10-CM

## 2021-02-09 DIAGNOSIS — Z20.822 PERSON UNDER INVESTIGATION FOR COVID-19: ICD-10-CM

## 2021-02-09 DIAGNOSIS — R05.9 COUGH: ICD-10-CM

## 2021-02-09 PROCEDURE — 4004F PT TOBACCO SCREEN RCVD TLK: CPT | Performed by: NURSE PRACTITIONER

## 2021-02-09 PROCEDURE — U0003 INFECTIOUS AGENT DETECTION BY NUCLEIC ACID (DNA OR RNA); SEVERE ACUTE RESPIRATORY SYNDROME CORONAVIRUS 2 (SARS-COV-2) (CORONAVIRUS DISEASE [COVID-19]), AMPLIFIED PROBE TECHNIQUE, MAKING USE OF HIGH THROUGHPUT TECHNOLOGIES AS DESCRIBED BY CMS-2020-01-R: HCPCS

## 2021-02-09 PROCEDURE — U0005 INFEC AGEN DETEC AMPLI PROBE: HCPCS

## 2021-02-09 PROCEDURE — 99213 OFFICE O/P EST LOW 20 MIN: CPT | Performed by: NURSE PRACTITIONER

## 2021-02-09 PROCEDURE — 3017F COLORECTAL CA SCREEN DOC REV: CPT | Performed by: NURSE PRACTITIONER

## 2021-02-09 PROCEDURE — G8417 CALC BMI ABV UP PARAM F/U: HCPCS | Performed by: NURSE PRACTITIONER

## 2021-02-09 PROCEDURE — G8427 DOCREV CUR MEDS BY ELIG CLIN: HCPCS | Performed by: NURSE PRACTITIONER

## 2021-02-09 PROCEDURE — G8482 FLU IMMUNIZE ORDER/ADMIN: HCPCS | Performed by: NURSE PRACTITIONER

## 2021-02-09 ASSESSMENT — ENCOUNTER SYMPTOMS
NAUSEA: 0
WHEEZING: 0
VOMITING: 0
RHINORRHEA: 1
SHORTNESS OF BREATH: 0
DIARRHEA: 1
COUGH: 1

## 2021-02-09 ASSESSMENT — PATIENT HEALTH QUESTIONNAIRE - PHQ9
2. FEELING DOWN, DEPRESSED OR HOPELESS: 0
SUM OF ALL RESPONSES TO PHQ QUESTIONS 1-9: 0
1. LITTLE INTEREST OR PLEASURE IN DOING THINGS: 0
SUM OF ALL RESPONSES TO PHQ QUESTIONS 1-9: 0

## 2021-02-09 NOTE — LETTER
[facility]  [address] [facility] [place] [address]  Phone: [phone]  Fax: [phone]    ETHAN Culver NP        February 9, 2021     Patient: Ez Denis   YOB: 1960   Date of Visit: 2/9/2021       To Whom it May Concern:    Chayito Collins was seen in my clinic on 2/9/2021. She may return to work after a negative covid test result (results expected in appx 3-5 days) and marked symptom improvement. Pt should be fever free for 24 hours without medication. If you have any questions or concerns, please don't hesitate to call.     Sincerely,         ETHAN Culver NP

## 2021-02-09 NOTE — PATIENT INSTRUCTIONS
Will notify you of covid test result as soon as available. You should isolate at home in an area away from family. If you must be around family members, please wear a mask. Quarantine at home until result is available. This means do not go to work/school, attend family gatherings, or invite others to your home until you know your test results. A work/school note will be provided for you. Symptoms appear viral; no antibiotic warranted at this time. May try mucinex (guaifenesin) to help with congestion and robitussin for persistent cough. May use tylenol or ibuprofen for fever/body aches/headache. Warm heat/ice packs to forehead and back of neck can help with headache as well. Increase water intake to help thin secretions and maintain hydration; it is recommended one drink 2-3 liters/day. Use cool mist humidifier at bedtime. Use nasal saline flush as needed. Practice good hand hygiene and cover your cough and sneeze to prevent passing virus on. If symptoms worsen or fail to improve, please return to clinic. If you develop severe worsening of symptoms such as chest pain or difficulty breathing, please go to ER. Patient Education        Learning About Coronavirus [phone])  What is coronavirus (COVID-19)? COVID-19 is a disease caused by a new type of coronavirus. This illness was first found in December 2019. It has since spread worldwide. Coronaviruses are a large group of viruses. They cause the common cold. They also cause more serious illnesses like [place] respiratory syndrome (MERS) and severe acute respiratory syndrome (SARS). COVID-19 is caused by a novel coronavirus. That means it's a new type that has not been seen in people before. What are the symptoms? Coronavirus (COVID-19) symptoms may include:  · Fever. · Cough. · Trouble breathing. · Chills or repeated shaking with chills. · Muscle pain. · Headache. · Sore throat. · New loss of taste or smell. · Vomiting. · Diarrhea.   In severe cases, COVID-19 can cause pneumonia and make it hard to breathe without help from a machine. It can cause death. How is it diagnosed? COVID-19 is diagnosed with a viral test. This may also be called a PCR test or antigen test. It looks for evidence of the virus in your breathing passages or lungs (respiratory system). The test is most often done on a sample from the nose, throat, or lungs. It's sometimes done on a sample of saliva. One way a sample is collected is by putting a long swab into the back of your nose. How is it treated? Mild cases of COVID-19 can be treated at home. Serious cases need treatment in the hospital. Treatment may include medicines to reduce symptoms, plus breathing support such as oxygen therapy or a ventilator. Some people may be placed on their belly to help their oxygen levels. Treatments that may help people who have COVID-19 include:  Antiviral medicines. These medicines treat viral infections. Remdesivir is an example. Immune-based therapy. These medicines help the immune system fight COVID-19. One example is bamlanivimab. It's a monoclonal antibody. Blood thinners. These medicines help prevent blood clots. People with severe illness are at risk for blood clots. How can you protect yourself and others? The best way to protect yourself from getting sick is to:  · Avoid areas where there is an outbreak. · Avoid contact with people who may be infected. · Avoid crowds and try to stay at least 6 feet away from other people. · Wash your hands often, especially after you cough or sneeze. Use soap and water, and scrub for at least 20 seconds. If soap and water aren't available, use an alcohol-based hand . · Avoid touching your mouth, nose, and eyes. To help avoid spreading the virus to others:  · Stay home if you are sick or have been exposed to the virus. Don't go to school, work, or public areas.  And don't use public transportation, ride-shares, or taxis unless you have no choice. · Wear a cloth face cover if you have to go to public areas. · Cover your mouth with a tissue when you cough or sneeze. Then throw the tissue in the trash and wash your hands right away. · If you're sick:  ? Leave your home only if you need to get medical care. But call the doctor's office first so they know you're coming. And wear a face cover. ? Wear the face cover whenever you're around other people. It can help stop the spread of the virus when you cough or sneeze. ? Limit contact with pets and people in your home. If possible, stay in a separate bedroom and use a separate bathroom. ? Clean and disinfect your home every day. Use household  and disinfectant wipes or sprays. Take special care to clean things that you grab with your hands. These include doorknobs, remote controls, phones, and handles on your refrigerator and microwave. And don't forget countertops, tabletops, bathrooms, and computer keyboards. When should you call for help? Call 911 anytime you think you may need emergency care. For example, call if you have life-threatening symptoms, such as:    · You have severe trouble breathing. (You can't talk at all.)     · You have constant chest pain or pressure.     · You are severely dizzy or lightheaded.     · You are confused or can't think clearly.     · Your face and lips have a blue color.     · You pass out (lose consciousness) or are very hard to wake up. Call your doctor now or seek immediate medical care if:    · You have moderate trouble breathing. (You can't speak a full sentence.)     · You are coughing up blood (more than about 1 teaspoon).     · You have signs of low blood pressure. These include feeling lightheaded; being too weak to stand; and having cold, pale, clammy skin. Watch closely for changes in your health, and be sure to contact your doctor if:    · Your symptoms get worse.     · You are not getting better as expected.    Call before you go to the doctor's office. Follow their instructions. And wear a cloth face cover. Current as of: December 18, 2020               Content Version: 12.7  © 2006-2021 [employer]. Care instructions adapted under license by [facility] ([facility]). If you have questions about a medical condition or this instruction, always ask your healthcare professional. [address] any warranty or liability for your use of this information. Patient Education        Coronavirus ([id number]): Care Instructions  Overview  The coronavirus disease (COVID-19) is caused by a virus. Symptoms may include a fever, a cough, and shortness of breath. It mainly spreads person-to-person through droplets from coughing and sneezing. The virus also can spread when people are in close contact with someone who is infected. Most people have mild symptoms and can take care of themselves at home. If their symptoms get worse, they may need care in a hospital. Treatment may include medicines to reduce symptoms, plus breathing support such as oxygen therapy or a ventilator. It's important to not spread the virus to others. If you have COVID-19, wear a face cover anytime you are around other people. You need to isolate yourself while you are sick. Leave your home only if you need to get medical care or testing. Follow-up care is a key part of your treatment and safety. Be sure to make and go to all appointments, and call your doctor if you are having problems. It's also a good idea to know your test results and keep a list of the medicines you take. How can you care for yourself at home? · Get extra rest. It can help you feel better. · Drink plenty of fluids. This helps replace fluids lost from fever. Fluids also help ease a scratchy throat. Water, soup, fruit juice, and hot tea with lemon are good choices. · Take acetaminophen (such as Tylenol) to reduce a fever. It may also help with muscle aches.  Read and follow all instructions on the label. · Use petroleum jelly on sore skin. This can help if the skin around your nose and lips becomes sore from rubbing a lot with tissues. Tips for self-isolation  · Limit contact with people in your home. If possible, stay in a separate bedroom and use a separate bathroom. · Wear a cloth face cover when you are around other people. It can help stop the spread of the virus when you cough or sneeze. · If you have to leave home, avoid crowds and try to stay at least 6 feet away from other people. · Avoid contact with pets and other animals. · Cover your mouth and nose with a tissue when you cough or sneeze. Then throw it in the trash right away. · Wash your hands often, especially after you cough or sneeze. Use soap and water, and scrub for at least 20 seconds. If soap and water aren't available, use an alcohol-based hand . · Don't share personal household items. These include bedding, towels, cups and glasses, and eating utensils. · [address] in the warmest water allowed for the fabric type, and dry it completely. It's okay to wash other people's laundry with yours. · Clean and disinfect your home every day. Use household  and disinfectant wipes or sprays. Take special care to clean things that you grab with your hands. These include doorknobs, remote controls, phones, and handles on your refrigerator and microwave. And don't forget countertops, tabletops, bathrooms, and computer keyboards. When you can end self-isolation  · If you know or suspect that you have COVID-19, stay in self-isolation until:  ? You haven't had a fever for 24 hours while not taking medicines to lower the fever, and  ? Your symptoms have improved, and  ? It's been at least 10 days since your symptoms started. · Talk to your doctor about whether you also need testing, especially if you have a weakened immune system. When should you call for help?    Call 911 anytime you think you may need emergency care. For example, call if you have life-threatening symptoms, such as:    · You have severe trouble breathing. (You can't talk at all.)     · You have constant chest pain or pressure.     · You are severely dizzy or lightheaded.     · You are confused or can't think clearly.     · Your face and lips have a blue color.     · You pass out (lose consciousness) or are very hard to wake up. Call your doctor now or seek immediate medical care if:    · You have moderate trouble breathing. (You can't speak a full sentence.)     · You are coughing up blood (more than about 1 teaspoon).     · You have signs of low blood pressure. These include feeling lightheaded; being too weak to stand; and having cold, pale, clammy skin. Watch closely for changes in your health, and be sure to contact your doctor if:    · Your symptoms get worse.     · You are not getting better as expected. Call before you go to the doctor's office. Follow their instructions. And wear a cloth face cover. Current as of: December 18, 2020               Content Version: 12.7  © 2006-2021 [employer]. Care instructions adapted under license by [facility] ([facility]). If you have questions about a medical condition or this instruction, always ask your healthcare professional. [address] any warranty or liability for your use of this information. Will notify you of COVID test results as soon as available. You should isoloate at home in an area away from family. If you must be around family members, please wear a mask. Quarantine at home until result is available. This means do not go to work/school, attend family gatherings, or invite others to your home until you know your test results. Patient Education        Learning About Coronavirus [phone])  What is coronavirus (COVID-19)? COVID-19 is a disease caused by a new type of coronavirus. This illness was first found in December 2019.  It has since spread worldwide. Coronaviruses are a large group of viruses. They cause the common cold. They also cause more serious illnesses like [place] respiratory syndrome (MERS) and severe acute respiratory syndrome (SARS). COVID-19 is caused by a novel coronavirus. That means it's a new type that has not been seen in people before. What are the symptoms? Coronavirus (COVID-19) symptoms may include:  · Fever. · Cough. · Trouble breathing. · Chills or repeated shaking with chills. · Muscle pain. · Headache. · Sore throat. · New loss of taste or smell. · Vomiting. · Diarrhea. In severe cases, COVID-19 can cause pneumonia and make it hard to breathe without help from a machine. It can cause death. How is it diagnosed? COVID-19 is diagnosed with a viral test. This may also be called a PCR test or antigen test. It looks for evidence of the virus in your breathing passages or lungs (respiratory system). The test is most often done on a sample from the nose, throat, or lungs. It's sometimes done on a sample of saliva. One way a sample is collected is by putting a long swab into the back of your nose. How is it treated? Mild cases of COVID-19 can be treated at home. Serious cases need treatment in the hospital. Treatment may include medicines to reduce symptoms, plus breathing support such as oxygen therapy or a ventilator. Some people may be placed on their belly to help their oxygen levels. Treatments that may help people who have COVID-19 include:  Antiviral medicines. These medicines treat viral infections. Remdesivir is an example. Immune-based therapy. These medicines help the immune system fight COVID-19. One example is bamlanivimab. It's a monoclonal antibody. Blood thinners. These medicines help prevent blood clots. People with severe illness are at risk for blood clots. How can you protect yourself and others?   The best way to protect yourself from getting sick is to:  · Avoid areas where there is an outbreak. · Avoid contact with people who may be infected. · Avoid crowds and try to stay at least 6 feet away from other people. · Wash your hands often, especially after you cough or sneeze. Use soap and water, and scrub for at least 20 seconds. If soap and water aren't available, use an alcohol-based hand . · Avoid touching your mouth, nose, and eyes. To help avoid spreading the virus to others:  · Stay home if you are sick or have been exposed to the virus. Don't go to school, work, or public areas. And don't use public transportation, ride-shares, or taxis unless you have no choice. · Wear a cloth face cover if you have to go to public areas. · Cover your mouth with a tissue when you cough or sneeze. Then throw the tissue in the trash and wash your hands right away. · If you're sick:  ? Leave your home only if you need to get medical care. But call the doctor's office first so they know you're coming. And wear a face cover. ? Wear the face cover whenever you're around other people. It can help stop the spread of the virus when you cough or sneeze. ? Limit contact with pets and people in your home. If possible, stay in a separate bedroom and use a separate bathroom. ? Clean and disinfect your home every day. Use household  and disinfectant wipes or sprays. Take special care to clean things that you grab with your hands. These include doorknobs, remote controls, phones, and handles on your refrigerator and microwave. And don't forget countertops, tabletops, bathrooms, and computer keyboards. When should you call for help? Call 911 anytime you think you may need emergency care. For example, call if you have life-threatening symptoms, such as:    · You have severe trouble breathing.  (You can't talk at all.)     · You have constant chest pain or pressure.     · You are severely dizzy or lightheaded.     · You are confused or can't think clearly.     · Your face and lips have a blue color.     · You pass out (lose consciousness) or are very hard to wake up. Call your doctor now or seek immediate medical care if:    · You have moderate trouble breathing. (You can't speak a full sentence.)     · You are coughing up blood (more than about 1 teaspoon).     · You have signs of low blood pressure. These include feeling lightheaded; being too weak to stand; and having cold, pale, clammy skin. Watch closely for changes in your health, and be sure to contact your doctor if:    · Your symptoms get worse.     · You are not getting better as expected. Call before you go to the doctor's office. Follow their instructions. And wear a cloth face cover. Current as of: December 18, 2020               Content Version: 12.7  © 2006-2021 [employer]. Care instructions adapted under license by [facility] ([facility]). If you have questions about a medical condition or this instruction, always ask your healthcare professional. [place] [address] any warranty or liability for your use of this information.

## 2021-02-09 NOTE — PROGRESS NOTES
[facility] [place] [facility]  [facility]. [place]  [place] [address]  Dept: [phone]  Dept Fax: [phone]  Loc: [phone]        CHIEF COMPLAINT       Chief Complaint   Patient presents with    Headache     fatigue, body aches. COVID NEG 2.4.2021       Nurses Notes reviewed and I agree except as noted in the HPI. HISTORY OF PRESENT ILLNESS   Tatum Kaye is a 63 y.o. female who presents to [facility] today (2/9/2021) for evaluation of:   Headache   This is a new problem. The current episode started in the past 7 days (Woke with symptoms 2/4/21). The problem occurs constantly. The problem has been unchanged. The pain is located in the frontal region. The quality of the pain is described as throbbing and aching. Associated symptoms include coughing, muscle aches and rhinorrhea. Pertinent negatives include no fever, nausea or vomiting. Associated symptoms comments: Diarrhea 2/6/21; states sense of taste is off. Nothing aggravates the symptoms. She has tried acetaminophen (tylenol sinus) for the symptoms. The treatment provided mild relief. 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 REVIEW OF SYSTEMS     Review of Systems   Constitutional: Positive for fatigue. Negative for fever. HENT: Positive for postnasal drip and rhinorrhea. Negative for congestion. Respiratory: Positive for cough. Negative for shortness of breath and wheezing. Cardiovascular: Negative for chest pain. Gastrointestinal: Positive for diarrhea (2/6/21). Negative for nausea and vomiting. Musculoskeletal: Positive for myalgias. Neurological: Positive for headaches.        PAST MEDICAL HISTORY         Diagnosis Date    Allergic rhinitis     Arthritis     Asthma     Bronchitis     COPD (chronic obstructive pulmonary disease) ([address]     Depression     Diabetes mellitus ([address]     Headache     Hyperlipidemia     Hypertension  Incontinence     Irritable bowel syndrome     Kidney stone     Osteoarthritis     Pneumonia     Type 2 diabetes mellitus without complication ([address] 5599    Ulcer        SURGICAL HISTORY     Patient  has a past surgical history that includes Tonsillectomy; Colonoscopy (7/11/208); Meckel's diverticulum excision (1970); Cholecystectomy, laparoscopic (1997); Hysterectomy, total abdominal (1986); laparoscopy (2008); Colonoscopy (08/02/2017); Upper gastrointestinal endoscopy (08/02/2017); Carpal tunnel release (Right, 5/7/2019); Colonoscopy (Left, 10/8/2019); and Upper gastrointestinal endoscopy (Left, 10/8/2019).     CURRENT MEDICATIONS       Outpatient Medications Prior to Visit   Medication Sig Dispense Refill    albuterol sulfate HFA (VENTOLIN HFA) 108 (90 Base) MCG/ACT inhaler Inhale 2 puffs into the lungs 4 times daily as needed for Wheezing 1 Inhaler 0    fluticasone (FLONASE) 50 MCG/ACT nasal spray 2 sprays by Nasal route daily 1 Bottle 3    vitamin D (ERGOCALCIFEROL) 1.25 MG (29442 UT) CAPS capsule Take 1 capsule by mouth once a week 12 capsule 1    pantoprazole (PROTONIX) 40 MG tablet Take 1 tablet by mouth daily 90 tablet 3    venlafaxine (EFFEXOR XR) 150 MG extended release capsule Take 1 capsule by mouth once daily 30 capsule 3    venlafaxine (EFFEXOR XR) 37.5 MG extended release capsule Take 1 capsule by mouth once daily 30 capsule 3    albuterol sulfate HFA (PROVENTIL HFA) 108 (90 Base) MCG/ACT inhaler Inhale 2 puffs into the lungs every 4 hours as needed for Wheezing 1 Inhaler 0    Spacer/Aero-Holding Chambers KLEBER 1 Device by Does not apply route daily as needed (as needed with inhaler) 1 Device 0    ondansetron (ZOFRAN-ODT) 8 MG TBDP disintegrating tablet Place 8 mg under the tongue every 8 hours as needed for Nausea or Vomiting      dicyclomine (BENTYL) 20 MG tablet Take 1 tablet by mouth every 6 hours as needed (bowel spasm) 120 tablet 3    Naproxen Sodium (ALEVE PO) Take by mouth daily as needed       No facility-administered medications prior to visit. ALLERGIES     Patient is is allergic to pcn [penicillins]; sulfa antibiotics; and metformin and related. FAMILY HISTORY     Patient's She was adopted. Family history is unknown by patient. SOCIAL HISTORY     Patient  reports that she has been smoking cigarettes. She started smoking about 50 years ago. She has a 30.00 pack-year smoking history. She has never used smokeless tobacco. She reports that she does not drink alcohol or use drugs. PHYSICAL EXAM     VITALS  BP: 127/85, Temp: 98.6 °F (37 °C), Pulse: 90, Resp: 16, SpO2: 98 %  Physical Exam  Vitals signs reviewed. Constitutional:       General: She is not in acute distress. Appearance: She is not toxic-appearing. HENT:      Nose: Rhinorrhea present. No congestion. Mouth/Throat:      Mouth: Mucous membranes are moist.      Pharynx: Oropharynx is clear. No oropharyngeal exudate or posterior oropharyngeal erythema. Eyes:      Pupils: Pupils are equal, round, and reactive to light. Neck:      Musculoskeletal: Normal range of motion and neck supple. No neck rigidity. Cardiovascular:      Rate and Rhythm: Normal rate and regular rhythm. Heart sounds: Normal heart sounds, S1 normal and S2 normal. No murmur. Pulmonary:      Effort: Pulmonary effort is normal. No accessory muscle usage or respiratory distress. Breath sounds: Normal breath sounds. No wheezing or rhonchi. Musculoskeletal: Normal range of motion. Lymphadenopathy:      Cervical: No cervical adenopathy. Skin:     General: Skin is warm and dry. Capillary Refill: Capillary refill takes less than 2 seconds. Neurological:      General: No focal deficit present. Mental Status: She is alert. DIAGNOSTIC RESULTS   Labs:No results found for this visit on 02/09/21.     IMAGING:        CLINICAL COURSE:     Vitals:    02/09/21 0930   BP: 127/85   Site: Right Upper Arm Position: Sitting   Cuff Size: Large Adult   Pulse: 90   Resp: 16   Temp: 98.6 °F (37 °C)   TempSrc: Temporal   SpO2: 98%   Weight: 188 lb 6.4 oz (85.5 kg)   Height: 5' 3\" (1.6 m)           PROCEDURES:  None  FINAL IMPRESSION      1. Fatigue, unspecified type    2. Myalgia    3. Cough    4. Rhinorrhea    5. Exposure to COVID-19 virus    6. Person under investigation for COVID-19         DISPOSITION/PLAN     Discussed with pt that symptoms are consistent with viral illness, likely covid due to her known exposure. Covid testing collected per pt request; will notofy of results. Discussed with pt that she should probably continue to quarantine through 2/13/21. Discussed conservative measures and encouraged pt to return if symptoms worsen. Patient Instructions     Will notify you of covid test result as soon as available. You should isolate at home in an area away from family. If you must be around family members, please wear a mask. Quarantine at home until result is available. This means do not go to work/school, attend family gatherings, or invite others to your home until you know your test results. A work/school note will be provided for you. Symptoms appear viral; no antibiotic warranted at this time. May try mucinex (guaifenesin) to help with congestion and robitussin for persistent cough. May use tylenol or ibuprofen for fever/body aches/headache. Warm heat/ice packs to forehead and back of neck can help with headache as well. Increase water intake to help thin secretions and maintain hydration; it is recommended one drink 2-3 liters/day. Use cool mist humidifier at bedtime. Use nasal saline flush as needed. Practice good hand hygiene and cover your cough and sneeze to prevent passing virus on. If symptoms worsen or fail to improve, please return to clinic. If you develop severe worsening of symptoms such as chest pain or difficulty breathing, please go to ER.     Patient Education        Learning About Coronavirus (COVID-19)  What is coronavirus (COVID-19)? COVID-19 is a disease caused by a new type of coronavirus. This illness was first found in December 2019. It has since spread worldwide. Coronaviruses are a large group of viruses. They cause the common cold. They also cause more serious illnesses like [place] respiratory syndrome (MERS) and severe acute respiratory syndrome (SARS). COVID-19 is caused by a novel coronavirus. That means it's a new type that has not been seen in people before. What are the symptoms? Coronavirus (COVID-19) symptoms may include:  · Fever. · Cough. · Trouble breathing. · Chills or repeated shaking with chills. · Muscle pain. · Headache. · Sore throat. · New loss of taste or smell. · Vomiting. · Diarrhea. In severe cases, COVID-19 can cause pneumonia and make it hard to breathe without help from a machine. It can cause death. How is it diagnosed? COVID-19 is diagnosed with a viral test. This may also be called a PCR test or antigen test. It looks for evidence of the virus in your breathing passages or lungs (respiratory system). The test is most often done on a sample from the nose, throat, or lungs. It's sometimes done on a sample of saliva. One way a sample is collected is by putting a long swab into the back of your nose. How is it treated? Mild cases of COVID-19 can be treated at home. Serious cases need treatment in the hospital. Treatment may include medicines to reduce symptoms, plus breathing support such as oxygen therapy or a ventilator. Some people may be placed on their belly to help their oxygen levels. Treatments that may help people who have COVID-19 include:  Antiviral medicines. These medicines treat viral infections. Remdesivir is an example. Immune-based therapy. These medicines help the immune system fight COVID-19. One example is bamlanivimab. It's a monoclonal antibody. Blood thinners.    These medicines help prevent blood clots. People with severe illness are at risk for blood clots. How can you protect yourself and others? The best way to protect yourself from getting sick is to:  · Avoid areas where there is an outbreak. · Avoid contact with people who may be infected. · Avoid crowds and try to stay at least 6 feet away from other people. · Wash your hands often, especially after you cough or sneeze. Use soap and water, and scrub for at least 20 seconds. If soap and water aren't available, use an alcohol-based hand . · Avoid touching your mouth, nose, and eyes. To help avoid spreading the virus to others:  · Stay home if you are sick or have been exposed to the virus. Don't go to school, work, or public areas. And don't use public transportation, ride-shares, or taxis unless you have no choice. · Wear a cloth face cover if you have to go to public areas. · Cover your mouth with a tissue when you cough or sneeze. Then throw the tissue in the trash and wash your hands right away. · If you're sick:  ? Leave your home only if you need to get medical care. But call the doctor's office first so they know you're coming. And wear a face cover. ? Wear the face cover whenever you're around other people. It can help stop the spread of the virus when you cough or sneeze. ? Limit contact with pets and people in your home. If possible, stay in a separate bedroom and use a separate bathroom. ? Clean and disinfect your home every day. Use household  and disinfectant wipes or sprays. Take special care to clean things that you grab with your hands. These include doorknobs, remote controls, phones, and handles on your refrigerator and microwave. And don't forget countertops, tabletops, bathrooms, and computer keyboards. When should you call for help? Call 911 anytime you think you may need emergency care. For example, call if you have life-threatening symptoms, such as:    · You have severe trouble breathing.  (You can't talk at all.)     · You have constant chest pain or pressure.     · You are severely dizzy or lightheaded.     · You are confused or can't think clearly.     · Your face and lips have a blue color.     · You pass out (lose consciousness) or are very hard to wake up. Call your doctor now or seek immediate medical care if:    · You have moderate trouble breathing. (You can't speak a full sentence.)     · You are coughing up blood (more than about 1 teaspoon).     · You have signs of low blood pressure. These include feeling lightheaded; being too weak to stand; and having cold, pale, clammy skin. Watch closely for changes in your health, and be sure to contact your doctor if:    · Your symptoms get worse.     · You are not getting better as expected. Call before you go to the doctor's office. Follow their instructions. And wear a cloth face cover. Current as of: December 18, 2020               Content Version: 12.7  © 2006-2021 [employer]. Care instructions adapted under license by [facility] ([facility]). If you have questions about a medical condition or this instruction, always ask your healthcare professional. [place] [address] any warranty or liability for your use of this information. Patient Education        Coronavirus ([facility]72): Care Instructions  Overview  The coronavirus disease (COVID-19) is caused by a virus. Symptoms may include a fever, a cough, and shortness of breath. It mainly spreads person-to-person through droplets from coughing and sneezing. The virus also can spread when people are in close contact with someone who is infected. Most people have mild symptoms and can take care of themselves at home. If their symptoms get worse, they may need care in a hospital. Treatment may include medicines to reduce symptoms, plus breathing support such as oxygen therapy or a ventilator. It's important to not spread the virus to others.  If you have COVID-19, wear a face cover anytime you are around other people. You need to isolate yourself while you are sick. Leave your home only if you need to get medical care or testing. Follow-up care is a key part of your treatment and safety. Be sure to make and go to all appointments, and call your doctor if you are having problems. It's also a good idea to know your test results and keep a list of the medicines you take. How can you care for yourself at home? · Get extra rest. It can help you feel better. · Drink plenty of fluids. This helps replace fluids lost from fever. Fluids also help ease a scratchy throat. Water, soup, fruit juice, and hot tea with lemon are good choices. · Take acetaminophen (such as Tylenol) to reduce a fever. It may also help with muscle aches. Read and follow all instructions on the label. · Use petroleum jelly on sore skin. This can help if the skin around your nose and lips becomes sore from rubbing a lot with tissues. Tips for self-isolation  · Limit contact with people in your home. If possible, stay in a separate bedroom and use a separate bathroom. · Wear a cloth face cover when you are around other people. It can help stop the spread of the virus when you cough or sneeze. · If you have to leave home, avoid crowds and try to stay at least 6 feet away from other people. · Avoid contact with pets and other animals. · Cover your mouth and nose with a tissue when you cough or sneeze. Then throw it in the trash right away. · Wash your hands often, especially after you cough or sneeze. Use soap and water, and scrub for at least 20 seconds. If soap and water aren't available, use an alcohol-based hand . · Don't share personal household items. These include bedding, towels, cups and glasses, and eating utensils. · [address] in the warmest water allowed for the fabric type, and dry it completely. It's okay to wash other people's laundry with yours.   · Clean and disinfect your home Health. If you have questions about a medical condition or this instruction, always ask your healthcare professional. [address] any warranty or liability for your use of this information. Orders Placed This Encounter   Procedures    COVID-19     Standing Status:   Future     Standing Expiration Date:   3/9/2021     Scheduling Instructions:      1) Due to current limited availability of the COVID-19 test, tests will be prioritized based on responses to questions above. Testing may be delayed due to volume. 2) Print and instruct patient to adhere to CDC home isolation program. (Link Above)              3) Set up or refer patient for a monitoring program.              4) Have patient sign up for and leverage QR [employer] (if not previously done). Order Specific Question:   Is this test for diagnosis or screening? Answer:   Diagnosis of ill patient     Order Specific Question:   Symptomatic for COVID-19 as defined by CDC? Answer:   Yes     Order Specific Question:   Date of Symptom Onset     Answer:   2/4/2021     Order Specific Question:   Hospitalized for COVID-19? Answer:   No     Order Specific Question:   Admitted to ICU for COVID-19? Answer:   No     Order Specific Question:   Employed in healthcare setting? Answer:   No     Order Specific Question:   Resident in a congregate (group) care setting? Answer:   No     Order Specific Question:   Pregnant? Answer:   No     Order Specific Question:   Previously tested for COVID-19?      Answer:   Yes     Outpatient Encounter Medications as of 2/9/2021   Medication Sig Dispense Refill    albuterol sulfate HFA (VENTOLIN HFA) 108 (90 Base) MCG/ACT inhaler Inhale 2 puffs into the lungs 4 times daily as needed for Wheezing 1 Inhaler 0    fluticasone (FLONASE) 50 MCG/ACT nasal spray 2 sprays by Nasal route daily 1 Bottle 3    vitamin D (ERGOCALCIFEROL) 1.25 MG (08410 UT) CAPS capsule Take 1 capsule by mouth once a week 12 capsule 1    pantoprazole (PROTONIX) 40 MG tablet Take 1 tablet by mouth daily 90 tablet 3    venlafaxine (EFFEXOR XR) 150 MG extended release capsule Take 1 capsule by mouth once daily 30 capsule 3    venlafaxine (EFFEXOR XR) 37.5 MG extended release capsule Take 1 capsule by mouth once daily 30 capsule 3    albuterol sulfate HFA (PROVENTIL HFA) 108 (90 Base) MCG/ACT inhaler Inhale 2 puffs into the lungs every 4 hours as needed for Wheezing 1 Inhaler 0    Spacer/Aero-Holding Chambers KLEBER 1 Device by Does not apply route daily as needed (as needed with inhaler) 1 Device 0    ondansetron (ZOFRAN-ODT) 8 MG TBDP disintegrating tablet Place 8 mg under the tongue every 8 hours as needed for Nausea or Vomiting      dicyclomine (BENTYL) 20 MG tablet Take 1 tablet by mouth every 6 hours as needed (bowel spasm) 120 tablet 3    Naproxen Sodium (ALEVE PO) Take by mouth daily as needed       No facility-administered encounter medications on file as of 2/9/2021. Return if symptoms worsen or fail to improve.                 Electronically signed by ETHAN Christine NP on 2/9/2021 at 10:25 AM

## 2021-02-10 LAB
SARS-COV-2, RAPID: NORMAL
SARS-COV-2: NORMAL
SARS-COV-2: NOT DETECTED
SOURCE: NORMAL

## 2021-02-11 ENCOUNTER — TELEPHONE (OUTPATIENT)
Dept: SLEEP CENTER | Age: 61
End: 2021-02-11

## 2021-02-26 ENCOUNTER — OFFICE VISIT (OUTPATIENT)
Dept: NEUROSURGERY | Age: 61
End: 2021-02-26
Payer: COMMERCIAL

## 2021-02-26 VITALS
OXYGEN SATURATION: 98 % | WEIGHT: 188 LBS | HEART RATE: 90 BPM | DIASTOLIC BLOOD PRESSURE: 80 MMHG | HEIGHT: 63 IN | SYSTOLIC BLOOD PRESSURE: 128 MMHG | RESPIRATION RATE: 16 BRPM | BODY MASS INDEX: 33.31 KG/M2

## 2021-02-26 DIAGNOSIS — G95.9 MYELOPATHY (HCC): Primary | ICD-10-CM

## 2021-02-26 DIAGNOSIS — M47.816 LUMBAR SPONDYLOSIS: ICD-10-CM

## 2021-02-26 DIAGNOSIS — M48.04 SPINAL STENOSIS OF THORACIC REGION: ICD-10-CM

## 2021-02-26 PROCEDURE — G8417 CALC BMI ABV UP PARAM F/U: HCPCS | Performed by: NURSE PRACTITIONER

## 2021-02-26 PROCEDURE — 4004F PT TOBACCO SCREEN RCVD TLK: CPT | Performed by: NURSE PRACTITIONER

## 2021-02-26 PROCEDURE — G8482 FLU IMMUNIZE ORDER/ADMIN: HCPCS | Performed by: NURSE PRACTITIONER

## 2021-02-26 PROCEDURE — G8427 DOCREV CUR MEDS BY ELIG CLIN: HCPCS | Performed by: NURSE PRACTITIONER

## 2021-02-26 PROCEDURE — 3017F COLORECTAL CA SCREEN DOC REV: CPT | Performed by: NURSE PRACTITIONER

## 2021-02-26 PROCEDURE — 99214 OFFICE O/P EST MOD 30 MIN: CPT | Performed by: NURSE PRACTITIONER

## 2021-02-26 SDOH — HEALTH STABILITY: MENTAL HEALTH: HOW MANY STANDARD DRINKS CONTAINING ALCOHOL DO YOU HAVE ON A TYPICAL DAY?: NOT ASKED

## 2021-02-26 NOTE — PROGRESS NOTES
[address]  [address] [facility], [address]  [facility] [address]  Dept: [phone]  Loc: [phone]    Patient:  Allison Sorenson  YOB: 1960  Date: 21    The patient is a 63 y.o. female who presents today for consult of the following problems:     Chief Complaint   Patient presents with    Back Pain     causing drop foot on the right (currently causing falls)         HPI:     Allison Sorenson is a 63 y.o. female on whom neurosurgical consultation was requested by Jayme Hoffmann MD for management of back and leg pain. Pt has had back pain and leg problems for many years. States that she has been having trouble with weakness in her arms, dragging of her foot, and has been having multiple unprovoked falls. Having trouble with hand grasp, frequently dropping objects. Has been developing cramping to hands and legs. Has also had some progressive issues with incontinence of urine and stool, has been wearing briefs for the last several years. Does appreciate some changes to handwriting, difficulty with dexterity. Leg weakness for the last 1-2 years, feels progressive. No recent conservative measures, no physical therapy. Does follow with a chiropractor. Groin pain: No  Saddle anesthesia: No  Hip Pain: Yes-bilateral  Bowel/bladder incontinence: Yes  Constipation or Urinary retention: No    LIMITATIONS    Pain significantly limiting from a daily standpoint. Assistive devices: Cane  Daily pharmacologic pain control include: Naproxen  Back versus le-50    MANAGEMENT     Prior Surgery: No  Prior to 1yr ago:   In the last year:    Physical Therapy: No   Chiropractic Interventions: Yes   Injections: No  Improvement: n/a    Types of injections/responses: n/a    History:     Past Medical History:   Diagnosis Date    Allergic rhinitis     Arthritis     Asthma     Bronchitis     [facility]     Depression     Diabetes mellitus [facility]     Headache     Hyperlipidemia     Hypertension     Incontinence     Irritable bowel syndrome     Kidney stone     Osteoarthritis     Pneumonia     Type 2 diabetes mellitus without complication ([facility] [address]    Ulcer      Past Surgical History:   Procedure Laterality Date    CARPAL TUNNEL RELEASE Right 5/7/2019    Right Carpal Tunnel Release [facility] [address] COLONOSCOPY  7/11/208    Serated polyp (1)    COLONOSCOPY  08/02/2017    [id number]    COLONOSCOPY Left 10/8/2019    COLONOSCOPY WITH BIOPSY performed by Kodak Carrera MD at [address] [facility], [address]    with Right oopherectomy still has left ovary    LAPAROSCOPY  2008    Diagnostic for pain - no findings    MECKEL DIVERTICULUM EXCISION  1970    TONSILLECTOMY      UPPER GASTROINTESTINAL ENDOSCOPY  08/02/2017    [id number]    UPPER GASTROINTESTINAL ENDOSCOPY Left 10/8/2019    EGD BIOPSY performed by Kodak Carrera MD at [facility]     Family History   Adopted: Yes   Family history unknown: Yes     Current Outpatient Medications on File Prior to Visit   Medication Sig Dispense Refill    fluticasone (FLONASE) 50 MCG/ACT nasal spray 2 sprays by Nasal route daily 1 Bottle 3    vitamin D (ERGOCALCIFEROL) 1.25 MG (29989 UT) CAPS capsule Take 1 capsule by mouth once a week 12 capsule 1    pantoprazole (PROTONIX) 40 MG tablet Take 1 tablet by mouth daily 90 tablet 3    venlafaxine (EFFEXOR XR) 150 MG extended release capsule Take 1 capsule by mouth once daily 30 capsule 3    venlafaxine (EFFEXOR XR) 37.5 MG extended release capsule Take 1 capsule by mouth once daily 30 capsule 3    albuterol sulfate HFA (PROVENTIL HFA) 108 (90 Base) MCG/ACT inhaler Inhale 2 puffs into the lungs every 4 hours as needed for Wheezing 1 Inhaler 0    ondansetron (ZOFRAN-ODT) 8 MG TBDP 33.3 kg/m² as calculated from the following:    Height as of this encounter: 5' 3\" (1.6 m). Weight as of this encounter: 188 lb (85.3 kg). General:  Shazia Proctor is a 63y.o. year old female who appears her stated age. HEENT: Normocephalic atraumatic. Neck supple. Chest: regular rate; pulses equal  Abdomen: Soft nontender nondistended. Ext: DP and PT pulses 2+, good cap refill  Neuro    Mentation  Appropriate affect  Registration intact  Orientation intact    Cranial Nerves:   Pupils equal and reactive to light  Extraocular motion intact  Face and shrug symmetric  Tongue midline  No dysarthria  v1-3 sensation symmetric, masseter tone symmetric  Hearing symmetric and intact    Sensation: Decreased hands and feet    Motor  L deltoid 5/5; R deltoid 5/5  L biceps 5/5; R biceps 5/5  L triceps 5/5; R triceps 5/5  L wrist extension 5/5; R wrist extension 5/5  L intrinsics 4/5; R intrinsics 4/5     L iliopsoas 5/5 , R iliopsoas 5/5  L quadriceps 5/5; R quadriceps 5/5  L Dorsiflexion 5/5; R dorsiflexion 5/5  L Plantarflexion 5/5; R plantarflexion 5/5  L EHL 5/5; R EHL 5/5    Reflexes  L Brachioradialis 2+/4; R brachioradialis 2+/4  L Biceps 2+/4; R Biceps 2+/4  L Triceps 2+/4; R Triceps 2+/4  L Patellar 3+/4: R Patellar 3+/4  L Achilles 2+/4; R Achilles 2+/4    hoffmans L: pos  hoffmans R: pos  Clonus L: neg  Clonus R: neg  Babinski L: neg  Babinski R: neg    WOOD: neg  Straight leg raise: neg  SI joint tenderness: neg    Studies Review:     EMG lower extremity 1/26/2021:  1. There is electrodiagnostic evidence of mild to moderate, sensorimotor, peripheral polyneuropathy involving examined both lower extremities. 2.  There is electrodiagnostic evidence of chronic L4-L5 and L5-S1 radiculopathy bilaterally. Needle electromyography shows no active denervation changes. 3.  There is no definite electrodiagnostic evidence of inflammatory myopathy involving examined both lower extremities.     MRI lumbar spine 1/15/2021 (images reviewed): BONES/ALIGNMENT: There is normal alignment of the spine.  Mild levoscoliosis   with tip at L3-L4.       Mild the vertebral body heights are maintained. The bone marrow signal   appears unremarkable.       SPINAL CORD: The conus terminates normally.       SOFT TISSUES: No paraspinal mass identified.       L1-L2: There is no significant disc herniation, spinal canal stenosis or   neural foraminal narrowing.       L2-L3: There is no significant disc herniation, spinal canal stenosis or   neural foraminal narrowing.       L3-L4: 1 mm disc bulging.  Mild bilateral said arthropathy.  Otherwise   unremarkable.       L4-L5: Grade 1 anterolisthesis.  2 mm disc bulging.  Moderate bilateral facet   arthropathy.  Moderate canal stenosis, mild narrowing of bilateral   subarticular recesses and mild bilateral neural foraminal narrowing.       L5-S1: 1 mm disc bulging.  Moderate bilateral set arthropathy.  Mild   bilateral neural foraminal narrowing. MRI thoracic spine 1/15/2021 (images reviewed):  DEGENERATIVE CHANGES:       There is multilevel loss of disc height and signal with endplate degenerative   marrow change.       At level T7-8, disc bulging with 4 mm left paracentral central disc   protrusion indents the anterior thecal sac.  Findings resulting in mild is   stenosis       At the level of T6-T7: Disc bulging with 3 mm central disc protrusion indents   anterior spinal cord.       At T4-T5 level, disc bulging 4 mm central disc protrusion indents the   anterior spinal cord.       At the level of T2-T3, disc bulging with 3 - 4 mm central disc protrusion   indents anterior spinal cord resulting in mild calcinosis. Assessment and Plan:      1. Myelopathy ([address]    2. Spinal stenosis of thoracic region    3.  Lumbar spondylosis          Plan: MRI images and reports reviewed with patient, does have thoracic stenosis that could be responsible for lower extremity symptoms, however patient is also having significant upper extremity symptoms including weakness, dexterity issues, dropping objects. Would recommend obtaining cervical MRI to evaluate for any concomitant cervical stenosis. We will also obtain flexion-extension imaging of cervical and lumbar spines as well as scoliosis x-rays to evaluate for overall alignment. We will see the patient back in approximately 4 weeks for reevaluation and review of additional imaging. Followup: Return in about 4 weeks (around 3/26/2021), or if symptoms worsen or fail to improve. Prescriptions Ordered:  No orders of the defined types were placed in this encounter. Orders Placed:  Orders Placed This Encounter   Procedures    MRI CERVICAL SPINE WO CONTRAST     Standing Status:   Future     Standing Expiration Date:   2/26/2022     Order Specific Question:   Reason for exam:     Answer:   evaluate for stenosis    XR CERVICAL SPINE FLEXION AND EXTENSION     Standing Status:   Future     Standing Expiration Date:   5/27/2021     Scheduling Instructions:      Upright lateral views: neutral, flexion, extension     Order Specific Question:   Reason for exam:     Answer:   evaluate for instability    XR LUMBAR SPINE (2-3 VIEWS)     Standing Status:   Future     Standing Expiration Date:   2/26/2022     Scheduling Instructions:      Standing lateral: flexion, extension, and neutral with both femoral heads     Order Specific Question:   Reason for exam:     Answer:   evaluate for instability    XR SPINE ENTIRE (2-3 VIEWS)     Standing Status:   Future     Standing Expiration Date:   2/26/2022     Scheduling Instructions:      STANDING -- AP and Lateral; Include C2 to Pelvis & both femoral heads     Order Specific Question:   Reason for exam:     Answer:   scoliosis        Electronically signed by ETHAN Hernandez CNP on 3/1/2021 at 11:04 AM    Please note that this chart was generated using voice recognition Dragon dictation software.   Although every effort was made to ensure the accuracy of this automated transcription, some errors in transcription may have occurred.

## 2021-02-26 NOTE — LETTER
[address] Neurosurgery A department of [facility]  [address] [facility], [address]  [place] [address]  Phone: [phone]  Fax: ETHAN Talley CNP        February 26, 2021     Patient: Chidi Real   YOB: 1960   Date of Visit: 2/26/2021       To Whom it May Concern:    Chance Brown was seen in my clinic on 2/26/2021. She may return to work on 2/26/2021 without restrictions. If you have any questions or concerns, please don't hesitate to call.     Sincerely,         ETHAN Mckeon CNP

## 2021-03-02 ENCOUNTER — HOSPITAL ENCOUNTER (OUTPATIENT)
Dept: MRI IMAGING | Age: 61
Discharge: HOME | End: 2021-03-04
Payer: COMMERCIAL

## 2021-03-02 ENCOUNTER — HOSPITAL ENCOUNTER (OUTPATIENT)
Dept: GENERAL RADIOLOGY | Age: 61
Discharge: HOME | End: 2021-03-04
Payer: COMMERCIAL

## 2021-03-02 DIAGNOSIS — M48.04 SPINAL STENOSIS OF THORACIC REGION: ICD-10-CM

## 2021-03-02 DIAGNOSIS — G95.9 MYELOPATHY (HCC): ICD-10-CM

## 2021-03-02 DIAGNOSIS — M47.816 LUMBAR SPONDYLOSIS: ICD-10-CM

## 2021-03-02 PROCEDURE — 72100 X-RAY EXAM L-S SPINE 2/3 VWS: CPT

## 2021-03-02 PROCEDURE — 72040 X-RAY EXAM NECK SPINE 2-3 VW: CPT

## 2021-03-02 PROCEDURE — 72082 X-RAY EXAM ENTIRE SPI 2/3 VW: CPT

## 2021-03-02 PROCEDURE — 72141 MRI NECK SPINE W/O DYE: CPT

## 2021-03-05 ENCOUNTER — OFFICE VISIT (OUTPATIENT)
Dept: PRIMARY CARE CLINIC | Age: 61
End: 2021-03-05
Payer: COMMERCIAL

## 2021-03-05 VITALS
TEMPERATURE: 98.2 F | HEART RATE: 74 BPM | BODY MASS INDEX: 31.89 KG/M2 | OXYGEN SATURATION: 98 % | SYSTOLIC BLOOD PRESSURE: 138 MMHG | WEIGHT: 180 LBS | DIASTOLIC BLOOD PRESSURE: 84 MMHG

## 2021-03-05 DIAGNOSIS — R51.9 ACUTE NONINTRACTABLE HEADACHE, UNSPECIFIED HEADACHE TYPE: ICD-10-CM

## 2021-03-05 DIAGNOSIS — H92.03 OTALGIA OF BOTH EARS: ICD-10-CM

## 2021-03-05 DIAGNOSIS — R53.83 FATIGUE, UNSPECIFIED TYPE: ICD-10-CM

## 2021-03-05 DIAGNOSIS — R09.81 NASAL CONGESTION: Primary | ICD-10-CM

## 2021-03-05 PROCEDURE — 3017F COLORECTAL CA SCREEN DOC REV: CPT | Performed by: NURSE PRACTITIONER

## 2021-03-05 PROCEDURE — G8427 DOCREV CUR MEDS BY ELIG CLIN: HCPCS | Performed by: NURSE PRACTITIONER

## 2021-03-05 PROCEDURE — 99213 OFFICE O/P EST LOW 20 MIN: CPT | Performed by: NURSE PRACTITIONER

## 2021-03-05 PROCEDURE — G8417 CALC BMI ABV UP PARAM F/U: HCPCS | Performed by: NURSE PRACTITIONER

## 2021-03-05 PROCEDURE — 4004F PT TOBACCO SCREEN RCVD TLK: CPT | Performed by: NURSE PRACTITIONER

## 2021-03-05 PROCEDURE — G8482 FLU IMMUNIZE ORDER/ADMIN: HCPCS | Performed by: NURSE PRACTITIONER

## 2021-03-05 ASSESSMENT — ENCOUNTER SYMPTOMS
SINUS COMPLAINT: 1
COUGH: 0
SORE THROAT: 0
SINUS PRESSURE: 1
CHEST TIGHTNESS: 0
RHINORRHEA: 1
WHEEZING: 0
SHORTNESS OF BREATH: 0
RESPIRATORY NEGATIVE: 1
GASTROINTESTINAL NEGATIVE: 1
SWOLLEN GLANDS: 0

## 2021-03-05 ASSESSMENT — PATIENT HEALTH QUESTIONNAIRE - PHQ9
SUM OF ALL RESPONSES TO PHQ QUESTIONS 1-9: 0
2. FEELING DOWN, DEPRESSED OR HOPELESS: 0
SUM OF ALL RESPONSES TO PHQ QUESTIONS 1-9: 0

## 2021-03-05 NOTE — LETTER
[address] [facility]  [address] [facility]  [place] [address]  Phone: [phone]  Fax: [phone]    Peter Jordan [address] [facility]AT[facility] CLINIC        March 5, 2021     Patient: Victorine Prader   YOB: 1960   Date of Visit: 3/5/2021       To Whom it May Concern:    Lee Ann Monzon was seen in my clinic on 3/5/2021. Please excuse for 03/04/21 and 03/05/21. If you have any questions or concerns, please don't hesitate to call.     Sincerely,         SCHEDULE, [address] [facility]

## 2021-03-05 NOTE — PROGRESS NOTES
[facility]             [address] [facility], [address] [facility]                        Telephone [phone]             Fax [phone]  1960  PWU:[id number]   Date of visit:  3/5/2021    Subjective:    Ishmael Wright is a 63 y.o.  female who presents to [facility] today (3/5/2021) for evaluation of:    Chief Complaint   Patient presents with    Sinus Problem       Sinus Problem  This is a new problem. The current episode started yesterday. The problem is unchanged. There has been no fever. Her pain is at a severity of 7/10. Associated symptoms include congestion, headaches (frontal), sinus pressure and sneezing. Pertinent negatives include no chills, coughing, ear pain, shortness of breath, sore throat or swollen glands. (Watery eyes, postnasal drainage; bilateral ear pressure) Treatments tried: tylenol sinus; flonase. The treatment provided mild relief.        She has the following problem list:  Patient Active Problem List   Diagnosis    Gastroesophageal reflux disease    Irritable bowel syndrome with diarrhea    [facility]    Type 2 diabetes mellitus without complication, without long-term current use of insulin ([facility])    Depression    Tobacco use    Allergic rhinitis    Carpal tunnel syndrome of right wrist        Current medications are:  Current Outpatient Medications   Medication Sig Dispense Refill    fluticasone (FLONASE) 50 MCG/ACT nasal spray 2 sprays by Nasal route daily 1 Bottle 3    vitamin D (ERGOCALCIFEROL) 1.25 MG (23009 UT) CAPS capsule Take 1 capsule by mouth once a week 12 capsule 1    pantoprazole (PROTONIX) 40 MG tablet Take 1 tablet by mouth daily 90 tablet 3    venlafaxine (EFFEXOR XR) 150 MG extended release capsule Take 1 capsule by mouth once daily 30 capsule 3    venlafaxine (EFFEXOR XR) 37.5 MG extended release capsule Take 1 capsule by mouth once daily 30 capsule 3    albuterol sulfate HFA (PROVENTIL HFA) 108 (90 Base) MCG/ACT inhaler Inhale 2 puffs into the lungs every 4 hours as needed for Wheezing 1 Inhaler 0    ondansetron (ZOFRAN-ODT) 8 MG TBDP disintegrating tablet Place 8 mg under the tongue every 8 hours as needed for Nausea or Vomiting      dicyclomine (BENTYL) 20 MG tablet Take 1 tablet by mouth every 6 hours as needed (bowel spasm) 120 tablet 3    Naproxen Sodium (ALEVE PO) Take by mouth daily as needed       No current facility-administered medications for this visit. She is allergic to pcn [penicillins]; sulfa antibiotics; and metformin and related. .    She  reports that she has been smoking cigarettes. She started smoking about 50 years ago. She has a 30.00 pack-year smoking history. She has never used smokeless tobacco.      Objective:    Vitals:    03/05/21 1721 03/05/21 1723   BP: (!) 148/88 138/84   Site: Right Upper Arm Right Upper Arm   Position: Sitting Sitting   Cuff Size: Large Adult Large Adult   Pulse: 74    Temp: 98.2 °F (36.8 °C)    TempSrc: Tympanic    SpO2: 98%    Weight: 180 lb (81.6 kg)      Body mass index is 31.89 kg/m². Review of Systems   Constitutional: Positive for fatigue. Negative for appetite change, chills and fever. HENT: Positive for congestion, postnasal drip, rhinorrhea, sinus pressure and sneezing. Negative for ear pain and sore throat. Respiratory: Negative. Negative for cough, chest tightness, shortness of breath and wheezing. Cardiovascular: Negative. Gastrointestinal: Negative. Musculoskeletal: Negative for myalgias. Neurological: Positive for headaches (frontal). Physical Exam  Vitals signs and nursing note reviewed. Constitutional:       Appearance: She is well-developed. HENT:      Head: Normocephalic. Jaw: There is normal jaw occlusion. Right Ear: Ear canal and external ear normal. A middle ear effusion is present.       Left Ear: Ear canal and external ear normal. A middle ear effusion is present. Nose: Congestion present. Right Turbinates: Swollen. Left Turbinates: Swollen. Right Sinus: Maxillary sinus tenderness present. No frontal sinus tenderness. Left Sinus: Maxillary sinus tenderness present. No frontal sinus tenderness. Mouth/Throat:      Lips: Pink. Mouth: Mucous membranes are moist.      Pharynx: Oropharynx is clear. Uvula midline. Eyes:      Pupils: Pupils are equal, round, and reactive to light. Neck:      Musculoskeletal: Normal range of motion and neck supple. Cardiovascular:      Rate and Rhythm: Normal rate and regular rhythm. Heart sounds: Normal heart sounds. Pulmonary:      Effort: Pulmonary effort is normal.      Breath sounds: Normal breath sounds and air entry. Lymphadenopathy:      Cervical: No cervical adenopathy. Skin:     General: Skin is warm and dry. Neurological:      General: No focal deficit present. Mental Status: She is alert and oriented to person, place, and time. Psychiatric:         Behavior: Behavior normal.         Thought Content: Thought content normal.       Assessment and Plan:    No results found for this visit on 03/05/21. Diagnosis Orders   1. Nasal congestion     2. Acute nonintractable headache, unspecified headache type     3. Fatigue, unspecified type     4. Otalgia of both ears         We discussed symptoms of Covid-19 and testing. Patient declined testing at this time. I recommended for patient to quarantine and if symptoms worsen to return for Covid-19 testing. Patient verbalized understanding. I recommended that she use decongestant to help with congestion. I also recommended Flonase and an antihistamine for sinus symptoms. she was also encouraged to use tylenol or ibuprofen for pain/fever. Increase water intake. Use cool mist humidifier at bedtime. Use nasal saline flush as needed. Good hand hygiene.  she was instructed to return if there is no improvement or symptoms worsen. The use, risks, benefits, and side effects of prescribed or recommended medications were discussed. All questions were answered and the patient/caregiver voiced understanding. No orders of the defined types were placed in this encounter.         Electronically signed by ETHAN Allen CNP on 3/5/21 at 5:29 PM EST

## 2021-03-05 NOTE — PATIENT INSTRUCTIONS
Patient Education        Fatigue: Care Instructions  Your Care Instructions     Fatigue is a feeling of tiredness, exhaustion, or lack of energy. You may feel fatigue because of too much or not enough activity. It can also come from stress, lack of sleep, boredom, and poor diet. Many medical problems, such as viral infections, can cause fatigue. Emotional problems, especially depression, are often the cause of fatigue. Fatigue is most often a symptom of another problem. Treatment for fatigue depends on the cause. For example, if you have fatigue because you have a certain health problem, treating this problem also treats your fatigue. If depression or anxiety is the cause, treatment may help. Follow-up care is a key part of your treatment and safety. Be sure to make and go to all appointments, and call your doctor if you are having problems. It's also a good idea to know your test results and keep a list of the medicines you take. How can you care for yourself at home? · Get regular exercise. But don't overdo it. Go back and forth between rest and exercise. · Get plenty of rest.  · Eat a healthy diet. Do not skip meals, especially breakfast.  · Reduce your use of caffeine, tobacco, and alcohol. Caffeine is most often found in coffee, tea, cola drinks, and chocolate. · Limit medicines that can cause fatigue. This includes tranquilizers and cold and allergy medicines. When should you call for help? Watch closely for changes in your health, and be sure to contact your doctor if:    · You have new symptoms such as fever or a rash.     · Your fatigue gets worse.     · You have been feeling down, depressed, or hopeless. Or you may have lost interest in things that you usually enjoy.     · You are not getting better as expected. Where can you learn more? Go to https://steve.Wetpaint. org and sign in to your InNetwork account.  Enter O095 in the [employer] box to learn more about \"Fatigue: Care Instructions. \"     If you do not have an account, please click on the \"Sign Up Now\" link. Current as of: June 26, 2019               Content Version: 12.6  © 1599-7937 [employer]. Care instructions adapted under license by [facility] ([facility]). If you have questions about a medical condition or this instruction, always ask your healthcare professional. [address] any warranty or liability for your use of this information. Patient Education        Viral Infections: Care Instructions  Your Care Instructions     You don't feel well, but it's not clear what's causing it. You may have a viral infection. Viruses cause many illnesses, such as the common cold, influenza, fever, rashes, and the diarrhea, nausea, and vomiting that are often called \"stomach flu. \" You may wonder if antibiotic medicines could make you feel better. But antibiotics only treat infections caused by bacteria. They don't work on viruses. The good news is that viral infections usually aren't serious. Most will go away in a few days without medical treatment. In the meantime, there are a few things you can do to make yourself more comfortable. Follow-up care is a key part of your treatment and safety. Be sure to make and go to all appointments, and call your doctor if you are having problems. It's also a good idea to know your test results and keep a list of the medicines you take. How can you care for yourself at home? · Get plenty of rest if you feel tired. · Take an over-the-counter pain medicine if needed, such as acetaminophen (Tylenol), ibuprofen (Advil, Motrin), or naproxen (Aleve). Read and follow all instructions on the label. · Be careful when taking over-the-counter cold or flu medicines and Tylenol at the same time. Many of these medicines have acetaminophen, which is Tylenol. Read the labels to make sure that you are not taking more than the recommended dose.  Too much acetaminophen (Tylenol) can be harmful. · Drink plenty of fluids, enough so that your urine is light yellow or clear like water. If you have kidney, heart, or liver disease and have to limit fluids, talk with your doctor before you increase the amount of fluids you drink. · Stay home from work, school, and other public places while you have a fever. When should you call for help? Call 911 anytime you think you may need emergency care. For example, call if:    · You have severe trouble breathing.     · You passed out (lost consciousness). Call your doctor now or seek immediate medical care if:    · You seem to be getting much sicker.     · You have a new or higher fever.     · You have blood in your stools.     · You have new belly pain, or your pain gets worse.     · You have a new rash. Watch closely for changes in your health, and be sure to contact your doctor if:    · You start to get better and then get worse.     · You do not get better as expected. Where can you learn more? Go to https://[employer]. org and sign in to your Gone! account. Enter J057 in the [employer] box to learn more about \"Viral Infections: Care Instructions. \"     If you do not have an account, please click on the \"Sign Up Now\" link. Current as of: February 11, 2020               Content Version: 12.6  © 4860-9143 [employer]. Care instructions adapted under license by [facility] ([facility]). If you have questions about a medical condition or this instruction, always ask your healthcare professional. [place] [address] any warranty or liability for your use of this information.

## 2021-03-16 ENCOUNTER — IMMUNIZATION (OUTPATIENT)
Dept: LAB | Age: 61
End: 2021-03-16
Payer: COMMERCIAL

## 2021-03-16 ENCOUNTER — HOSPITAL ENCOUNTER (OUTPATIENT)
Dept: ULTRASOUND IMAGING | Age: 61
Discharge: HOME | End: 2021-03-18
Payer: COMMERCIAL

## 2021-03-16 ENCOUNTER — OFFICE VISIT (OUTPATIENT)
Dept: FAMILY MEDICINE CLINIC | Age: 61
End: 2021-03-16
Payer: COMMERCIAL

## 2021-03-16 VITALS
WEIGHT: 182 LBS | HEIGHT: 63 IN | SYSTOLIC BLOOD PRESSURE: 150 MMHG | TEMPERATURE: 99.5 F | DIASTOLIC BLOOD PRESSURE: 80 MMHG | BODY MASS INDEX: 32.25 KG/M2 | OXYGEN SATURATION: 98 % | HEART RATE: 95 BPM

## 2021-03-16 DIAGNOSIS — E04.1 THYROID NODULE: ICD-10-CM

## 2021-03-16 DIAGNOSIS — M54.50 CHRONIC MIDLINE LOW BACK PAIN WITHOUT SCIATICA: ICD-10-CM

## 2021-03-16 DIAGNOSIS — G89.29 CHRONIC MIDLINE LOW BACK PAIN WITHOUT SCIATICA: ICD-10-CM

## 2021-03-16 DIAGNOSIS — M62.838 TRAPEZIUS MUSCLE SPASM: Primary | ICD-10-CM

## 2021-03-16 PROCEDURE — 76536 US EXAM OF HEAD AND NECK: CPT

## 2021-03-16 PROCEDURE — 91303 COVID-19, J&J VACCINE, PF, 0.5 ML DOSE: CPT | Performed by: INTERNAL MEDICINE

## 2021-03-16 PROCEDURE — 3017F COLORECTAL CA SCREEN DOC REV: CPT | Performed by: FAMILY MEDICINE

## 2021-03-16 PROCEDURE — G8417 CALC BMI ABV UP PARAM F/U: HCPCS | Performed by: FAMILY MEDICINE

## 2021-03-16 PROCEDURE — 4004F PT TOBACCO SCREEN RCVD TLK: CPT | Performed by: FAMILY MEDICINE

## 2021-03-16 PROCEDURE — 20552 NJX 1/MLT TRIGGER POINT 1/2: CPT | Performed by: FAMILY MEDICINE

## 2021-03-16 PROCEDURE — G8427 DOCREV CUR MEDS BY ELIG CLIN: HCPCS | Performed by: FAMILY MEDICINE

## 2021-03-16 PROCEDURE — 99214 OFFICE O/P EST MOD 30 MIN: CPT | Performed by: FAMILY MEDICINE

## 2021-03-16 PROCEDURE — 0031A COVID-19, J&J VACCINE, PF, 0.5 ML DOSE: CPT | Performed by: INTERNAL MEDICINE

## 2021-03-16 PROCEDURE — G8482 FLU IMMUNIZE ORDER/ADMIN: HCPCS | Performed by: FAMILY MEDICINE

## 2021-03-16 RX ORDER — BACLOFEN 10 MG/1
10 TABLET ORAL NIGHTLY PRN
Qty: 30 TABLET | Refills: 1 | Status: ON HOLD | OUTPATIENT
Start: 2021-03-16 | End: 2022-08-07 | Stop reason: HOSPADM

## 2021-03-16 RX ORDER — TRIAMCINOLONE ACETONIDE 40 MG/ML
20 INJECTION, SUSPENSION INTRA-ARTICULAR; INTRAMUSCULAR ONCE
Status: COMPLETED | OUTPATIENT
Start: 2021-03-16 | End: 2021-03-16

## 2021-03-16 RX ORDER — GABAPENTIN 100 MG/1
100 CAPSULE ORAL 3 TIMES DAILY
Qty: 90 CAPSULE | Refills: 0 | Status: SHIPPED | OUTPATIENT
Start: 2021-03-16 | End: 2021-04-30 | Stop reason: SDUPTHER

## 2021-03-16 RX ADMIN — TRIAMCINOLONE ACETONIDE 20 MG: 40 INJECTION, SUSPENSION INTRA-ARTICULAR; INTRAMUSCULAR at 16:48

## 2021-03-16 ASSESSMENT — ENCOUNTER SYMPTOMS
CHEST TIGHTNESS: 0
WHEEZING: 0
SHORTNESS OF BREATH: 0
COUGH: 0
BACK PAIN: 1

## 2021-03-16 NOTE — PATIENT INSTRUCTIONS
Patient Education        Back Stretches: Exercises  Introduction  Here are some examples of exercises for stretching your back. Start each exercise slowly. Ease off the exercise if you start to have pain. Your doctor or physical therapist will tell you when you can start these exercises and which ones will work best for you. How to do the exercises  Overhead stretch   1. Stand comfortably with your feet shoulder-width apart. 2. Looking straight ahead, raise both arms over your head and reach toward the ceiling. Do not allow your head to tilt back. 3. Hold for 15 to 30 seconds, then lower your arms to your sides. 4. Repeat 2 to 4 times. Side stretch   1. Stand comfortably with your feet shoulder-width apart. 2. Raise one arm over your head, and then lean to the other side. 3. Slide your hand down your leg as you let the weight of your arm gently stretch your side muscles. Hold for 15 to 30 seconds. 4. Repeat 2 to 4 times on each side. Press-up   1. Lie on your stomach, supporting your body with your forearms. 2. Press your elbows down into the floor to raise your upper back. As you do this, relax your stomach muscles and allow your back to arch without using your back muscles. As your press up, do not let your hips or pelvis come off the floor. 3. Hold for 15 to 30 seconds, then relax. 4. Repeat 2 to 4 times. Relax and rest   1. Lie on your back with a rolled towel under your neck and a pillow under your knees. Extend your arms comfortably to your sides. 2. Relax and breathe normally. 3. Remain in this position for about 10 minutes. 4. If you can, do this 2 or 3 times each day. Follow-up care is a key part of your treatment and safety. Be sure to make and go to all appointments, and call your doctor if you are having problems. It's also a good idea to know your test results and keep a list of the medicines you take. Where can you learn more? Go to https://steve.healthHealthSpot. org and sign in to your [employer] account. Enter G902 in the [employer] box to learn more about \"Back Stretches: Exercises. \"     If you do not have an account, please click on the \"Sign Up Now\" link. Current as of: November 16, 2020               Content Version: 12.8  © 2006-2021 [employer]. Care instructions adapted under license by [facility] ([facility]). If you have questions about a medical condition or this instruction, always ask your healthcare professional. [address] any warranty or liability for your use of this information. Patient Education        Acute Low Back Pain: Exercises  Introduction  Here are some examples of typical rehabilitation exercises for your condition. Start each exercise slowly. Ease off the exercise if you start to have pain. Your doctor or physical therapist will tell you when you can start these exercises and which ones will work best for you. When you are not being active, find a comfortable position for rest. Some people are comfortable on the floor or a medium-firm bed with a small pillow under their head and another under their knees. Some people prefer to lie on their side with a pillow between their knees. Don't stay in one position for too long. Take short walks (10 to 20 minutes) every 2 to 3 hours. Avoid slopes, hills, and stairs until you feel better. Walk only distances you can manage without pain, especially leg pain. How to do the exercises  Back stretches   5. Get down on your hands and knees on the floor. 6. Relax your head and allow it to droop. Round your back up toward the ceiling until you feel a nice stretch in your upper, middle, and lower back. Hold this stretch for as long as it feels comfortable, or about 15 to 30 seconds. 7. Return to the starting position with a flat back while you are on your hands and knees. 8. Let your back sway by pressing your stomach toward the floor.  Lift your buttocks toward the ceiling. 9. Hold this position for 15 to 30 seconds. 10. Repeat 2 to 4 times. Follow-up care is a key part of your treatment and safety. Be sure to make and go to all appointments, and call your doctor if you are having problems. It's also a good idea to know your test results and keep a list of the medicines you take. Where can you learn more? Go to https://HoneyCombpepiceweb.FotoSwipe. org and sign in to your [employer] account. Enter G572 in the [employer] box to learn more about \"Acute Low Back Pain: Exercises. \"     If you do not have an account, please click on the \"Sign Up Now\" link. Current as of: November 16, 2020               Content Version: 12.8  © 2006-2021 Healthwise, [employer]. Care instructions adapted under license by [facility] ([facility]). If you have questions about a medical condition or this instruction, always ask your healthcare professional. [place] [address] any warranty or liability for your use of this information. Patient Education        Rhomboid Muscle Strain: Rehab Exercises  Introduction  Here are some examples of exercises for you to try. The exercises may be suggested for a condition or for rehabilitation. Start each exercise slowly. Ease off the exercises if you start to have pain. You will be told when to start these exercises and which ones will work best for you. How to do the exercises  Lower neck and upper back (rhomboid) stretch   1. Stretch your arms out in front of your body. Clasp one hand on top of your other hand. 2. Gently reach out so that you feel your shoulder blades stretching away from each other. 3. Gently bend your head forward. 4. Hold for 15 to 30 seconds. 5. Repeat 2 to 4 times. Resisted rows   For this exercise, you will need elastic exercise material, such as surgical tubing or Thera-Band. 1. Put the band around a solid object, such as a bedpost, at about waist level.  Stand facing where you have placed the band. Hold equal lengths of the band in each hand. 2. Start with your arms held out in front of you. 3. Pull the bands back, and move your shoulder blades together. As you finish, your elbows should be at your side and bent at 90 degrees (like the angle of the letter \"L\"). 4. Return to the starting position. 5. Repeat 8 to 12 times. Neck stretches   1. Look straight ahead, and tip your right ear to your right shoulder. Do not let your left shoulder rise as you tip your head to the right. 2. Hold for 15 to 30 seconds. 3. Tilt your head to the left. Do not let your right shoulder rise as you tip your head to the left. 4. Hold for 15 to 30 seconds. 5. Repeat 2 to 4 times to each side. Neck rotation   1. Sit in a firm chair, or stand up straight. 2. Keeping your chin level, turn your head to the right, and hold for 15 to 30 seconds. 3. Turn your head to the left, and hold for 15 to 30 seconds. 4. Repeat 2 to 4 times to each side. Follow-up care is a key part of your treatment and safety. Be sure to make and go to all appointments, and call your doctor if you are having problems. It's also a good idea to know your test results and keep a list of the medicines you take. Where can you learn more? Go to https://VedantupepicTurning Arteb.Five minutes. org and sign in to your [employer] account. Enter O797 in the [facility] box to learn more about \"Rhomboid Muscle Strain: Rehab Exercises. \"     If you do not have an account, please click on the \"Sign Up Now\" link. Current as of: November 16, 2020               Content Version: 12.8  © 9489-4609 Healthwise, [employer]. Care instructions adapted under license by [facility] ([facility]). If you have questions about a medical condition or this instruction, always ask your healthcare professional. [address] any warranty or liability for your use of this information.

## 2021-03-16 NOTE — PROGRESS NOTES
[facility] [address] [address]  [address] [address]  Dept: [phone]  Dept Fax: [phone]  Loc: [phone]    Darrius Trinh is a 63 y.o. female who presents today for her medical conditions/complaints as noted below. Darrius Trinh is c/o of   Chief Complaint   Patient presents with    Back Pain     Middle and lower back pain. HPI:     HPI Here today for continued back pain. She has been really struggling with her pain. Her pain is mostly in her neck into her left shoulder, in the middle of her back and in her low back. She is taking aleve for pain and it is not helping much right now. She stands all day at work and she has to lift and bend all day. She missed work today and yesterday. She worked 7 days straight prior which is really hard on her. She is worried that she is going to fall all of the time. Her foot fell asleep today and she nearly fell when she stepped on it. She does get some pain and tingling in her left arm as well. She gets a shooting pain in her left arm as well.        Past Medical History:   Diagnosis Date    Allergic rhinitis     Arthritis     Asthma     Bronchitis     COPD (chronic obstructive pulmonary disease) ([facility]     Depression     [facility]     Headache     Hyperlipidemia     Hypertension     Incontinence     Irritable bowel syndrome     Kidney stone     Osteoarthritis     Pneumonia     Type 2 diabetes mellitus without complication ([facility] 5928    Ulcer           Social History     Tobacco Use    Smoking status: Current Every Day Smoker     Packs/day: 1.00     Years: 30.00     Pack years: 30.00     Types: Cigarettes     Start date: 1/1/1971    Smokeless tobacco: Never Used   Substance Use Topics    Alcohol use: No     Frequency: Never     Binge frequency: Never     Current Outpatient Medications   Medication Sig Dispense Refill    gabapentin (NEURONTIN) 100 MG capsule Take 1 capsule by mouth 3 times daily for 30 days. Intended supply: 30 days 90 capsule 0    baclofen (LIORESAL) 10 MG tablet Take 1 tablet by mouth nightly as needed (muscle spasms) 30 tablet 1    fluticasone (FLONASE) 50 MCG/ACT nasal spray 2 sprays by Nasal route daily 1 Bottle 3    vitamin D (ERGOCALCIFEROL) 1.25 MG (91763 UT) CAPS capsule Take 1 capsule by mouth once a week 12 capsule 1    pantoprazole (PROTONIX) 40 MG tablet Take 1 tablet by mouth daily 90 tablet 3    venlafaxine (EFFEXOR XR) 150 MG extended release capsule Take 1 capsule by mouth once daily 30 capsule 3    venlafaxine (EFFEXOR XR) 37.5 MG extended release capsule Take 1 capsule by mouth once daily 30 capsule 3    albuterol sulfate HFA (PROVENTIL HFA) 108 (90 Base) MCG/ACT inhaler Inhale 2 puffs into the lungs every 4 hours as needed for Wheezing 1 Inhaler 0    ondansetron (ZOFRAN-ODT) 8 MG TBDP disintegrating tablet Place 8 mg under the tongue every 8 hours as needed for Nausea or Vomiting      dicyclomine (BENTYL) 20 MG tablet Take 1 tablet by mouth every 6 hours as needed (bowel spasm) 120 tablet 3    Naproxen Sodium (ALEVE PO) Take by mouth daily as needed       No current facility-administered medications for this visit. Allergies   Allergen Reactions    Pcn [Penicillins] Anaphylaxis     \"can't breath\"    Sulfa Antibiotics Anaphylaxis     \"can't breathe\"    Metformin And Related Other (See Comments)     Sweating and diarrhea       Subjective:     Review of Systems   Constitutional: Negative for activity change, appetite change, chills, fatigue and fever. Eyes: Negative for visual disturbance. Respiratory: Negative for cough, chest tightness, shortness of breath and wheezing. Cardiovascular: Negative for chest pain, palpitations and leg swelling. Genitourinary: Negative for difficulty urinating. Musculoskeletal: Positive for arthralgias, back pain, neck pain and neck stiffness. had an ultrasound done in 2016 and it was never followed up so I ordered an ultrasound for today. Return in about 6 weeks (around 4/27/2021) for Back pain follow up. Orders Placed This Encounter   Procedures    US THYROID     Standing Status:   Future     Number of Occurrences:   1     Standing Expiration Date:   3/16/2022    DE INJECT TRIGGER POINT, 1 OR 2     Orders Placed This Encounter   Medications    gabapentin (NEURONTIN) 100 MG capsule     Sig: Take 1 capsule by mouth 3 times daily for 30 days. Intended supply: 30 days     Dispense:  90 capsule     Refill:  0    baclofen (LIORESAL) 10 MG tablet     Sig: Take 1 tablet by mouth nightly as needed (muscle spasms)     Dispense:  30 tablet     Refill:  1    triamcinolone acetonide (KENALOG-40) injection 20 mg       Patientgiven educational materials - see patient instructions. Discussed use, benefit,and side effects of prescribed medications. All patient questions answered. Ptvoiced understanding. Reviewed health maintenance. Instructed to continue currentmedications, diet and exercise. Patient agreed with treatment plan. Follow up asdirected.      Electronically signed by Marcela Gutierrez MD on 3/16/2021 at 4:51 PM

## 2021-03-17 ENCOUNTER — TELEPHONE (OUTPATIENT)
Dept: FAMILY MEDICINE CLINIC | Age: 61
End: 2021-03-17

## 2021-03-17 DIAGNOSIS — E04.1 THYROID NODULE: Primary | ICD-10-CM

## 2021-03-17 NOTE — TELEPHONE ENCOUNTER
----- Message from Pamella Cooper MD sent at 3/16/2021  5:33 PM EDT -----  Please let her know that she has 2 nodules on her thyroid that are a little bigger than last time so the radiologist is recommending that they be biopsied. I put in a referral to Dr. Mel Adame for that.

## 2021-03-22 DIAGNOSIS — F32.A DEPRESSION, UNSPECIFIED DEPRESSION TYPE: ICD-10-CM

## 2021-03-22 RX ORDER — VENLAFAXINE HYDROCHLORIDE 37.5 MG/1
CAPSULE, EXTENDED RELEASE ORAL
Qty: 30 CAPSULE | Refills: 3 | Status: SHIPPED | OUTPATIENT
Start: 2021-03-22 | End: 2021-06-14 | Stop reason: SDUPTHER

## 2021-03-22 RX ORDER — VENLAFAXINE HYDROCHLORIDE 150 MG/1
CAPSULE, EXTENDED RELEASE ORAL
Qty: 30 CAPSULE | Refills: 3 | Status: SHIPPED | OUTPATIENT
Start: 2021-03-22 | End: 2021-06-14 | Stop reason: SDUPTHER

## 2021-03-22 NOTE — TELEPHONE ENCOUNTER
Alayna called requesting a refill of the below medication which has been pended for you:     Requested Prescriptions     Pending Prescriptions Disp Refills    venlafaxine (EFFEXOR XR) 150 MG extended release capsule 30 capsule 3     Sig: Take 1 capsule by mouth once daily    venlafaxine (EFFEXOR XR) 37.5 MG extended release capsule 30 capsule 3     Sig: Take 1 capsule by mouth once daily       Last Appointment Date: 3/16/2021  Next Appointment Date: 4/30/2021    Allergies   Allergen Reactions    Pcn [Penicillins] Anaphylaxis     \"can't breath\"    Sulfa Antibiotics Anaphylaxis     \"can't breathe\"    Metformin And Related Other (See Comments)     Sweating and diarrhea

## 2021-03-26 ENCOUNTER — VIRTUAL VISIT (OUTPATIENT)
Dept: NEUROSURGERY | Age: 61
End: 2021-03-26
Payer: COMMERCIAL

## 2021-03-26 DIAGNOSIS — M47.12 CERVICAL SPONDYLOSIS WITH MYELOPATHY: ICD-10-CM

## 2021-03-26 DIAGNOSIS — M47.816 LUMBAR SPONDYLOSIS: ICD-10-CM

## 2021-03-26 DIAGNOSIS — G95.9 MYELOPATHY (HCC): Primary | ICD-10-CM

## 2021-03-26 DIAGNOSIS — M48.04 SPINAL STENOSIS OF THORACIC REGION: ICD-10-CM

## 2021-03-26 PROCEDURE — 4004F PT TOBACCO SCREEN RCVD TLK: CPT | Performed by: NURSE PRACTITIONER

## 2021-03-26 PROCEDURE — G8482 FLU IMMUNIZE ORDER/ADMIN: HCPCS | Performed by: NURSE PRACTITIONER

## 2021-03-26 PROCEDURE — 99214 OFFICE O/P EST MOD 30 MIN: CPT | Performed by: NURSE PRACTITIONER

## 2021-03-26 PROCEDURE — 3017F COLORECTAL CA SCREEN DOC REV: CPT | Performed by: NURSE PRACTITIONER

## 2021-03-26 PROCEDURE — G8427 DOCREV CUR MEDS BY ELIG CLIN: HCPCS | Performed by: NURSE PRACTITIONER

## 2021-03-26 PROCEDURE — G8417 CALC BMI ABV UP PARAM F/U: HCPCS | Performed by: NURSE PRACTITIONER

## 2021-03-26 NOTE — PROGRESS NOTES
[facility] Neurosurgery Telehealth Followup Visit    Pt Name: Sharlene Woodawrd  MRN: [id number]  YOB: 1960  Date of evaluation: 3/26/2021  Primary Care Physician: Casi Hernandez MD  Reason for Evaluation: TELEHEALTH EVALUATION -- Audio/Visual (During BYFLU-44 public health emergency) and back pain, leg weakness    TELEHEALTH EVALUATION -- Audio/Visual (During SCYQH-86 public health emergency)    HPI:    Sharlene Woodward (:  1960) has requested an audio/video evaluation for the following concern(s):    Patient presents for follow-up of diffuse neck low back pain, and for follow-up of updated imaging. Patient has had longstanding axial neck and back pain for years. Has had progression in the last several months, reports that she has experiencing weakness in arms, legs. Has been having issues with cramping to hands and legs intermittently. Feels like she is dragging her foot while she is walking. Has had lower extremity weakness for the last 1 to 2 years, feels as though it is worsening. Has been following with a chiropractor with no lasting improvement in symptoms. Has not had any recent physical therapy. Does report that she has had some intermittent issues with incontinence of urine and stool, has been wearing briefs most recently. Symptoms are severe, interfere with all of her daily activities. Has not been able to complete her activities of daily living. Has noticed some changes to her handwriting, issues with dexterity. Imaging did incidentally show thyroid nodule, is following with her primary care for this, recently underwent fine-needle aspiration. MRI cervical spine 3/2/2021 (images reviewed): FINDINGS:   BONES/ALIGNMENT: There is normal alignment of the spine. The vertebral body   heights are maintained.  The bone marrow signal appears unremarkable.  Left   thyroid lobe contains a 2 x 1.9 cm nodule.       SPINAL CORD: No abnormal cord signal is seen.     SOFT TISSUES: Incidental note is made of sebaceous cyst within the   subcutaneous tissues of central occipital region.       C2-C3: There is no significant disc protrusion, spinal canal stenosis or   neural foraminal narrowing.       C3-C4: Grade 1 anterolisthesis, posterior uncovertebral hypertrophy and disc   bulging.  Moderate left mild right facet arthropathy.  Findings resulting   mild canal stenosis and moderate left neural foraminal narrowing.       C4-C5: Grade 1 anterolisthesis, posterior uncovertebral hypertrophy. Moderate left and mild right facet arthropathy.  Findings resulting in   moderate left neural foraminal narrowing.       C5-C6: 1 mm disc bulging.  Mild bilateral said arthropathy.  Findings result   in mild left neural foraminal narrowing.       C6-C7: Mild bilateral facet arthropathy.  Otherwise unremarkable       C7-T1: There is no significant disc protrusion, spinal canal stenosis or   neural foraminal narrowing. X-ray cervical spine flexion-extension; lumbar spine; spine entire 3/2/2021 (images reviewed): FINDINGS:   Cervical spine: There is mild straightening of cervical lordosis.  Vertebral   body heights are well preserved. No concerning lytic or sclerotic lesions are   identified. Grade 1 anterolisthesis of C4-C5 and C5-C6.  The atlantodental   alignment is congruent. The other visualized soft tissue and osseous   structures appear unremarkable.       Thoracic spine: There is normal thoracic kyphosis.  Mild dextroscoliosis with   tip at T7.  The vertebral body heights are well preserved and anatomically   aligned. No concerning lytic or sclerotic lesions are identified. No   significant degenerative disease is detected. The paravertebral soft tissues   are unremarkable.       Lumbar spine: There are 5 nonrib-bearing lumbar vertebrae.  Mild   levoscoliosis with tip at L1-L2.  Lumbar lordosis is well preserved.  The   vertebral body heights are well preserved.  No concerning lytic or sclerotic   lesions are identified.  Disc and facet degenerative disease is of L4-L5 and   L5-S1.  Grade 1 anterolisthesis of L4-L5.  The other visualized soft tissues   and osseous structures appear unremarkable. Prior to Visit Medications    Medication Sig Taking? Authorizing Provider   venlafaxine (EFFEXOR XR) 150 MG extended release capsule Take 1 capsule by mouth once daily Yes Casi Hernandez MD   venlafaxine (EFFEXOR XR) 37.5 MG extended release capsule Take 1 capsule by mouth once daily Yes Casi Hernandez MD   gabapentin (NEURONTIN) 100 MG capsule Take 1 capsule by mouth 3 times daily for 30 days.  Intended supply: 30 days Yes Casi Hernandez MD   baclofen (LIORESAL) 10 MG tablet Take 1 tablet by mouth nightly as needed (muscle spasms) Yes Casi Hernandez MD   fluticasone Rufus Mink) 50 MCG/ACT nasal spray 2 sprays by Nasal route daily Yes Casi Hernandez MD   vitamin D (ERGOCALCIFEROL) 1.25 MG (34838 UT) CAPS capsule Take 1 capsule by mouth once a week Yes Casi Hernandez MD   pantoprazole (PROTONIX) 40 MG tablet Take 1 tablet by mouth daily Yes Casi Hernandez MD   albuterol sulfate HFA (PROVENTIL HFA) 108 (90 Base) MCG/ACT inhaler Inhale 2 puffs into the lungs every 4 hours as needed for Wheezing Yes ETHAN Mckeon CNP   ondansetron (ZOFRAN-ODT) 8 MG TBDP disintegrating tablet Place 8 mg under the tongue every 8 hours as needed for Nausea or Vomiting Yes Historical Provider, MD   dicyclomine (BENTYL) 20 MG tablet Take 1 tablet by mouth every 6 hours as needed (bowel spasm) Yes Santi Hamilton MD   Naproxen Sodium (ALEVE PO) Take by mouth daily as needed Yes Historical Provider, MD       Social History     Tobacco Use    Smoking status: Current Every Day Smoker     Packs/day: 1.00     Years: 30.00     Pack years: 30.00     Types: Cigarettes     Start date: 1/1/1971    Smokeless tobacco: Never Used   Substance Use Topics    Alcohol use: No     Frequency: Never     Binge frequency: Never  Drug use: No        Allergies   Allergen Reactions    Pcn [Penicillins] Anaphylaxis     \"can't breath\"    Sulfa Antibiotics Anaphylaxis     \"can't breathe\"    Metformin And Related Other (See Comments)     Sweating and diarrhea   ,   Past Medical History:   Diagnosis Date    Allergic rhinitis     Arthritis     Asthma     Bronchitis     [facility] obstructive [facility]     Depression     Diabetes mellitus [facility]     Headache     Hyperlipidemia     Hypertension     Incontinence     Irritable bowel syndrome     Kidney stone     Osteoarthritis     Pneumonia     Type 2 diabetes mellitus without complication ([facility] 7284    Ulcer    ,   Past Surgical History:   Procedure Laterality Date    CARPAL TUNNEL RELEASE Right 5/7/2019    Right Carpal Tunnel Release performed by Cinthia Yost MD at [address], [address] [facility] COLONOSCOPY  7/11/208    Serated polyp (1)    COLONOSCOPY  08/02/2017    [id number]    COLONOSCOPY Left 10/8/2019    COLONOSCOPY WITH BIOPSY performed by Renetta Campbell MD at [facility], [address] [place] [address]    with Right oopherectomy still has left ovary    LAPAROSCOPY  2008    Diagnostic for pain - no findings    MECKEL DIVERTICULUM EXCISION  1970    TONSILLECTOMY      UPPER GASTROINTESTINAL ENDOSCOPY  08/02/2017    [id number]    UPPER GASTROINTESTINAL ENDOSCOPY Left 10/8/2019    EGD BIOPSY performed by Renetta Campbell MD at [address] [place] [address]    Frequently dropping things: Yes  Difficulty with buttoning clothes, using zipper, putting on watch OR jewelry: Yes  Changes in handwriting: Yes  Numbness or tingling: Yes    Groin pain: No  Saddle anesthesia: No  Hip Pain: Yes-bilateral  Bowel/bladder incontinence: Yes  Constipation or Urinary retention: No     LIMITATIONS     Pain significantly limiting from a daily standpoint.    Assistive devices: Cane  Daily pharmacologic pain control include: Musculoskeletal:   [x] Normal gait with no signs of ataxia         [x] Normal range of motion of neck        [] Abnormal       Neurological:        [x] No Facial Asymmetry (Cranial nerve 7 motor function) (limited exam to video visit)          [x] No gaze palsy        [] Abnormal         Skin:        [x] No significant exanthematous lesions or discoloration noted on facial skin         [] Abnormal            Psychiatric:       [x] Normal Affect [] Abnormal        [x] No Hallucinations      Due to this being a TeleHealth encounter, evaluation of the following organ systems is limited: Vitals/Constitutional/EENT/Resp/CV/GI//MS/Neuro/Skin/Heme-Lymph-Imm. ASSESSMENT/PLAN:   Diagnosis Orders   1. Myelopathy [facility] [place]   2. Spinal stenosis of thoracic region  [facility] [place]   3. Lumbar spondylosis  [facility] - [place]   4. Cervical spondylosis with myelopathy  [facility] [place]       Imaging reviewed, patient does have diffuse, multilevel degenerative changes throughout spine. No critical central stenosis observed in cervical or lumbar spine. Patient has not had any recent physical therapy, is very reluctant to go to therapy, referral has been provided, discussed the importance of component. Also discussed the possible referral to pain specialist for consideration of injections, patient does not want to pursue this until after she meets with surgeon. Recommend proceeding with a trial of physical therapy, will have the patient return in approximately 10 weeks for reevaluation. Monitor for any new or worsening symptoms in the interim. Continue to follow with PCP regarding thyroid nodule. Return in about 10 weeks (around 2021), or if symptoms worsen or fail to improve. An  electronic signature was used to authenticate this note.     --ETHAN Jennings - CNP on 3/29/2021 at 9:13 AM    Time Spent in Counselin minutes Patient given educational materials - see patient instructions. Discussed use, benefit, and side effects of prescribed medications. Personally reviewed imaging with patients and all questions answered. Pt voiced understanding. Patient agreed with treatment plan. Follow up as directed above. Pursuant to the emergency declaration under the [address] [place] [address] authority and the [employer] Act, this Virtual  Visit was conducted, with patient's consent, to reduce the patient's risk of exposure to COVID-19 and provide continuity of care for an established patient. Services were provided through a video synchronous discussion virtually to substitute for in-person clinic visit. This is a telehealth visit that was performed with the originating site at Patient Location of [place] and Provider Location of [place], [place]. Verbal consent to participate in video visit was obtained. Pursuant to the emergency declaration under the [address] [place] [address] authority and the [employer] Act, this Virtual Visit was conducted, with patient's consent, to reduce the patient's risk of exposure to COVID-19 and provide continuity of care for an established patient. Services were provided through a video synchronous discussion virtually to substitute for in-person clinic visit. I discussed with the patient the nature of our telehealth visits via interactive/real-time audio/video that:  - I would evaluate the patient and recommend diagnostics and treatments based on my assessment  - Our sessions are not being recorded and that personal health information is protected  - Our team would provide follow up care in person if/when the patient needs it.

## 2021-03-31 ENCOUNTER — TELEPHONE (OUTPATIENT)
Dept: FAMILY MEDICINE CLINIC | Age: 61
End: 2021-03-31

## 2021-03-31 NOTE — TELEPHONE ENCOUNTER
Please let her know that all of her thyroid biopsies were negative so there is nothing else we need to do with it.

## 2021-04-02 ENCOUNTER — HOSPITAL ENCOUNTER (OUTPATIENT)
Dept: PHYSICAL THERAPY | Age: 61
Setting detail: THERAPIES SERIES
Discharge: HOME | End: 2021-04-02
Payer: COMMERCIAL

## 2021-04-02 NOTE — PROGRESS NOTES
Physical Therapy  Outpatient Physical Therapy    [] [place]  Phone: [phone]  Fax: [phone]      [] [place]  Phone: [phone]  Fax: [phone]    Physical Therapy  Cancellation/No-show Note  Patient Name:  Matthew Larson  :  1960   Date:  2021  Cancelled visits to date: 1  No-shows to date: 0    For today's appointment patient:  [x]  Cancelled  []  Rescheduled appointment  []  No-show     Reason given by patient:  [x]  Patient ill  []  Conflicting appointment  []  No transportation    []  Conflict with work  []  No reason given  []  Other:     Comments:      Electronically signed by: Cece Maciel PT

## 2021-04-09 ENCOUNTER — HOSPITAL ENCOUNTER (OUTPATIENT)
Dept: PHYSICAL THERAPY | Age: 61
Setting detail: THERAPIES SERIES
Discharge: HOME | End: 2021-04-09
Payer: COMMERCIAL

## 2021-04-12 DIAGNOSIS — G95.9 MYELOPATHY (HCC): Primary | ICD-10-CM

## 2021-04-12 DIAGNOSIS — M47.12 CERVICAL SPONDYLOSIS WITH MYELOPATHY: ICD-10-CM

## 2021-04-12 DIAGNOSIS — M48.04 SPINAL STENOSIS, THORACIC REGION: ICD-10-CM

## 2021-04-15 ENCOUNTER — HOSPITAL ENCOUNTER (OUTPATIENT)
Dept: PHYSICAL THERAPY | Age: 61
Setting detail: THERAPIES SERIES
Discharge: HOME | End: 2021-04-15
Payer: COMMERCIAL

## 2021-04-15 PROCEDURE — 97110 THERAPEUTIC EXERCISES: CPT

## 2021-04-15 PROCEDURE — 97162 PT EVAL MOD COMPLEX 30 MIN: CPT

## 2021-04-15 PROCEDURE — G0283 ELEC STIM OTHER THAN WOUND: HCPCS

## 2021-04-15 ASSESSMENT — PAIN SCALES - GENERAL: PAINLEVEL_OUTOF10: 5

## 2021-04-15 ASSESSMENT — PAIN DESCRIPTION - ONSET: ONSET: AWAKENED FROM SLEEP

## 2021-04-15 ASSESSMENT — PAIN DESCRIPTION - DESCRIPTORS: DESCRIPTORS: TINGLING;SPASM

## 2021-04-15 ASSESSMENT — PAIN DESCRIPTION - FREQUENCY: FREQUENCY: INTERMITTENT

## 2021-04-15 ASSESSMENT — PAIN DESCRIPTION - PROGRESSION: CLINICAL_PROGRESSION: GRADUALLY WORSENING

## 2021-04-15 ASSESSMENT — PAIN - FUNCTIONAL ASSESSMENT: PAIN_FUNCTIONAL_ASSESSMENT: PREVENTS OR INTERFERES WITH ALL ACTIVE AND SOME PASSIVE ACTIVITIES

## 2021-04-15 NOTE — FLOWSHEET NOTE
Physical Therapy Daily Treatment Note    Date:  4/15/2021    Patient Name:  Josafat Harmon    :  1960  MRN: [id number]  Restrictions/Precautions:     Medical/Treatment Diagnosis Information:   · Diagnosis: myleopathy  ·    Insurance/Certification information:  PT Insurance Information: [address] [facility]  Physician Information:  Referring Practitioner: Zina Flores of care signed (Y/N):n    Visit# / total visits:   Pain level: 5/10       Time In:   Time Out:    Progress Note: [x]  Yes  []  No  Next due by: Visit #10      Subjective: Completed the initial evaluation      Objective:    Observation:    Test measurements:      Exercises:   Exercise/Equipment Resistance/Repetitions Other comments   Bridging     Ab bracing     LTR     PIR     Shoulder retraction      Seated dorsiflexion                                              [x] Provided verbal/tactile cueing for activities related to strengthening, flexibility, endurance, ROM. (49171)  [] Provided verbal/tactile cueing for activities related to improving balance, coordination, kinesthetic sense, posture, motor skill, proprioception. (86616)    Therapeutic Activities:     [] Therapeutic activities, direct (one-on-one) patient contact (use of dynamic activities to improve functional performance). (55277)    Gait:   [] Provided training and instruction to the patient for ambulation re-education. (14915)    Self-Care/ADL's  [] Self-care/home management training and compensatory training, meal preparation, safety procedures, and instructions in use of assistive technology devices/adaptive equipment, direct one-on-one contact.  (88673)    Home Exercise Program:     [x] Reviewed/Progressed HEP activities related to strengthening, flexibility, endurance, ROM. (12105)  [] Reviewed/Progressed HEP activities related to improving balance, coordination, kinesthetic sense, posture, motor skill, proprioception.  (04753)    Manual Treatments:    [] Provided manual therapy to mobilize soft tissue/joints for the purpose of modulating pain, promoting relaxation,  increasing ROM, reducing/eliminating soft tissue swelling/inflammation/restriction, improving soft tissue extensibility. (59286)    Service Based Modalities:  Electrical stiomulation PREMOD x 2, 1- left SI and 1 left upper trap, intensity to patient toleranceTimed Code Treatment Minutes:   15  Total Treatment Minutes:   59    Treatment/Activity Tolerance:  [x] Patient tolerated treatment well [x] Patient limited by fatique  [] Patient limited by pain  [] Patient limited by other medical complications  [] Other:     Prognosis: [x] Good [] Fair  [] Poor    Patient Requires Follow-up: [x] Yes  [] No      Goals:  Short term goals  Time Frame for Short term goals: 2 weeks  Short term goal 1: The patinet will demonstrate improved single leg stand to 20 sec for each LE  Short term goal 2: The patient will demonstrate ability to maintain abdominal bracing with stand and walking 50% of the time without verbal cues  Short term goal 3: Decrease pain in the left shoulder and upper back to 3/10  Short term goal 4: educate patient on HEP and good posural positioning    Long term goals  Time Frame for Long term goals : 4 weeks  Long term goal 1: The patient will tolerate walking for 10 min without draggin her toes for improved ease to return to work  Long term goal 2: PAtient will be independent with maintaining good abdominal bracing with walking and lifting activities to complete daily activities. Long term goal 3: Strength will be increased to 4/5 in the bialteral UE and LE to assist with daily activities  Long term goal 4:  Increase single leg stand bilaterally to 45 sec without UE support  Long term goal 5: decreased pain and tingling in the UE and LE by 50% when completing daily activities          Plan:   [x] Continue per plan of care [] Alter current plan (see comments)  [] Plan of care initiated [] Hold pending MD visit [] Discharge  Plan for Next Session:  Progress there-ex and continue with the electrical stimulation    Electronically signed by:  Ashleigh Talley     Transfer care to [place] LUCAS Segura

## 2021-04-15 NOTE — PROGRESS NOTES
Physical Therapy  Initial Assessment  Date: 4/15/2021  Patient Name: Sergio Gil  MRN: [id number]  : 1960          Restrictions       Subjective   General  Chart Reviewed: Yes  Patient assessed for rehabilitation services?: Yes  Family / Caregiver Present: No  Referring Practitioner: Patric Lombardo  Referral Date : 21  Diagnosis: myleopathy  PT Visit Information  Onset Date: 21  PT Insurance Information: [address] [facility]  Pain Screening  Patient Currently in Pain: Yes  Pain Assessment  Pain Assessment: 0-10  Pain Level: 5  Patient's Stated Pain Goal: 3  Pain Descriptors: Tingling;Spasm  Pain Frequency: Intermittent  Pain Onset: Awakened from sleep  Clinical Progression: Gradually worsening  Functional Pain Assessment: Prevents or interferes with all active and some passive activities  Multiple Pain Sites: Yes  Vital Signs  Patient Currently in Pain: Yes    Vision/Hearing       Orientation       Social/Functional History  Social/Functional History  Type of Home: House  Home Layout: Two level  Bathroom Toilet: Standard    Objective     Observation/Palpation  Palpation: tenderness in the low back and in the mid back and the left side of the neck  Observation: forward head posure    Spine  Cervical: Cervical ROM is [facility]  Thoracic: rigid with limited mobiity  Lumbar: flattened lumbar curve  Special Tests: prone prop relieved pain but thoracic muscle spasms  Joint Mobility  Spine: rigid    Strength RLE  Strength RLE: WFL  Comment: dorsiflexion 3+/5  Strength LLE  Strength LLE: WFL  Comment: dorsiflexioin is 3-/5 on the left  Strength RUE  Strength RUE: WFL  Strength LUE  Strength LUE: WFL  Comment: The patinet has more difficulty holding the left UE up  for more than 5 sec  Strength Other  Other: Abdominal strength 3-/5     Additional Measures  Special Tests: Pelvic rotation is negative, tingling with Karen's test                   Balance  Posture: Fair  Single Leg Stance R Leg: 10  Single Leg Stance L Leg: 8  Foam Surface  Strategy: Ankle  Eyes Closed: 0 seconds  Sway: Moderate  Strategy: Step                         Assessment   Conditions Requiring Skilled Therapeutic Intervention  Body structures, Functions, Activity limitations: Decreased functional mobility ; Decreased strength;Decreased balance;Decreased endurance; Increased pain;Decreased posture  Assessment: The patient has decreased posture  affecting her mobility and increased pain noted. Prognosis: Good  Decision Making: Medium Complexity  Activity Tolerance  Activity Tolerance: Patient Tolerated treatment well         Plan   Plan  Times per week: 2-3  Plan weeks: 4  Specific instructions for Next Treatment: advance core strengthening and work on her posture. Advance LE and UE strengthening as well.   E-stim if benefits noted from patient  Current Treatment Recommendations: Strengthening, Balance Training, Endurance Training, Neuromuscular Re-education, Integrated Dry Needling, Modalities, Home Exercise Program, Manual Therapy - Soft Tissue Mobilization  Plan Comment: Neuro surgeon appointment in [place]    G-Code       OutComes Score                                                  AM-PAC Score             Goals  Short term goals  Time Frame for Short term goals: 2 weeks  Short term goal 1: The patinet will demonstrate improved single leg stand to 20 sec for each LE  Short term goal 2: The patient will demonstrate ability to maintain abdominal bracing with stand and walking 50% of the time without verbal cues  Short term goal 3: Decrease pain in the left shoulder and upper back to 3/10  Short term goal 4: educate patient on HEP and good posural positioning  Long term goals  Time Frame for Long term goals : 4 weeks  Long term goal 1: The patient will tolerate walking for 10 min without draggin her toes for improved ease to return to work  Long term goal 2: PAtient will be independent with maintaining good abdominal bracing with walking and lifting activities to complete daily activities. Long term goal 3: Strength will be increased to 4/5 in the bialteral UE and LE to assist with daily activities  Long term goal 4:  Increase single leg stand bilaterally to 45 sec without UE support  Long term goal 5: decreased pain and tingling in the UE and LE by 50% when completing daily activities  Patient Goals   Patient goals : to releive pain and tingling so she can return to work       Therapy Time   Individual Concurrent Group Co-treatment   Time In 1605         Time Out 1704         Minutes [address] [address], [address] [facility]

## 2021-04-15 NOTE — PLAN OF CARE
[facility]    [x] [place]  Phone: [phone]  Fax: [phone]      [] [place]  Phone: [phone]  Fax: [phone]        To: Referring Practitioner: Aly Vivas      Patient: Joseph Dixon  : 1960   MRN: [id number]  Evaluation Date: 4/15/2021      Diagnosis Information:  · Diagnosis: myleopathy   ·       Physical Therapy Certification  Dear Stefanie Srivastava following patient has been evaluated for physical therapy services and for therapy to continue, Medicare requires monthly physician review of the treatment plan. Please review the attached evaluation and/or summary of the patient's plan of care, and verify that you agree therapy should continue by signing the attached document and sending it back to our office. Plan of Care/Treatment to date:  [x] Therapeutic Exercise    [x] Modalities:  [x] Therapeutic Activity     [] Ultrasound  [x] Electrical Stimulation  [] Gait Training      [] Cervical Traction [] Lumbar Traction  [x] Neuromuscular Re-education    [] Cold/hotpack [] Iontophoresis   [x] Instruction in HEP     Other:  [x] Manual Therapy      []             [] Aquatic Therapy      []           ?       Goals:  Short term goals  Time Frame for Short term goals: 2 weeks  Short term goal 1: The patinet will demonstrate improved single leg stand to 20 sec for each LE  Short term goal 2: The patient will demonstrate ability to maintain abdominal bracing with stand and walking 50% of the time without verbal cues  Short term goal 3: Decrease pain in the left shoulder and upper back to 3/10  Short term goal 4: educate patient on HEP and good posural positioning    Long term goals  Time Frame for Long term goals : 4 weeks  Long term goal 1: The patient will tolerate walking for 10 min without draggin her toes for improved ease to return to work  Long term goal 2: PAtient will be independent with maintaining good abdominal bracing with walking and lifting activities to complete daily activities. Long term goal 3: Strength will be increased to 4/5 in the bialteral UE and LE to assist with daily activities  Long term goal 4: Increase single leg stand bilaterally to 45 sec without UE support  Long term goal 5: decreased pain and tingling in the UE and LE by 50% when completing daily activities    Frequency/Duration:  # Days per week: [] 1 day # Weeks: [] 1 week [] 5 weeks     [x] 2 days? [] 2 weeks [] 6 weeks     [x] 3 days   [] 3 weeks [] 7 weeks     [] 4 days   [x] 4 weeks [] 8 weeks    Rehab Potential: [] Excellent [x] Good [] Fair  [] Poor     Electronically signed by:  Honey Hays PT    If you have any questions or concerns, please don't hesitate to call.   Thank you for your referral.      Physician Signature:________________________________Date:__________________  By signing above, therapists plan is approved by physician

## 2021-04-23 ENCOUNTER — APPOINTMENT (OUTPATIENT)
Dept: PHYSICAL THERAPY | Age: 61
End: 2021-04-23
Payer: COMMERCIAL

## 2021-04-26 ENCOUNTER — HOSPITAL ENCOUNTER (OUTPATIENT)
Dept: PHYSICAL THERAPY | Age: 61
Setting detail: THERAPIES SERIES
Discharge: HOME | End: 2021-04-26
Payer: COMMERCIAL

## 2021-04-26 PROCEDURE — 97110 THERAPEUTIC EXERCISES: CPT

## 2021-04-26 PROCEDURE — G0283 ELEC STIM OTHER THAN WOUND: HCPCS

## 2021-04-26 NOTE — FLOWSHEET NOTE
Physical Therapy Daily Treatment Note    Date:  2021    Patient Name:  Tomas Regan    :  1960  MRN: [id number]  Restrictions/Precautions:     Medical/Treatment Diagnosis Information:         Insurance/Certification information:  PT Insurance Information: [employer]  Physician Information:  Referring Practitioner: Gianna Purvis of care signed (Y/N):n    Visit# / total visits:   Pain level: /10       Time In: 2:52   Time Out: 3:32    Progress Note: [x]  Yes  []  No  Next due by: Visit #10      Subjective: Patient states she is having mid back pain on this date. She is also having L shoulder pain into neck region. RTW May 17, 2021. The hardest part of her job is standing all day while bending and twisting. Objective: SHASHI to increase ROM and reduction of pain for return to work duties. Added in several exercises on this date with good tolerance noted. Towards the end of session patient experienced upper trap spasm. Verbal instructions and demonstration provided throughout for proper performance of exercises.  Observation:    Test measurements:      Exercises:   Exercise/Equipment Resistance/Repetitions Other comments   Bridging x10    Ab bracing     LTR     PIR     Shoulder retraction      Seated dorsiflexion          Prone  3'    RIAN 5'    Press ups 2x10    PKB 2x10    Back bend x20    Modified extension x20    Counter exercises x15 Hip abd/ext                  [x] Provided verbal/tactile cueing for activities related to strengthening, flexibility, endurance, ROM. (16069)  [] Provided verbal/tactile cueing for activities related to improving balance, coordination, kinesthetic sense, posture, motor skill, proprioception. (51116)    Therapeutic Activities:     [] Therapeutic activities, direct (one-on-one) patient contact (use of dynamic activities to improve functional performance). (25611)    Gait:   [] Provided training and instruction to the patient for ambulation re-education. (69534)    Self-Care/ADL's  [] Self-care/home management training and compensatory training, meal preparation, safety procedures, and instructions in use of assistive technology devices/adaptive equipment, direct one-on-one contact. (08238)    Home Exercise Program:     [x] Reviewed/Progressed HEP activities related to strengthening, flexibility, endurance, ROM. (26427)  [] Reviewed/Progressed HEP activities related to improving balance, coordination, kinesthetic sense, posture, motor skill, proprioception.  (66394)    Manual Treatments:    [] Provided manual therapy to mobilize soft tissue/joints for the purpose of modulating pain, promoting relaxation,  increasing ROM, reducing/eliminating soft tissue swelling/inflammation/restriction, improving soft tissue extensibility.  (58786)    Service Based Modalities:  Estim 15'  IFC to mid back/low back region    Treatment Minutes: 25'  Total Treatment Minutes:   36'    Treatment/Activity Tolerance:  [x] Patient tolerated treatment well [x] Patient limited by fatique  [] Patient limited by pain  [] Patient limited by other medical complications  [] Other:     Prognosis: [x] Good [] Fair  [] Poor    Patient Requires Follow-up: [x] Yes  [] No      Goals:  Short term goals  Time Frame for Short term goals: 2 weeks  Short term goal 1: The patinet will demonstrate improved single leg stand to 20 sec for each LE  Short term goal 2: The patient will demonstrate ability to maintain abdominal bracing with stand and walking 50% of the time without verbal cues  Short term goal 3: Decrease pain in the left shoulder and upper back to 3/10  Short term goal 4: educate patient on HEP and good posural positioning    Long term goals  Time Frame for Long term goals : 4 weeks  Long term goal 1: The patient will tolerate walking for 10 min without draggin her toes for improved ease to return to work  Long term goal 2: PAtient will be independent with maintaining good abdominal bracing with walking and lifting activities to complete daily activities. Long term goal 3: Strength will be increased to 4/5 in the bialteral UE and LE to assist with daily activities  Long term goal 4: Increase single leg stand bilaterally to 45 sec without UE support  Long term goal 5: decreased pain and tingling in the UE and LE by 50% when completing daily activities      Plan:   [x] Continue per plan of care [] Alter current plan (see comments)  [] Plan of care initiated [] Hold pending MD visit [] Discharge    Plan for Next Session:  Continue to progress per patient tolerance.     Electronically signed by:  Ariana Mcdonald PTA     Transfer care to Sharron Foley PT

## 2021-04-28 ENCOUNTER — HOSPITAL ENCOUNTER (OUTPATIENT)
Dept: PHYSICAL THERAPY | Age: 61
Setting detail: THERAPIES SERIES
Discharge: HOME | End: 2021-04-28
Payer: COMMERCIAL

## 2021-04-28 PROCEDURE — G0283 ELEC STIM OTHER THAN WOUND: HCPCS

## 2021-04-28 PROCEDURE — 97110 THERAPEUTIC EXERCISES: CPT

## 2021-04-28 NOTE — FLOWSHEET NOTE
Physical Therapy Daily Treatment Note    Date:  2021    Patient Name:  Clinton Valerio    :  1960  MRN: [id number]  Restrictions/Precautions:     Medical/Treatment Diagnosis Information:     Diagnosis: myleopathy      Insurance/Certification information:  PT Insurance Information: [employer]  Physician Information:  Referring Practitioner: Leidy Best of care signed (Y/N):n    Visit# / total visits: 3/12  Pain level: 5/10       Time In: 2:47  Time Out: 3:25    Progress Note: [x]  Yes  []  No  Next due by: Visit #10      Subjective: Patient states her back is really touchy on this date. After last session she was increasingly sore with leg pain. Last night she woke up from the pain and took Aleve which helped to take the edge off. Patient skipped HEP yesterday due to increased pain. Objective: SHASHI to increase ROM and reduction of pain for return to work duties.  Observation:   Concern for problems of incontinence and sense of foot drop weakness.  Will work on [employer], balance   Test measurements:      MMT of B dflex is 4+/5    Exercises:   Exercise/Equipment Resistance/Repetitions Other comments     **Caution re incontinence   Bridging x10    B hip abd/add     Ab bracing     LTR          Shoulder retraction               Prone  3'    RIAN 5'         Prone hip ext 2x10    PKB 2x10              Counter exercises x15 Hip abd/ext   Squat     Step up          [x] Provided verbal/tactile cueing for activities related to strengthening, flexibility, endurance, ROM. (96639)  [] Provided verbal/tactile cueing for activities related to improving balance, coordination, kinesthetic sense, posture, motor skill, proprioception. (45963)    Therapeutic Activities:     [] Therapeutic activities, direct (one-on-one) patient contact (use of dynamic activities to improve functional performance). (86151)    Gait:   [] Provided training and instruction to the patient for ambulation re-education. (19709)    Self-Care/ADL's  [] Self-care/home management training and compensatory training, meal preparation, safety procedures, and instructions in use of assistive technology devices/adaptive equipment, direct one-on-one contact. (45108)    Home Exercise Program:     [x] Reviewed/Progressed HEP activities related to strengthening, flexibility, endurance, ROM. (77809)  [] Reviewed/Progressed HEP activities related to improving balance, coordination, kinesthetic sense, posture, motor skill, proprioception.  (77443)    Manual Treatments:    [] Provided manual therapy to mobilize soft tissue/joints for the purpose of modulating pain, promoting relaxation,  increasing ROM, reducing/eliminating soft tissue swelling/inflammation/restriction, improving soft tissue extensibility.  (56184)    Service Based Modalities:  Estim 15'  IFC to mid back/low back region    Treatment Minutes: 23'  Total Treatment Minutes:   45'    Treatment/Activity Tolerance:  [x] Patient tolerated treatment well [x] Patient limited by fatique  [] Patient limited by pain  [] Patient limited by other medical complications  [] Other:     Prognosis: [x] Good [] Fair  [] Poor    Patient Requires Follow-up: [x] Yes  [] No      Goals:  Short term goals  Time Frame for Short term goals: 2 weeks  Short term goal 1: The patinet will demonstrate improved single leg stand to 20 sec for each LE  Short term goal 2: The patient will demonstrate ability to maintain abdominal bracing with stand and walking 50% of the time without verbal cues  Short term goal 3: Decrease pain in the left shoulder and upper back to 3/10  Short term goal 4: educate patient on HEP and good posural positioning    Long term goals  Time Frame for Long term goals : 4 weeks  Long term goal 1: The patient will tolerate walking for 10 min without draggin her toes for improved ease to return to work  Long term goal 2: PAtient will be independent with maintaining good abdominal bracing with walking and lifting activities to complete daily activities. Long term goal 3: Strength will be increased to 4/5 in the bialteral UE and LE to assist with daily activities  Long term goal 4: Increase single leg stand bilaterally to 45 sec without UE support  Long term goal 5: decreased pain and tingling in the UE and LE by 50% when completing daily activities      Plan:   [x] Continue per plan of care [] Alter current plan (see comments)  [] Plan of care initiated [] Hold pending MD visit [] Discharge    Plan for Next Session:  Continue to progress per patient tolerance.     Electronically signed by:  Tl Das PTA     Transfer care to Sammy Neves PT

## 2021-04-30 ENCOUNTER — HOSPITAL ENCOUNTER (OUTPATIENT)
Dept: PHYSICAL THERAPY | Age: 61
Setting detail: THERAPIES SERIES
Discharge: HOME | End: 2021-04-30
Payer: COMMERCIAL

## 2021-04-30 ENCOUNTER — OFFICE VISIT (OUTPATIENT)
Dept: FAMILY MEDICINE CLINIC | Age: 61
End: 2021-04-30
Payer: COMMERCIAL

## 2021-04-30 VITALS
BODY MASS INDEX: 34.38 KG/M2 | HEART RATE: 110 BPM | WEIGHT: 194 LBS | OXYGEN SATURATION: 98 % | SYSTOLIC BLOOD PRESSURE: 138 MMHG | HEIGHT: 63 IN | DIASTOLIC BLOOD PRESSURE: 80 MMHG | TEMPERATURE: 98.8 F

## 2021-04-30 DIAGNOSIS — G89.29 CHRONIC MIDLINE LOW BACK PAIN WITHOUT SCIATICA: Primary | ICD-10-CM

## 2021-04-30 DIAGNOSIS — M54.50 CHRONIC MIDLINE LOW BACK PAIN WITHOUT SCIATICA: Primary | ICD-10-CM

## 2021-04-30 DIAGNOSIS — J30.9 ALLERGIC RHINITIS, UNSPECIFIED SEASONALITY, UNSPECIFIED TRIGGER: ICD-10-CM

## 2021-04-30 PROCEDURE — 3017F COLORECTAL CA SCREEN DOC REV: CPT | Performed by: FAMILY MEDICINE

## 2021-04-30 PROCEDURE — 99214 OFFICE O/P EST MOD 30 MIN: CPT | Performed by: FAMILY MEDICINE

## 2021-04-30 PROCEDURE — G8427 DOCREV CUR MEDS BY ELIG CLIN: HCPCS | Performed by: FAMILY MEDICINE

## 2021-04-30 PROCEDURE — 4004F PT TOBACCO SCREEN RCVD TLK: CPT | Performed by: FAMILY MEDICINE

## 2021-04-30 PROCEDURE — 97110 THERAPEUTIC EXERCISES: CPT

## 2021-04-30 PROCEDURE — G8417 CALC BMI ABV UP PARAM F/U: HCPCS | Performed by: FAMILY MEDICINE

## 2021-04-30 RX ORDER — GABAPENTIN 300 MG/1
300 CAPSULE ORAL 3 TIMES DAILY
Qty: 90 CAPSULE | Refills: 1 | Status: SHIPPED | OUTPATIENT
Start: 2021-04-30 | End: 2022-08-07

## 2021-04-30 RX ORDER — MONTELUKAST SODIUM 10 MG/1
10 TABLET ORAL NIGHTLY
Qty: 30 TABLET | Refills: 3 | Status: SHIPPED | OUTPATIENT
Start: 2021-04-30 | End: 2022-08-07

## 2021-04-30 RX ORDER — FLUTICASONE PROPIONATE 50 MCG
2 SPRAY, SUSPENSION (ML) NASAL DAILY
Qty: 3 BOTTLE | Refills: 3 | Status: SHIPPED | OUTPATIENT
Start: 2021-04-30 | End: 2022-08-07

## 2021-04-30 ASSESSMENT — ENCOUNTER SYMPTOMS
WHEEZING: 0
SINUS PRESSURE: 1
SHORTNESS OF BREATH: 0
CHEST TIGHTNESS: 0
SORE THROAT: 1
BACK PAIN: 1
COUGH: 1

## 2021-04-30 NOTE — FLOWSHEET NOTE
Physical Therapy Daily Treatment Note    Date:  2021    Patient Name:  Alexi Villaseñor    :  1960  MRN: [id number]  Restrictions/Precautions:     Medical/Treatment Diagnosis Information:     Diagnosis: myleopathy      Insurance/Certification information:  PT Insurance Information: [address] [facility]  Physician Information:  Referring Practitioner: Mark Guillaume of care signed (Y/N):n    Visit# / total visits:   Pain level: 4/10       Time In: 9:30     Time Out: 10:15    Progress Note: [x]  Yes  []  No  Next due by: Visit #10      Subjective: Patient states her left hip is hurting today. She says this is normal pain. Patient states she frequently falls due to her foot drop. She has not experienced a fall since being in therapy but is extremely scared. 21 Patient sees neurosurgeon about incontinence and possible nerve damage causing foot drop as well. Objective: SHASHI to increase ROM and reduction of pain for return to work duties. Frequent reminders for constant ab contraction during supine exercises. Added in several exercises on this date with good tolerance noted. Verbal and demonstration provided for proper performance of exercises. Most challenged by 4-way ankle tband.  Observation:   Concern for problems of incontinence and sense of foot drop weakness.    Will work on [employer] strength, balance   Test measurements:      MMT of B dflex is 4+/5    Exercises:   Exercise/Equipment Resistance/Repetitions Other comments     **Caution re incontinence   Bridging x20    B hip abd/add 20x3\"    Ab bracing 20x5\"    LTR 15x5\"         Shoulder retraction 20x3\"    Ext/rows tband x15 red        Prone  3'    RIAN 5'    Prone hip ext 2x10    PKB 2x10              Counter exercises x15 Hip abd/ext   Squat x10 3 positions   Step up x10 6 inch   4-way ankle tband  green   [x] Provided verbal/tactile cueing for activities related to strengthening, flexibility, endurance, ROM. (13074)  [] Provided verbal/tactile cueing for activities related to improving balance, coordination, kinesthetic sense, posture, motor skill, proprioception. (77481)    Therapeutic Activities:     [] Therapeutic activities, direct (one-on-one) patient contact (use of dynamic activities to improve functional performance). (62925)    Gait:   [] Provided training and instruction to the patient for ambulation re-education. (27493)    Self-Care/ADL's  [] Self-care/home management training and compensatory training, meal preparation, safety procedures, and instructions in use of assistive technology devices/adaptive equipment, direct one-on-one contact. (01455)    Home Exercise Program:     [x] Reviewed/Progressed HEP activities related to strengthening, flexibility, endurance, ROM. (28218)  [] Reviewed/Progressed HEP activities related to improving balance, coordination, kinesthetic sense, posture, motor skill, proprioception.  (93117)    Manual Treatments:    [] Provided manual therapy to mobilize soft tissue/joints for the purpose of modulating pain, promoting relaxation,  increasing ROM, reducing/eliminating soft tissue swelling/inflammation/restriction, improving soft tissue extensibility.  (78083)    Service Based Modalities:      Treatment Minutes:     39'  SHASHI  Total Treatment Minutes:  39 '    Treatment/Activity Tolerance:  [x] Patient tolerated treatment well [x] Patient limited by fatique  [] Patient limited by pain  [] Patient limited by other medical complications  [] Other:     Prognosis: [x] Good [] Fair  [] Poor    Patient Requires Follow-up: [x] Yes  [] No      Goals:  Short term goals  Time Frame for Short term goals: 2 weeks  Short term goal 1: The patinet will demonstrate improved single leg stand to 20 sec for each LE  Short term goal 2: The patient will demonstrate ability to maintain abdominal bracing with stand and walking 50% of the time without verbal cues  Short term goal 3: Decrease pain in the left shoulder and upper back to 3/10  Short term goal 4: educate patient on HEP and good posural positioning    Long term goals  Time Frame for Long term goals : 4 weeks  Long term goal 1: The patient will tolerate walking for 10 min without draggin her toes for improved ease to return to work  Long term goal 2: PAtient will be independent with maintaining good abdominal bracing with walking and lifting activities to complete daily activities. Long term goal 3: Strength will be increased to 4/5 in the bialteral UE and LE to assist with daily activities  Long term goal 4: Increase single leg stand bilaterally to 45 sec without UE support  Long term goal 5: decreased pain and tingling in the UE and LE by 50% when completing daily activities      Plan:   [x] Continue per plan of care [] Alter current plan (see comments)  [] Plan of care initiated [] Hold pending MD visit [] Discharge    Plan for Next Session:  Add in balance exercises next session.      Electronically signed by:  Bandar Eastman PTA

## 2021-04-30 NOTE — LETTER
[address] of [place] [address]  Phone: [phone]  Fax: [phone]    Zahida Shaffer MD        April 30, 2021     Patient: Clinton Postal   YOB: 1960   Date of Visit: 4/30/2021       To Whom It May Concern: It is my medical opinion that Austen Rutherford may return to work on 6/28/21. She has been off of work due to medical complications. If you have any questions or concerns, please don't hesitate to call.     Sincerely,        Zahida Shaffer MD

## 2021-04-30 NOTE — PROGRESS NOTES
[facility] [address] [address]  [address] [place] [address]  Dept: [phone]  Dept Fax: [phone]  Loc: [phone]    Sharifa Campbell is a 63 y.o. female who presents today for her medical conditions/complaints as noted below. Sharifa Campbell is c/o of   Chief Complaint   Patient presents with    1 Month Follow-Up     back pain    Sinusitis       HPI:     Here today for a follow up of her back pain and she is congested. Sinusitis  This is a new problem. The problem is unchanged. There has been no fever. Her pain is at a severity of 1/10. The pain is mild. Associated symptoms include congestion, coughing, headaches, sinus pressure, sneezing and a sore throat. Pertinent negatives include no chills, ear pain or shortness of breath. (Ear fullness) Past treatments include saline sprays (xyzal, flonase). The treatment provided mild relief. Back pain: she has been doing PT for the past week. She has been going 3 times a week. She isn't sure that it is helping yet but she has only been 3 times. The exercises that she is doing aren't hard, but they do cause her some pain. She saw the neurosurgery NP and she insisted she do PT. She has a lot of pain if she has to ride in the car for very long. Her baclofen is helping with her muscle spasms. The gabapentin is also really helping with her nerve pain. The gabapentin does not make her too tired. She would like to try increasing the gabapentin to see if that helps her pain more. She is still falling quite a bit.      Past Medical History:   Diagnosis Date    Allergic rhinitis     Arthritis     Asthma     Bronchitis     [facility]     Depression     [facility]     Headache     Hyperlipidemia     Hypertension     Incontinence     Irritable bowel syndrome     Kidney stone     Osteoarthritis     Pneumonia     [facility]407    Ulcer           Social History     Tobacco Use    Smoking status: Current Every Day Smoker     Packs/day: 1.00     Years: 30.00     Pack years: 30.00     Types: Cigarettes     Start date: 1/1/1971    Smokeless tobacco: Never Used   Substance Use Topics    Alcohol use: No     Frequency: Never     Binge frequency: Never     Current Outpatient Medications   Medication Sig Dispense Refill    fluticasone (FLONASE) 50 MCG/ACT nasal spray 2 sprays by Nasal route daily 3 Bottle 3    gabapentin (NEURONTIN) 300 MG capsule Take 1 capsule by mouth 3 times daily for 60 days. Intended supply: 30 days 90 capsule 1    montelukast (SINGULAIR) 10 MG tablet Take 1 tablet by mouth nightly 30 tablet 3    venlafaxine (EFFEXOR XR) 150 MG extended release capsule Take 1 capsule by mouth once daily 30 capsule 3    venlafaxine (EFFEXOR XR) 37.5 MG extended release capsule Take 1 capsule by mouth once daily 30 capsule 3    baclofen (LIORESAL) 10 MG tablet Take 1 tablet by mouth nightly as needed (muscle spasms) 30 tablet 1    vitamin D (ERGOCALCIFEROL) 1.25 MG (18119 UT) CAPS capsule Take 1 capsule by mouth once a week 12 capsule 1    pantoprazole (PROTONIX) 40 MG tablet Take 1 tablet by mouth daily 90 tablet 3    albuterol sulfate HFA (PROVENTIL HFA) 108 (90 Base) MCG/ACT inhaler Inhale 2 puffs into the lungs every 4 hours as needed for Wheezing 1 Inhaler 0    Naproxen Sodium (ALEVE PO) Take by mouth daily as needed       No current facility-administered medications for this visit. Allergies   Allergen Reactions    Pcn [Penicillins] Anaphylaxis     \"can't breath\"    Sulfa Antibiotics Anaphylaxis     \"can't breathe\"    Metformin And Related Other (See Comments)     Sweating and diarrhea       Subjective:     Review of Systems   Constitutional: Negative for activity change, appetite change, chills, fatigue and fever.    HENT: Positive for congestion, sinus pressure, sneezing and sore throat. Negative for ear pain. Respiratory: Positive for cough. Negative for chest tightness, shortness of breath and wheezing. Cardiovascular: Negative for chest pain, palpitations and leg swelling. Genitourinary: Negative for difficulty urinating. Musculoskeletal: Positive for back pain. Neurological: Positive for headaches. Negative for dizziness, syncope, weakness and light-headedness. Objective:      Physical Exam  Vitals signs and nursing note reviewed. Constitutional:       General: She is not in acute distress. Appearance: She is well-developed. HENT:      Right Ear: Tympanic membrane, ear canal and external ear normal.      Left Ear: Tympanic membrane, ear canal and external ear normal.   Eyes:      Conjunctiva/sclera: Conjunctivae normal.   Neck:      Musculoskeletal: Normal range of motion and neck supple. Thyroid: No thyromegaly. Cardiovascular:      Rate and Rhythm: Normal rate and regular rhythm. Heart sounds: Normal heart sounds. No murmur. Pulmonary:      Effort: Pulmonary effort is normal. No respiratory distress. Breath sounds: Normal breath sounds. No wheezing. Lymphadenopathy:      Cervical: No cervical adenopathy. Skin:     General: Skin is warm and dry. Findings: No erythema or rash. Neurological:      Mental Status: She is alert and oriented to person, place, and time. Psychiatric:         Mood and Affect: Mood normal.         Behavior: Behavior normal.         Thought Content: Thought content normal.         Judgment: Judgment normal.       /80   Pulse 110   Temp 98.8 °F (37.1 °C)   Ht 5' 3\" (1.6 m)   Wt 194 lb (88 kg)   SpO2 98%   BMI 34.37 kg/m²     Assessment:       Diagnosis Orders   1. Chronic midline low back pain without sciatica  gabapentin (NEURONTIN) 300 MG capsule   2.  Allergic rhinitis, unspecified seasonality, unspecified trigger  fluticasone (FLONASE) 50 MCG/ACT nasal spray             Plan: Back pain: stable; she is still very uncomfortable but she is working through PT. After she finishes therapy she will talk to the neurosurgeon about injections vs surgery. I wrote her off work for another 6 weeks so she can finish therapy and see the neurosurgeon. I also increased her gabapentin to try to help with her pain. Allergic rhinitis: worsening; I started her on Singulair and I told her to start using nasal saline. Return in about 6 weeks (around 2021) for Back pain follow up. Orders Placed This Encounter   Medications    fluticasone (FLONASE) 50 MCG/ACT nasal spray     Si sprays by Nasal route daily     Dispense:  3 Bottle     Refill:  3    gabapentin (NEURONTIN) 300 MG capsule     Sig: Take 1 capsule by mouth 3 times daily for 60 days. Intended supply: 30 days     Dispense:  90 capsule     Refill:  1    montelukast (SINGULAIR) 10 MG tablet     Sig: Take 1 tablet by mouth nightly     Dispense:  30 tablet     Refill:  3       Patientgiven educational materials - see patient instructions. Discussed use, benefit,and side effects of prescribed medications. All patient questions answered. Ptvoiced understanding. Reviewed health maintenance. Instructed to continue currentmedications, diet and exercise. Patient agreed with treatment plan. Follow up asdirected.      Electronically signed by Pasha Kurtz MD on 2021 at 8:57 AM

## 2021-05-03 ENCOUNTER — HOSPITAL ENCOUNTER (OUTPATIENT)
Dept: PHYSICAL THERAPY | Age: 61
Setting detail: THERAPIES SERIES
Discharge: HOME | End: 2021-05-03
Payer: COMMERCIAL

## 2021-05-05 ENCOUNTER — HOSPITAL ENCOUNTER (OUTPATIENT)
Dept: PHYSICAL THERAPY | Age: 61
Setting detail: THERAPIES SERIES
Discharge: HOME | End: 2021-05-05
Payer: COMMERCIAL

## 2021-05-05 PROCEDURE — 97110 THERAPEUTIC EXERCISES: CPT

## 2021-05-05 NOTE — FLOWSHEET NOTE
Physical Therapy Daily Treatment Note    Date:  2021    Patient Name:  Heidy Knig    :  1960  MRN: [id number]  Restrictions/Precautions:     Medical/Treatment Diagnosis Information:     Diagnosis: myleopathy      Insurance/Certification information:  PT Insurance Information: [address] [facility], [place]  Physician Information:  Referring Practitioner: Karl Hagen of care signed (Y/N):n    Visit# / total visits:   Pain level: 3/10       Time In: 12:31 PM     Time Out: 1:24 PM    Progress Note: []  Yes  [x]  No  Next due by: Visit #10      Subjective: Patient reports her left knee is hurting and it radiates down her anterior thigh from her hip. Patient reports she was sore after last therapy session. Patient reports difficulty sleeping and the pain wakes her up. Patient reports she hasn't been doing her exercises due to being sick but otherwise had been compliant. Patient reports difficulty with getting up from the floor. Patient reports she notices weakness in her legs. Patient reports she can't carry anything to heavy. Patient reports she has noticed difficulty with fine motor tasks recently. Objective: Performed there-ex as documented on flowsheet to improve strength, ROM, and stability for ease with daily tasks and ambulation. Patient given verbal cues to perform exercises properly. Initiated new exercises with good tolerance. Patient tolerated treatment well with no increased pain or soreness.  Observation:   Concern for problems of incontinence and sense of foot drop weakness.    Will work on LE strength, balance   Test measurements:   Hip Flexion: 4-/5     Knee Extension: 4/5     Knee Flexion: 4/5    Exercises:   Exercise/Equipment Resistance/Repetitions Other comments     **Caution re incontinence   Bridging x20    B hip abd/add 20x3\" green   Ab bracing 20x5\"    LTR 15x5\"    SL clamshell/ hip abd 15x    Shoulder retraction 20x3\"    Ext/rows tband x15 Green    TB SPO 15x green Prone  3'    RIAN 5'    Prone hip ext 2x10    PKB 2x10         FTEO 2x30\" foam   Counter exercises x15 Hip abd/ext   Squat x10 3 positions   Step up x10 6 inch   4-way ankle tband  green   [x] Provided verbal/tactile cueing for activities related to strengthening, flexibility, endurance, ROM. (83694)  [] Provided verbal/tactile cueing for activities related to improving balance, coordination, kinesthetic sense, posture, motor skill, proprioception. (02562)    Therapeutic Activities:     [] Therapeutic activities, direct (one-on-one) patient contact (use of dynamic activities to improve functional performance). (82910)    Gait:   [] Provided training and instruction to the patient for ambulation re-education. (26295)    Self-Care/ADL's  [] Self-care/home management training and compensatory training, meal preparation, safety procedures, and instructions in use of assistive technology devices/adaptive equipment, direct one-on-one contact. (02414)    Home Exercise Program:     [x] Reviewed/Progressed HEP activities related to strengthening, flexibility, endurance, ROM. (25129)  [] Reviewed/Progressed HEP activities related to improving balance, coordination, kinesthetic sense, posture, motor skill, proprioception.  (47468)    Manual Treatments:    [] Provided manual therapy to mobilize soft tissue/joints for the purpose of modulating pain, promoting relaxation,  increasing ROM, reducing/eliminating soft tissue swelling/inflammation/restriction, improving soft tissue extensibility.  (49111)    Service Based Modalities:      Treatment Minutes:     48'  SHASHI    Total Treatment Minutes:  48'    Treatment/Activity Tolerance:  [x] Patient tolerated treatment well [x] Patient limited by fatique  [] Patient limited by pain  [] Patient limited by other medical complications  [] Other:     Prognosis: [x] Good [] Fair  [] Poor    Patient Requires Follow-up: [x] Yes  [] No      Goals:  Short term goals  Time Frame for Short term goals: 2 weeks  Short term goal 1: The patinet will demonstrate improved single leg stand to 20 sec for each LE  Short term goal 2: The patient will demonstrate ability to maintain abdominal bracing with stand and walking 50% of the time without verbal cues  Short term goal 3: Decrease pain in the left shoulder and upper back to 3/10  Short term goal 4: educate patient on HEP and good posural positioning    Long term goals  Time Frame for Long term goals : 4 weeks  Long term goal 1: The patient will tolerate walking for 10 min without draggin her toes for improved ease to return to work  Long term goal 2: PAtient will be independent with maintaining good abdominal bracing with walking and lifting activities to complete daily activities. Long term goal 3: Strength will be increased to 4/5 in the bialteral UE and LE to assist with daily activities  Long term goal 4: Increase single leg stand bilaterally to 45 sec without UE support  Long term goal 5: decreased pain and tingling in the UE and LE by 50% when completing daily activities      Plan:   [x] Continue per plan of care [] Alter current plan (see comments)  [] Plan of care initiated [] Hold pending MD visit [] Discharge    Plan for Next Session:  Progress per patient tolerance.      Electronically signed by:  Michael Katz PTA

## 2021-05-07 ENCOUNTER — HOSPITAL ENCOUNTER (OUTPATIENT)
Dept: PHYSICAL THERAPY | Age: 61
Setting detail: THERAPIES SERIES
Discharge: HOME | End: 2021-05-07
Payer: COMMERCIAL

## 2021-05-07 NOTE — PROGRESS NOTES
Physical Therapy    Outpatient Physical Therapy    [x] [place]  Phone: [phone]  Fax: [phone]      [] [place]  Phone: [phone]  Fax: [phone]    Physical Therapy  Cancellation/No-show Note  Patient Name:  Alexis Gabriel  :  1960   Date:  2021  Cancelled visits to date: 4  No-shows to date: 1    For today's appointment patient:  [x]  Cancelled  []  Rescheduled appointment  []  No-show     Reason given by patient:  [x]  Patient ill  []  Conflicting appointment  []  No transportation    []  Conflict with work  []  No reason given  []  Other:  sick   Comments:      Electronically signed by: Marybeth Underwood PTA

## 2021-05-11 ENCOUNTER — HOSPITAL ENCOUNTER (OUTPATIENT)
Dept: PHYSICAL THERAPY | Age: 61
Setting detail: THERAPIES SERIES
Discharge: HOME | End: 2021-05-11
Payer: COMMERCIAL

## 2021-05-11 PROCEDURE — 97110 THERAPEUTIC EXERCISES: CPT

## 2021-05-12 ENCOUNTER — HOSPITAL ENCOUNTER (OUTPATIENT)
Dept: PHYSICAL THERAPY | Age: 61
Setting detail: THERAPIES SERIES
Discharge: HOME | End: 2021-05-12
Payer: COMMERCIAL

## 2021-05-12 PROCEDURE — 97110 THERAPEUTIC EXERCISES: CPT

## 2021-05-12 PROCEDURE — G0283 ELEC STIM OTHER THAN WOUND: HCPCS

## 2021-05-12 NOTE — FLOWSHEET NOTE
Physical Therapy Daily Treatment Note    Date:  2021    Patient Name:  Loly Buck    :  1960  MRN: [id number]  Restrictions/Precautions:     Medical/Treatment Diagnosis Information:     Diagnosis: myleopathy      Insurance/Certification information:  PT Insurance Information: [address] [facility]  Physician Information:  Referring Practitioner: Timothy Saucedo of care signed (Y/N):n    Visit# / total visits:   Pain level: 8/10 entire back       Time In: 10:35 AM     Time Out: 11:28 AM    Progress Note: []  Yes  [x]  No  Next due by: Visit #10      Subjective: Patient reports her entire back is hurting this morning. Patient reports she had to go home and take pain medication due to hurting after last therapy appointment. Patient reports tingling in her L LE. Patient reports she has her off for another 6 next weeks until . Patient denies any falls recently but has had some near misses. Patient reports her legs and arms feel shaky due to weakness. Patient reports able to walk or perform daily tasks for 15 minutes. Patient stated Dr. Kieran Haskins asked if she was doing dry needling at recent follow up. Objective: Performed there-ex as documented on flowsheet to improve strength, ROM, and stability for ease with daily tasks and ambulation. Patient given verbal cues to perform exercises properly. Patient reported peripheralization symptoms with prone on elbows position after 2 minutes. Patient reported continued N/T in L LE slight improvement in symptoms. Concluded treatment with IFC to decrease pain levels with good tolerance. Held standing exercises this date due to increased pain levels and radicular symptoms. Patient provided with dry needling information this date.  Observation:   Concern for problems of incontinence and sense of foot drop weakness.    Will work on LE strength, balance   Test measurements: Hip Flexion: 4/5   Hip Abduction: 4/5    Exercises:   Exercise/Equipment Resistance/Repetitions Other comments     **Caution re incontinence   Bridging x20    B hip abd/add 20x3\" green   Ab bracing 20x5\"    LTR 15x5\"    SL clamshell/ hip abd 15x    Shoulder retraction 20x3\"    Ext/rows tband  Green    TB SPO  green   Prone  5'    RIAN 2'    Prone hip ext 2x10    PKB 2x10    Sidesteps    FTEO foam   Counter exercises Hip abd/ext   Squat 3 positions   Step up 6 inch   4-way ankle tband  green   [x] Provided verbal/tactile cueing for activities related to strengthening, flexibility, endurance, ROM. (90003)  [] Provided verbal/tactile cueing for activities related to improving balance, coordination, kinesthetic sense, posture, motor skill, proprioception. (13009)    Therapeutic Activities:     [] Therapeutic activities, direct (one-on-one) patient contact (use of dynamic activities to improve functional performance). (94800)    Gait:   [] Provided training and instruction to the patient for ambulation re-education. (72390)    Self-Care/ADL's  [] Self-care/home management training and compensatory training, meal preparation, safety procedures, and instructions in use of assistive technology devices/adaptive equipment, direct one-on-one contact. (15033)    Home Exercise Program:     [x] Reviewed/Progressed HEP activities related to strengthening, flexibility, endurance, ROM. (85295)  [] Reviewed/Progressed HEP activities related to improving balance, coordination, kinesthetic sense, posture, motor skill, proprioception.  (77478)    Manual Treatments:    [] Provided manual therapy to mobilize soft tissue/joints for the purpose of modulating pain, promoting relaxation,  increasing ROM, reducing/eliminating soft tissue swelling/inflammation/restriction, improving soft tissue extensibility.  (51929)    Service Based Modalities:  15' IFC to low back to decrease pain levels and tightness    Treatment Minutes:     45'  SHASHI    Total Treatment Minutes:  48'    Treatment/Activity Tolerance:  [x] Patient tolerated treatment well [x] Patient limited by fatique  [] Patient limited by pain  [] Patient limited by other medical complications  [] Other:     Prognosis: [x] Good [] Fair  [] Poor    Patient Requires Follow-up: [x] Yes  [] No      Goals:  Short term goals  Time Frame for Short term goals: 2 weeks  Short term goal 1: The patinet will demonstrate improved single leg stand to 20 sec for each LE  Short term goal 2: The patient will demonstrate ability to maintain abdominal bracing with stand and walking 50% of the time without verbal cues  Short term goal 3: Decrease pain in the left shoulder and upper back to 3/10  Short term goal 4: educate patient on HEP and good posural positioning    Long term goals  Time Frame for Long term goals : 4 weeks  Long term goal 1: The patient will tolerate walking for 10 min without draggin her toes for improved ease to return to work  Long term goal 2: PAtient will be independent with maintaining good abdominal bracing with walking and lifting activities to complete daily activities. Long term goal 3: Strength will be increased to 4/5 in the bialteral UE and LE to assist with daily activities  Long term goal 4: Increase single leg stand bilaterally to 45 sec without UE support  Long term goal 5: decreased pain and tingling in the UE and LE by 50% when completing daily activities      Plan:   [x] Continue per plan of care [] Alter current plan (see comments)  [] Plan of care initiated [] Hold pending MD visit [] Discharge    Plan for Next Session:  Progress per patient tolerance. Potentially add dry needling next visit.      Electronically signed by:  Eloy Lima PTA

## 2021-05-14 ENCOUNTER — HOSPITAL ENCOUNTER (OUTPATIENT)
Dept: PHYSICAL THERAPY | Age: 61
Setting detail: THERAPIES SERIES
Discharge: HOME | End: 2021-05-14
Payer: COMMERCIAL

## 2021-05-18 ENCOUNTER — TELEPHONE (OUTPATIENT)
Dept: FAMILY MEDICINE CLINIC | Age: 61
End: 2021-05-18

## 2021-05-18 ENCOUNTER — HOSPITAL ENCOUNTER (OUTPATIENT)
Dept: PHYSICAL THERAPY | Age: 61
Setting detail: THERAPIES SERIES
Discharge: HOME | End: 2021-05-18
Payer: COMMERCIAL

## 2021-05-18 DIAGNOSIS — G89.29 CHRONIC MIDLINE LOW BACK PAIN WITHOUT SCIATICA: Primary | ICD-10-CM

## 2021-05-18 DIAGNOSIS — M54.50 CHRONIC MIDLINE LOW BACK PAIN WITHOUT SCIATICA: Primary | ICD-10-CM

## 2021-05-18 PROCEDURE — 97110 THERAPEUTIC EXERCISES: CPT

## 2021-05-18 PROCEDURE — G0283 ELEC STIM OTHER THAN WOUND: HCPCS

## 2021-05-18 NOTE — TELEPHONE ENCOUNTER
Please let patient know that I would like her have a functional capacity exam done to fill out her paperwork.  I put in the referral

## 2021-05-21 ENCOUNTER — HOSPITAL ENCOUNTER (OUTPATIENT)
Dept: PHYSICAL THERAPY | Age: 61
Setting detail: THERAPIES SERIES
Discharge: HOME | End: 2021-05-21
Payer: COMMERCIAL

## 2021-05-21 ENCOUNTER — OFFICE VISIT (OUTPATIENT)
Dept: NEUROSURGERY | Age: 61
End: 2021-05-21
Payer: COMMERCIAL

## 2021-05-21 VITALS
DIASTOLIC BLOOD PRESSURE: 80 MMHG | BODY MASS INDEX: 34.91 KG/M2 | HEART RATE: 104 BPM | HEIGHT: 63 IN | WEIGHT: 197 LBS | SYSTOLIC BLOOD PRESSURE: 140 MMHG | RESPIRATION RATE: 16 BRPM

## 2021-05-21 DIAGNOSIS — M47.12 CERVICAL SPONDYLOSIS WITH MYELOPATHY: ICD-10-CM

## 2021-05-21 DIAGNOSIS — G95.9 MYELOPATHY (HCC): ICD-10-CM

## 2021-05-21 DIAGNOSIS — M43.16 SPONDYLOLISTHESIS OF LUMBAR REGION: Primary | ICD-10-CM

## 2021-05-21 DIAGNOSIS — B02.29 POST HERPETIC NEURALGIA: ICD-10-CM

## 2021-05-21 PROCEDURE — 99215 OFFICE O/P EST HI 40 MIN: CPT | Performed by: NEUROLOGICAL SURGERY

## 2021-05-21 PROCEDURE — G8417 CALC BMI ABV UP PARAM F/U: HCPCS | Performed by: NEUROLOGICAL SURGERY

## 2021-05-21 PROCEDURE — 3017F COLORECTAL CA SCREEN DOC REV: CPT | Performed by: NEUROLOGICAL SURGERY

## 2021-05-21 PROCEDURE — 4004F PT TOBACCO SCREEN RCVD TLK: CPT | Performed by: NEUROLOGICAL SURGERY

## 2021-05-21 PROCEDURE — G8427 DOCREV CUR MEDS BY ELIG CLIN: HCPCS | Performed by: NEUROLOGICAL SURGERY

## 2021-05-21 NOTE — PROGRESS NOTES
Physical Therapy    Outpatient Physical Therapy    [x] [place]  Phone: [phone]  Fax: [phone]      [] [place]  Phone: [phone]  Fax: [phone]    Physical Therapy  Cancellation/No-show Note  Patient Name:  Loly Buck  :  1960   Date:  2021  Cancelled visits to date: 10  No-shows to date: 1    For today's appointment patient:  [x]  Cancelled  []  Rescheduled appointment  []  No-show     Reason given by patient:   []  Patient ill  []  Conflicting appointment  []  No transportation    []  Conflict with work  []  No reason given  [x]  Other: At another doctor appt.     Comments:      Electronically signed by: Stacey Logan PTA

## 2021-05-21 NOTE — PROGRESS NOTES
[address]  [address] [facility], [address]  [facility] [address]  Dept: [phone]  Loc: [phone]    Patient:  Maren Jenkins  YOB: 1960  Date: 5/21/21    The patient is a 63 y.o. female who presents today for consult of the following problems:     Chief Complaint   Patient presents with    Back Pain     with left foot drop             HPI:     Maren Jenkins is a 63 y.o. female on whom neurosurgical consultation was requested by Mane Vaughan MD for management of multiple complaints including significant axial neck pain ranging anywhere from 7-8 out of 10 worse with change in position and ambulating. In addition she has associated left upper extremity numbness that is diffuse nondermatomal without any sparing of the digits. Exacerbation of the numbness in the left upper therapy while she is driving or utilizing the left upper extremity. Denies any right arm symptoms. Does have issues with dexterity and dropping things routinely. Some issues with ADLs including buttoning writing and sitting. Has had some associated ataxia as well. Denies any recent falls. Denies any vision changes speech issues or weakness in 1 side of the body. Also with a significant amount of axial back pain ranging over from 4-5 out of 10 that is of been ongoing for a number of years. Is currently in physical therapy for the last 1 month. Denies having any other prior injections or therapy episodes. States that the pain has been worsening over the last few years and has been seeking out help more recently. Associated numbness diffusely throughout the left lower extremity. Nondermatomal distribution. Patient states that these issues have been progressing as of recent and has been falling and having intermittent foot drop along with significant issues with dexterity.   Has not seen a neurologist but did undergo an EMG recently Visit   Medication Sig Dispense Refill    fluticasone (FLONASE) 50 MCG/ACT nasal spray 2 sprays by Nasal route daily 3 Bottle 3    gabapentin (NEURONTIN) 300 MG capsule Take 1 capsule by mouth 3 times daily for 60 days. Intended supply: 30 days 90 capsule 1    montelukast (SINGULAIR) 10 MG tablet Take 1 tablet by mouth nightly 30 tablet 3    venlafaxine (EFFEXOR XR) 150 MG extended release capsule Take 1 capsule by mouth once daily 30 capsule 3    venlafaxine (EFFEXOR XR) 37.5 MG extended release capsule Take 1 capsule by mouth once daily 30 capsule 3    baclofen (LIORESAL) 10 MG tablet Take 1 tablet by mouth nightly as needed (muscle spasms) 30 tablet 1    vitamin D (ERGOCALCIFEROL) 1.25 MG (04584 UT) CAPS capsule Take 1 capsule by mouth once a week 12 capsule 1    pantoprazole (PROTONIX) 40 MG tablet Take 1 tablet by mouth daily 90 tablet 3    albuterol sulfate HFA (PROVENTIL HFA) 108 (90 Base) MCG/ACT inhaler Inhale 2 puffs into the lungs every 4 hours as needed for Wheezing 1 Inhaler 0    Naproxen Sodium (ALEVE PO) Take by mouth daily as needed       No current facility-administered medications on file prior to visit. Social History     Tobacco Use    Smoking status: Current Every Day Smoker     Packs/day: 1.50     Years: 30.00     Pack years: 45.00     Types: Cigarettes     Start date: 1/1/1971    Smokeless tobacco: Never Used   Vaping Use    Vaping Use: Never used   Substance Use Topics    Alcohol use: No    Drug use: No       Allergies   Allergen Reactions    Pcn [Penicillins] Anaphylaxis     \"can't breath\"    Sulfa Antibiotics Anaphylaxis     \"can't breathe\"    Metformin And Related Other (See Comments)     Sweating and diarrhea       Review of Systems  Constitutional: Negative for activity change and appetite change. HENT: Negative for ear pain and facial swelling. Eyes: Negative for discharge and itching. Respiratory: Negative for choking and chest tightness.     Cardiovascular: Negative for chest pain and leg swelling. Gastrointestinal: Negative for nausea and abdominal pain. Endocrine: Negative for cold intolerance and heat intolerance. Genitourinary: Negative for frequency and flank pain. Musculoskeletal: Negative for myalgias and joint swelling. Skin: Negative for rash and wound. Allergic/Immunologic: Negative for environmental allergies and food allergies. Hematological: Negative for adenopathy. Does not bruise/bleed easily. Psychiatric/Behavioral: Negative for self-injury. The patient is not nervous/anxious. Physical Exam:      BP (!) 140/80 (Site: Left Upper Arm, Position: Sitting, Cuff Size: Large Adult)   Pulse 104   Resp 16   Ht 5' 3\" (1.6 m)   Wt 197 lb (89.4 kg)   LMP  (LMP Unknown)   Breastfeeding No   BMI 34.90 kg/m²   Estimated body mass index is 34.9 kg/m² as calculated from the following:    Height as of this encounter: 5' 3\" (1.6 m). Weight as of this encounter: 197 lb (89.4 kg). General:  Sharifa Campbell is a 63y.o. year old female who appears her stated age. HEENT: Normocephalic atraumatic. Neck supple. Chest: regular rate; pulses equal  Abdomen: Soft nontender nondistended. Normoactive bowel sounds. Ext: DP and PT pulses 2+, good cap refill  Neuro    Mentation  Appropriate affect  Registration intact  Orientation intact  3 item recall intact  Judgement intact to situation    Cranial Nerves:   Pupils equal and reactive to light  Extraocular motion intact  Face and shrug symmetric  Tongue midline  No dysarthria  v1-3 sensation symmetric, masseter tone symmetric  Hearing symmetric and intact to finger rub    Sensation:   Diminished left upper and left lower extremity. Nondermatomal distribution.     Motor  L deltoid 5/5; R deltoid 5/5  L biceps 5/5; R biceps 5/5  L triceps 5/5; R triceps 5/5  L wrist extension 5/5; R wrist extension 5/5  L intrinsics 5/5; R intrinsics 5/5     L iliopsoas 5/5 , R iliopsoas 5/5  L quadriceps 5/5; R quadriceps 5/5  L Dorsiflexion 5/5; R dorsiflexion 5/5  L Plantarflexion 5/5; R plantarflexion 5/5  L EHL 5/5; R EHL 5/5    Reflexes  Positive Balbina's bilateral.  Hyperreflexia in biceps triceps brachioradialis 3 out of 4 as well as patellar's. Studies Review:     MRI cervical spine with artifact but no critical stenosis cord distortion or signal change evident. MRI lumbar spine with anterolisthesis at L4-5 and mild to moderate bilateral lateral recess stenosis. Flexion-extension x-rays do show some instability with dynamic movement. Those films that were completed show some levoscoliosis. Assessment and Plan:      1. Spondylolisthesis of lumbar region    2. Post herpetic neuralgia    3. Cervical spondylosis with myelopathy          Plan:     Patient does have long track signs consistent with myelopathy as well as a subjective presentation despite the absence of imaging findings showing critical stenosis cord compression or signal change. The myriad of symptoms that she has including lower and upper extremity numbness in addition to long track signs warrants a thorough work-up which will include an MRI of the brain along with neurology evaluation for medical causes of myelopathy. Regarding the axial neck and back pain I do believe continuation of conservative measures at this time and I did reiterate to the patient that we do not have good surgical outcomes in terms of treating primarily axial back pain in the absence of good localization measures. Would recommend continuation of physical therapy as well as other simple conservative measures including weight loss smoking cessation and usage of insoles and regular core strengthening measures at home. In addition I will also refer her for an L4-5 interlaminar block which may help with both axial and radiculopathic symptomology.     Regarding the thoracic symptoms the pattern she is describing does not completely coincide with an axial thoracic radiculopathy considering it does not completely wraparound the subcostal margin. In any case she denies any history of rashes but still there is consideration for shingles in addition to musculoskeletal pathology and intra-abdominal pathology. Followup: No follow-ups on file. Prescriptions Ordered:  No orders of the defined types were placed in this encounter. Orders Placed:  No orders of the defined types were placed in this encounter. Electronically signed by Mary Ambrose DO on 5/21/2021 at 9:06 AM    Please note that this chart was generated using voice recognition Dragon dictation software. Although every effort was made to ensure the accuracy of this automated transcription, some errors in transcription may have occurred.

## 2021-05-25 ENCOUNTER — HOSPITAL ENCOUNTER (OUTPATIENT)
Dept: PHYSICAL THERAPY | Age: 61
Setting detail: THERAPIES SERIES
Discharge: HOME | End: 2021-05-25
Payer: COMMERCIAL

## 2021-05-25 PROCEDURE — 97110 THERAPEUTIC EXERCISES: CPT

## 2021-05-25 NOTE — FLOWSHEET NOTE
Physical Therapy Daily Treatment Note    Date:  2021    Patient Name:  Matthew Larson    :  1960  MRN: [id number]  Restrictions/Precautions:     Medical/Treatment Diagnosis Information:     Diagnosis: myleopathy      Insurance/Certification information:  PT Insurance Information: [address] [facility]  Physician Information:  Referring Practitioner: Milton Daley of care signed (Y/N): Y    Visit# / total visits:   Pain level:    5/10 mid back, 6-7/10 L knee       Time In: 9:49 AM     Time Out: 10:28 AM    Progress Note: []  Yes  [x]  No  Next due by: Visit #10      Subjective: Patient reports lower back pain on this date but increased L knee pain. She is going to be receiving a steroid shot in her low back in  from doctor Chi Rose. She seen a neuro surgeon last week that he suspects she may have the onset of MS. The doctor states there is stuff going on with the low back but nothing that needs surgical intervention. She is going to a neurologist to confirm suspicions of MS. Doctor suggests to continue with therapy until she visits with neurologist. Patient states she has to go to [facility] therapy for a 3-hour disability qualification test.     Objective: Performed there-ex as documented on flowsheet to improve strength, ROM, and stability for ease with daily tasks and ambulation. Added and advanced exercise son this date with good patient tolerance. Patient unable to perform full reps of squats due to increased knee pain. Advised patient to apply heat/ice on and off for knee pain. Verbal instructions provided throughout session.  Observation:   Concern for problems of incontinence and sense of foot drop weakness.    Will work on LE strength, balance   Test measurements: Hip Flexion: 4/5   Hip Abduction: 4/5    Exercises:   Exercise/Equipment Resistance/Repetitions Other comments     **Caution re incontinence   Bridging x20    B hip abd/add 20x3\" green   Ab bracing 20x5\"    LTR 15x5\"    SL complications  [] Other:     Prognosis: [x] Good [] Fair  [] Poor    Patient Requires Follow-up: [x] Yes  [] No      Goals:  Short term goals  Time Frame for Short term goals: 2 weeks  Short term goal 1: The patinet will demonstrate improved single leg stand to 20 sec for each LE (ongoing)  Short term goal 2: The patient will demonstrate ability to maintain abdominal bracing with stand and walking 50% of the time without verbal cues (ongoing)  Short term goal 3: Decrease pain in the left shoulder and upper back to 3/10 (7/10 mid back)  Short term goal 4: educate patient on HEP and good posural positioning  (not compliant)    Long term goals  Time Frame for Long term goals : 4 weeks  Long term goal 1: The patient will tolerate walking for 10 min without draggin her toes for improved ease to return to work (unable)  Long term goal 2: PAtient will be independent with maintaining good abdominal bracing with walking and lifting activities to complete daily activities. (frequent reminders)  Long term goal 3: Strength will be increased to 4/5 in the bialteral UE and LE to assist with daily activities (ongoing)  Long term goal 4: Increase single leg stand bilaterally to 45 sec without UE support  Long term goal 5: decreased pain and tingling in the UE and LE by 50% when completing daily activities      Plan:   [x] Continue per plan of care [] Alter current plan (see comments)  [] Plan of care initiated [] Hold pending MD visit [] Discharge    Plan for Next Session:  Progress per patient tolerance.       Electronically signed by:  Abdulkadir Whittington PTA

## 2021-05-27 ENCOUNTER — HOSPITAL ENCOUNTER (OUTPATIENT)
Dept: PHYSICAL THERAPY | Age: 61
Setting detail: THERAPIES SERIES
Discharge: HOME | End: 2021-05-27
Payer: COMMERCIAL

## 2021-05-27 NOTE — PROGRESS NOTES
Physical Therapy    Outpatient Physical Therapy    [x] [place]  Phone: [phone]  Fax: [phone]      [] [place]  Phone: [phone]  Fax: [phone]    Physical Therapy  Cancellation/No-show Note  Patient Name:  Eloisa Valero  :  1960   Date:  2021  Cancelled visits to date: 7  No-shows to date: 1    For today's appointment patient:  [x]  Cancelled  []  Rescheduled appointment  []  No-show     Reason given by patient:   []  Patient ill  []  Conflicting appointment  []  No transportation    []  Conflict with work  []  No reason given  [x]  Other:  Slept in.     Comments:      Electronically signed by: Jack Guo PTA

## 2021-05-28 ENCOUNTER — HOSPITAL ENCOUNTER (OUTPATIENT)
Dept: PHYSICAL THERAPY | Age: 61
Setting detail: THERAPIES SERIES
Discharge: HOME | End: 2021-05-28
Payer: COMMERCIAL

## 2021-05-28 PROCEDURE — G0283 ELEC STIM OTHER THAN WOUND: HCPCS

## 2021-05-28 PROCEDURE — 97110 THERAPEUTIC EXERCISES: CPT

## 2021-05-28 NOTE — FLOWSHEET NOTE
Physical Therapy Daily Treatment Note    Date:  2021    Patient Name:  Matthew Larson    :  1960  MRN: [id number]  Restrictions/Precautions:     Medical/Treatment Diagnosis Information:     Diagnosis: myleopathy      Insurance/Certification information:  PT Insurance Information: [address] [facility]  Physician Information:  Referring Practitioner: Milton Daley of care signed (Y/N): Y    Visit# / total visits:   Pain level:    6-7/10 L knee, L hip and low back       Time In: 10:19 AM     Time Out: 11:05  AM    Progress Note: []  Yes  [x]  No  Next due by: Visit #10      Subjective: Patient reports that yesterday she had a fall. She was at her [employer] game and fell trying to get up on the bleacher. She states her L knee, hip and low back are extremely sore and hurting on this date. She does not think she can do any exercises requiring knee flexion due to increased pain. She is going to look into getting an appointment with Dr. Naeem Leyva for her knee. She has noticed in the ball of her foot and toes they have started to get a  tingling sensation in them a lot. This started within the last month. Patient is very fearful of falling stating it is starting to happen more and more and she does not feel steady while ambulating anymore. Objective: Performed there-ex as documented on flowsheet to improve strength, ROM, and stability for ease with daily tasks and ambulation. Focused on back exercises on this date then placing e-stim on low back to reduce pain.  Observation:   Concern for problems of incontinence and sense of foot drop weakness.    Will work on LE strength, balance   Test measurements: Hip Flexion: 4/5   Hip Abduction: 4/5    Exercises:   Exercise/Equipment Resistance/Repetitions Other comments     **Caution re incontinence   Bridging x20    B hip abd/add 20x3\" green   Ab bracing 20x5\"    LTR 15x5\"    SL clamshell/ hip abd x15         Shoulder retraction    Ext/rows tband Reyes Manifold Fair  [] Poor    Patient Requires Follow-up: [x] Yes  [] No      Goals:  Short term goals  Time Frame for Short term goals: 2 weeks  Short term goal 1: The patinet will demonstrate improved single leg stand to 20 sec for each LE (ongoing)  Short term goal 2: The patient will demonstrate ability to maintain abdominal bracing with stand and walking 50% of the time without verbal cues (ongoing)  Short term goal 3: Decrease pain in the left shoulder and upper back to 3/10 (7/10 mid back)  Short term goal 4: educate patient on HEP and good posural positioning  (not compliant)    Long term goals  Time Frame for Long term goals : 4 weeks  Long term goal 1: The patient will tolerate walking for 10 min without draggin her toes for improved ease to return to work (unable)  Long term goal 2: PAtient will be independent with maintaining good abdominal bracing with walking and lifting activities to complete daily activities. (frequent reminders)  Long term goal 3: Strength will be increased to 4/5 in the bialteral UE and LE to assist with daily activities (ongoing)  Long term goal 4: Increase single leg stand bilaterally to 45 sec without UE support  Long term goal 5: decreased pain and tingling in the UE and LE by 50% when completing daily activities      Plan:   [x] Continue per plan of care [] Alter current plan (see comments)  [] Plan of care initiated [] Hold pending MD visit [] Discharge    Plan for Next Session:  Progress per patient tolerance.       Electronically signed by:  Bandar Eastman PTA

## 2021-06-03 ENCOUNTER — HOSPITAL ENCOUNTER (OUTPATIENT)
Dept: PHYSICAL THERAPY | Age: 61
Setting detail: THERAPIES SERIES
Discharge: HOME | End: 2021-06-03
Payer: COMMERCIAL

## 2021-06-03 PROCEDURE — 97110 THERAPEUTIC EXERCISES: CPT

## 2021-06-03 PROCEDURE — G0283 ELEC STIM OTHER THAN WOUND: HCPCS

## 2021-06-03 NOTE — FLOWSHEET NOTE
15x5\"    SL clamshell/ hip abd x20         Shoulder retraction    Ext/rows tband Green    TB SPO green             Prone  5'    RIAN 2'    Prone hip ext 2x10    PKB  2x10    Back bends  x15    Press ups 2x10              Sidesteps    FTEO foam   Counter exercises Hip abd/ext   Squat 3 positions   Step up 6 inch   4-way ankle tband  green   [x] Provided verbal/tactile cueing for activities related to strengthening, flexibility, endurance, ROM. (13234)  [] Provided verbal/tactile cueing for activities related to improving balance, coordination, kinesthetic sense, posture, motor skill, proprioception. (14288)    Therapeutic Activities:     [] Therapeutic activities, direct (one-on-one) patient contact (use of dynamic activities to improve functional performance). (11682)    Gait:   [] Provided training and instruction to the patient for ambulation re-education. (11063)    Self-Care/ADL's  [] Self-care/home management training and compensatory training, meal preparation, safety procedures, and instructions in use of assistive technology devices/adaptive equipment, direct one-on-one contact. (88867)    Home Exercise Program:     [x] Reviewed/Progressed HEP activities related to strengthening, flexibility, endurance, ROM. (69572)  [] Reviewed/Progressed HEP activities related to improving balance, coordination, kinesthetic sense, posture, motor skill, proprioception.  (79046)    Manual Treatments:    [] Provided manual therapy to mobilize soft tissue/joints for the purpose of modulating pain, promoting relaxation,  increasing ROM, reducing/eliminating soft tissue swelling/inflammation/restriction, improving soft tissue extensibility.  (41339)    Service Based Modalities:  15' IFC to low back to decrease pain levels and tightness    Treatment Minutes:   39'  SHASHI    Total Treatment Minutes: 64 '    Treatment/Activity Tolerance:  [x] Patient tolerated treatment well [x] Patient limited by kiet  [] Patient limited by pain  [] Patient limited by other medical complications  [] Other:     Prognosis: [x] Good [] Fair  [] Poor    Patient Requires Follow-up: [x] Yes  [] No      Goals:  Short term goals  Time Frame for Short term goals: 2 weeks  Short term goal 1: The patinet will demonstrate improved single leg stand to 20 sec for each LE (ongoing)  Short term goal 2: The patient will demonstrate ability to maintain abdominal bracing with stand and walking 50% of the time without verbal cues (ongoing)  Short term goal 3: Decrease pain in the left shoulder and upper back to 3/10 (patient reports she has nerve pain everyday, 4-5/10 left shoulder, upper back)  Short term goal 4: educate patient on HEP and good posural positioning  (semi compliant with HEP)    Long term goals  Time Frame for Long term goals : 4 weeks  Long term goal 1: The patient will tolerate walking for 10 min without draggin her toes for improved ease to return to work (able to walk 10-15 minutes with increased low back pain)  Long term goal 2: PAtient will be independent with maintaining good abdominal bracing with walking and lifting activities to complete daily activities. (needs frequent cueing)  Long term goal 3: Strength will be increased to 4/5 in the bialteral UE and LE to assist with daily activities (Hip 4/5 grossly, Knee 4/5 grossly, Ankle DF: 4/5)  Long term goal 4:  Increase single leg stand bilaterally to 45 sec without UE support (SLS: 19 seconds R, 21 seconds L)  Long term goal 5: decreased pain and tingling in the UE and LE by 50% when completing daily activities (continues to have pain and tingling intermittently throughout day, never completely gone even with medication)      Plan:   [] Continue per plan of care [] Alter current plan (see comments)  [] Plan of care initiated [] Hold pending MD visit [x] Discharge    Plan for Next Session:   Plan to D/C to HEP secondary to insurance limitations    Electronically signed by:  Renata Epstein PTA

## 2021-06-07 ENCOUNTER — APPOINTMENT (OUTPATIENT)
Dept: PHYSICAL THERAPY | Age: 61
End: 2021-06-07
Payer: COMMERCIAL

## 2021-06-08 ENCOUNTER — HOSPITAL ENCOUNTER (OUTPATIENT)
Dept: MRI IMAGING | Age: 61
Discharge: HOME | End: 2021-06-10

## 2021-06-08 ENCOUNTER — HOSPITAL ENCOUNTER (OUTPATIENT)
Dept: NEUROLOGY | Age: 61
Discharge: HOME | End: 2021-06-08

## 2021-06-08 DIAGNOSIS — G95.9 MYELOPATHY (HCC): ICD-10-CM

## 2021-06-08 PROCEDURE — 70551 MRI BRAIN STEM W/O DYE: CPT

## 2021-06-08 PROCEDURE — 95912 NRV CNDJ TEST 11-12 STUDIES: CPT

## 2021-06-08 PROCEDURE — 95886 MUSC TEST DONE W/N TEST COMP: CPT

## 2021-06-08 NOTE — PROGRESS NOTES
EMG/NCS Bilateral    upper Completed    PCP: Noel Laws MD    Ordering: Mary Ambrose DO    Interpreting Physician: Diamond Spencer.  Scout García, Neurologist    Technician: Bea Fothergill, RN, RN

## 2021-06-11 ENCOUNTER — APPOINTMENT (OUTPATIENT)
Dept: PHYSICAL THERAPY | Age: 61
End: 2021-06-11
Payer: COMMERCIAL

## 2021-06-14 ENCOUNTER — OFFICE VISIT (OUTPATIENT)
Dept: FAMILY MEDICINE CLINIC | Age: 61
End: 2021-06-14
Payer: COMMERCIAL

## 2021-06-14 ENCOUNTER — OFFICE VISIT (OUTPATIENT)
Dept: PAIN MANAGEMENT | Age: 61
End: 2021-06-14
Payer: COMMERCIAL

## 2021-06-14 ENCOUNTER — TELEPHONE (OUTPATIENT)
Dept: FAMILY MEDICINE CLINIC | Age: 61
End: 2021-06-14

## 2021-06-14 VITALS
OXYGEN SATURATION: 98 % | BODY MASS INDEX: 35.08 KG/M2 | HEIGHT: 63 IN | TEMPERATURE: 99 F | HEART RATE: 92 BPM | SYSTOLIC BLOOD PRESSURE: 138 MMHG | DIASTOLIC BLOOD PRESSURE: 80 MMHG | WEIGHT: 198 LBS

## 2021-06-14 VITALS
SYSTOLIC BLOOD PRESSURE: 138 MMHG | WEIGHT: 199 LBS | RESPIRATION RATE: 16 BRPM | BODY MASS INDEX: 35.26 KG/M2 | HEIGHT: 63 IN | DIASTOLIC BLOOD PRESSURE: 80 MMHG

## 2021-06-14 DIAGNOSIS — M25.562 ACUTE PAIN OF LEFT KNEE: Primary | ICD-10-CM

## 2021-06-14 DIAGNOSIS — M47.817 LUMBOSACRAL SPONDYLOSIS WITHOUT MYELOPATHY: ICD-10-CM

## 2021-06-14 DIAGNOSIS — M51.36 DDD (DEGENERATIVE DISC DISEASE), LUMBAR: ICD-10-CM

## 2021-06-14 DIAGNOSIS — J44.9 COPD WITHOUT EXACERBATION (HCC): ICD-10-CM

## 2021-06-14 DIAGNOSIS — M54.50 CHRONIC MIDLINE LOW BACK PAIN WITHOUT SCIATICA: ICD-10-CM

## 2021-06-14 DIAGNOSIS — M77.02 MEDIAL EPICONDYLITIS OF LEFT ELBOW: ICD-10-CM

## 2021-06-14 DIAGNOSIS — G89.29 CHRONIC MIDLINE LOW BACK PAIN WITHOUT SCIATICA: ICD-10-CM

## 2021-06-14 DIAGNOSIS — M54.16 LUMBAR RADICULOPATHY: Primary | ICD-10-CM

## 2021-06-14 PROCEDURE — 3023F SPIROM DOC REV: CPT | Performed by: FAMILY MEDICINE

## 2021-06-14 PROCEDURE — G8417 CALC BMI ABV UP PARAM F/U: HCPCS | Performed by: FAMILY MEDICINE

## 2021-06-14 PROCEDURE — 3017F COLORECTAL CA SCREEN DOC REV: CPT | Performed by: FAMILY MEDICINE

## 2021-06-14 PROCEDURE — G8417 CALC BMI ABV UP PARAM F/U: HCPCS | Performed by: PHYSICAL MEDICINE & REHABILITATION

## 2021-06-14 PROCEDURE — 99214 OFFICE O/P EST MOD 30 MIN: CPT | Performed by: FAMILY MEDICINE

## 2021-06-14 PROCEDURE — 4004F PT TOBACCO SCREEN RCVD TLK: CPT | Performed by: FAMILY MEDICINE

## 2021-06-14 PROCEDURE — G8926 SPIRO NO PERF OR DOC: HCPCS | Performed by: FAMILY MEDICINE

## 2021-06-14 PROCEDURE — 99213 OFFICE O/P EST LOW 20 MIN: CPT

## 2021-06-14 PROCEDURE — 4004F PT TOBACCO SCREEN RCVD TLK: CPT | Performed by: PHYSICAL MEDICINE & REHABILITATION

## 2021-06-14 PROCEDURE — 99213 OFFICE O/P EST LOW 20 MIN: CPT | Performed by: FAMILY MEDICINE

## 2021-06-14 PROCEDURE — 20610 DRAIN/INJ JOINT/BURSA W/O US: CPT | Performed by: FAMILY MEDICINE

## 2021-06-14 PROCEDURE — 99204 OFFICE O/P NEW MOD 45 MIN: CPT | Performed by: PHYSICAL MEDICINE & REHABILITATION

## 2021-06-14 PROCEDURE — G8427 DOCREV CUR MEDS BY ELIG CLIN: HCPCS | Performed by: PHYSICAL MEDICINE & REHABILITATION

## 2021-06-14 PROCEDURE — G8427 DOCREV CUR MEDS BY ELIG CLIN: HCPCS | Performed by: FAMILY MEDICINE

## 2021-06-14 PROCEDURE — 3017F COLORECTAL CA SCREEN DOC REV: CPT | Performed by: PHYSICAL MEDICINE & REHABILITATION

## 2021-06-14 RX ORDER — TRIAMCINOLONE ACETONIDE 40 MG/ML
40 INJECTION, SUSPENSION INTRA-ARTICULAR; INTRAMUSCULAR ONCE
Status: COMPLETED | OUTPATIENT
Start: 2021-06-14 | End: 2021-06-14

## 2021-06-14 RX ORDER — ALBUTEROL SULFATE 90 UG/1
2 AEROSOL, METERED RESPIRATORY (INHALATION) EVERY 4 HOURS PRN
Qty: 1 INHALER | Refills: 0 | Status: SHIPPED | OUTPATIENT
Start: 2021-06-14

## 2021-06-14 RX ORDER — VENLAFAXINE HYDROCHLORIDE 150 MG/1
CAPSULE, EXTENDED RELEASE ORAL
Qty: 30 CAPSULE | Refills: 3 | Status: SHIPPED | OUTPATIENT
Start: 2021-06-14 | End: 2021-07-30 | Stop reason: SDUPTHER

## 2021-06-14 RX ORDER — VENLAFAXINE HYDROCHLORIDE 37.5 MG/1
CAPSULE, EXTENDED RELEASE ORAL
Qty: 30 CAPSULE | Refills: 3 | Status: SHIPPED | OUTPATIENT
Start: 2021-06-14 | End: 2021-07-30 | Stop reason: SDUPTHER

## 2021-06-14 RX ADMIN — TRIAMCINOLONE ACETONIDE 40 MG: 40 INJECTION, SUSPENSION INTRA-ARTICULAR; INTRAMUSCULAR at 12:35

## 2021-06-14 SDOH — ECONOMIC STABILITY: FOOD INSECURITY: WITHIN THE PAST 12 MONTHS, THE FOOD YOU BOUGHT JUST DIDN'T LAST AND YOU DIDN'T HAVE MONEY TO GET MORE.: PATIENT DECLINED

## 2021-06-14 SDOH — ECONOMIC STABILITY: FOOD INSECURITY: WITHIN THE PAST 12 MONTHS, YOU WORRIED THAT YOUR FOOD WOULD RUN OUT BEFORE YOU GOT MONEY TO BUY MORE.: PATIENT DECLINED

## 2021-06-14 ASSESSMENT — ENCOUNTER SYMPTOMS
CHEST TIGHTNESS: 0
DIARRHEA: 0
EYES NEGATIVE: 1
WHEEZING: 0
ALLERGIC/IMMUNOLOGIC NEGATIVE: 1
BACK PAIN: 1
RESPIRATORY NEGATIVE: 1
SHORTNESS OF BREATH: 0
COUGH: 0
BACK PAIN: 1
CONSTIPATION: 0

## 2021-06-14 ASSESSMENT — SOCIAL DETERMINANTS OF HEALTH (SDOH): HOW HARD IS IT FOR YOU TO PAY FOR THE VERY BASICS LIKE FOOD, HOUSING, MEDICAL CARE, AND HEATING?: PATIENT DECLINED

## 2021-06-14 NOTE — PATIENT INSTRUCTIONS
age of 24 must have a parent or legal guardian sign the permit to be able to do the procedure. 9.  You must have finished any antibiotic prescribed for recent infections. If required, please take pre-procedure antibiotic or other pre-procedure medications as instructed. 10. Bring inhalers and pain medications with you to your procedure. 11. Bring your MRI/CT films if they were done outside of the [address] [place] [address],[place] [address]. 12. If you should develop a cold, sore throat, cough, fever or other new indication of illness or infection, or are started on antibiotics within 2 weeks of the scheduled procedure, please notify the [facility] office as early as possible at [phone]. If calling after 4:30pm the day prior to your scheduled procedure please contact [phone] and Leave a Voice Message.

## 2021-06-14 NOTE — PROGRESS NOTES
PAIN MANAGEMENTNEW PATIENT CONSULTATION  6/14/21    Bob Trimble  1960    ReferringProvider:  Mejia Chowdary, [address],  [address]  [facility] [address] [place] [address]    Primary Care Physician:  MD Юлия Peacock. 46    HPIinformation obtained from the patient as well as the Comprehensive Pain ManagementHealth Questionnaire filled out by the patient. The questionnaire will be scannedinto the electronic chart and PMH, PSH, meds, and allergies will be updates accordingly. Subjective:       CHIEF COMPLAINT:  This is a58 y.o. female patientwho presents with a complaint of Back Pain, New Patient, Lower Back Pain (left side, SI joint; Dr Angel Lynch would like to have this addressed), and Other (PT done at [facility])      PAIN HPI:    B lumbar  Pain woirse with extension radiates   Left omore than  Right    Also pain in L  Hip   Sometimes  Down   Lateral leg in a skipping pattern    ? Separate L knee  Pain  Did injection this aga     Location: Back  Location Modifier: -  Severity of Pain: 3  Duration of Pain: Intermittent  Frequency of Pain: Intermittent  Aggravating Factors: Stretching, Bending, Straightening, Standing, Walking, Stairs, Exercise, Kneeling and Squatting  Limiting Behavior: no  Relieving Factors: Heat, Cold and Medication -  Result of Injury: no  Work Related Injury: no  [place] of Worker Compensation Case: no      Prior evaluation:    Previous lower back problems:No    Previous workup: No   History of surgery in painful area:No    Previous pain medication trials have included:       Opioids: -     NSAID's:-     Muscle relaxants: -     Neuropathicpain meds: -    Previous Pain Management Physician: No   Nerve blocks, spinal injections:No   Typeof Pain Intervention . [place] Date of last Pain Intervention . Was there pain relief from Pain Intervention: No.    How long was your pain relief from the Pain Intervention. Sleep:       Difficultyfalling asleep:  No   Takes sleepingmedication: No    Mental health:    Patient feels - secondary to their current pain problems as described above. H/O depression and anxiety: No   Patient is not seeing psychologist orpsychiatrist   Abuse history? .    Employed? No  Alcohol use?: No  Tobacco use?: No  Marijuana use?: No  Illicit drug use?: No    Imaging: Reviewed available imagingin our system with the patient. No results found. Referring physician records reviewed. Review of Systems   Constitutional: Positive for fatigue. HENT: Negative. Eyes: Negative. Respiratory: Negative. Cardiovascular: Negative. Gastrointestinal: Negative for constipation and diarrhea. Endocrine: Negative. Genitourinary: Negative for difficulty urinating and flank pain. Musculoskeletal: Positive for back pain and myalgias. Skin: Negative. Allergic/Immunologic: Negative. Neurological: Positive for weakness and numbness. Hematological: Negative. Psychiatric/Behavioral: Positive for sleep disturbance. Allergies   Allergen Reactions    Pcn [Penicillins] Anaphylaxis     \"can't breath\"    Sulfa Antibiotics Anaphylaxis     \"can't breathe\"    Metformin And Related Other (See Comments)     Sweating and diarrhea       Outpatient Medications Prior to Visit   Medication Sig Dispense Refill    albuterol sulfate HFA (PROVENTIL HFA) 108 (90 Base) MCG/ACT inhaler Inhale 2 puffs into the lungs every 4 hours as needed for Wheezing 1 Inhaler 0    venlafaxine (EFFEXOR XR) 150 MG extended release capsule Take 1 capsule by mouth once daily 30 capsule 3    venlafaxine (EFFEXOR XR) 37.5 MG extended release capsule Take 1 capsule by mouth once daily 30 capsule 3    Misc. Devices (CANE) MISC Use daily 1 each 0    fluticasone (FLONASE) 50 MCG/ACT nasal spray 2 sprays by Nasal route daily 3 Bottle 3    gabapentin (NEURONTIN) 300 MG capsule Take 1 capsule by mouth 3 times daily for 60 days.  Intended supply: 30 days 90 capsule 1    montelukast (SINGULAIR) 10 MG tablet Take 1 tablet by mouth nightly 30 tablet 3    baclofen (LIORESAL) 10 MG tablet Take 1 tablet by mouth nightly as needed (muscle spasms) 30 tablet 1    vitamin D (ERGOCALCIFEROL) 1.25 MG (06448 UT) CAPS capsule Take 1 capsule by mouth once a week 12 capsule 1    pantoprazole (PROTONIX) 40 MG tablet Take 1 tablet by mouth daily 90 tablet 3    Naproxen Sodium (ALEVE PO) Take by mouth daily as needed       Facility-Administered Medications Prior to Visit   Medication Dose Route Frequency Provider Last Rate Last Admin    triamcinolone acetonide (KENALOG-40) injection 40 mg  40 mg Intramuscular Once Chris Durán MD           Past Medical History:   Diagnosis Date    Allergic rhinitis     Arthritis     Asthma     Bronchitis     [facility]     Depression     Diabetes mellitus [facility]     Headache     Hyperlipidemia     Hypertension     Incontinence     Irritable bowel syndrome     Kidney stone     Osteoarthritis     Pneumonia     Type 2 diabetes mellitus without complication ([facility] 7917    Ulcer        Past Surgical History:   Procedure Laterality Date    CARPAL TUNNEL RELEASE Right 5/7/2019    Right Carpal Tunnel Release performed by Alan English MD at [address] [facility], [address] [place] [address] COLONOSCOPY  7/11/208    Serated polyp (1)    COLONOSCOPY  08/02/2017    [id number]    COLONOSCOPY Left 10/8/2019    COLONOSCOPY WITH BIOPSY performed by Oswaldo Armas MD at [address] [place] [address], [address] [place] [address]    with Right oopherectomy still has left ovary    LAPAROSCOPY  2008    Diagnostic for pain - no findings    MECKEL DIVERTICULUM EXCISION  1970    TONSILLECTOMY      UPPER GASTROINTESTINAL ENDOSCOPY  08/02/2017    [id number]    UPPER GASTROINTESTINAL ENDOSCOPY Left 10/8/2019    EGD BIOPSY performed by Oswaldo Armas MD at [facility]       Family History   Adopted:  Yes Family history unknown: Yes     Social History     Socioeconomic History    Marital status:      Spouse name: None    Number of children: None    Years of education: None    Highest education level: None   Occupational History    Occupation:      Employer: [employer]   Tobacco Use    Smoking status: Current Every Day Smoker     Packs/day: 1.50     Years: 30.00     Pack years: 45.00     Types: Cigarettes     Start date: 1/1/1971    Smokeless tobacco: Never Used   Vaping Use    Vaping Use: Never used   Substance and Sexual Activity    Alcohol use: No    Drug use: No    Sexual activity: Yes     Comment:  x 11 yrs   Other Topics Concern    None   Social History Narrative    None     Social Determinants of Health     Financial Resource Strain: Unknown    Difficulty of Paying Living Expenses: Patient refused   Food Insecurity: Unknown    Worried About Running Out of Food in the Last Year: Patient refused    Ran Out of Food in the Last Year: Patient refused   Transportation Needs:     Lack of Transportation (Medical):  Lack of Transportation (Non-Medical):    Physical Activity:     Days of Exercise per Week:     Minutes of Exercise per Session:    Stress:     Feeling of Stress :    Social Connections:     Frequency of Communication with Friends and Family:     Frequency of Social Gatherings with Friends and Family:     Attends Sabianist Services:     Active Member of Clubs or Organizations:     Attends Club or Organization Meetings:     Marital Status:    Intimate Partner Violence:     Fear of Current or Ex-Partner:     Emotionally Abused:     Physically Abused:     Sexually Abused:          Objective:     Physical Exam:  Vitals:    06/14/21 1112 06/14/21 1114   BP:  138/80   Resp: 16    Weight: 199 lb (90.3 kg)    Height: 5' 3\" (1.6 m)      Pain Score:   5    Physical Exam  Constitutional:       General: She is not in acute distress.      Appearance: Normal appearance. She is well-developed. She is not diaphoretic. HENT:      Head: Normocephalic and atraumatic. Right Ear: External ear normal. No decreased hearing noted. Left Ear: External ear normal. No decreased hearing noted. Nose: Nose normal.   Eyes:      General: Lids are normal. No scleral icterus. Conjunctiva/sclera: Conjunctivae normal.   Neck:      Trachea: Phonation normal.   Cardiovascular:      Comments: No BLE edema present  Pulmonary:      Effort: Pulmonary effort is normal. No accessory muscle usage or respiratory distress. Genitourinary:     Comments: deferred  Musculoskeletal:      Lumbar back: Negative right straight leg raise test and negative left straight leg raise test.   Skin:     General: Skin is warm and dry. Coloration: Skin is not pale. Findings: No erythema or rash. Neurological:      General: No focal deficit present. Mental Status: She is alert and oriented to person, place, and time. Psychiatric:         Mood and Affect: Mood normal.         Speech: Speech normal.         Behavior: Behavior normal.         Back Exam     Tenderness   The patient is experiencing tenderness in the lumbar.     Range of Motion   Extension: normal   Flexion: normal   Lateral bend right: normal   Lateral bend left: normal   Rotation right: normal   Rotation left: normal     Muscle Strength   Right Quadriceps:  5/5   Left Quadriceps:  5/5   Right Hamstrings:  5/5   Left Hamstrings:  5/5     Tests   Straight leg raise right: negative  Straight leg raise left: negative    Other   Toe walk: normal  Heel walk: normal  Sensation: normal  Gait: normal     Comments:  +slump  -kemps  -fabers              Labs:   Lab Results   Component Value Date    WBC 8.0 01/07/2021    HGB 14.2 01/07/2021    HCT 44.2 01/07/2021     01/07/2021    CHOL 237 (H) 01/07/2021    TRIG 82 01/07/2021    HDL 69 01/07/2021    ALT 9 06/02/2020    AST 11 06/02/2020     01/07/2021    K 4.6 01/07/2021     01/07/2021    CREATININE 0.67 01/07/2021    BUN 12 01/07/2021    CO2 31 01/07/2021    TSH 0.46 01/07/2021    INR 1.0 01/15/2017    LABA1C 6.4 (H) 01/07/2021    LABMICR 9 01/07/2021       Assessment: This is a 63 y.o. female with the following diagnosis:     Pain Diagnoses:  1. Lumbar radiculopathy    2. Lumbosacral spondylosis without myelopathy    3. DDD (degenerative disc disease), lumbar        Medical/ Psychological Comorbidities:  As listed in the past medical and surgical history    Functional Limitations secondary to the above problems:  Chronic painlimits function and quality of life    Plan:     1. L4-5 IESI    2. Meds:   New Prescriptions    No medications on file   These medications were prescribed by a provider not a part of this encounter    [facility]. DEVICES (CANE) MISC    Use daily      No orders of the defined types were placed in this encounter. Controlled Substances Monitoring:    OARRS report was reviewed for [place], [place]. Pt educated about the risks of taking opiates, including increasedsedation, constipation, slowed breathing, tolerance, dependence, and addiction. No orders of the defined types were placed in this encounter. No follow-ups on file. The patient expressed understanding of the above assessment and plan. Totaltime spent face to face with patient was 30 minutes inwhich  50% or more of the time was spent in counseling, education about risk andbenefits of the above plan, and coordination of care.

## 2021-06-14 NOTE — PATIENT INSTRUCTIONS
Patient Education        Golalon's Elbow: Exercises  Introduction  Here are some examples of exercises for you to try. The exercises may be suggested for a condition or for rehabilitation. Start each exercise slowly. Ease off the exercises if you start to have pain. You will be told when to start these exercises and which ones will work best for you. How to do the exercises  Wrist extensor stretch   1. Extend your affected arm in front of you and make a fist with your palm facing down. 2. Bend your wrist so that your fist points toward the floor. 3. With your other hand, gently bend your wrist farther until you feel a mild to moderate stretch in your forearm. 4. Hold for at least 15 to 30 seconds. 5. Repeat 2 to 4 times. 6. Repeat steps 1 through 5 with your fingers pointing toward the floor. Forearm extensor stretch   1. Place your affected elbow down at your side, bent at about 90 degrees. Then make a fist with your palm facing down. 2. Keeping your wrist bent, slowly straighten your elbow so your arm is down at your side. Then twist your fist out so your palm is facing out to the side and you feel a stretch. 3. Hold for at least 15 to 30 seconds. 4. Repeat 2 to 4 times. Wrist flexor stretch   1. Extend your affected arm in front of you with your palm facing away from your body. 2. Bend back your wrist, pointing your hand up toward the ceiling. 3. With your other hand, gently bend your wrist farther until you feel a mild to moderate stretch in your forearm. 4. Hold for at least 15 to 30 seconds. 5. Repeat 2 to 4 times. 6. Repeat steps 1 through 5, but this time extend your affected arm in front of you with your palm facing up. Then bend back your wrist, pointing your hand toward the floor. Wrist curls   1. Place your forearm on a table with your hand hanging over the edge of the table, palm up. 2. Place a 1- to 2-pound weight in your hand.  This may be a dumbbell, a can of food, or a your thigh with your hand and wrist in front of your knee. 2. Grasp one end of an exercise band with your palm up, and step on the other end. 3. Keeping your wrist straight, roll your palm inward toward your thigh for a count of 2, then slowly move your wrist back to the starting position to a count of 5.  4. Repeat 8 to 12 times. Resisted supination   1. Sit leaning forward with your legs slightly spread. Then place your affected forearm on your thigh with your hand and wrist in front of your knee. 2. Grasp one end of an exercise band with your palm down, and step on the other end. 3. Keeping your wrist straight, roll your palm outward and away from your thigh for a count of 2, then slowly move your wrist back to the starting position to a count of 5.  4. Repeat 8 to 12 times. Follow-up care is a key part of your treatment and safety. Be sure to make and go to all appointments, and call your doctor if you are having problems. It's also a good idea to know your test results and keep a list of the medicines you take. Where can you learn more? Go to https://[email or web address]. org and sign in to your [employer] account. Enter [phone] in the [facility] box to learn more about \"Golfer's Elbow: Exercises. \"     If you do not have an account, please click on the \"Sign Up Now\" link. Current as of: November 16, 2020               Content Version: 12.8  © 2006-2021 Healthwise, Incorporated. Care instructions adapted under license by [facility] ([facility]). If you have questions about a medical condition or this instruction, always ask your healthcare professional. [place] [address] any warranty or liability for your use of this information. Patient Education        Knee Arthritis: Exercises  Introduction  Here are some examples of exercises for you to try. The exercises may be suggested for a condition or for rehabilitation. Start each exercise slowly.  Ease off the exercises if kneecap is uncomfortable, roll up a washcloth and put it under your leg just above your kneecap. 2. Lift the foot of your affected leg by bending the knee so that you bring the foot up toward your buttock. If this motion hurts, try it without bending your knee quite as far. This may help you avoid any painful motion. 3. Slowly move your leg up and down. 4. Repeat 8 to 12 times. 5. Switch legs and repeat steps 1 through 4, even if only one knee is sore. Quadriceps stretch (facedown)   1. Lie flat on your stomach, and rest your face on the floor. 2. Wrap a towel or belt strap around the lower part of your affected leg. Then use the towel or belt strap to slowly pull your heel toward your buttock until you feel a stretch. 3. Hold for about 15 to 30 seconds, then relax your leg against the towel or belt strap. 4. Repeat 2 to 4 times. 5. Switch legs and repeat steps 1 through 4, even if only one knee is sore. Stationary exercise bike   1. If you do not have a stationary exercise bike at home, you can find one to ride at your local health club or community center. 2. Adjust the height of the bike seat so that your knee is slightly bent when your leg is extended downward. If your knee hurts when the pedal reaches the top, you can raise the seat so that your knee does not bend as much. 3. Start slowly. At first, try to do 5 to 10 minutes of cycling with little to no resistance. Then increase your time and the resistance bit by bit until you can do 20 to 30 minutes without pain. 4. If you start to have pain, rest your knee until your pain gets back to the level that is normal for you. Or cycle for less time or with less effort. Follow-up care is a key part of your treatment and safety. Be sure to make and go to all appointments, and call your doctor if you are having problems. It's also a good idea to know your test results and keep a list of the medicines you take. Where can you learn more?   Go to

## 2021-06-14 NOTE — LETTER
[facility] department of [place] [address]  Phone: [phone]  Fax: [phone]    Karena Jerez MD        June 14, 2021     Patient: Elizabeth Yusuf   YOB: 1960   Date of Visit: 6/14/2021       To Whom It May Concern: It is my medical opinion that Vandana Vance should remain out of work until 8/1/21. If you have any questions or concerns, please don't hesitate to call.     Sincerely,        Karena Jerez MD

## 2021-06-14 NOTE — TELEPHONE ENCOUNTER
She needs a brace for her foot drop and supposedly I sent one to [facility] but they don't have them and it needs to go to an orthotics place. She said there is one [place] that comes to [place]. Can you find out which place that is and how I need to write the order?  (I can't find my other one in the computer)

## 2021-06-14 NOTE — PROGRESS NOTES
[facility] [address]  [address] [address]  Dept: [phone]  Dept Fax: [phone]  Loc: [phone]    Miles Ellington is a 63 y.o. female who presents today for her medical conditions/complaints as noted below. Miles Ellington is c/o of   Chief Complaint   Patient presents with    Back Pain     6 week follow up    Knee Pain     left knee pain, unable to tolerate weight on it or bend it; no known injury       HPI:      Here today for a follow up of her back pain and knee pain. Knee Pain   The incident occurred more than 1 week ago (1 month). There was no injury mechanism. The pain is present in the left knee. The quality of the pain is described as stabbing and aching. The pain is at a severity of 5/10. The pain is moderate. The pain has been worsening since onset. Associated symptoms include an inability to bear weight and muscle weakness. Pertinent negatives include no loss of motion, loss of sensation, numbness or tingling. Associated symptoms comments: She can feel her knee cap moving when she turns. She reports no foreign bodies present. The symptoms are aggravated by movement and weight bearing. She has tried NSAIDs for the symptoms. The treatment provided mild relief. Back pain: worsening; she is having problems with significant incontinence when she is sleeping. Her neurosurgeon referred her to a neurologist and a pain management doctor because he does not think she is a surgical candidate and he thinks there is more going on than just her back. She has issues with incontinence during the day that does not seem to be related to stress or urgency. She goes through a lot of depends. She has had some pain in her left elbow.      Past Medical History:   Diagnosis Date    Allergic rhinitis     Arthritis     Asthma     Bronchitis     [facility] ([facility]     Depression     Diabetes mellitus ([facility]     Headache     Hyperlipidemia     Hypertension     Incontinence     Irritable bowel syndrome     Kidney stone     Osteoarthritis     Pneumonia     Type 2 diabetes mellitus without complication ([facility] 8738    Ulcer           Social History     Tobacco Use    Smoking status: Current Every Day Smoker     Packs/day: 1.50     Years: 30.00     Pack years: 45.00     Types: Cigarettes     Start date: 1/1/1971    Smokeless tobacco: Never Used   Substance Use Topics    Alcohol use: No     Current Outpatient Medications   Medication Sig Dispense Refill    albuterol sulfate HFA (PROVENTIL HFA) 108 (90 Base) MCG/ACT inhaler Inhale 2 puffs into the lungs every 4 hours as needed for Wheezing 1 Inhaler 0    venlafaxine (EFFEXOR XR) 150 MG extended release capsule Take 1 capsule by mouth once daily 30 capsule 3    venlafaxine (EFFEXOR XR) 37.5 MG extended release capsule Take 1 capsule by mouth once daily 30 capsule 3    Misc. Devices (CANE) MISC Use daily 1 each 0    fluticasone (FLONASE) 50 MCG/ACT nasal spray 2 sprays by Nasal route daily 3 Bottle 3    gabapentin (NEURONTIN) 300 MG capsule Take 1 capsule by mouth 3 times daily for 60 days. Intended supply: 30 days 90 capsule 1    montelukast (SINGULAIR) 10 MG tablet Take 1 tablet by mouth nightly 30 tablet 3    baclofen (LIORESAL) 10 MG tablet Take 1 tablet by mouth nightly as needed (muscle spasms) 30 tablet 1    vitamin D (ERGOCALCIFEROL) 1.25 MG (23994 UT) CAPS capsule Take 1 capsule by mouth once a week 12 capsule 1    pantoprazole (PROTONIX) 40 MG tablet Take 1 tablet by mouth daily 90 tablet 3    Naproxen Sodium (ALEVE PO) Take by mouth daily as needed       No current facility-administered medications for this visit.           Allergies   Allergen Reactions    Pcn [Penicillins] Anaphylaxis     \"can't breath\"    Sulfa Antibiotics Anaphylaxis     \"can't breathe\"    Metformin And Related Other (See Comments) Sweating and diarrhea       Subjective:     Review of Systems   Constitutional: Negative for activity change, appetite change, chills, fatigue and fever. Eyes: Negative for visual disturbance. Respiratory: Negative for cough, chest tightness, shortness of breath and wheezing. Cardiovascular: Negative for chest pain, palpitations and leg swelling. Genitourinary: Negative for difficulty urinating. Urinary incontinence    Musculoskeletal: Positive for arthralgias (left elbow), back pain, gait problem and joint swelling (in left knee). Neurological: Positive for headaches. Negative for dizziness, tingling, syncope, weakness, light-headedness and numbness. Objective:      Physical Exam  Vitals and nursing note reviewed. Constitutional:       General: She is not in acute distress. Appearance: She is well-developed. Eyes:      Conjunctiva/sclera: Conjunctivae normal.   Neck:      Thyroid: No thyromegaly. Cardiovascular:      Rate and Rhythm: Normal rate and regular rhythm. Heart sounds: Normal heart sounds. No murmur heard. Pulmonary:      Effort: Pulmonary effort is normal. No respiratory distress. Breath sounds: Normal breath sounds. No wheezing. Musculoskeletal:      Left elbow: No swelling, deformity or effusion. Normal range of motion. Tenderness present in medial epicondyle. Cervical back: Normal range of motion and neck supple. Left knee: Swelling, effusion and crepitus (very mild) present. No erythema. Decreased range of motion (unable to fully flex knee). No tenderness. No LCL laxity, MCL laxity, ACL laxity or PCL laxity. Abnormal patellar mobility. Normal meniscus. Instability Tests: Anterior drawer test negative. Posterior drawer test negative. Lymphadenopathy:      Cervical: No cervical adenopathy. Skin:     General: Skin is warm and dry. Findings: No erythema or rash.    Neurological:      Mental Status: She is alert and oriented to person, place, and time. Psychiatric:         Mood and Affect: Mood normal.         Behavior: Behavior normal.       /80   Pulse 92   Temp 99 °F (37.2 °C)   Ht 5' 3\" (1.6 m)   Wt 198 lb (89.8 kg)   LMP  (LMP Unknown)   SpO2 98%   BMI 35.07 kg/m²     Assessment:       Diagnosis Orders   1. Acute pain of left knee  OR ARTHROCENTESIS ASPIR&/INJ MAJOR JT/BURSA W/O US    triamcinolone acetonide (KENALOG-40) injection 40 mg   2. Chronic midline low back pain without sciatica     3. Medial epicondylitis of left elbow     4. COPD without exacerbation (HCC)  albuterol sulfate HFA (PROVENTIL HFA) 108 (90 Base) MCG/ACT inhaler             Plan:        Knee pain: new; likely arthritis that has gotten inflamed. I recommended a steroid injection which she was agreeable to. Procedure Note    PREOP DIAGNOSIS:  [] Right [x]  Left    [] Bilateral Knee Pain    POSTOP DIAGNOSIS:  [] Right [x]  Left    [] Bilateral Knee Pain    OPERATION:  [] Right [x]  Left    [] Bilateral INTRA-ARTICULAR KNEE INJECTION    PROCEDURE:  After sterile prep, an anterolateral approach was used. [x] 40 mg Kenalog  [x]  2 ml of 2% lidocaine without epi injected intra-articularly without incident. Pre- and post neurovascular status verified. No complications noted. Back pain: stable; she has started seeing pain management and she is going to be seeing neurology as well. Hopefully something starts improving her symptoms. For now I have her off of work until 8/1/21. She also needs a brace to help with her foot drop. We sent a script to [facility] but they don't carry them so it needs sent somewhere else. Medial epicondylitis: new; I recommended some home exercises. Return in about 3 months (around 9/14/2021).     Orders Placed This Encounter   Procedures    OR ARTHROCENTESIS ASPIR&/INJ MAJOR JT/BURSA W/O US     Orders Placed This Encounter   Medications    albuterol sulfate HFA (PROVENTIL HFA) 108 (90 Base) MCG/ACT inhaler Sig: Inhale 2 puffs into the lungs every 4 hours as needed for Wheezing     Dispense:  1 Inhaler     Refill:  0    venlafaxine (EFFEXOR XR) 150 MG extended release capsule     Sig: Take 1 capsule by mouth once daily     Dispense:  30 capsule     Refill:  3    venlafaxine (EFFEXOR XR) 37.5 MG extended release capsule     Sig: Take 1 capsule by mouth once daily     Dispense:  30 capsule     Refill:  3    triamcinolone acetonide (KENALOG-40) injection 40 mg    Misc. Devices (CANE) MISC     Sig: Use daily     Dispense:  1 each     Refill:  0       Patientgiven educational materials - see patient instructions. Discussed use, benefit,and side effects of prescribed medications. All patient questions answered. Ptvoiced understanding. Reviewed health maintenance. Instructed to continue currentmedications, diet and exercise. Patient agreed with treatment plan. Follow up asdirected.      Electronically signed by Aby Pierson MD on 6/14/2021 at 12:58 PM

## 2021-06-14 NOTE — Clinical Note
[address] Pain Management A department of [place] [address]  Phone: [phone]  Fax: [phone]    Piedad Skiff, MD        June 14, 2021     Patient: Sergio Gil   YOB: 1960   Date of Visit: 6/14/2021       To Whom It May Concern: It is my medical opinion that Medina Pulido {Work release (duty restriction):58461}. If you have any questions or concerns, please don't hesitate to call.     Sincerely,        Piedad Skiff, MD

## 2021-06-14 NOTE — LETTER
[facility] A department of [facility]  Phone: [phone]  Fax: [phone]    Alcides Chao MD    June 14, 2021     Herbert Kim, [address]    Patient: Odilia Douglas   MR Number: [id number]   YOB: 1960   Date of Visit: 6/14/2021       Dear Herbert Kim: Thank you for referring Ane Pouch to me for evaluation/treatment. Below are the relevant portions of my assessment and plan of care. If you have questions, please do not hesitate to call me. I look forward to following Alayna along with you.     Sincerely,        Alcides Chao MD

## 2021-06-18 ENCOUNTER — OFFICE VISIT (OUTPATIENT)
Dept: NEUROLOGY | Age: 61
End: 2021-06-18

## 2021-06-18 ENCOUNTER — HOSPITAL ENCOUNTER (OUTPATIENT)
Dept: GENERAL RADIOLOGY | Age: 61
Discharge: HOME | End: 2021-06-20

## 2021-06-18 ENCOUNTER — HOSPITAL ENCOUNTER (OUTPATIENT)
Dept: LAB | Age: 61
Discharge: HOME | End: 2021-06-18

## 2021-06-18 VITALS
OXYGEN SATURATION: 95 % | WEIGHT: 197.8 LBS | HEIGHT: 63 IN | DIASTOLIC BLOOD PRESSURE: 82 MMHG | SYSTOLIC BLOOD PRESSURE: 130 MMHG | HEART RATE: 102 BPM | BODY MASS INDEX: 35.05 KG/M2

## 2021-06-18 DIAGNOSIS — G56.01 CARPAL TUNNEL SYNDROME OF RIGHT WRIST: ICD-10-CM

## 2021-06-18 DIAGNOSIS — G60.8 POLYNEUROPATHY, PERIPHERAL SENSORIMOTOR AXONAL: ICD-10-CM

## 2021-06-18 DIAGNOSIS — Z91.81 H/O FALL: ICD-10-CM

## 2021-06-18 DIAGNOSIS — G31.9 ACQUIRED CEREBRAL ATROPHY (HCC): ICD-10-CM

## 2021-06-18 DIAGNOSIS — F32.0 CURRENT MILD EPISODE OF MAJOR DEPRESSIVE DISORDER, UNSPECIFIED WHETHER RECURRENT (HCC): ICD-10-CM

## 2021-06-18 DIAGNOSIS — K21.9 GASTROESOPHAGEAL REFLUX DISEASE, UNSPECIFIED WHETHER ESOPHAGITIS PRESENT: ICD-10-CM

## 2021-06-18 DIAGNOSIS — M79.2 NEUROPATHIC PAIN: ICD-10-CM

## 2021-06-18 DIAGNOSIS — M54.6 CHRONIC BILATERAL THORACIC BACK PAIN: ICD-10-CM

## 2021-06-18 DIAGNOSIS — R26.89 BALANCE PROBLEM: ICD-10-CM

## 2021-06-18 DIAGNOSIS — I67.82 CHRONIC CEREBRAL ISCHEMIA: ICD-10-CM

## 2021-06-18 DIAGNOSIS — R20.2 NUMBNESS AND TINGLING: ICD-10-CM

## 2021-06-18 DIAGNOSIS — Z72.0 TOBACCO USE: ICD-10-CM

## 2021-06-18 DIAGNOSIS — M54.12 CHRONIC CERVICAL RADICULOPATHY: Primary | ICD-10-CM

## 2021-06-18 DIAGNOSIS — E11.9 TYPE 2 DIABETES MELLITUS WITHOUT COMPLICATION, WITHOUT LONG-TERM CURRENT USE OF INSULIN (HCC): ICD-10-CM

## 2021-06-18 DIAGNOSIS — G89.29 CHRONIC RADICULAR LUMBAR PAIN: ICD-10-CM

## 2021-06-18 DIAGNOSIS — M48.04 SPINAL STENOSIS, THORACIC REGION: ICD-10-CM

## 2021-06-18 DIAGNOSIS — G56.03 BILATERAL CARPAL TUNNEL SYNDROME: ICD-10-CM

## 2021-06-18 DIAGNOSIS — M47.12 CERVICAL SPONDYLOSIS WITH MYELOPATHY: ICD-10-CM

## 2021-06-18 DIAGNOSIS — G89.29 CHRONIC BILATERAL THORACIC BACK PAIN: ICD-10-CM

## 2021-06-18 DIAGNOSIS — M54.16 CHRONIC RADICULAR LUMBAR PAIN: ICD-10-CM

## 2021-06-18 DIAGNOSIS — M62.838 MUSCLE SPASM: ICD-10-CM

## 2021-06-18 DIAGNOSIS — M51.16 LUMBAR DISC DISEASE WITH RADICULOPATHY: ICD-10-CM

## 2021-06-18 DIAGNOSIS — R20.0 NUMBNESS AND TINGLING: ICD-10-CM

## 2021-06-18 LAB
FOLATE: 12.2 NG/ML
VITAMIN B-12: 451 PG/ML (ref 232–1245)

## 2021-06-18 PROCEDURE — 99213 OFFICE O/P EST LOW 20 MIN: CPT | Performed by: PSYCHIATRY & NEUROLOGY

## 2021-06-18 PROCEDURE — 86696 HERPES SIMPLEX TYPE 2 TEST: CPT

## 2021-06-18 PROCEDURE — 84165 PROTEIN E-PHORESIS SERUM: CPT

## 2021-06-18 PROCEDURE — 82607 VITAMIN B-12: CPT

## 2021-06-18 PROCEDURE — 36415 COLL VENOUS BLD VENIPUNCTURE: CPT

## 2021-06-18 PROCEDURE — 99245 OFF/OP CONSLTJ NEW/EST HI 55: CPT | Performed by: PSYCHIATRY & NEUROLOGY

## 2021-06-18 PROCEDURE — 86694 HERPES SIMPLEX NES ANTBDY: CPT

## 2021-06-18 PROCEDURE — 84155 ASSAY OF PROTEIN SERUM: CPT

## 2021-06-18 PROCEDURE — 86695 HERPES SIMPLEX TYPE 1 TEST: CPT

## 2021-06-18 PROCEDURE — L3908 WHO COCK-UP NONMOLDE PRE OTS: HCPCS | Performed by: PSYCHIATRY & NEUROLOGY

## 2021-06-18 PROCEDURE — 82746 ASSAY OF FOLIC ACID SERUM: CPT

## 2021-06-18 PROCEDURE — 71046 X-RAY EXAM CHEST 2 VIEWS: CPT

## 2021-06-18 ASSESSMENT — ENCOUNTER SYMPTOMS
BLOOD IN STOOL: 0
PHOTOPHOBIA: 0
EYE ITCHING: 0
NAUSEA: 0
SORE THROAT: 0
VOMITING: 0
CHEST TIGHTNESS: 0
EYE DISCHARGE: 0
BOWEL INCONTINENCE: 0
VOICE CHANGE: 0
EYE REDNESS: 0
ABDOMINAL PAIN: 0
EYE PAIN: 0
BACK PAIN: 1
VISUAL CHANGE: 0
SHORTNESS OF BREATH: 0
CHOKING: 0
SINUS PRESSURE: 0
FACIAL SWELLING: 0
DIARRHEA: 0
TROUBLE SWALLOWING: 0
CONSTIPATION: 0
WHEEZING: 0
ABDOMINAL DISTENTION: 0
APNEA: 0
COUGH: 0
COLOR CHANGE: 0

## 2021-06-18 NOTE — PROGRESS NOTES
[facility]  Neurology    [address] [address]  LQZPW:[phone]   Fax: [phone]        SUBJECTIVE:       PATIENT ID:  Azeem Sung is a  RIGHT     HANDED 63 y.o. female. Neurologic Problem  The patient's primary symptoms include clumsiness, focal sensory loss, focal weakness, a loss of balance and weakness. The patient's pertinent negatives include no altered mental status, memory loss, near-syncope, slurred speech, syncope or visual change. Primary symptoms comment: H/O   CHRONIC  NECK  AND  BACK  PAIN     FOR    MORE   THAN   20   YEARS,   H/O    FALLING. This is a chronic problem. The neurological problem developed insidiously. The problem has been gradually worsening since onset. There was lower extremity, left-sided, right-sided and upper extremity focality noted. Associated symptoms include back pain and neck pain. Pertinent negatives include no abdominal pain, auditory change, aura, bladder incontinence, bowel incontinence, chest pain, confusion, diaphoresis, dizziness, fatigue, fever, headaches, light-headedness, nausea, palpitations, shortness of breath, vertigo or vomiting. (MUSCLE  SPASMS,   CHRONIC    PAIN) Past treatments include medication and bed rest. The treatment provided no relief. Her past medical history is significant for mood changes. There is no history of a bleeding disorder, a clotting disorder, a CVA, dementia, head trauma, liver disease or seizures. History obtained from  The patient     and other  available   medical  records   were  Also  reviewed. The  Duration,  Quality,  Severity,  Location,  Timing,  Context,  Modifying  Factors   Of   The   Chief   Complaint       And  Present  Illness  Was   Reviewed   In   Chronological   Manner.                                             PATIENT'S  MAIN  CONCERNS INCLUDE :                     1)        H/O     CHRONIC       NECK   PAIN       FOR    30   YEARS 2)    H/O      CHRONIC  LUMBAR  PAIN     FOR     20  YEARS                       3)      H/O      CHRONIC   THORACIC    PAIN      FOR     TWO   YEARS                        4)         WORSENING  OF      NECK  PAIN      WITH  RADICULAR  SYMPTOMS                                  ON  LEFT  SIDE          SINCE     2020                           5)     WORSENING  OF    THORACIC    AND     LUMBAR  PAIN       WITH                                     MUSCLE  SPASMS     SINCE      2020                           6)     MRI   THORACIC  AND  LUMBAR  SPINE IN  JAN. 2021                          REPORT     SHOWED  :             \"                            No fracture within the thoracic or lumbar spine.       No cord signal abnormality within the thoracic or lumbar spine.  No cord   compression.       Mild levoscoliosis with tip at L3-L4.  Mild dextroscoliosis with tip at T7-8.       At the level of T7-T8, T6-T7, T4-T5 and T2-T3, posterior disc protrusion   indents the anterior spinal cord.       Mild canal stenosis at T7-T8 and T2-T3 level.       At L4-L5, grade 1 anterolisthesis, moderate canal stenosis and mom mild   bilateral neural foraminal narrowing   \"                    7)     EMG/  NC  STUDIES OF  BOTH  LOWER  EXTREMITIES     IN  JAN. 2021                          SHOWED :                            A)     MILD     TO  MODERATE    PERIPHERAL  POLYNEUROPATHY                         B)    CHRONIC  L 4 - 5    AND    L 5 -  S 1   RADICULOPATHY                          8)    MRI    CERVICAL   SPINE    IN   [facility]   2021                             REPORT   SHOWED  :             \"           No cord signal abnormality.       No canal stenosis.  No nerve impingement.  No significant posterior disc   pathology.       At C3-C4, grade 1 anterolisthesis, mild canal stenosis and moderate left   neural foraminal narrowing.       At C4-C5, grade 1 anterolisthesis and moderate left neural foraminal   Narrowing.    \"     8)     PATIENT  HAD  NEURO  SURGERY    EVALUATIONS      IN   MAY   2021                         RECOMMENDED     CONSERVATIVE    MANAGEMENT                             9)        EMG  /  NC   STUDIES      OF  BOTH  UPPER  EXTREMITIES  IN  June 2021                            SHOWED  :                           BILATERAL    MILD  TO  MODERATE     CARPAL   TUNNEL    SYNDROME ,                               MORE   SO     ON  RIGHT  SIDE                           -  PATIENT  ADVISED   TO   WEAR    WRIST  BRACES                        10)     MRI  BRAIN  IN  June 2021      REPORT    SHOWED                                                            \"  No acute intracranial abnormality.       Minimal parenchymal volume loss.       Minimal chronic microvascular disease      '                  11)      KNOWN     H/O    TYPE    II     DM                                WITH    ELEVATED   HEMOGLOBIN  A 1 C                             WITH  PERIPHERAL  POLYNEUROPATHY                        12)      MULTIPLE  CO MORBID  MEDICAL    CONDITIONS                             BEING  FOLLOWED   BY   HER PCP                                 13)       H/O     CHRONIC  SMOKING                    PATIENT  AWARE  OF  RISKS  AND                 SIDE  EFFECTS  OF   SMOKING   DISCUSSED. PATIENT   ADVISED   AND  COUNSELED    TO   QUIT  SMOKING.                                     14)     PATIENT     STARTED     PAIN  MANAGEMENT   IN    June 2021                                              15)     PATIENT   ON    NEURONTIN ,    BACLOFEN       AND  ALEVE   AS  NEEDED                       16)      H/O    CHRONIC   ANXIETY,   DEPRESSION                                       FOR    30    YEARS                              -    ON     EFFEXOR                               17)      PATIENT  HAS      WEAKNESS  BOTH   HANDS  AND  BOTH                             LOWER   EXTREMITIES     WITH      NUMBNESS     &   TINGLING OF     ALL   EXTREMITIES         SINCE     2020                                                   18)      H/O      BALANCE    PROBLEMS         AND                             H/O     FALLING           SINCE     2020                                    NO     H/O    HEAD INJURY. NO  LOC                           19)        H/O     THORACIC    MUSCLE  SPASM  &   PAIN                                      WITH   PARAESTHESIAS     FOR    TWO  YEARS                                                      20)    RESULTS    OF    ABOVE     NEURO  DIAGNOSTIC  WORK  UP                           EXPECTATIONS   &   GOAL  OF  MANAGEMENT,  PROGNOSIS                              DISCUSSED       WITH PATIENT    IN   DETAIL                        21)        IN  VIEW  OF  THE  PATIENT'S    MULTIPLE   NEUROLOGICAL                           SYMPTOMS  AND  CONCERNS    FOR  PROLONGED   DURATION,                           AND    MULTIPLE   CO MORBID  MEDICAL  CONDITIONS,                           PATIENT    NEEDS  NEURO  DIAGNOSTIC  EVALUATIONS                      FOR   ANY   NEUROLOGICAL  PATHOLOGIES    AND  OTHER                        CORRECTABLE   ETIOLOGIES;     AND                              PATIENT  REQUESTS   THE  SAME. 22)         VARIOUS  RISK   FACTORS   WERE  REVIEWED   AND   DISCUSSED. PATIENT   HAS  MULTIPLE   MEDICAL, NEUROLOGICAL                        AND   MENTAL HEALTH   PROBLEMS . PATIENT'S   MANAGEMENT  IS  CHALLENGING.                                         PRECIPITATING  FACTORS: including  fever/infection, exertion/relaxation, position change, stress,                weather change,   medications/alcohol, time of day/darkness/light  Are  present                                                          MODIFYING  FACTORS:  fever/infection, exertion/relaxation, position change, stress, weather change, Clinical  Condition   and/or lack of                                 other    Alternate   resources.             RECORDS   REVIEWED:    historical medical records           INFORMATION   REVIEWED:     MEDICAL   HISTORY,SURGICAL   HISTORY,     MEDICATIONS   LIST,   ALLERGIES AND  DRUG  INTOLERANCES,       FAMILY   HISTORY,  SOCIAL  HISTORY,      PROBLEM  LIST   FOR  PATIENT  CARE   COORDINATION          Past Medical History:   Diagnosis Date    Allergic rhinitis     Arthritis     Asthma     Bronchitis     [facility]     Depression     Diabetes mellitus [facility]     Headache     Hyperlipidemia     Hypertension     Incontinence     Irritable bowel syndrome     Kidney stone     Osteoarthritis     Pneumonia     Type [facility] mellitus without [facility]706    Ulcer          Past Surgical History:   Procedure Laterality Date    CARPAL TUNNEL RELEASE Right 5/7/2019    Right Carpal Tunnel Release performed by Steven Hill MD at [address] [facility], [address] [facility] COLONOSCOPY  7/11/208    Serated polyp (1)    COLONOSCOPY  08/02/2017    [id number]    COLONOSCOPY Left 10/8/2019    COLONOSCOPY WITH BIOPSY performed by Racheal Casey MD at [address] [place] [address], [address] [place] [address]    with Right oopherectomy still has left ovary    LAPAROSCOPY  2008    Diagnostic for pain - no findings    MECKEL DIVERTICULUM EXCISION  1970    TONSILLECTOMY      UPPER GASTROINTESTINAL ENDOSCOPY  08/02/2017    [id number]    UPPER GASTROINTESTINAL ENDOSCOPY Left 10/8/2019    EGD BIOPSY performed by Racheal Casey MD at [facility]         Current Outpatient Medications   Medication Sig Dispense Refill    Multiple Vitamin (MULTI-VITAMIN DAILY PO) Take by mouth      albuterol sulfate HFA (PROVENTIL HFA) 108 (90 Base) MCG/ACT inhaler Inhale 2 puffs into the lungs every 4 hours as needed for Wheezing 1 Inhaler 0    venlafaxine (EFFEXOR XR) 150 MG extended release capsule Take 1 capsule by mouth once daily 30 capsule 3    venlafaxine (EFFEXOR XR) 37.5 MG extended release capsule Take 1 capsule by mouth once daily 30 capsule 3    Misc. Devices (CANE) MISC Use daily 1 each 0    fluticasone (FLONASE) 50 MCG/ACT nasal spray 2 sprays by Nasal route daily 3 Bottle 3    gabapentin (NEURONTIN) 300 MG capsule Take 1 capsule by mouth 3 times daily for 60 days. Intended supply: 30 days (Patient taking differently: Take 300 mg by mouth 3 times daily. Intended supply: 30 days  Taking PRN) 90 capsule 1    montelukast (SINGULAIR) 10 MG tablet Take 1 tablet by mouth nightly 30 tablet 3    baclofen (LIORESAL) 10 MG tablet Take 1 tablet by mouth nightly as needed (muscle spasms) 30 tablet 1    vitamin D (ERGOCALCIFEROL) 1.25 MG (06728 UT) CAPS capsule Take 1 capsule by mouth once a week 12 capsule 1    pantoprazole (PROTONIX) 40 MG tablet Take 1 tablet by mouth daily 90 tablet 3    Naproxen Sodium (ALEVE PO) Take by mouth daily as needed       No current facility-administered medications for this visit.          Allergies   Allergen Reactions    Pcn [Penicillins] Anaphylaxis     \"can't breath\"    Sulfa Antibiotics Anaphylaxis     \"can't breathe\"    Metformin And Related Other (See Comments)     Sweating and diarrhea         Family History   Adopted: Yes   Family history unknown: Yes         Social History     Socioeconomic History    Marital status:      Spouse name: Not on file    Number of children: Not on file    Years of education: Not on file    Highest education level: Not on file   Occupational History    Occupation:      Employer: [employer]   Tobacco Use    Smoking status: Current Every Day Smoker     Packs/day: 1.50     Years: 30.00     Pack years: 45.00     Types: Cigarettes     Start date: 1/1/1971    Smokeless tobacco: Never Used   Vaping Use    Vaping Use: Never used   Substance and Sexual Activity CALCIUM 9.7 01/07/2021    PROT 7.0 06/02/2020    LABALBU 4.3 06/02/2020    BILITOT 0.14 06/02/2020    ALKPHOS 85 06/02/2020    AST 11 06/02/2020    ALT 9 06/02/2020     Lab Results   Component Value Date    ALKPHOS 85 06/02/2020    ALT 9 06/02/2020    AST 11 06/02/2020    PROT 7.0 06/02/2020    BILITOT 0.14 06/02/2020    LABALBU 4.3 06/02/2020     Lab Results   Component Value Date    BUN 12 01/07/2021    CREATININE 0.67 01/07/2021     Lab Results   Component Value Date    CALCIUM 9.7 01/07/2021    MG 1.9 01/15/2017     Lab Results   Component Value Date    AST 11 06/02/2020    ALT 9 06/02/2020     No components found for: CPYLN9F  No results found for: URICACID  No results found for: CKTOTAL  No results found for: [id number]      Review of Systems   Constitutional: Negative for appetite change, chills, diaphoresis, fatigue, fever and unexpected weight change. HENT: Negative for congestion, dental problem, drooling, ear discharge, ear pain, facial swelling, hearing loss, mouth sores, nosebleeds, postnasal drip, sinus pressure, sore throat, tinnitus, trouble swallowing and voice change. Eyes: Negative for photophobia, pain, discharge, redness, itching and visual disturbance. Respiratory: Negative for apnea, cough, choking, chest tightness, shortness of breath and wheezing. Cardiovascular: Negative for chest pain, palpitations, leg swelling and near-syncope. Gastrointestinal: Negative for abdominal distention, abdominal pain, blood in stool, bowel incontinence, constipation, diarrhea, nausea and vomiting. Endocrine: Negative for cold intolerance, heat intolerance, polydipsia, polyphagia and polyuria. Genitourinary: Negative for bladder incontinence. Musculoskeletal: Positive for back pain and neck pain. Negative for arthralgias, gait problem, joint swelling, myalgias and neck stiffness. Skin: Negative for color change, pallor, rash and wound.    Allergic/Immunologic: Negative for environmental allergies, food allergies and immunocompromised state. Neurological: Positive for focal weakness, weakness, numbness and loss of balance. Negative for dizziness, vertigo, tremors, seizures, syncope, facial asymmetry, speech difficulty, light-headedness and headaches. Hematological: Negative for adenopathy. Does not bruise/bleed easily. Psychiatric/Behavioral: Negative for agitation, behavioral problems, confusion, decreased concentration, dysphoric mood, hallucinations, memory loss, self-injury, sleep disturbance and suicidal ideas. The patient is nervous/anxious. The patient is not hyperactive. OBJECTIVE:      Physical Exam  Constitutional:       Appearance: She is well-developed. HENT:      Head: Normocephalic and atraumatic. No raccoon eyes or Arreguin's sign. Right Ear: External ear normal.      Left Ear: External ear normal.      Nose: Nose normal.   Eyes:      Conjunctiva/sclera: Conjunctivae normal.      Pupils: Pupils are equal, round, and reactive to light. Neck:      Thyroid: No thyroid mass or thyromegaly. Vascular: No carotid bruit. Trachea: No tracheal deviation. Meningeal: Brudzinski's sign and Kernig's sign absent. Cardiovascular:      Rate and Rhythm: Normal rate and regular rhythm. Pulmonary:      Effort: Pulmonary effort is normal.   Musculoskeletal:         General: No tenderness. Cervical back: Normal range of motion and neck supple. No rigidity. No muscular tenderness. Normal range of motion. Skin:     General: Skin is warm. Coloration: Skin is not pale. Findings: No erythema or rash. Nails: There is no clubbing. Psychiatric:         Attention and Perception: She is attentive. Mood and Affect: Mood is anxious. Mood is not depressed. Affect is not labile, blunt or inappropriate. Speech: She is communicative.  Speech is not rapid and pressured, delayed, slurred or tangential.         Behavior: Behavior is not agitated, slowed, aggressive, withdrawn, hyperactive or combative. Behavior is cooperative. Thought Content: Thought content is not paranoid or delusional. Thought content does not include homicidal or suicidal ideation. Thought content does not include homicidal or suicidal plan. Cognition and Memory: Memory is not impaired. She does not exhibit impaired recent memory or impaired remote memory. Judgment: Judgment is not impulsive or inappropriate. NEUROLOGICALEXAMINATION :      A) MENTAL STATUS:                   Alert and  oriented  To time, place  And  Person. No Aphasia. No  Dysarthria. Able   To  Follow     SIMPLE    commands   without   Any  Difficulty. No right  To left confusion. Normal  Speech  And language function. Insight and  Judgment ,Fund  Of  Knowledge   within normal limits                Recent  And  Remote memory  within   normal limits                Attention &  Concentration are within   normal limits                                                   B) CRANIAL NERVES :        CN : Visual  Acuity  And  Visual fields  within normal limits               Fundi  Could  Not  Be  Could  Not  Be  Evaluated. 3,4,6 CN : Both  Pupils are   Reactive and  Equal.  Movements  Are  Intact. No  Nystagmus. No  TERRY. No  Afferent  Pupillary  Defect noted. 5 CN :  Normal  Facial sensations and Corneal  Reflexes           7 CN:  Normal  Facial  Symmetry  And  Strength. No facial  Weakness.            8 CN :  Hearing  Appears within normal limits          9, 10 CN: Normal   spontaneous, reflex   palate   movements         11 CN:   Normal  Shoulder  shrug and  strength         12 CN :   Normal  Tongue movements and  Tongue  In midline                        No tongue   Fasciculations or atrophy       C) MOTOR  EXAM:                 Strength  In upper WITHOUT CONTRAST  6/8/2021 8:27 am       TECHNIQUE:   Multiplanar multisequence MRI of the brain was performed without the   administration of intravenous contrast.       COMPARISON:   None.       HISTORY:   ORDERING SYSTEM PROVIDED HISTORY: Myelopathy ([facility]   TECHNOLOGIST PROVIDED HISTORY:   evaluation for demyelinating lesions   Reason for Exam: Numbness and tingling bilateral arms and legs. Patient has   been falling a lot. Symptoms for about eight months. Evaluation for   demyelinating disease. Acuity: Chronic   Type of Exam: Initial   Additional signs and symptoms: Myelopathy       FINDINGS:   INTRACRANIAL STRUCTURES/VENTRICLES: There is minimal parenchymal volume loss. There are a few scattered foci of T2/FLAIR hyperintensity in the   periventricular and subcortical white matter, likely related to minimal   chronic microvascular disease. Josiephine Payer is no acute infarct. No mass effect or   midline shift. No acute intracranial hemorrhage. There is no hydrocephalus.    The sellar/suprasellar regions appear unremarkable.  The normal signal voids   within the major intracranial vessels appear maintained.       ORBITS: The visualized portion of the orbits demonstrate no acute abnormality.       SINUSES: There is scattered minimal mucosal thickening in the paranasal   sinuses.  The bilateral mastoid air cells are well aerated.       BONES/SOFT TISSUES: The bone marrow signal intensity appears normal. The soft   tissues demonstrate no acute abnormality.  There are 2 adjacent subcutaneous   soft tissue nodules in the occipital scalp measuring up to 1.3 cm, likely to   sebaceous cysts.           Impression   No acute intracranial abnormality.       Minimal parenchymal volume loss.       Minimal chronic microvascular disease.         MRI OF THE CERVICAL SPINE WITHOUT CONTRAST 3/2/2021 4:23 pm       TECHNIQUE:   Multiplanar multisequence MRI of the cervical spine was performed without the   administration of intravenous contrast.       COMPARISON:   None.       HISTORY:   ORDERING SYSTEM PROVIDED HISTORY: Myelopathy ([address]   TECHNOLOGIST PROVIDED HISTORY:   evaluate for stenosis   Reason for Exam: Neck pain, left shoulder pain; Muscle spasms, weakness   bilateral arms. Evaluate for stenosis. Symptoms for years, worse in the last   six to eight months. Acuity: Chronic   Type of Exam: Initial   Additional signs and symptoms: Myelopathy       FINDINGS:   BONES/ALIGNMENT: There is normal alignment of the spine. The vertebral body   heights are maintained. The bone marrow signal appears unremarkable.  Left   thyroid lobe contains a 2 x 1.9 cm nodule.       SPINAL CORD: No abnormal cord signal is seen.       SOFT TISSUES: Incidental note is made of sebaceous cyst within the   subcutaneous tissues of central occipital region.       C2-C3: There is no significant disc protrusion, spinal canal stenosis or   neural foraminal narrowing.       C3-C4: Grade 1 anterolisthesis, posterior uncovertebral hypertrophy and disc   bulging.  Moderate left mild right facet arthropathy.  Findings resulting   mild canal stenosis and moderate left neural foraminal narrowing.       C4-C5: Grade 1 anterolisthesis, posterior uncovertebral hypertrophy.    Moderate left and mild right facet arthropathy.  Findings resulting in   moderate left neural foraminal narrowing.       C5-C6: 1 mm disc bulging.  Mild bilateral said arthropathy.  Findings result   in mild left neural foraminal narrowing.       C6-C7: Mild bilateral facet arthropathy.  Otherwise unremarkable       C7-T1: There is no significant disc protrusion, spinal canal stenosis or   neural foraminal narrowing.           Impression   No cord signal abnormality.       No canal stenosis.  No nerve impingement.  No significant posterior disc   pathology.       At C3-C4, grade 1 anterolisthesis, mild canal stenosis and moderate left   neural foraminal narrowing.       At C4-C5, grade 1 anterolisthesis and moderate left neural foraminal   narrowing.       2 cm nodule left thyroid lobe.  Thyroid ultrasound is helpful for further   characterization. MRI OF THE THORACIC SPINE WITHOUT CONTRAST; MRI OF THE LUMBAR SPINE WITHOUT   CONTRAST  1/15/2021 4:19 pm; 1/15/2021 4:48 pm       TECHNIQUE:   Multiplanar multisequence MRI of the thoracic spine was performed without the   administration of intravenous contrast.; Multiplanar multisequence MRI of the   lumbar spine was performed without the administration of intravenous contrast.       COMPARISON:   None.       HISTORY:   ORDERING SYSTEM PROVIDED HISTORY: Frequent falls   TECHNOLOGIST PROVIDED HISTORY:   Reason for Exam: Thoracic pain, frequent falls. Symptoms for a few years,   getting worse   Acuity: Chronic   Type of Exam: Initial   Additional signs and symptoms: Frequent falls; Foot-drop, unspecified   laterality.; ORDERING SYSTEM PROVIDED HISTORY: Frequent falls   TECHNOLOGIST PROVIDED HISTORY:   low back pain   Reason for Exam: Low back pain and frequent falls. Symptoms for a few years,   getting worse. Acuity: Chronic   Type of Exam: Initial   Additional signs and symptoms: Frequent falls; Foot-drop, unspecified   laterality.       FINDINGS:   Thoracic spine:       BONES/ALIGNMENT: There is normal alignment of the spine.  Mild   dextroscoliosis with tip at T7-T8.  The vertebral body heights are   maintained.  The bone marrow signal appears unremarkable.       SPINAL CORD: No abnormal cord signal is seen.       SOFT TISSUES: No paraspinal mass identified.       DEGENERATIVE CHANGES:       There is multilevel loss of disc height and signal with endplate degenerative   marrow change.       At level T7-8, disc bulging with 4 mm left paracentral central disc   protrusion indents the anterior thecal sac.  Findings resulting in mild is   stenosis       At the level of T6-T7: Disc bulging with 3 mm central disc protrusion indents   anterior spinal cord.       At T4-T5 level, disc bulging 4 mm central disc protrusion indents the   anterior spinal cord.       At the level of T2-T3, disc bulging with 3 - 4 mm central disc protrusion   indents anterior spinal cord resulting in mild calcinosis.       Lumbar spine:       BONES/ALIGNMENT: There is normal alignment of the spine.  Mild levoscoliosis   with tip at L3-L4.       Mild the vertebral body heights are maintained. The bone marrow signal   appears unremarkable.       SPINAL CORD: The conus terminates normally.       SOFT TISSUES: No paraspinal mass identified.       L1-L2: There is no significant disc herniation, spinal canal stenosis or   neural foraminal narrowing.       L2-L3: There is no significant disc herniation, spinal canal stenosis or   neural foraminal narrowing.       L3-L4: 1 mm disc bulging.  Mild bilateral said arthropathy.  Otherwise   unremarkable.       L4-L5: Grade 1 anterolisthesis.  2 mm disc bulging.  Moderate bilateral facet   arthropathy.  Moderate canal stenosis, mild narrowing of bilateral   subarticular recesses and mild bilateral neural foraminal narrowing.       L5-S1: 1 mm disc bulging.  Moderate bilateral set arthropathy.  Mild   bilateral neural foraminal narrowing.           Impression   No fracture within the thoracic or lumbar spine.       No cord signal abnormality within the thoracic or lumbar spine.  No cord   compression.       Mild levoscoliosis with tip at L3-L4.  Mild dextroscoliosis with tip at T7-8.       At the level of T7-T8, T6-T7, T4-T5 and T2-T3, posterior disc protrusion   indents the anterior spinal cord.       Mild canal stenosis at T7-T8 and T2-T3 level.       At L4-L5, grade 1 anterolisthesis, moderate canal stenosis and mom mild   bilateral neural foraminal narrowing.           VISITING DIAGNOSIS:      ICD-10-CM    1. Chronic cervical radiculopathy  M54.12    2.  Type 2 diabetes mellitus without complication, without long-term current use of insulin ([facility] [address]  E11.9 HERPES PROFILE Vitamin B12     Folate     Electrophoresis Protein, Serum without Reflex to Immunofixation     XR CHEST STANDARD (2 VW)     Procare Comfort Wrist w/Thumb splint   3. Current mild episode of major depressive disorder, unspecified whether recurrent ([facility])  F32.0 HERPES PROFILE     Vitamin B12     Folate     Electrophoresis Protein, Serum without Reflex to Immunofixation     XR CHEST STANDARD (2 VW)     Procare Comfort Wrist w/Thumb splint   4. Tobacco use  Z72.0 HERPES PROFILE     Vitamin B12     Folate     Electrophoresis Protein, Serum without Reflex to Immunofixation     XR CHEST STANDARD (2 VW)     Procare Comfort Wrist w/Thumb splint   5. Gastroesophageal reflux disease, unspecified whether esophagitis present  K21.9 HERPES PROFILE     Vitamin B12     Folate     Electrophoresis Protein, Serum without Reflex to Immunofixation     XR CHEST STANDARD (2 VW)     Procare Comfort Wrist w/Thumb splint   6. Carpal tunnel syndrome of right wrist  G56.01 HERPES PROFILE     Vitamin B12     Folate     Electrophoresis Protein, Serum without Reflex to Immunofixation     XR CHEST STANDARD (2 VW)     Procare Comfort Wrist w/Thumb splint   7. Spinal stenosis, thoracic region  M48.04    8. Cervical spondylosis with myelopathy  M47.12    9. Lumbar disc disease with radiculopathy  M51.16    10. Bilateral carpal tunnel syndrome  G56.03    11. Chronic radicular lumbar pain  M54.16     G89.29    12. Chronic bilateral thoracic back pain  M54.6     G89.29    13. Numbness and tingling  R20.0     R20.2    14. Chronic cerebral ischemia  I67.82    15. Acquired cerebral atrophy (HCC)  G31.9    16. Polyneuropathy, peripheral sensorimotor axonal  G60.8    17. H/O fall  Z91.81    18. Balance problem  R26.89    19. Muscle spasm  M62.838    20.  Neuropathic pain  M79.2                 CONCERNS   &   INCREASED   RISK   FOR         * TIA,  CEREBRO  VASCULAR  ISCHEMIA         *  MONONEUROPATHIES,   RADICULOPATHY      *   PERIPHERAL  NEUROPATHY, NEUROPATHIC  PAIN       *     BALANCE PROBLMES   AND  FALL                VARIOUS  RISK   FACTORS   WERE  REVIEWED   AND   DISCUSSED. *  PATIENT   HAS  MULTIPLE   MEDICAL, NEUROLOGICAL                        AND   MENTAL HEALTH   PROBLEMS          PATIENT'S   MANAGEMENT  IS  CHALLENGING. PLAN:                         Sherryle Ito  Of  The  Diagnoses,  The  Management & Treatment  Options            AND    Care  plan  Were          Reviewed and   Discussed   With  patient. * Goals  And  Expectations  Of  The  Therapy  Discussed   And  Reviewed. *   Benefits   And   Side  Effect  Profile  Of  Medication/s   Were   Discussed                * Need   For  Further   Follow up For  The  Various  Problems Were  discussed. * Results  Of  The  Previous  Diagnostic tests were reviewed and  discussed                   Medical  Decision  Making  Was  Made  Based on the   Complexity  Of  Above  Mentioned  Diagnoses,    Data reviewed             And    Risk  Of  Significant   Co morbidities and   complicating   Factors. Medical  Decision  Was   High    Complexity   Due   To  The  Patient's  Multiple  Symptoms,  Advancing   Disease,            Complex  Treatment  Regimen,  Multiple medications           and   Risk  Of   Side  Effects,  Difficulty  In  Medication  Management  And  Diagnostic  Challenges           In  View  Of  The  Associated   Co  Morbid  Conditions   And  Problems. * FALL   PRECAUTIONS. THESE  REVIEWED   AND  DISCUSSED      *  USE   WALKING  ASSISTANCE  DEVICES     QUAD  CANE        *   BE  CAREFUL  WITH  ACTIVITIES   INCLUDING  DRIVING.         *   AVOID   NECK  AND/ BACK  STRAINING  ACTIVITIES          *   TO   WEAR   BILATERAL   WRIST  BRACES       *   TO  AVOID  PRESSURE  OVER   ULNAR  ASPECT   OF   ELBOWS            *   ADEQUATE   FLUID  INTAKE   AND  ELECTROLYTE  BALANCE           * KEEP  DAIRY  OF   THE  NEUROLOGICAL SYMPTOMS        RECORDING THE    DURATION  AND  FREQUENCY. *  AVOID    CONDITIONS  AND  FACTORS   THAT  MAKE                  NEUROLOGICAL  SYMPTOMS  WORSE.                      *TO  MAINTAIN  REGULAR  SLEEP  WAKE  CYCLES. *   TO  HAVE  ADEQUATE  REST  AND   SLEEP    HOURS.              *    AVOID  ANY USAGE OF    TOBACCO,          AVOID  EXCESSIVE  ALCOHOL  AND   ILLEGAL   SUBSTANCES          *  CONTINUE   MEDICATIONS    PRESCRIBED       AS    RECOMMENDED       *   Compliance   With  Medications   And  Instructions              *    Antiplatelet  therapy    As   Recommended  Was   Discussed      *    Prophylactic  Use   Of     Vitamin   B   Complex,  Folic  Acid,    Vitamin  B12    Multivitamin,       Calcium  With  magnesium  And  Vit D    Supplementations   Over  The  Counter  Discussed             *FOOT  CARE, DAILY  INSPECTION  OF  FEET   AND         PERIODIC  PODIATRY EVALUATIONS . *  EVALUATIONS  AND  FOLLOW UP:                   * PHYSICAL  THERAPY   / OCCUPATIONAL THERAPY                                                  *  NEUROSURGERY                 * PAIN  MANAGEMENT               *  ORTHO                                                        *   IN  VIEW  OF  THE  PATIENT'S    MULTIPLE   NEUROLOGICAL                           SYMPTOMS  AND  CONCERNS    FOR  PROLONGED   DURATION,                           AND    MULTIPLE   CO MORBID  MEDICAL  CONDITIONS,                           PATIENT    NEEDS  NEURO  DIAGNOSTIC  EVALUATIONS                      FOR   ANY   NEUROLOGICAL  PATHOLOGIES    AND  OTHER                        CORRECTABLE   ETIOLOGIES;     AND                              PATIENT  REQUESTS   THE  SAME.                                            Orders Placed This Encounter   Procedures    XR CHEST STANDARD (2 VW)    HERPES PROFILE    Vitamin B12    Folate    Electrophoresis Protein, Serum without Reflex to Immunofixation    Procare Comfort Wrist w/Thumb splint *  PATIENT  TO  RETURN  THE  CLINIC  AFTER   ABOVE,                       FOR   FURTHER    RE EVALUATIONS                               AND  ADDITIONAL  RECOMMENDATIONS ,                        INCLUDING  MEDICAL  MANAGEMENT,                        AS   CLINICALLY    INDICATED. *PATIENT   TO  FOLLOW  UP  WITH   PRIMARY  CARE         OTHER  CONSULTANTS  AS  BEFORE.               *TO  FOLLOW  WITH   MENTAL  HEALTH  PROFESSIONALS ,  INCLUDING            PSYCHOLOGICAL  COUNSELING   AND  PSYCHIATRIC  EVALUTIONS,                     *  Maintain   Healthy  Life Style    With   Periodic  Monitoring  Of          Any  Medical  Conditions  Including   Elevated  Blood  Pressure,  Lipid  Profile,        Blood  Sugar levels  AndHeart  Disease. *   Period   Screening  For  Cancers  Involving  Breast,  Colon,         Lungs  And  Other  Organs  As  Applicable,  In consultation   With  Your  Primary Care Providers. *Second  Neurological  Opinion  And  Evaluations  In  [facility]  Setting  If  Patient  Is  Interested. * Please   Contact   Neurology  Clinic   Early   If   Are  Any  New  Neurological   And  Any neurological  Concerns. *  If  The  Patient remains  Neurologically  Stable   Return   To  [facility] Neurology Department   IN    1-2     MONTHS  TIME   FOR  FURTHER              FOLLOW UP.                       *   The  Neurological   Findings,  Possible  Diagnosis,  Differential diagnoses                    And  Options  For    Further   Investigations                   And  management   Are  Discussed  Comprehensively. Medications   And  Prescription   Risks  And  Side effects  Are   Also  Discussed.                      *  If   There is  Any  Significant  Worsening   Of  Current  Symptoms  And  Or                  If patient  Develops   Any additional  New NeurologicalSymptoms                  Or  Significant  Concerns   Should  Call  911 or  Go  To  Emergency  Department  For  Further  Immediate  Evaluation. The   Above  Were  Reviewed  With  patient   and                       questions  Answered  In  Detail. More   Than  50% of face  To face Time   Was  Spent  On  Counseling                    And   Coordination  Of  Care   Of   Patient's  multiple   Neurological  Problems                         And   Comorbid  Medical   Conditions. Electronically signed by   Rajiv Tejeda M.D., Reginald Berumen. Board Certified in  Neurology &  In  [facility] ([facility])      DISCLAIMER:   Although every effort was made to ensure the accuracy of this  electronictranscription, some errors in transcription may have occurred. GENERAL PATIENT INSTRUCTIONS:      A Healthy Lifestyle: Care Instructions   Your Care Instructions   A healthy lifestyle can help you feel good, stay at ahealthy weight, and have plenty of energy for both work and play. A healthy lifestyle is something you can share with your whole family.  A healthy lifestyle also can lower your risk for serious health problems, such ashigh blood pressure, heart disease, and diabetes.  You can follow a few steps listed below to improve your health and the health of your family.  Follow-up careis a key part of your treatment and safety. Be sure to make and go to all appointments, and call your doctor if you are having problems. Its also a good idea to know your test results and keep a list of the medicines you take.  How can you care for yourself at home?  Do not eat too much sugar, fat, or fast foods. You can still have dessert and treats nowand then. The goal is moderation.  Start small to improve your eating habits.  Pay attention to portion sizes, drink less juice and soda pop, and eat more

## 2021-06-18 NOTE — PATIENT INSTRUCTIONS
* FALL   PRECAUTIONS. *  USE   WALKING  ASSISTANCE  DEVICES     QUAD  CANE         *      WEIGHT   LOSS. *   ADEQUATE   FLUID  INTAKE   AND  ELECTROLYTE  BALANCE           * KEEP  DAIRY  OF   THE  NEUROLOGICAL  SYMPTOMS          *  TO  MAINTAIN  REGULAR  SLEEP  WAKE  CYCLES. *   TO  HAVE  ADEQUATE  REST  AND   SLEEP    HOURS.        *    AVOID  USAGE OF   TOBACCO,  EXCESSIVE  ALCOHOL                AND   ILLEGAL   SUBSTANCES,  IF  ANY          *  Maintain   Healthy  Life Style    With   Periodic  Monitoring  Of      Any  Medical  Conditions  Including   Elevated  Blood  Pressure,  Lipid  Profile,     Blood  Sugar levels  And   Heart  Disease. *   Period   Screening  For  Cancers  Involving  Breast,  Colon,   lungs  And  Other  Organs  As  Applicable,  In consultation   With  Your  Primary Care Providers. *  If   There is  Any  Significant  Worsening   Of  Current  Symptoms  And  Or  If    Any additional  New  Neurological  Symptoms                 Or  Significant  Concerns   Should  Call  911 or  Go  To  Emergency  Department  For  Further  Immediate  Evaluation.

## 2021-06-22 ENCOUNTER — TELEPHONE (OUTPATIENT)
Dept: NEUROLOGY | Age: 61
End: 2021-06-22

## 2021-06-22 LAB
ALBUMIN (CALCULATED): 4.7 G/DL (ref 3.2–5.2)
ALBUMIN PERCENT: 63 % (ref 45–65)
ALPHA 1 PERCENT: 2 % (ref 3–6)
ALPHA 2 PERCENT: 12 % (ref 6–13)
ALPHA-1-GLOBULIN: 0.2 G/DL (ref 0.1–0.4)
ALPHA-2-GLOBULIN: 0.9 G/DL (ref 0.5–0.9)
BETA GLOBULIN: 0.9 G/DL (ref 0.5–1.1)
BETA PERCENT: 11 % (ref 11–19)
GAMMA GLOBULIN %: 12 % (ref 9–20)
GAMMA GLOBULIN: 0.9 G/DL (ref 0.5–1.5)
HERPES SIMPLEX VIRUS 1 IGG: 3.82
HERPES SIMPLEX VIRUS 2 IGG: 4.97
HERPES TYPE 1/2 IGM COMBINED: 1.77
PATHOLOGIST: ABNORMAL
PROTEIN ELECTROPHORESIS, SERUM: ABNORMAL
TOTAL PROT. SUM,%: 100 % (ref 98–102)
TOTAL PROT. SUM: 7.6 G/DL (ref 6.3–8.2)
TOTAL PROTEIN: 7.5 G/DL (ref 6.4–8.3)

## 2021-06-22 RX ORDER — ACYCLOVIR 800 MG/1
800 TABLET ORAL
Qty: 180 TABLET | Refills: 0 | Status: SHIPPED | OUTPATIENT
Start: 2021-06-22 | End: 2021-07-02

## 2021-06-22 NOTE — TELEPHONE ENCOUNTER
----- Message from Blanca Friedman MD sent at 6/22/2021 12:07 PM EDT -----  ABNORMAL    HERPES  PROFILE   LAB   RESULTS. START    ACYCLOVIR    800    MG     5    TIMES  DAILY    FOR     14   DAYS,    THEN     ACYCLOVIR     800    MG      3    TIMES   DAILY. PRESCRIPTIONS  GIVEN        PATIENT  NEEDS  FOLLOW   UP. PLEASE  NOTIFY. THANK   YOU.

## 2021-06-24 ENCOUNTER — TELEPHONE (OUTPATIENT)
Dept: PAIN MANAGEMENT | Age: 61
End: 2021-06-24

## 2021-06-24 NOTE — TELEPHONE ENCOUNTER
Per KR at [facility] patient's [facility] insurance is no longer effective,  And she asked office to reach out and see if she is using [address] [place] [address] for her insurance. Writer called and patient and she states she is uninsured due to not working and her insurance phasing out. Pt was transferred to patient services to see what can be done to help and see if she is able to get the injections as previously ordered.    JS picked up line

## 2021-06-24 NOTE — DISCHARGE SUMMARY
[facility]    [] [place]  Phone: [phone]  Fax: [phone]      [] [place]  Phone: [phone]  Fax: [phone]    Physical Therapy Discharge Note  Date: 2021        Patient Name:  Juice Mariscal    :  1960  MRN: [id number]  Restrictions/Precautions:     Medical/Treatment Diagnosis Information:   ·   Diagnosis: myleopathy  ·     Insurance/Certification information:  PT Insurance Information: [employer]  Physician Information:  Referring Practitioner: Sarika Gloria of care signed (Y/N): Y    Visit# / total visits: 10/12  Pain level:    4-10/10 L knee        Time Period for Report: 4/15/21 - 6/3/21  Cancels/No-shows to date:  7    Plan of Care/Treatment to date:  [x] Therapeutic Exercise    [x] Modalities:  [] Therapeutic Activity     [] Ultrasound  [x] Electrical Stimulation  [] Gait Training      [] Cervical Traction    [] Lumbar Traction  [] Neuromuscular Re-education  [x] Cold/hotpack [] Iontophoresis  [x] Instruction in [facility]      Other:  [] Manual Therapy       []    [] Aquatic Therapy       []                              Subjective: Patient reports she had to do an assessment yesterday at [facility] and discovered she is getting weak. Patient reports she fell again yesterday but don't really know what caused her to fall. Patient reports her back isn't to bad this morning but her left knee continues to bother her. Patient reports she can't even lift 8# to shoulder height. Patient reports difficulty going up and down stairs and has to take one step at a time. Patient reports very minimal improvement with therapy. Patient reports she will be losing her insurance on 2021 therefore would like to be discharged to [facility].     Objective: Performed ther-ex as documented on flowsheet to improve strength, ROM, and stability for ease with daily tasks and ambulation. Patient given verbal cues to perform exercises properly.   Reviewed HEP with good understanding and provided updated HEP. Continued to focus on back exercises due to continued L knee pain and patient request.      · Observation:  · Concern for problems of incontinence and sense of foot drop weakness. · Will work on LE strength, balance  · Test measurements: SLS: 19 seconds right, 21 seconds left  1. Hip 4/5 grossly, Knee 4/5 grossly, Ankle DF: 4/5        Assessment:    Alayna continues to have moderate difficulty with daily activities due to radicular symptoms and pain    Plan:    D/C due to lack of preogress       Goals:    Short term goals  Time Frame for Short term goals: 2 weeks  Short term goal 1: The patinet will demonstrate improved single leg stand to 20 sec for each LE (ongoing)  Short term goal 2: The patient will demonstrate ability to maintain abdominal bracing with stand and walking 50% of the time without verbal cues (ongoing)  Short term goal 3: Decrease pain in the left shoulder and upper back to 3/10 (patient reports she has nerve pain everyday, 4-5/10 left shoulder, upper back)  Short term goal 4: educate patient on HEP and good posural positioning  (semi compliant with HEP)     Long term goals  Time Frame for Long term goals : 4 weeks  Long term goal 1: The patient will tolerate walking for 10 min without draggin her toes for improved ease to return to work (able to walk 10-15 minutes with increased low back pain)  Long term goal 2: PAtient will be independent with maintaining good abdominal bracing with walking and lifting activities to complete daily activities. (needs frequent cueing)  Long term goal 3: Strength will be increased to 4/5 in the bialteral UE and LE to assist with daily activities (Hip 4/5 grossly, Knee 4/5 grossly, Ankle DF: 4/5)  Long term goal 4:  Increase single leg stand bilaterally to 45 sec without UE support (SLS: 19 seconds R, 21 seconds L)  Long term goal 5: decreased pain and tingling in the UE and LE by 50% when completing daily activities (continues to have pain and tingling intermittently throughout day, never completely gone even with medication)       Discharge Prognosis: [] Excellent [] Good [x] Fair  [] Poor     Goal Status:  [] Achieved [] Partially Achieved  [x] Not Achieved       Electronically signed by:  Zev Ghosh, PT, DPT

## 2021-07-08 ENCOUNTER — HOSPITAL ENCOUNTER (OUTPATIENT)
Age: 61
Setting detail: OUTPATIENT SURGERY
Discharge: HOME | End: 2021-07-08
Attending: PHYSICAL MEDICINE & REHABILITATION | Admitting: PHYSICAL MEDICINE & REHABILITATION
Payer: COMMERCIAL

## 2021-07-08 ENCOUNTER — APPOINTMENT (OUTPATIENT)
Dept: GENERAL RADIOLOGY | Age: 61
End: 2021-07-08
Attending: PHYSICAL MEDICINE & REHABILITATION

## 2021-07-08 VITALS
OXYGEN SATURATION: 97 % | WEIGHT: 190 LBS | SYSTOLIC BLOOD PRESSURE: 169 MMHG | RESPIRATION RATE: 16 BRPM | HEIGHT: 63 IN | HEART RATE: 82 BPM | DIASTOLIC BLOOD PRESSURE: 89 MMHG | TEMPERATURE: 99 F | BODY MASS INDEX: 33.66 KG/M2

## 2021-07-08 PROCEDURE — 2709999900 HC NON-CHARGEABLE SUPPLY: Performed by: PHYSICAL MEDICINE & REHABILITATION

## 2021-07-08 PROCEDURE — 2500000003 HC RX 250 WO HCPCS: Performed by: PHYSICAL MEDICINE & REHABILITATION

## 2021-07-08 PROCEDURE — 6360000004 HC RX CONTRAST MEDICATION: Performed by: PHYSICAL MEDICINE & REHABILITATION

## 2021-07-08 PROCEDURE — 3209999900 FLUORO FOR SURGICAL PROCEDURES

## 2021-07-08 PROCEDURE — 3600000054 HC PAIN LEVEL 3 BASE: Performed by: PHYSICAL MEDICINE & REHABILITATION

## 2021-07-08 PROCEDURE — 62323 NJX INTERLAMINAR LMBR/SAC: CPT | Performed by: PHYSICAL MEDICINE & REHABILITATION

## 2021-07-08 PROCEDURE — 7100000010 HC PHASE II RECOVERY - FIRST 15 MIN: Performed by: PHYSICAL MEDICINE & REHABILITATION

## 2021-07-08 PROCEDURE — 2580000003 HC RX 258: Performed by: PHYSICAL MEDICINE & REHABILITATION

## 2021-07-08 PROCEDURE — 6360000002 HC RX W HCPCS: Performed by: PHYSICAL MEDICINE & REHABILITATION

## 2021-07-08 RX ORDER — BUPIVACAINE HYDROCHLORIDE 5 MG/ML
INJECTION, SOLUTION EPIDURAL; INTRACAUDAL PRN
Status: DISCONTINUED | OUTPATIENT
Start: 2021-07-08 | End: 2021-07-08 | Stop reason: ALTCHOICE

## 2021-07-08 RX ORDER — SODIUM CHLORIDE 9 MG/ML
INJECTION INTRAVENOUS PRN
Status: DISCONTINUED | OUTPATIENT
Start: 2021-07-08 | End: 2021-07-08 | Stop reason: ALTCHOICE

## 2021-07-08 RX ORDER — DEXAMETHASONE SODIUM PHOSPHATE 10 MG/ML
INJECTION INTRAMUSCULAR; INTRAVENOUS PRN
Status: DISCONTINUED | OUTPATIENT
Start: 2021-07-08 | End: 2021-07-08 | Stop reason: ALTCHOICE

## 2021-07-08 ASSESSMENT — PAIN DESCRIPTION - DESCRIPTORS
DESCRIPTORS: ACHING
DESCRIPTORS: ACHING

## 2021-07-08 ASSESSMENT — PAIN - FUNCTIONAL ASSESSMENT: PAIN_FUNCTIONAL_ASSESSMENT: 0-10

## 2021-07-08 ASSESSMENT — PAIN DESCRIPTION - PAIN TYPE: TYPE: SURGICAL PAIN

## 2021-07-08 ASSESSMENT — PAIN DESCRIPTION - LOCATION: LOCATION: BACK

## 2021-07-08 ASSESSMENT — PAIN SCALES - GENERAL: PAINLEVEL_OUTOF10: 7

## 2021-07-08 ASSESSMENT — PAIN DESCRIPTION - ORIENTATION: ORIENTATION: LOWER;MID

## 2021-07-08 NOTE — H&P
Nerve blocks, spinal injections:No              Typeof Pain Intervention . Mir Tesen Date of last Pain Intervention . Was there pain relief from Pain Intervention: No.               How long was your pain relief from the Pain Intervention. Sleep:                             Difficultyfalling asleep:  No              Takes sleepingmedication: No     Mental health:               Patient feels - secondary to their current pain problems as described above. H/O depression and anxiety: No              Patient is not seeing psychologist orpsychiatrist              Abuse history? .     Employed? No  Alcohol use?: No  Tobacco use?: No  Marijuana use?: No  Illicit drug use?: No     Imaging: Reviewed available imagingin our system with the patient. No results found. Referring physician records reviewed. Review of Systems   Constitutional: Positive for fatigue. HENT: Negative. Eyes: Negative. Respiratory: Negative. Cardiovascular: Negative. Gastrointestinal: Negative for constipation and diarrhea. Endocrine: Negative. Genitourinary: Negative for difficulty urinating and flank pain. Musculoskeletal: Positive for back pain and myalgias. Skin: Negative. Allergic/Immunologic: Negative. Neurological: Positive for weakness and numbness. Hematological: Negative. Psychiatric/Behavioral: Positive for sleep disturbance.                Allergies   Allergen Reactions    Pcn [Penicillins] Anaphylaxis       \"can't breath\"    Sulfa Antibiotics Anaphylaxis       \"can't breathe\"    Metformin And Related Other (See Comments)       Sweating and diarrhea         Medications Prior to Visit          Outpatient Medications Prior to Visit   Medication Sig Dispense Refill    albuterol sulfate HFA (PROVENTIL HFA) 108 (90 Base) MCG/ACT inhaler Inhale 2 puffs into the lungs every 4 hours as needed for Wheezing 1 Inhaler 0    venlafaxine (EFFEXOR XR) 150 MG extended release capsule Take 1 capsule by mouth once daily 30 capsule 3    venlafaxine (EFFEXOR XR) 37.5 MG extended release capsule Take 1 capsule by mouth once daily 30 capsule 3    Misc. Devices (CANE) MISC Use daily 1 each 0    fluticasone (FLONASE) 50 MCG/ACT nasal spray 2 sprays by Nasal route daily 3 Bottle 3    gabapentin (NEURONTIN) 300 MG capsule Take 1 capsule by mouth 3 times daily for 60 days.  Intended supply: 30 days 90 capsule 1    montelukast (SINGULAIR) 10 MG tablet Take 1 tablet by mouth nightly 30 tablet 3    baclofen (LIORESAL) 10 MG tablet Take 1 tablet by mouth nightly as needed (muscle spasms) 30 tablet 1    vitamin D (ERGOCALCIFEROL) 1.25 MG (89548 UT) CAPS capsule Take 1 capsule by mouth once a week 12 capsule 1    pantoprazole (PROTONIX) 40 MG tablet Take 1 tablet by mouth daily 90 tablet 3    Naproxen Sodium (ALEVE PO) Take by mouth daily as needed          Facility-Administered Medications Prior to Visit   Medication Dose Route Frequency Provider Last Rate Last Admin    triamcinolone acetonide (KENALOG-40) injection 40 mg  40 mg Intramuscular Once Ed Wright MD                Past Medical History        Past Medical History:   Diagnosis Date    Allergic rhinitis      Arthritis      Asthma      Bronchitis      [facility]      Depression      Diabetes mellitus ([facility]      Headache      Hyperlipidemia      Hypertension      Incontinence      Irritable bowel syndrome      Kidney stone      Osteoarthritis      Pneumonia      Type 2 diabetes mellitus without complication ([facility] 7723    Ulcer              Past Surgical History         Past Surgical History:   Procedure Laterality Date    CARPAL TUNNEL RELEASE Right 5/7/2019     Right Carpal Tunnel Release performed by Leyda Kasper MD at [address] [facility], [facility] COLONOSCOPY   7/11/208     Serated polyp (1)    COLONOSCOPY   08/02/2017 [id number]    COLONOSCOPY Left 10/8/2019     COLONOSCOPY WITH BIOPSY performed by Talha Cabrera MD at [address] [place] [address], [facility]     with Right oopherectomy still has left ovary    LAPAROSCOPY   2008     Diagnostic for pain - no findings    MECKEL DIVERTICULUM EXCISION   1970    TONSILLECTOMY        UPPER GASTROINTESTINAL ENDOSCOPY   08/02/2017     [id number]    UPPER GASTROINTESTINAL ENDOSCOPY Left 10/8/2019     EGD BIOPSY performed by Talha Cabrera MD at [facility]            Family History   Family History   Adopted: Yes   Family history unknown: Yes         Social History   Social History            Socioeconomic History    Marital status:        Spouse name: None    Number of children: None    Years of education: None    Highest education level: None   Occupational History    Occupation:        Employer: [employer]   Tobacco Use    Smoking status: Current Every Day Smoker       Packs/day: 1.50       Years: 30.00       Pack years: 45.00       Types: Cigarettes       Start date: 1/1/1971    Smokeless tobacco: Never Used   Vaping Use    Vaping Use: Never used   Substance and Sexual Activity    Alcohol use: No    Drug use: No    Sexual activity: Yes       Comment:  x 11 yrs   Other Topics Concern    None   Social History Narrative    None      Social Determinants of Health          Financial Resource Strain: Unknown    Difficulty of Paying Living Expenses: Patient refused   Food Insecurity: Unknown    Worried About Running Out of Food in the Last Year: Patient refused    [address] in the Last Year: Patient refused   Transportation Needs:     Lack of Transportation (Medical):      Lack of Transportation (Non-Medical):    Physical Activity:     Days of Exercise per Week:     Minutes of Exercise per Session:    Stress:     Feeling of Stress :    Social Connections:     Frequency of Communication with bend left: normal   Rotation right: normal   Rotation left: normal      Muscle Strength   Right Quadriceps:  5/5   Left Quadriceps:  5/5   Right Hamstrings:  5/5   Left Hamstrings:  5/5      Tests   Straight leg raise right: negative  Straight leg raise left: negative     Other   Toe walk: normal  Heel walk: normal  Sensation: normal  Gait: normal      Comments:  +carter  -elliott lawton                  Labs:         Lab Results   Component Value Date     WBC 8.0 01/07/2021     HGB 14.2 01/07/2021     HCT 44.2 01/07/2021      01/07/2021     CHOL 237 (H) 01/07/2021     TRIG 82 01/07/2021     HDL 69 01/07/2021     ALT 9 06/02/2020     AST 11 06/02/2020      01/07/2021     K 4.6 01/07/2021      01/07/2021     CREATININE 0.67 01/07/2021     BUN 12 01/07/2021     CO2 31 01/07/2021     TSH 0.46 01/07/2021     INR 1.0 01/15/2017     LABA1C 6.4 (H) 01/07/2021     LABMICR 9 01/07/2021         Assessment: This is a 63 y.o. female with the following diagnosis:      Pain Diagnoses:  1. Lumbar radiculopathy    2. Lumbosacral spondylosis without myelopathy    3. DDD (degenerative disc disease), lumbar          Medical/ Psychological Comorbidities:  As listed in the past medical and surgical history     Functional Limitations secondary to the above problems:  Chronic painlimits function and quality of life     Plan:      1. L4-5 IESI    2. Meds:        New Prescriptions     No medications on file   These medications were prescribed by a provider not a part of this encounter     [facility]. DEVICES (CANE) MISC    Use daily        Encounter Medications    No orders of the defined types were placed in this encounter. Controlled Substances Monitoring:    OARRS report was reviewed for [place], [place]. Pt educated about the risks of taking opiates, including increasedsedation, constipation, slowed breathing, tolerance, dependence, and addiction.      No orders of the defined types were placed in this encounter. No follow-ups on file. The patient expressed understanding of the above assessment and plan. Totaltime spent face to face with patient was 30 minutes inwhich  50% or more of the time was spent in counseling, education about risk andbenefits of the above plan, and coordination of care.

## 2021-07-08 NOTE — OP NOTE
INTERLAMINAR EPIDURAL STEROID INJECTION    7/8/21  The patient was counseled at length about the risks of doreen Covid-19 during their perioperative period and any recovery window from their procedure. The patient was made aware that doreen Covid-19  may worsen their prognosis for recovering from their procedure  and lend to a higher morbidity and/or mortality risk. All material risks, benefits, and reasonable alternatives including postponing the procedure were discussed. The patient does wish to proceed with the procedure at this time. Surgeon: Beck Duran MD    Pre-operative Diagnosis:   Patient Active Problem List   Diagnosis Code    Gastroesophageal reflux disease K21.9    Irritable bowel syndrome with diarrhea K58.0    Chronic bronchitis ([facility] J42    Type 2 diabetes mellitus without complication, without long-term current use of insulin ([facility]) E11.9    Depression F32.9    Tobacco use Z72.0    Allergic rhinitis J30.9    Carpal tunnel syndrome of right wrist G56.01    Spinal stenosis, thoracic region M48.04    Cervical spondylosis with myelopathy M47.12    Lumbar disc disease with radiculopathy M51.16    Bilateral carpal tunnel syndrome G56.03    Chronic radicular lumbar pain M54.16, G89.29    Chronic bilateral thoracic back pain M54.6, G89.29    Chronic cervical radiculopathy M54.12    Numbness and tingling R20.0, R20.2    Chronic cerebral ischemia I67.82    Acquired cerebral atrophy (HCC) G31.9    Polyneuropathy, peripheral sensorimotor axonal G60.8    H/O fall Z91.81    Balance problem R26.89    Neuropathic pain M79.2    Muscle spasm M62.838       Post-operative Diagnosis: Same    Assistants: none    INDICATION:  Interlaminar epidural injection has been requested for diagnostic and therapeutic reasons. Please see H&P for details on previous treatments, examination findings, and work up.     Conservative treatment was ineffective i.e.: ice, NSAIDS, rest,     Patient space and the patient's response was again monitored. Finally, 1ml of 0.5% bupivacaine was then instilled. The patient's response was again monitored. The spinal needle was removed, and the patient's pain response, gait pattern, sensorimotor examination and range of motion were examined. The patient tolerated the procedure well and without complications and was noted to be in stable condition prior to discharge from the procedure center with discharge instructions. IMPRESSIONS:  1. L4-5 Interlaminar epidurogram, epidural anesthesia and epidural steroid injection procedures accomplished without incident. EBL: no blood loss    SPECIMEN: none    RECOMMENDATIONS:  1. Complete and return Post-Procedure Pain and Activity Diary.    2. Contact the [address] for symptom exacerbation, fever or unusual symptoms. 3. Post-procedure care according to verbal and written discharge instructions    POST-PROCEDURE EPIDUROGRAPHY INTERPRETATION:    EXAMINATION: AP, lateral views. FLUORO TIME: 12 seconds    DISCUSSION: Spot views of the spine reveal normal alignment and segmentation. Spinal needle is positioned at the L4-5 interlaminar space. Contrast spreads and outlines the epidural space. The epidurogram reveals excellent contrast flow. Visualized spine reveals See radiology report. Soft tissues reveal no abnormalities. IMPRESSION: L4-5 interlaminar epidurogram/epineurogram reveals satisfactory needle position and contrast spread.      Electronically signed by Beck Duran MD on 7/8/2021 at 10:30 AM

## 2021-07-21 ENCOUNTER — TELEPHONE (OUTPATIENT)
Dept: FAMILY MEDICINE CLINIC | Age: 61
End: 2021-07-21

## 2021-07-21 NOTE — TELEPHONE ENCOUNTER
Pt calling stating [employer] put her off work until 8-1 but pt needs this extended to 9-1 for her back, please advise at above number.

## 2021-07-27 ENCOUNTER — OFFICE VISIT (OUTPATIENT)
Dept: PAIN MANAGEMENT | Age: 61
End: 2021-07-27

## 2021-07-27 VITALS
HEIGHT: 63 IN | DIASTOLIC BLOOD PRESSURE: 88 MMHG | SYSTOLIC BLOOD PRESSURE: 136 MMHG | BODY MASS INDEX: 35.61 KG/M2 | WEIGHT: 201 LBS

## 2021-07-27 DIAGNOSIS — M54.16 LUMBAR RADICULOPATHY: ICD-10-CM

## 2021-07-27 DIAGNOSIS — M53.3 SACROILIAC JOINT DYSFUNCTION OF LEFT SIDE: Primary | ICD-10-CM

## 2021-07-27 DIAGNOSIS — M47.817 LUMBOSACRAL SPONDYLOSIS WITHOUT MYELOPATHY: ICD-10-CM

## 2021-07-27 DIAGNOSIS — G57.02 PIRIFORMIS SYNDROME OF LEFT SIDE: ICD-10-CM

## 2021-07-27 PROCEDURE — 99215 OFFICE O/P EST HI 40 MIN: CPT | Performed by: PHYSICAL MEDICINE & REHABILITATION

## 2021-07-27 PROCEDURE — 99214 OFFICE O/P EST MOD 30 MIN: CPT | Performed by: PHYSICAL MEDICINE & REHABILITATION

## 2021-07-27 PROCEDURE — 99213 OFFICE O/P EST LOW 20 MIN: CPT | Performed by: PHYSICAL MEDICINE & REHABILITATION

## 2021-07-27 ASSESSMENT — ENCOUNTER SYMPTOMS
RESPIRATORY NEGATIVE: 1
BACK PAIN: 1
ALLERGIC/IMMUNOLOGIC NEGATIVE: 1
EYES NEGATIVE: 1
DIARRHEA: 0
CONSTIPATION: 0

## 2021-07-27 NOTE — PROGRESS NOTES
.  PAIN MANAGEMENT FOLLOW-UP NOTE  7/27/21    CHIEF COMPLAINT: This is a58 y.o. female patientwho returns to the Pain Management Clinic with a history of Follow-up (IESI)      PAIN HPI:Alayna Olson returns today for  reevaluation. Since the visit, the patient reports that the pain is not changed. Patient hadL4-5 IESI with 1week of good improvement of  B lumbar pain and L gluteal pain  Since returned . Little down legs      Location: Back  Location Modifier: left lower back  Severity of Pain: 2  Duration of Pain: Intermittent  Frequency of Pain: Intermittent  Aggravating Factors: -  Limiting Behavior: Standing   Relieving Factors: relaxation        Previous pain medication trials have included:          Mental health:    Patient feels - secondary to their current pain problems as described above. H/O depression and anxiety: No   Patient is not seeing psychologist orpsychiatrist   Abuse history? No    Employed? No    ANALGESIA:   Are your Current Pain medication (s) helping to decrease pain? No.   Current Pain score:      ADVERSE AFFECTS:   Medication Side Effects: No.    ACTIVITY:  Are you able to be more active with your pain medications? No      ABERRANT BEHAVIORS SINCE LAST VISIT? No    Review of Systems   Constitutional: Positive for fatigue. HENT: Negative. Eyes: Negative. Respiratory: Negative. Cardiovascular: Negative. Gastrointestinal: Negative for constipation and diarrhea. Endocrine: Negative. Genitourinary: Negative for difficulty urinating and flank pain. Musculoskeletal: Positive for back pain and myalgias. Skin: Negative. Allergic/Immunologic: Negative. Neurological: Positive for weakness and numbness. Hematological: Negative. Psychiatric/Behavioral: Positive for sleep disturbance.         Allergies   Allergen Reactions    Pcn [Penicillins] Anaphylaxis     \"can't breath\"    Sulfa Antibiotics Anaphylaxis     \"can't breathe\"    Metformin And Related Other (See Lack of Transportation (Non-Medical):    Physical Activity:     Days of Exercise per Week:     Minutes of Exercise per Session:    Stress:     Feeling of Stress :    Social Connections:     Frequency of Communication with Friends and Family:     Frequency of Social Gatherings with Friends and Family:     Attends Protestant Services:     Active Member of Clubs or Organizations:     Attends Club or Organization Meetings:     Marital Status:    Intimate Partner Violence:     Fear of Current or Ex-Partner:     Emotionally Abused:     Physically Abused:     Sexually Abused:          Family and Social Historyreviewed in the electronic medical record. Imaging:Reviewed available imaging in our system with the patient. No results found. Objective:     Physical Exam:  Vitals:    07/27/21 0938   BP: 136/88   Site: Right Upper Arm   Position: Sitting   Cuff Size: Medium Adult   Weight: 201 lb (91.2 kg)   Height: 5' 3\" (1.6 m)          Physical Exam  Constitutional:       General: She is not in acute distress. Appearance: Normal appearance. She is well-developed. She is not diaphoretic. HENT:      Head: Normocephalic and atraumatic. Right Ear: External ear normal. No decreased hearing noted. Left Ear: External ear normal. No decreased hearing noted. Nose: Nose normal.   Eyes:      General: Lids are normal. No scleral icterus. Conjunctiva/sclera: Conjunctivae normal.   Neck:      Trachea: Phonation normal.   Cardiovascular:      Comments: No BLE edema present  Pulmonary:      Effort: Pulmonary effort is normal. No accessory muscle usage or respiratory distress. Abdominal:      General: Abdomen is flat. Palpations: Abdomen is soft. Genitourinary:     Comments: deferred  Musculoskeletal:      Lumbar back: Negative right straight leg raise test and negative left straight leg raise test.   Skin:     General: Skin is warm and dry. Coloration: Skin is not pale.       Findings: No erythema or rash. Neurological:      General: No focal deficit present. Mental Status: She is alert and oriented to person, place, and time. Psychiatric:         Mood and Affect: Mood normal.         Speech: Speech normal.         Behavior: Behavior normal.       Left Knee Exam     Tenderness   The patient is experiencing tenderness in the patella. Back Exam     Tenderness   The patient is experiencing tenderness in the lumbar and sacroiliac (+faberes Left  mild kemps Left  -slump +priformis tender). Range of Motion   Extension: normal   Flexion: normal   Lateral bend right: normal   Lateral bend left: normal   Rotation right: normal   Rotation left: normal     Muscle Strength   Right Quadriceps:  5/5   Left Quadriceps:  5/5   Right Hamstrings:  5/5   Left Hamstrings:  5/5     Tests   Straight leg raise right: negative  Straight leg raise left: negative    Other   Toe walk: normal  Heel walk: normal  Sensation: normal  Gait: normal                                   Research  has found that  Spine injections    reduce pain and  give  better functional  outcomes. Assessment: This is a 63 y.o. female patient with:    Diagnosis:   Diagnosis Orders   1. Sacroiliac joint dysfunction of left side     2. Piriformis syndrome of left side     3. Lumbar radiculopathy     4. Lumbosacral spondylosis without myelopathy         Medical Comorbidities:  As listed in the patient's past medical and surgical history    Functional Limitations:   Pain limits function and quality of life. Plan:     Sacroiliac  Piriformis  Left  For   Hold on second L4-5 IESI  Meds:   Controlled Substances Monitoring: Pt educated about the risks of taking opiates,including increased sedation, constipation, slowed breathing, tolerance, dependence,and addiction. New Prescriptions    No medications on file      No orders of the defined types were placed in this encounter.     No orders of the defined types were placed in this encounter. No follow-ups on file. Opioid medication has  significant  risk  benefit concerns. We  instruct    our patients that  a Random Urine Drug Screen is required  along with a  an Opioid assessment questionaire such as ORT  or SOAPP  The patient expressed understanding of the above assessment and plan. Totaltime spent face to face with patient was 25 minutes inwhich  50% or more of the time was spent in counseling, education about risk andbenefits of the above plan, and coordination of care.

## 2021-07-27 NOTE — PATIENT INSTRUCTIONS
[facility]  [address] [facility] [place], [address]  Telephone [phone]  Fax [phone]      PROCEDURE INSTRUCTIONS FOR  PAIN MANAGEMENT PROCEDURES WITHOUT IV SEDATION    Alayna Naqvi scheduled to see Dr. Teo Abreu to undergo the following procedure:  Left Sacroiliac Joint and piriformis injection    Procedure Date: ____08/19/21____  You will receive a phone call the day prior to your procedure to confirm a time of arrival.    Report to the [place] [address], Registration office on the 1st floor in the hospital, after check in and signing of paper work you will then go to the second floor to the surgery center. 1. Stop the following medications prior to the procedure:    Nothing to hold for this procedure     2. Take all routine medications unless otherwise instructed. Ok to take vitamins and antiinflammatory medications    3. EATING & DRINKING:  Ok to eat or drink before the injection - no IV sedation will be used. 4. If you are allergic to contrast or iodine, you must take benadryl and prednisone prior to the injection to prevent an allergic reaction. Follow the directions on the prescription for the times to take the medication. 5. Oral valium can be prescribed if your are anxious about the injections or feel that you can not lay still during the injection. If you take valium, you must have a . Make arrangements for a family member or friend to drive you to the surgery center. Your ride must stay in the hospital while you are having the injection done. If they cannot stay, the injection will be rescheduled. The valium may affect your judgment following the procedure and driving a vehicle within 24 hours after the sedation could be dangerous. 6.  Wear simple loose clothing, which can be easily changed. 7.  Leave jewelry (including rings) and other valuables at home.      8.  You will be asked to sign several forms prior to surgery; patients under the age of 18 must have a parent or legal guardian sign the permit to be able to do the procedure. 9.  You must have finished any antibiotic prescribed for recent infections. If required, please take pre-procedure antibiotic or other pre-procedure medications as instructed. 10. Bring inhalers and pain medications with you to your procedure. 11. Bring your MRI/CT films if they were done outside of the [facility]. 12. If you should develop a cold, sore throat, cough, fever or other new indication of illness or infection, or are started on antibiotics within 2 weeks of the scheduled procedure, please notify the [facility] office as early as possible at [phone]. If calling after 4:30pm the day prior to your scheduled procedure please contact [phone] and Leave a Voice Message.

## 2021-07-27 NOTE — LETTER
[address] Pain Management A department of [place] [address]  Phone: [phone]  Fax: [phone]    Lo Stuart MD    July 27, 2021     Sue Baldwin, [facility]  [address]    Patient: Martina Rizvi   MR Number: [id number]   YOB: 1960   Date of Visit: 7/27/2021       Dear Sue Baldwin: Thank you for referring Maximilian Abdul to me for evaluation/treatment. Below are the relevant portions of my assessment and plan of care. If you have questions, please do not hesitate to call me. I look forward to following Alayna along with you.     Sincerely,        Lo Stuart MD

## 2021-07-30 ENCOUNTER — OFFICE VISIT (OUTPATIENT)
Dept: NEUROLOGY | Age: 61
End: 2021-07-30

## 2021-07-30 VITALS
WEIGHT: 200.2 LBS | DIASTOLIC BLOOD PRESSURE: 72 MMHG | SYSTOLIC BLOOD PRESSURE: 126 MMHG | BODY MASS INDEX: 35.47 KG/M2 | HEART RATE: 94 BPM | OXYGEN SATURATION: 96 % | HEIGHT: 63 IN

## 2021-07-30 DIAGNOSIS — R20.0 NUMBNESS AND TINGLING: ICD-10-CM

## 2021-07-30 DIAGNOSIS — G89.29 CHRONIC RADICULAR LUMBAR PAIN: ICD-10-CM

## 2021-07-30 DIAGNOSIS — M79.2 NEUROPATHIC PAIN: ICD-10-CM

## 2021-07-30 DIAGNOSIS — Z72.0 TOBACCO USE: ICD-10-CM

## 2021-07-30 DIAGNOSIS — G56.03 BILATERAL CARPAL TUNNEL SYNDROME: ICD-10-CM

## 2021-07-30 DIAGNOSIS — G95.9 MYELOPATHY (HCC): ICD-10-CM

## 2021-07-30 DIAGNOSIS — M62.838 MUSCLE SPASM: ICD-10-CM

## 2021-07-30 DIAGNOSIS — I67.82 CHRONIC CEREBRAL ISCHEMIA: ICD-10-CM

## 2021-07-30 DIAGNOSIS — M54.12 CHRONIC CERVICAL RADICULOPATHY: Primary | ICD-10-CM

## 2021-07-30 DIAGNOSIS — M54.16 CHRONIC RADICULAR LUMBAR PAIN: ICD-10-CM

## 2021-07-30 DIAGNOSIS — M51.16 LUMBAR DISC DISEASE WITH RADICULOPATHY: ICD-10-CM

## 2021-07-30 DIAGNOSIS — M48.04 SPINAL STENOSIS, THORACIC REGION: ICD-10-CM

## 2021-07-30 DIAGNOSIS — M47.12 CERVICAL SPONDYLOSIS WITH MYELOPATHY: ICD-10-CM

## 2021-07-30 DIAGNOSIS — R26.89 BALANCE PROBLEM: ICD-10-CM

## 2021-07-30 DIAGNOSIS — R20.2 NUMBNESS AND TINGLING: ICD-10-CM

## 2021-07-30 DIAGNOSIS — G31.9 ACQUIRED CEREBRAL ATROPHY (HCC): ICD-10-CM

## 2021-07-30 DIAGNOSIS — G60.8 POLYNEUROPATHY, PERIPHERAL SENSORIMOTOR AXONAL: ICD-10-CM

## 2021-07-30 DIAGNOSIS — Z91.81 H/O FALL: ICD-10-CM

## 2021-07-30 DIAGNOSIS — E11.9 TYPE 2 DIABETES MELLITUS WITHOUT COMPLICATION, WITHOUT LONG-TERM CURRENT USE OF INSULIN (HCC): ICD-10-CM

## 2021-07-30 DIAGNOSIS — R25.1 EPISODE OF SHAKING: ICD-10-CM

## 2021-07-30 DIAGNOSIS — F32.0 CURRENT MILD EPISODE OF MAJOR DEPRESSIVE DISORDER WITHOUT PRIOR EPISODE (HCC): ICD-10-CM

## 2021-07-30 DIAGNOSIS — R89.4 POSITIVE TEST FOR HERPES SIMPLEX VIRUS (HSV) ANTIBODY: ICD-10-CM

## 2021-07-30 PROCEDURE — 99214 OFFICE O/P EST MOD 30 MIN: CPT | Performed by: PSYCHIATRY & NEUROLOGY

## 2021-07-30 RX ORDER — VENLAFAXINE HYDROCHLORIDE 37.5 MG/1
CAPSULE, EXTENDED RELEASE ORAL
Qty: 30 CAPSULE | Refills: 3 | Status: SHIPPED | OUTPATIENT
Start: 2021-07-30 | End: 2021-09-20 | Stop reason: SDUPTHER

## 2021-07-30 RX ORDER — VENLAFAXINE HYDROCHLORIDE 150 MG/1
CAPSULE, EXTENDED RELEASE ORAL
Qty: 30 CAPSULE | Refills: 3 | Status: SHIPPED | OUTPATIENT
Start: 2021-07-30 | End: 2021-09-20 | Stop reason: SDUPTHER

## 2021-07-30 ASSESSMENT — ENCOUNTER SYMPTOMS
WHEEZING: 0
VISUAL CHANGE: 0
SINUS PRESSURE: 0
EYE REDNESS: 0
CONSTIPATION: 0
SHORTNESS OF BREATH: 0
EYE PAIN: 0
COLOR CHANGE: 0
DIARRHEA: 0
BOWEL INCONTINENCE: 0
COUGH: 0
FACIAL SWELLING: 0
NAUSEA: 0
CHEST TIGHTNESS: 0
PHOTOPHOBIA: 0
VOMITING: 0
SORE THROAT: 0
BLOOD IN STOOL: 0
EYE DISCHARGE: 0
ABDOMINAL PAIN: 0
VOICE CHANGE: 0
BACK PAIN: 1
TROUBLE SWALLOWING: 0
CHOKING: 0
EYE ITCHING: 0
ABDOMINAL DISTENTION: 0
APNEA: 0

## 2021-07-30 NOTE — PROGRESS NOTES
This writer contacted scheduling to schedule the following testing: EEG - patient made aware, will contact office with further needs.

## 2021-07-30 NOTE — TELEPHONE ENCOUNTER
Alayna called requesting a refill of the below medication which has been pended for you:     Requested Prescriptions     Pending Prescriptions Disp Refills    venlafaxine (EFFEXOR XR) 150 MG extended release capsule 30 capsule 3     Sig: Take 1 capsule by mouth once daily    venlafaxine (EFFEXOR XR) 37.5 MG extended release capsule 30 capsule 3     Sig: Take 1 capsule by mouth once daily       Last Appointment Date: 6/14/2021  Next Appointment Date: 9/20/2021    Allergies   Allergen Reactions    Pcn [Penicillins] Anaphylaxis     \"can't breath\"    Sulfa Antibiotics Anaphylaxis     \"can't breathe\"    Metformin And Related Other (See Comments)     Sweating and diarrhea

## 2021-07-30 NOTE — PROGRESS NOTES
[facility]  Neurology    [address]TFI:[phone]   Fax: [phone]        SUBJECTIVE:       PATIENT ID:  Boo Baeza is a  RIGHT     HANDED 63 y.o. female. Neurologic Problem  The patient's primary symptoms include clumsiness, focal sensory loss, focal weakness, a loss of balance and weakness. The patient's pertinent negatives include no altered mental status, memory loss, near-syncope, slurred speech, syncope or visual change. Primary symptoms comment: H/O   CHRONIC  NECK  AND  BACK  PAIN     FOR    MORE   THAN   20   YEARS,   H/O    FALLING. This is a chronic problem. The neurological problem developed insidiously. The problem has been gradually worsening since onset. There was lower extremity, left-sided, right-sided and upper extremity focality noted. Associated symptoms include back pain and neck pain. Pertinent negatives include no abdominal pain, auditory change, aura, bladder incontinence, bowel incontinence, chest pain, confusion, diaphoresis, dizziness, fatigue, fever, headaches, light-headedness, nausea, palpitations, shortness of breath, vertigo or vomiting. (MUSCLE  SPASMS,   CHRONIC    PAIN) Past treatments include medication and bed rest. The treatment provided no relief. Her past medical history is significant for mood changes. There is no history of a bleeding disorder, a clotting disorder, a CVA, dementia, head trauma, liver disease or seizures. History obtained from  The patient     and other  available   medical  records   were  Also  reviewed. The  Duration,  Quality,  Severity,  Location,  Timing,  Context,  Modifying  Factors   Of   The   Chief   Complaint       And  Present  Illness  Was   Reviewed   In   Chronological   Manner.                                             PATIENT'S  MAIN  CONCERNS INCLUDE :                     1)        H/O     CHRONIC       NECK   PAIN       FOR    30   YEARS 2)    H/O      CHRONIC  LUMBAR  PAIN     FOR     20  YEARS                       3)      H/O      CHRONIC   THORACIC    PAIN      FOR     TWO   YEARS                        4)         WORSENING  OF      NECK  PAIN      WITH  RADICULAR  SYMPTOMS                                  ON  LEFT  SIDE          SINCE     2020                           5)     WORSENING  OF    THORACIC    AND     LUMBAR  PAIN       WITH                                     MUSCLE  SPASMS     SINCE      2020                           6)     MRI   THORACIC  AND  LUMBAR  SPINE IN  JAN. 2021                          REPORT     SHOWED  :             \"                            No fracture within the thoracic or lumbar spine.       No cord signal abnormality within the thoracic or lumbar spine.  No cord   compression.       Mild levoscoliosis with tip at L3-L4.  Mild dextroscoliosis with tip at T7-8.       At the level of T7-T8, T6-T7, T4-T5 and T2-T3, posterior disc protrusion   indents the anterior spinal cord.       Mild canal stenosis at T7-T8 and T2-T3 level.       At L4-L5, grade 1 anterolisthesis, moderate canal stenosis and mom mild   bilateral neural foraminal narrowing   \"                    7)     EMG/  NC  STUDIES OF  BOTH  LOWER  EXTREMITIES     IN  JAN. 2021                          SHOWED :                            A)     MILD     TO  MODERATE    PERIPHERAL  POLYNEUROPATHY                         B)    CHRONIC  L 4 - 5    AND    L 5 -  S 1   RADICULOPATHY                          8)    MRI    CERVICAL   SPINE    IN   [facility]   2021                             REPORT   SHOWED  :             \"           No cord signal abnormality.       No canal stenosis.  No nerve impingement.  No significant posterior disc   pathology.       At C3-C4, grade 1 anterolisthesis, mild canal stenosis and moderate left   neural foraminal narrowing.       At C4-C5, grade 1 anterolisthesis and moderate left neural foraminal   Narrowing.    \"     8)     PATIENT  HAD  NEURO  SURGERY    EVALUATIONS      IN   MAY   2021                         RECOMMENDED     CONSERVATIVE    MANAGEMENT                             9)        EMG  /  NC   STUDIES      OF  BOTH  UPPER  EXTREMITIES  IN  June 2021                                                           SHOWED  :                           BILATERAL    MILD  TO  MODERATE     CARPAL   TUNNEL    SYNDROME ,                               MORE   SO     ON  RIGHT  SIDE                           -  PATIENT  ADVISED   TO   WEAR    WRIST  BRACES                        10)     MRI  BRAIN  IN  June 2021      REPORT    SHOWED                                                            \"  No acute intracranial abnormality.       Minimal parenchymal volume loss.       Minimal chronic microvascular disease      '                  11)      KNOWN     H/O    TYPE    II     DM                                WITH    ELEVATED   HEMOGLOBIN  A 1 C                             WITH  PERIPHERAL  POLYNEUROPATHY                        12)      MULTIPLE  CO MORBID  MEDICAL    CONDITIONS                             BEING  FOLLOWED   BY   HER PCP                                 13)       H/O     CHRONIC  SMOKING                    PATIENT  AWARE  OF  RISKS  AND                 SIDE  EFFECTS  OF   SMOKING   DISCUSSED. PATIENT   ADVISED   AND  COUNSELED    TO   QUIT  SMOKING.                                     14)     PATIENT     STARTED  ON     PAIN  MANAGEMENT   IN    June 2021                            AND   GETTING     LOCAL  INJECTIONS                                             15)     PATIENT   ON    NEURONTIN ,    BACLOFEN       AND  ALEVE   AS  NEEDED                       16)      H/O    CHRONIC   ANXIETY,   DEPRESSION                                       FOR    30    YEARS                              -    ON     EFFEXOR                                                             18)      H/O    CHRONIC MILD     BALANCE    PROBLEMS         AND                             H/O     FALLING           SINCE     2020                                    NO     H/O    HEAD INJURY. NO  LOC                           19)        H/O   CHRONIC      THORACIC    MUSCLE  SPASM  &   PAIN                                      WITH   PARAESTHESIAS     FOR    TWO  YEARS                                                   20)      POSITIVE    HSV    I    AND  II      I g G    And    I g M      With    I g M                                   In     June 2021                                      -   TO   COMPLETE    ACYCLOVIR    THERAPY    AS  PLANNED                          21)    RESULTS    OF    ABOVE     NEURO  DIAGNOSTIC  WORK  UP                           EXPECTATIONS   &   GOAL  OF  MANAGEMENT,  PROGNOSIS                              DISCUSSED       WITH PATIENT    IN   DETAIL                          22)     H/O    SHAKING    EPISODES      OF    BODY   AND  EXTREMITIES                                  SINCE      June 2021                                NO  LOC. PATIENT  DENIES  ANY   ANXIETY                               23)         VARIOUS  RISK   FACTORS   WERE  REVIEWED   AND   DISCUSSED. PATIENT   HAS  MULTIPLE   MEDICAL, NEUROLOGICAL                                    AND   MENTAL HEALTH   PROBLEMS . PATIENT'S   MANAGEMENT  IS  CHALLENGING.                                         PRECIPITATING  FACTORS: including  fever/infection, exertion/relaxation, position change, stress,                weather change,   medications/alcohol, time of day/darkness/light  Are  present                                                          MODIFYING  FACTORS:  fever/infection, exertion/relaxation, position change, stress, weather change,               medications/alcohol, time of day/darkness/light  Are  present                Patient   Indicates   The  Presence And  The  Absence  Of  The  Following    Associated  And             Additional  Neurological    Symptoms:                                Balance  And coordination   problems  present           Gait problems     absent            Headaches      absent              Migraines           absent           Memory problemsabsent              Confusion        absent            Paresthesia   numbness          present           Seizures  And  Starring  Episodes           absent           Syncope,  Near  syncopal episodes         absent           Speech   problems           absent             Swallowing   Problems      absent            Dizziness,  Light headedness           absent              Vertigo        absent             Generalized   Weakness    absent              focal  Weakness     present             Tremors         absent              Sleep  Problems     absent             History  Of   Recent  Head  Injury     absent             History  Of   Recent  TIA     absent             History  Of   Recent    Stroke     absent             Neck  Pain   and   Neck muscle  Spasms  present               Radiating  down   And   Weakness           present            Lower back   Pain  And     Spasms  present              Radiating    Down   And   Weakness          present                H/OFALLS        present               History  Of   Visual  Symptoms    absent                  Associated   Diplopia       absent                                               Also   Additional   Symptoms   Present    As  Documented    In   The   detailed                  Review  Of  Systems   And    Please   Refer   To    Them for   Additional    Information. Any components  That are either  Unobtainable  Or  Limited  In   HPI, ROS  And/or PFSH   Are                   Due   ToPatient's  Medical  Problems,  Clinical  Condition   and/or lack of                                 other    Alternate   resources.             RECORDS REVIEWED:    historical medical records           INFORMATION   REVIEWED:     MEDICAL   HISTORY,SURGICAL   HISTORY,     MEDICATIONS   LIST,   ALLERGIES AND  DRUG  INTOLERANCES,       FAMILY   HISTORY,  SOCIAL  HISTORY,      PROBLEM  LIST   FOR  PATIENT  CARE   COORDINATION          Past Medical History:   Diagnosis Date    Allergic rhinitis     Arthritis     Asthma     Bronchitis     [facility]     Depression     Diabetes mellitus [facility]     Headache     Hyperlipidemia     Hypertension     Incontinence     Irritable bowel syndrome     Kidney stone     Osteoarthritis     Pneumonia     [facility]    Ulcer          Past Surgical History:   Procedure Laterality Date    CARPAL TUNNEL RELEASE Right 5/7/2019    Right Carpal Tunnel Release performed by Avel Silva MD at [facility], [address] [place] [address] COLONOSCOPY  7/11/208    Serated polyp (1)    COLONOSCOPY  08/02/2017    [id number]    COLONOSCOPY Left 10/8/2019    COLONOSCOPY WITH BIOPSY performed by Chanell Moses MD at [facility], [address] [place] [address]    with Right oopherectomy still has left ovary    LAPAROSCOPY  2008    Diagnostic for pain - no findings    MECKEL DIVERTICULUM EXCISION  1970    PAIN MANAGEMENT PROCEDURE N/A 7/8/2021    L4-L5 INTERLAMINAR JEAN performed by Beck Oneal MD at [address]  08/02/2017    [id number]    UPPER GASTROINTESTINAL ENDOSCOPY Left 10/8/2019    EGD BIOPSY performed by Chanell Moses MD at [facility]         Current Outpatient Medications   Medication Sig Dispense Refill    Multiple Vitamin (MULTI-VITAMIN DAILY PO) Take by mouth      albuterol sulfate HFA (PROVENTIL HFA) 108 (90 Base) MCG/ACT inhaler Inhale 2 puffs into the lungs every 4 hours as needed for Wheezing 1 Inhaler 0    venlafaxine (EFFEXOR XR) 150 MG extended release capsule Take 1 capsule by mouth once daily 30 capsule 3    venlafaxine (EFFEXOR XR) 37.5 MG extended release capsule Take 1 capsule by mouth once daily 30 capsule 3    Misc. Devices (CANE) MISC Use daily 1 each 0    fluticasone (FLONASE) 50 MCG/ACT nasal spray 2 sprays by Nasal route daily 3 Bottle 3    montelukast (SINGULAIR) 10 MG tablet Take 1 tablet by mouth nightly 30 tablet 3    baclofen (LIORESAL) 10 MG tablet Take 1 tablet by mouth nightly as needed (muscle spasms) 30 tablet 1    vitamin D (ERGOCALCIFEROL) 1.25 MG (05239 UT) CAPS capsule Take 1 capsule by mouth once a week 12 capsule 1    pantoprazole (PROTONIX) 40 MG tablet Take 1 tablet by mouth daily 90 tablet 3    Naproxen Sodium (ALEVE PO) Take by mouth daily as needed      gabapentin (NEURONTIN) 300 MG capsule Take 1 capsule by mouth 3 times daily for 60 days. Intended supply: 30 days (Patient taking differently: Take 300 mg by mouth 3 times daily. Intended supply: 30 days  Taking PRN) 90 capsule 1     No current facility-administered medications for this visit.          Allergies   Allergen Reactions    Pcn [Penicillins] Anaphylaxis     \"can't breath\"    Sulfa Antibiotics Anaphylaxis     \"can't breathe\"    Metformin And Related Other (See Comments)     Sweating and diarrhea         Family History   Adopted: Yes   Family history unknown: Yes         Social History     Socioeconomic History    Marital status:      Spouse name: Not on file    Number of children: Not on file    Years of education: Not on file    Highest education level: Not on file   Occupational History    Occupation:      Employer: [employer]   Tobacco Use    Smoking status: Current Every Day Smoker     Packs/day: 1.50     Years: 30.00     Pack years: 45.00     Types: Cigarettes     Start date: 1/1/1971    Smokeless tobacco: Never Used   Vaping Use    Vaping Use: Never used   Substance and Sexual Activity    Alcohol use: No    Drug use: No    Sexual activity: Yes     Comment:  x 11 yrs   Other Topics Concern    Not on file   Social History Narrative    Not on file     Social Determinants of Health     Financial Resource Strain: Unknown    Difficulty of Paying Living Expenses: Patient refused   Food Insecurity: Unknown    Worried About Running Out of Food in the Last Year: Patient refused    [address] in the Last Year: Patient refused   Transportation Needs:     Lack of Transportation (Medical):      Lack of Transportation (Non-Medical):    Physical Activity:     Days of Exercise per Week:     Minutes of Exercise per Session:    Stress:     Feeling of Stress :    Social Connections:     Frequency of Communication with Friends and Family:     Frequency of Social Gatherings with Friends and Family:     Attends Jew Services:     Active Member of Clubs or Organizations:     Attends Club or Organization Meetings:     Marital Status:    Intimate Partner Violence:     Fear of Current or Ex-Partner:     Emotionally Abused:     Physically Abused:     Sexually Abused:        Vitals:    07/30/21 0939   BP: 126/72   Pulse: 94   SpO2: 96%         Wt Readings from Last 3 Encounters:   07/30/21 200 lb 3.2 oz (90.8 kg)   07/27/21 201 lb (91.2 kg)   07/08/21 190 lb (86.2 kg)         BP Readings from Last 3 Encounters:   07/30/21 126/72   07/27/21 136/88   07/08/21 (!) 169/89           Hematology and Coagulation    Lab Results   Component Value Date    WBC 8.0 01/07/2021    RBC 4.70 01/07/2021    HGB 14.2 01/07/2021    HCT 44.2 01/07/2021    MCV 94.0 01/07/2021    MCH 30.2 01/07/2021    MCHC 32.1 01/07/2021    RDW 12.5 01/07/2021     01/07/2021    MPV 10.6 01/07/2021           Chemistries    Lab Results   Component Value Date     01/07/2021    K 4.6 01/07/2021     01/07/2021    CO2 31 01/07/2021    BUN 12 01/07/2021    CREATININE 0.67 01/07/2021    CALCIUM 9.7 01/07/2021    PROT 7.5 06/18/2021    LABALBU 4.3 06/02/2020    BILITOT 0.14 06/02/2020    ALKPHOS 85 06/02/2020    AST 11 06/02/2020    ALT 9 06/02/2020     Lab Results   Component Value Date    ALKPHOS 85 06/02/2020    ALT 9 06/02/2020    AST 11 06/02/2020    PROT 7.5 06/18/2021    BILITOT 0.14 06/02/2020    LABALBU 4.3 06/02/2020     Lab Results   Component Value Date    BUN 12 01/07/2021    CREATININE 0.67 01/07/2021     Lab Results   Component Value Date    CALCIUM 9.7 01/07/2021    MG 1.9 01/15/2017     Lab Results   Component Value Date    AST 11 06/02/2020    ALT 9 06/02/2020       Lab Results   Component Value Date    [id number] 451 06/18/2021         Review of Systems   Constitutional: Negative for appetite change, chills, diaphoresis, fatigue, fever and unexpected weight change. HENT: Negative for congestion, dental problem, drooling, ear discharge, ear pain, facial swelling, hearing loss, mouth sores, nosebleeds, postnasal drip, sinus pressure, sore throat, tinnitus, trouble swallowing and voice change. Eyes: Negative for photophobia, pain, discharge, redness, itching and visual disturbance. Respiratory: Negative for apnea, cough, choking, chest tightness, shortness of breath and wheezing. Cardiovascular: Negative for chest pain, palpitations, leg swelling and near-syncope. Gastrointestinal: Negative for abdominal distention, abdominal pain, blood in stool, bowel incontinence, constipation, diarrhea, nausea and vomiting. Endocrine: Negative for cold intolerance, heat intolerance, polydipsia, polyphagia and polyuria. Genitourinary: Negative for bladder incontinence. Musculoskeletal: Positive for back pain and neck pain. Negative for arthralgias, gait problem, joint swelling, myalgias and neck stiffness. Skin: Negative for color change, pallor, rash and wound. Allergic/Immunologic: Negative for environmental allergies, food allergies and immunocompromised state.    Neurological: Positive for focal weakness, weakness, numbness and loss of balance. Negative for dizziness, vertigo, tremors, seizures, syncope, facial asymmetry, speech difficulty, light-headedness and headaches. Hematological: Negative for adenopathy. Does not bruise/bleed easily. Psychiatric/Behavioral: Negative for agitation, behavioral problems, confusion, decreased concentration, dysphoric mood, hallucinations, memory loss, self-injury, sleep disturbance and suicidal ideas. The patient is nervous/anxious. The patient is not hyperactive. OBJECTIVE:      Physical Exam  Constitutional:       Appearance: She is well-developed. HENT:      Head: Normocephalic and atraumatic. No raccoon eyes or Arreguin's sign. Right Ear: External ear normal.      Left Ear: External ear normal.      Nose: Nose normal.   Eyes:      Conjunctiva/sclera: Conjunctivae normal.      Pupils: Pupils are equal, round, and reactive to light. Neck:      Thyroid: No thyroid mass or thyromegaly. Vascular: No carotid bruit. Trachea: No tracheal deviation. Meningeal: Brudzinski's sign and Kernig's sign absent. Cardiovascular:      Rate and Rhythm: Normal rate and regular rhythm. Pulmonary:      Effort: Pulmonary effort is normal.   Musculoskeletal:         General: No tenderness. Cervical back: Normal range of motion and neck supple. No rigidity. No muscular tenderness. Normal range of motion. Skin:     General: Skin is warm. Coloration: Skin is not pale. Findings: No erythema or rash. Nails: There is no clubbing. Psychiatric:         Attention and Perception: She is attentive. Mood and Affect: Mood is anxious. Mood is not depressed. Affect is not labile, blunt or inappropriate. Speech: She is communicative. Speech is not rapid and pressured, delayed, slurred or tangential.         Behavior: Behavior is not agitated, slowed, aggressive, withdrawn, hyperactive or combative. Behavior is cooperative.          Thought Content: Thought content is not paranoid or delusional. Thought content does not include homicidal or suicidal ideation. Thought content does not include homicidal or suicidal plan. Cognition and Memory: Memory is not impaired. She does not exhibit impaired recent memory or impaired remote memory. Judgment: Judgment is not impulsive or inappropriate. NEUROLOGICALEXAMINATION :      A) MENTAL STATUS:                   Alert and  oriented  To time, place  And  Person. No Aphasia. No  Dysarthria. Able   To  Follow     SIMPLE    commands   without   Any  Difficulty. No right  To left confusion. Normal  Speech  And language function. Insight and  Judgment ,Fund  Of  Knowledge   within normal limits                Recent  And  Remote memory  within   normal limits                Attention &  Concentration are within   normal limits                                                   B) CRANIAL NERVES :        CN : Visual  Acuity  And  Visual fields  within normal limits               Fundi  Could  Not  Be  Could  Not  Be  Evaluated. 3,4,6 CN : Both  Pupils are   Reactive and  Equal.  Movements  Are  Intact. No  Nystagmus. No  TERRY. No  Afferent  Pupillary  Defect noted. 5 CN :  Normal  Facial sensations and Corneal  Reflexes           7 CN:  Normal  Facial  Symmetry  And  Strength. No facial  Weakness.            8 CN :  Hearing  Appears within normal limits          9, 10 CN: Normal   spontaneous, reflex   palate   movements         11 CN:   Normal  Shoulder  shrug and  strength         12 CN :   Normal  Tongue movements and  Tongue  In midline                        No tongue   Fasciculations or atrophy       C) MOTOR  EXAM:                 Strength  In upper    extremities   within   normal limits,                     EXCEPT  FOR    DECREASED  BOTH  HAND                     Strength  In   Lower   extremities   DECREASED  4 +/5                           Rapid   alternating  And  repetitions  Movements  DECREASED                 Muscle  Tone  In upper  And  Lower  Extremities  normal                No rigidity. No  Spasticity. Bradykinesia   absent                 No  Asterixis. Sustention  Tremor , Resting   Tremor   absent                    No   other  Abnormal  Movements noted           D) SENSORY :               Light   touch, pinprick,   position  And  Vibration  DECREASED                     MORE  SO  BOTH  LOWER  EXTREMITIES        E) REFLEXES:                   Deep  Tendon  Reflexes    DECREASED                  No  pathological  Reflexes  Bilaterally.                                   F) COORDINATION  AND  GAIT :                                Station and  Gait  normal                              Romberg 's test   POSITIVE                            Ataxia negative          ASSESSMENT:        Patient Active Problem List   Diagnosis    Gastroesophageal reflux disease    Irritable bowel syndrome with diarrhea    [facility]    Type 2 diabetes mellitus without complication, without long-term current use of insulin ([facility]    Depression    Tobacco use    Allergic rhinitis    Carpal tunnel syndrome of right wrist    Spinal stenosis, thoracic region    Cervical spondylosis with myelopathy    Lumbar disc disease with radiculopathy    Bilateral carpal tunnel syndrome    Chronic radicular lumbar pain    Chronic bilateral thoracic back pain    Chronic cervical radiculopathy    Numbness and tingling    Chronic cerebral ischemia    Acquired cerebral atrophy (HCC)    Polyneuropathy, peripheral sensorimotor axonal    H/O fall    Balance problem    Neuropathic pain    Muscle spasm    Positive test for herpes simplex virus (HSV) antibody    Episode of shaking           MRI OF THE BRAIN WITHOUT CONTRAST  6/8/2021 8:27 am       TECHNIQUE:   Multiplanar multisequence MRI of the brain was performed without the   administration of intravenous contrast.       COMPARISON:   None.       HISTORY:   ORDERING SYSTEM PROVIDED HISTORY[facility]   TECHNOLOGIST PROVIDED HISTORY:   evaluation for demyelinating lesions   Reason for Exam: Numbness and tingling bilateral arms and legs. Patient has   been falling a lot. Symptoms for about eight months. Evaluation for   demyelinating disease. Acuity: Chronic   Type of Exam: Initial   Additional signs and symptoms: Myelopathy       FINDINGS:   INTRACRANIAL STRUCTURES/VENTRICLES: There is minimal parenchymal volume loss. There are a few scattered foci of T2/FLAIR hyperintensity in the   periventricular and subcortical white matter, likely related to minimal   chronic microvascular disease. Osmar Teresita is no acute infarct. No mass effect or   midline shift. No acute intracranial hemorrhage. There is no hydrocephalus.    The sellar/suprasellar regions appear unremarkable.  The normal signal voids   within the major intracranial vessels appear maintained.       ORBITS: The visualized portion of the orbits demonstrate no acute abnormality.       SINUSES: There is scattered minimal mucosal thickening in the paranasal   sinuses.  The bilateral mastoid air cells are well aerated.       BONES/SOFT TISSUES: The bone marrow signal intensity appears normal. The soft   tissues demonstrate no acute abnormality.  There are 2 adjacent subcutaneous   soft tissue nodules in the occipital scalp measuring up to 1.3 cm, likely to   sebaceous cysts.           Impression   No acute intracranial abnormality.       Minimal parenchymal volume loss.       Minimal chronic microvascular disease.         MRI OF THE CERVICAL SPINE WITHOUT CONTRAST 3/2/2021 4:23 pm       TECHNIQUE:   Multiplanar multisequence MRI of the cervical spine was performed without the   administration of intravenous contrast.     COMPARISON:   None.       HISTORY:   ORDERING SYSTEM PROVIDED HISTORY: Myelopathy ([address]   TECHNOLOGIST PROVIDED HISTORY:   evaluate for stenosis   Reason for Exam: Neck pain, left shoulder pain; Muscle spasms, weakness   bilateral arms. Evaluate for stenosis. Symptoms for years, worse in the last   six to eight months. Acuity: Chronic   Type of Exam: Initial   Additional signs and symptoms: Myelopathy       FINDINGS:   BONES/ALIGNMENT: There is normal alignment of the spine. The vertebral body   heights are maintained. The bone marrow signal appears unremarkable.  Left   thyroid lobe contains a 2 x 1.9 cm nodule.       SPINAL CORD: No abnormal cord signal is seen.       SOFT TISSUES: Incidental note is made of sebaceous cyst within the   subcutaneous tissues of central occipital region.       C2-C3: There is no significant disc protrusion, spinal canal stenosis or   neural foraminal narrowing.       C3-C4: Grade 1 anterolisthesis, posterior uncovertebral hypertrophy and disc   bulging.  Moderate left mild right facet arthropathy.  Findings resulting   mild canal stenosis and moderate left neural foraminal narrowing.       C4-C5: Grade 1 anterolisthesis, posterior uncovertebral hypertrophy.    Moderate left and mild right facet arthropathy.  Findings resulting in   moderate left neural foraminal narrowing.       C5-C6: 1 mm disc bulging.  Mild bilateral said arthropathy.  Findings result   in mild left neural foraminal narrowing.       C6-C7: Mild bilateral facet arthropathy.  Otherwise unremarkable       C7-T1: There is no significant disc protrusion, spinal canal stenosis or   neural foraminal narrowing.           Impression   No cord signal abnormality.       No canal stenosis.  No nerve impingement.  No significant posterior disc   pathology.       At C3-C4, grade 1 anterolisthesis, mild canal stenosis and moderate left   neural foraminal narrowing.       At C4-C5, grade 1 anterolisthesis and moderate left bulging 4 mm central disc protrusion indents the   anterior spinal cord.       At the level of T2-T3, disc bulging with 3 - 4 mm central disc protrusion   indents anterior spinal cord resulting in mild calcinosis.       Lumbar spine:       BONES/ALIGNMENT: There is normal alignment of the spine.  Mild levoscoliosis   with tip at L3-L4.       Mild the vertebral body heights are maintained. The bone marrow signal   appears unremarkable.       SPINAL CORD: The conus terminates normally.       SOFT TISSUES: No paraspinal mass identified.       L1-L2: There is no significant disc herniation, spinal canal stenosis or   neural foraminal narrowing.       L2-L3: There is no significant disc herniation, spinal canal stenosis or   neural foraminal narrowing.       L3-L4: 1 mm disc bulging.  Mild bilateral said arthropathy.  Otherwise   unremarkable.       L4-L5: Grade 1 anterolisthesis.  2 mm disc bulging.  Moderate bilateral facet   arthropathy.  Moderate canal stenosis, mild narrowing of bilateral   subarticular recesses and mild bilateral neural foraminal narrowing.       L5-S1: 1 mm disc bulging.  Moderate bilateral set arthropathy.  Mild   bilateral neural foraminal narrowing.           Impression   No fracture within the thoracic or lumbar spine.       No cord signal abnormality within the thoracic or lumbar spine.  No cord   compression.       Mild levoscoliosis with tip at L3-L4.  Mild dextroscoliosis with tip at T7-8.       At the level of T7-T8, T6-T7, T4-T5 and T2-T3, posterior disc protrusion   indents the anterior spinal cord.       Mild canal stenosis at T7-T8 and T2-T3 level.       At L4-L5, grade 1 anterolisthesis, moderate canal stenosis and mom mild   bilateral neural foraminal narrowing.           VISITING DIAGNOSIS:      ICD-10-CM    1. Chronic cervical radiculopathy  M54.12    2. Cervical spondylosis with myelopathy  M47.12    3. Polyneuropathy, peripheral sensorimotor axonal  G60.8    4.  H/O fall  Z91.81 5. Muscle spasm  M62.838    6. Current mild episode of major depressive disorder without prior episode ([address]  F32.0    7. Spinal stenosis, thoracic region  M48.04    8. Acquired cerebral atrophy ([address]  G31.9    9. Bilateral carpal tunnel syndrome  G56.03    10. Myelopathy ([address]  G95.9    11. Chronic cerebral ischemia  I67.82    12. Lumbar disc disease with radiculopathy  M51.16    13. Neuropathic pain  M79.2    14. Balance problem  R26.89    15. Tobacco use  Z72.0    16. Type 2 diabetes mellitus without complication, without long-term current use of insulin (HCC)  E11.9    17. Numbness and tingling  R20.0     R20.2    18. Chronic radicular lumbar pain  M54.16     G89.29    19. Positive test for herpes simplex virus (HSV) antibody  R89.4    20. Episode of shaking  R25.1                 CONCERNS   &   INCREASED   RISK   FOR         * TIA,  CEREBRO  VASCULAR  ISCHEMIA       *  MONONEUROPATHIES,   RADICULOPATHY      *   PERIPHERAL  NEUROPATHY,   NEUROPATHIC  PAIN       *     BALANCE PROBLMES   AND  FALL                VARIOUS  RISK   FACTORS   WERE  REVIEWED   AND   DISCUSSED. *  PATIENT   HAS  MULTIPLE   MEDICAL, NEUROLOGICAL                        AND   MENTAL HEALTH   PROBLEMS          PATIENT'S   MANAGEMENT  IS  CHALLENGING. PLAN:                         Artist Repine  Of  The  Diagnoses,  The  Management & Treatment  Options            AND    Care  plan  Were          Reviewed and   Discussed   With  patient. * Goals  And  Expectations  Of  The  Therapy  Discussed   And  Reviewed. *   Benefits   And   Side  Effect  Profile  Of  Medication/s   Were   Discussed                * Need   For  Further   Follow up For  The  Various  Problems Were  discussed.           * Results  Of  The  Previous  Diagnostic tests were reviewed and  discussed                   Medical  Decision  Making  Was  Made  Based on the   Complexity  Of  Above  Mentioned  Diagnoses,    Data reviewed             And Risk  Of  Significant   Co morbidities and   complicating   Factors. Medical  Decision  Was   High    Complexity   Due   To  The  Patient's  Multiple  Symptoms,  Advancing   Disease,            Complex  Treatment  Regimen,  Multiple medications           and   Risk  Of   Side  Effects,  Difficulty  In  Medication  Management  And  Diagnostic  Challenges           In  View  Of  The  Associated   Co  Morbid  Conditions   And  Problems. * FALL   PRECAUTIONS. THESE  REVIEWED   AND  DISCUSSED      *  USE   WALKING  ASSISTANCE  DEVICES     QUAD  CANE        *   BE  CAREFUL  WITH  ACTIVITIES   INCLUDING  DRIVING. *   AVOID   NECK  AND/ BACK  STRAINING  ACTIVITIES          *   TO   WEAR   BILATERAL   WRIST  BRACES       *   TO  AVOID  PRESSURE  OVER   ULNAR  ASPECT   OF   ELBOWS            *   ADEQUATE   FLUID  INTAKE   AND  ELECTROLYTE  BALANCE           * KEEP  DAIRY  OF   THE  NEUROLOGICAL  SYMPTOMS        RECORDING THE    DURATION  AND  FREQUENCY. *  AVOID    CONDITIONS  AND  FACTORS   THAT  MAKE                  NEUROLOGICAL  SYMPTOMS  WORSE.                      *TO  MAINTAIN  REGULAR  SLEEP  WAKE  CYCLES. *   TO  HAVE  ADEQUATE  REST  AND   SLEEP    HOURS.              *    AVOID  ANY USAGE OF    TOBACCO,          AVOID  EXCESSIVE  ALCOHOL  AND   ILLEGAL   SUBSTANCES          *  CONTINUE   MEDICATIONS    PRESCRIBED       AS    RECOMMENDED       *   Compliance   With  Medications   And  Instructions              *    Antiplatelet  therapy    As   Recommended  Was   Discussed      *    Prophylactic  Use   Of     Vitamin   B   Complex,  Folic  Acid,    Vitamin  B12    Multivitamin,       Calcium  With  magnesium  And  Vit D    Supplementations   Over  The  Counter  Discussed             *FOOT  CARE, DAILY  INSPECTION  OF  FEET   AND         PERIODIC  PODIATRY EVALUATIONS .            *  EVALUATIONS  AND  FOLLOW UP:                   * PHYSICAL  THERAPY / OCCUPATIONAL THERAPY                                                  *  NEUROSURGERY                 * PAIN  MANAGEMENT               *  ORTHO                                                            *PATIENT   TO  FOLLOW  UP  WITH   PRIMARY  CARE         OTHER  CONSULTANTS  AS  BEFORE.               *TO  FOLLOW  WITH   MENTAL  HEALTH  PROFESSIONALS ,  INCLUDING            PSYCHOLOGICAL  COUNSELING   AND  PSYCHIATRIC  EVALUTIONS,                     *  Maintain   Healthy  Life Style    With   Periodic  Monitoring  Of          Any  Medical  Conditions  Including   Elevated  Blood  Pressure,  Lipid  Profile,        Blood  Sugar levels  AndHeart  Disease. *   Period   Screening  For  Cancers  Involving  Breast,  Colon,         Lungs  And  Other  Organs  As  Applicable,  In consultation   With  Your  Primary Care Providers. *Second  Neurological  Opinion  And  Evaluations  In  [facility] AND [facility]  Setting  If  Patient  Is  Interested. * Please   Contact   Neurology  Clinic   Early   If   Are  Any  New  Neurological   And  Any neurological  Concerns. *  If  The  Patient remains  Neurologically  Stable   Return   To  [facility] Neurology Department   IN    2     MONTHS  TIME   FOR  FURTHER              FOLLOW UP.                       *   The  Neurological   Findings,  Possible  Diagnosis,  Differential diagnoses                    And  Options  For    Further   Investigations                   And  management   Are  Discussed  Comprehensively. Medications   And  Prescription   Risks  And  Side effects  Are   Also  Discussed.                      *  If   There is  Any  Significant  Worsening   Of  Current  Symptoms  And  Or                  If patient  Develops   Any additional  New  NeurologicalSymptoms                  Or  Significant  Concerns   Should  Call  911 or  Go  To  Emergency  Department  For Further  Immediate  Evaluation. The   Above  Were  Reviewed  With  patient   and                       questions  Answered  In  Detail. More   Than  50% of face  To face Time   Was  Spent  On  Counseling                    And   Coordination  Of  Care   Of   Patient's  multiple   Neurological  Problems                         And   Comorbid  Medical   Conditions. Electronically signed by   Dav Ventura M.D., Mardelle Red. Board Certified in  Neurology &  In  [facility] of Psychiatry and Neurology ([facility])      DISCLAIMER:   Although every effort was made to ensure the accuracy of this  electronictranscription, some errors in transcription may have occurred. GENERAL PATIENT INSTRUCTIONS:      A Healthy Lifestyle: Care Instructions   Your Care Instructions   A healthy lifestyle can help you feel good, stay at ahealthy weight, and have plenty of energy for both work and play. A healthy lifestyle is something you can share with your whole family.  A healthy lifestyle also can lower your risk for serious health problems, such ashigh blood pressure, heart disease, and diabetes.  You can follow a few steps listed below to improve your health and the health of your family.  Follow-up careis a key part of your treatment and safety. Be sure to make and go to all appointments, and call your doctor if you are having problems. Its also a good idea to know your test results and keep a list of the medicines you take.  How can you care for yourself at home?  Do not eat too much sugar, fat, or fast foods. You can still have dessert and treats nowand then. The goal is moderation.  Start small to improve your eating habits. Pay attention to portion sizes, drink less juice and soda pop, and eat more fruits and vegetables.  Eat a healthy amount of food.  A 3-ounce serving of meat, for example, is about the size of a deck of can do together to improve your health.  Eat meals together as a family as often as possible.  Eat healthy foods. This includes fruits, vegetables, lean meats and dairy, and whole grains.  Include your family in your fitness plan. Most peoplethink of activities such as jogging or tennis as the way to fitness, but there are many ways you and your family can be more active. Anything that makes you breathe hard and gets your heart pumping is exercise. Here are sometips:   Walk to do errands or to take your child to school or the bus.  Go for a family bike ride after dinner instead of watching TV.  Where can you learn more?  Go [email or web address] and sign in to your [employer] account. Enter L667 in the Search HealthInformation box to learn more about \"A Healthy Lifestyle: Care Instructions. \"     If you do not have anaccount, please click on the \"Sign Up Now\" link.  Current as of: July 26, 2016   Content Version: 11.2   © 7104-4353 [employer]. Care instructions adapted under license by [facility] ([facility]). If you have questions about a medical condition or this instruction, always ask your healthcare professional. [employer] disclaims any warranty or liability for your use of this information.

## 2021-08-18 ENCOUNTER — HOSPITAL ENCOUNTER (OUTPATIENT)
Dept: NEUROLOGY | Age: 61
Discharge: HOME | End: 2021-08-18

## 2021-08-18 DIAGNOSIS — G60.8 POLYNEUROPATHY, PERIPHERAL SENSORIMOTOR AXONAL: ICD-10-CM

## 2021-08-18 DIAGNOSIS — R89.4 POSITIVE TEST FOR HERPES SIMPLEX VIRUS (HSV) ANTIBODY: ICD-10-CM

## 2021-08-18 DIAGNOSIS — E11.9 TYPE 2 DIABETES MELLITUS WITHOUT COMPLICATION, WITHOUT LONG-TERM CURRENT USE OF INSULIN (HCC): ICD-10-CM

## 2021-08-18 DIAGNOSIS — R20.2 NUMBNESS AND TINGLING: ICD-10-CM

## 2021-08-18 DIAGNOSIS — G56.03 BILATERAL CARPAL TUNNEL SYNDROME: ICD-10-CM

## 2021-08-18 DIAGNOSIS — M48.04 SPINAL STENOSIS, THORACIC REGION: ICD-10-CM

## 2021-08-18 DIAGNOSIS — Z72.0 TOBACCO USE: ICD-10-CM

## 2021-08-18 DIAGNOSIS — M54.16 CHRONIC RADICULAR LUMBAR PAIN: ICD-10-CM

## 2021-08-18 DIAGNOSIS — F32.0 CURRENT MILD EPISODE OF MAJOR DEPRESSIVE DISORDER WITHOUT PRIOR EPISODE (HCC): ICD-10-CM

## 2021-08-18 DIAGNOSIS — G31.9 ACQUIRED CEREBRAL ATROPHY (HCC): ICD-10-CM

## 2021-08-18 DIAGNOSIS — R20.0 NUMBNESS AND TINGLING: ICD-10-CM

## 2021-08-18 DIAGNOSIS — M62.838 MUSCLE SPASM: ICD-10-CM

## 2021-08-18 DIAGNOSIS — M79.2 NEUROPATHIC PAIN: ICD-10-CM

## 2021-08-18 DIAGNOSIS — I67.82 CHRONIC CEREBRAL ISCHEMIA: ICD-10-CM

## 2021-08-18 DIAGNOSIS — M54.12 CHRONIC CERVICAL RADICULOPATHY: ICD-10-CM

## 2021-08-18 DIAGNOSIS — R25.1 EPISODE OF SHAKING: ICD-10-CM

## 2021-08-18 DIAGNOSIS — G95.9 MYELOPATHY (HCC): ICD-10-CM

## 2021-08-18 DIAGNOSIS — M47.12 CERVICAL SPONDYLOSIS WITH MYELOPATHY: ICD-10-CM

## 2021-08-18 DIAGNOSIS — G89.29 CHRONIC RADICULAR LUMBAR PAIN: ICD-10-CM

## 2021-08-18 DIAGNOSIS — R26.89 BALANCE PROBLEM: ICD-10-CM

## 2021-08-18 DIAGNOSIS — Z91.81 H/O FALL: ICD-10-CM

## 2021-08-18 DIAGNOSIS — M51.16 LUMBAR DISC DISEASE WITH RADICULOPATHY: ICD-10-CM

## 2021-08-18 PROCEDURE — 95957 EEG DIGITAL ANALYSIS: CPT

## 2021-08-18 PROCEDURE — 95813 EEG EXTND MNTR 61-119 MIN: CPT

## 2021-08-18 NOTE — PROGRESS NOTES
EXTENDED EEG Completed with [facility]. PCP: Aydee Blackwell MD    Ordering: Esperanza Davalos, Neurologist    Interpreting Physician: Danielle Reese, Neurologist    Technician: Nathaniel Velasquez, RN, RN

## 2021-08-23 NOTE — PROCEDURES
[address]                 [address] [place] [id number]                         ELECTROENCEPHALOGRAM (EEG) REPORT      PATIENT NAME: Brigid Douglas                    :        1960  MED REC NO:   [id number]                             ROOM:  ACCOUNT NO:   [de-identified]                           ADMIT DATE: 2021    PROVIDER:     Cesar Riley MD    DATE OF STUDY:  2021      TECHNIQUE:  23 channels of EEG, 2 channels of EOG, 2 channel of EKG and  1 channel ground and 1 channel reference were recorded with [employer]  Software 32 Channel System utilizing the International 10/20 System  Protocol. [facility] Carlos detection and seizure analysis protocols were utilized and the  study was reviewed using the comprehensive EEG monitoring. This is an extended EEG recorded for more than one hour. CLINICAL DATA:       The patient is 63years old with a history of balance  problem, falling, chronic cerebral ischemia, shaking episode. The purpose of the study is to evaluate for associated seizure activity. MEDICATIONS:  Effexor. BACKGROUND ACTIVITY:      While the patient was awake, the background  activity consisted of fairly-regulated 9 Hz waveform seen over both  cerebral hemispheres. There are intermittent frontal muscle artifacts and eye movement  artifacts noted. ACTIVATION PROCEDURES:    HYPERVENTILATION:  Hyperventilation was performed for 3 minutes. Patient  was cooperative with good effort. Also, post-hyperventilation period  was monitored closely. Hyperventilation:  Unremarkable. PHOTIC STIMULATION:  Photic stimulation was performed at the following  frequencies: 1 Hz, 3 Hz, 6 Hz, 9 Hz, 12 Hz, 15 Hz, 18 Hz, 21 Hz, 25 Hz,  30 Hz and patient tolerated well. Photic stimulation:  Mild symmetric driving response noted. SLEEP:  Stage I and stage II sleep were noted.     EKG:  EKG showed normal sinus rhythm without any significant  abnormality. IMPRESSION:        No significant focal, lateralized or epileptiform features     noted during the current recording. Further clinical correlation and followup recommended. Ruby He MD, [address] [place] [address]     Board Certified in Neurology  & in  [facility] of Psychiatry and Neurology ([employer]).            D: 08/23/2021 13:02:11       T: 08/23/2021 13:58:34     [id number]  Job#: [id number]     Doc#: [id number]    CC:    Cira Hewitt MD

## 2021-08-26 ENCOUNTER — PATIENT MESSAGE (OUTPATIENT)
Dept: FAMILY MEDICINE CLINIC | Age: 61
End: 2021-08-26

## 2021-08-26 NOTE — TELEPHONE ENCOUNTER
From: Sean Dominique  To: Aydee Blackwell MD  Sent: 8/26/2021 7:25 AM EDT  Subject: Visit Follow-Up Question    Hi, I'm supposed to go back to work on September 1st. I would like to have that extended to October 1st because I have several procedures and an appointment with you September 20th.  Thank you

## 2021-09-02 ENCOUNTER — TELEPHONE (OUTPATIENT)
Dept: PAIN MANAGEMENT | Age: 61
End: 2021-09-02

## 2021-09-02 ENCOUNTER — OFFICE VISIT (OUTPATIENT)
Dept: NEUROLOGY | Age: 61
End: 2021-09-02

## 2021-09-02 VITALS
BODY MASS INDEX: 35.26 KG/M2 | SYSTOLIC BLOOD PRESSURE: 136 MMHG | HEIGHT: 63 IN | OXYGEN SATURATION: 97 % | DIASTOLIC BLOOD PRESSURE: 88 MMHG | HEART RATE: 106 BPM | WEIGHT: 199 LBS

## 2021-09-02 DIAGNOSIS — R20.2 NUMBNESS AND TINGLING: ICD-10-CM

## 2021-09-02 DIAGNOSIS — E11.9 TYPE 2 DIABETES MELLITUS WITHOUT COMPLICATION, WITHOUT LONG-TERM CURRENT USE OF INSULIN (HCC): ICD-10-CM

## 2021-09-02 DIAGNOSIS — M51.16 LUMBAR DISC DISEASE WITH RADICULOPATHY: ICD-10-CM

## 2021-09-02 DIAGNOSIS — G56.03 BILATERAL CARPAL TUNNEL SYNDROME: ICD-10-CM

## 2021-09-02 DIAGNOSIS — R89.4 POSITIVE TEST FOR HERPES SIMPLEX VIRUS (HSV) ANTIBODY: ICD-10-CM

## 2021-09-02 DIAGNOSIS — G31.9 ACQUIRED CEREBRAL ATROPHY (HCC): ICD-10-CM

## 2021-09-02 DIAGNOSIS — M79.2 NEUROPATHIC PAIN: ICD-10-CM

## 2021-09-02 DIAGNOSIS — Z72.0 TOBACCO USE: ICD-10-CM

## 2021-09-02 DIAGNOSIS — M47.12 CERVICAL SPONDYLOSIS WITH MYELOPATHY: Primary | ICD-10-CM

## 2021-09-02 DIAGNOSIS — R20.0 NUMBNESS AND TINGLING: ICD-10-CM

## 2021-09-02 DIAGNOSIS — Z91.81 H/O FALL: ICD-10-CM

## 2021-09-02 DIAGNOSIS — M54.16 CHRONIC RADICULAR LUMBAR PAIN: ICD-10-CM

## 2021-09-02 DIAGNOSIS — R26.89 BALANCE PROBLEM: ICD-10-CM

## 2021-09-02 DIAGNOSIS — R25.1 EPISODE OF SHAKING: ICD-10-CM

## 2021-09-02 DIAGNOSIS — G60.8 POLYNEUROPATHY, PERIPHERAL SENSORIMOTOR AXONAL: ICD-10-CM

## 2021-09-02 DIAGNOSIS — I67.82 CHRONIC CEREBRAL ISCHEMIA: ICD-10-CM

## 2021-09-02 DIAGNOSIS — M62.838 MUSCLE SPASM: ICD-10-CM

## 2021-09-02 DIAGNOSIS — F32.0 CURRENT MILD EPISODE OF MAJOR DEPRESSIVE DISORDER WITHOUT PRIOR EPISODE (HCC): ICD-10-CM

## 2021-09-02 DIAGNOSIS — G89.29 CHRONIC RADICULAR LUMBAR PAIN: ICD-10-CM

## 2021-09-02 PROCEDURE — 99213 OFFICE O/P EST LOW 20 MIN: CPT | Performed by: PSYCHIATRY & NEUROLOGY

## 2021-09-02 PROCEDURE — 99214 OFFICE O/P EST MOD 30 MIN: CPT | Performed by: PSYCHIATRY & NEUROLOGY

## 2021-09-02 ASSESSMENT — ENCOUNTER SYMPTOMS
CHOKING: 0
EYE PAIN: 0
BACK PAIN: 1
WHEEZING: 0
BLOOD IN STOOL: 0
EYE ITCHING: 0
DIARRHEA: 0
APNEA: 0
CONSTIPATION: 0
VOICE CHANGE: 0
TROUBLE SWALLOWING: 0
CHEST TIGHTNESS: 0
VOMITING: 0
ABDOMINAL PAIN: 0
SORE THROAT: 0
SHORTNESS OF BREATH: 0
FACIAL SWELLING: 0
COUGH: 0
COLOR CHANGE: 0
EYE DISCHARGE: 0
VISUAL CHANGE: 0
BOWEL INCONTINENCE: 0
EYE REDNESS: 0
ABDOMINAL DISTENTION: 0
NAUSEA: 0
SINUS PRESSURE: 0
PHOTOPHOBIA: 0

## 2021-09-02 NOTE — PROGRESS NOTES
[facility]  Neurology    [address] [address]  [place]IVO:[phone]   Fax: [phone]        SUBJECTIVE:       PATIENT ID:  Flash Andrew is a  RIGHT     HANDED 63 y.o. female. Neurologic Problem  The patient's primary symptoms include clumsiness, focal sensory loss, focal weakness, a loss of balance and weakness. The patient's pertinent negatives include no altered mental status, memory loss, near-syncope, slurred speech, syncope or visual change. Primary symptoms comment: H/O   CHRONIC  NECK  AND  BACK  PAIN     FOR    MORE   THAN   20   YEARS,   H/O    FALLING. This is a chronic problem. The neurological problem developed insidiously. The problem has been gradually worsening since onset. There was lower extremity, left-sided, right-sided and upper extremity focality noted. Associated symptoms include back pain and neck pain. Pertinent negatives include no abdominal pain, auditory change, aura, bladder incontinence, bowel incontinence, chest pain, confusion, diaphoresis, dizziness, fatigue, fever, headaches, light-headedness, nausea, palpitations, shortness of breath, vertigo or vomiting. (MUSCLE  SPASMS,   CHRONIC    PAIN) Past treatments include medication and bed rest. The treatment provided no relief. Her past medical history is significant for mood changes. There is no history of a bleeding disorder, a clotting disorder, a CVA, dementia, head trauma, liver disease or seizures. History obtained from  The patient     and other  available   medical  records   were  Also  reviewed. The  Duration,  Quality,  Severity,  Location,  Timing,  Context,  Modifying  Factors   Of   The   Chief   Complaint       And  Present  Illness  Was   Reviewed   In   Chronological   Manner.                                             PATIENT'S  MAIN  CONCERNS INCLUDE :                     1)        H/O     CHRONIC       NECK   PAIN       FOR    30   YEARS 2)    H/O      CHRONIC  LUMBAR  PAIN     FOR     20  YEARS                         3)      H/O      CHRONIC   THORACIC    PAIN      FOR     TWO   YEARS                          4)         WORSENING  OF      NECK  PAIN      WITH  RADICULAR  SYMPTOMS                                             ON  LEFT  SIDE          SINCE     2020                           5)     WORSENING  OF    THORACIC    AND     LUMBAR  PAIN       WITH                                     MUSCLE  SPASMS     SINCE      2020                           6)     MRI   THORACIC  AND  LUMBAR  SPINE IN  JAN. 2021                          REPORT     SHOWED  :             \"                            No fracture within the thoracic or lumbar spine.       No cord signal abnormality within the thoracic or lumbar spine.  No cord   compression.       Mild levoscoliosis with tip at L3-L4.  Mild dextroscoliosis with tip at T7-8.       At the level of T7-T8, T6-T7, T4-T5 and T2-T3, posterior disc protrusion   indents the anterior spinal cord.       Mild canal stenosis at T7-T8 and T2-T3 level.       At L4-L5, grade 1 anterolisthesis, moderate canal stenosis and mom mild   bilateral neural foraminal narrowing   \"                    7)     EMG/  NC  STUDIES OF  BOTH  LOWER  EXTREMITIES     IN  JAN. 2021                          SHOWED :                            A)     MILD     TO  MODERATE    PERIPHERAL  POLYNEUROPATHY                         B)    CHRONIC  L 4 - 5    AND    L 5 -  S 1   RADICULOPATHY                          8)    MRI    CERVICAL   SPINE    IN   [facility]   2021                             REPORT   SHOWED  :             \"           No cord signal abnormality.       No canal stenosis.  No nerve impingement.  No significant posterior disc   pathology.       At C3-C4, grade 1 anterolisthesis, mild canal stenosis and moderate left   neural foraminal narrowing.       At C4-C5, grade 1 anterolisthesis and moderate left neural foraminal Narrowing. \"                    8)     PATIENT  HAD  NEURO  SURGERY    EVALUATIONS      IN   MAY   2021                         RECOMMENDED     CONSERVATIVE    MANAGEMENT                             9)        EMG  /  NC   STUDIES      OF  BOTH  UPPER  EXTREMITIES  IN  June 2021                                                           SHOWED  :                           BILATERAL    MILD  TO  MODERATE     CARPAL   TUNNEL    SYNDROME ,                               MORE   SO     ON  RIGHT  SIDE                           -  PATIENT  ADVISED   TO   WEAR    WRIST  BRACES                        10)     MRI  BRAIN  IN  June 2021      REPORT    SHOWED                                                            \"  No acute intracranial abnormality.       Minimal parenchymal volume loss.       Minimal chronic microvascular disease      '                      11)      KNOWN     H/O    TYPE    II     DM                                WITH    ELEVATED   HEMOGLOBIN  A 1 C                             WITH  PERIPHERAL  POLYNEUROPATHY                        12)      MULTIPLE  CO MORBID  MEDICAL    CONDITIONS                             BEING  FOLLOWED   BY   HER PCP                                 13)       H/O     CHRONIC  SMOKING                    PATIENT  AWARE  OF  RISKS  AND                 SIDE  EFFECTS  OF   SMOKING   DISCUSSED. PATIENT   ADVISED   AND  COUNSELED    TO   QUIT  SMOKING.                                     14)     PATIENT     STARTED  ON     PAIN  MANAGEMENT   IN    June 2021                            AND   GETTING     LOCAL  INJECTIONS                                             15)     PATIENT   ON    NEURONTIN ,    BACLOFEN    AND  ALEVE   AS  NEEDED                         16)      H/O    CHRONIC   ANXIETY,   DEPRESSION                                       FOR    30    YEARS                                  -    ON     EFFEXOR                                       FOLLOW  WITH [facility]                                                        17)      H/O    CHRONIC  MILD     BALANCE    PROBLEMS         AND                                 H/O     FALLING           SINCE     2020                                    NO     H/O    HEAD INJURY. NO  LOC                           18)        H/O   CHRONIC      THORACIC    MUSCLE  SPASM  &   PAIN                                      WITH   PARAESTHESIAS     FOR    TWO  YEARS                                          -  STABLE                                                    19)      POSITIVE    HSV    I    AND  II      I g G    And    I g M      With    I g M                                   In     June 2021                                   -     COMPLETED    ACYCLOVIR    THERAPY    IN  July 2021                            20)       RESULTS    OF    PREVIOUS     NEURO  DIAGNOSTIC  WORK  UP                             EXPECTATIONS   &   GOAL  OF  MANAGEMENT,  PROGNOSIS                              DISCUSSED       WITH PATIENT    IN   DETAIL                          21)     H/O    SHAKING    EPISODES      OF    BODY   AND  EXTREMITIES                                    INTERMITTENTLY       SINCE      June 2021                                     NO  LOC. PATIENT  DENIES  ANY     SEIZURE   ACTIVITY                                           EEG      IN     AUG.  2021      SHOWED                                            NO  EPILEPTIFORM  FEATURES                                     PATIENT   ON    NEURONTIN,    BACLOFEN      FROM  HER PCP                                   AND  MAY  CONTINUE   THE  SAME   FOR  SYMPTOMATIC   RELIEF                                                 22)         VARIOUS  RISK   FACTORS   WERE  REVIEWED   AND   DISCUSSED. PATIENT   HAS  MULTIPLE   MEDICAL, NEUROLOGICAL                                    AND   MENTAL HEALTH   PROBLEMS . PATIENT'S   MANAGEMENT  IS  CHALLENGING.                                         PRECIPITATING  FACTORS: including  fever/infection, exertion/relaxation, position change, stress,                weather change,   medications/alcohol, time of day/darkness/light  Are  present                                                          MODIFYING  FACTORS:  fever/infection, exertion/relaxation, position change, stress, weather change,               medications/alcohol, time of day/darkness/light  Are  present                Patient   Indicates   The  Presence   And  The  Absence  Of  The  Following    Associated  And             Additional  Neurological    Symptoms:                                Balance  And coordination   problems  present           Gait problems     absent            Headaches      absent              Migraines           absent           Memory problemsabsent              Confusion        absent            Paresthesia   numbness          present           Seizures  And  Starring  Episodes           absent           Syncope,  Near  syncopal episodes         absent           Speech   problems           absent             Swallowing   Problems      absent            Dizziness,  Light headedness           absent              Vertigo        absent             Generalized   Weakness    absent              focal  Weakness     present             Tremors         absent              Sleep  Problems     absent             History  Of   Recent  Head  Injury     absent             History  Of   Recent  TIA     absent             History  Of   Recent    Stroke     absent             Neck  Pain   and   Neck muscle  Spasms  present               Radiating  down   And   Weakness           present            Lower back   Pain  And     Spasms  present              Radiating    Down   And   Weakness          present                H/OFALLS        present               History  Of   Visual  Symptoms    absent Associated   Diplopia       absent                                               Also   Additional   Symptoms   Present    As  Documented    In   The   detailed                  Review  Of  Systems   And    Please   Refer   To    Them for   Additional    Information. Any components  That are either  Unobtainable  Or  Limited  In   HPI, ROS  And/or PFSH   Are                   Due   ToPatient's  Medical  Problems,  Clinical  Condition   and/or lack of                                 other    Alternate   resources.             RECORDS   REVIEWED:    historical medical records           INFORMATION   REVIEWED:     MEDICAL   HISTORY,SURGICAL   HISTORY,     MEDICATIONS   LIST,   ALLERGIES AND  DRUG  INTOLERANCES,       FAMILY   HISTORY,  SOCIAL  HISTORY,      PROBLEM  LIST   FOR  PATIENT  CARE   COORDINATION          Past Medical History:   Diagnosis Date    Allergic rhinitis     Arthritis     Asthma     Bronchitis     [facility]     Depression     Diabetes mellitus ([facility]     Headache     Hyperlipidemia     Hypertension     Incontinence     Irritable bowel syndrome     Kidney stone     Osteoarthritis     Pneumonia     Type 2 diabetes mellitus without complication ([facility] [address] [id number]    Ulcer          Past Surgical History:   Procedure Laterality Date    CARPAL TUNNEL RELEASE Right 5/7/2019    Right Carpal Tunnel Release performed by Jessica Kumar MD at [address] [facility], [address] [facility] COLONOSCOPY  7/11/208    Serated polyp (1)    COLONOSCOPY  08/02/2017    [id number]    COLONOSCOPY Left 10/8/2019    COLONOSCOPY WITH BIOPSY performed by Jayden Neal MD at [address] [facility], [address] [place] [address]    with Right oopherectomy still has left ovary    LAPAROSCOPY  2008    Diagnostic for pain - no findings    MECKEL DIVERTICULUM EXCISION  1970    PAIN MANAGEMENT PROCEDURE N/A 7/8/2021    L4-L5 INTERLAMINAR JEAN performed by Lo Stuart MD at [address] ENDOSCOPY  08/02/2017    [id number]    UPPER GASTROINTESTINAL ENDOSCOPY Left 10/8/2019    EGD BIOPSY performed by Nicolle Mcdonough MD at [facility] Endoscopy         Current Outpatient Medications   Medication Sig Dispense Refill    venlafaxine (EFFEXOR XR) 150 MG extended release capsule Take 1 capsule by mouth once daily 30 capsule 3    venlafaxine (EFFEXOR XR) 37.5 MG extended release capsule Take 1 capsule by mouth once daily 30 capsule 3    Multiple Vitamin (MULTI-VITAMIN DAILY PO) Take by mouth      albuterol sulfate HFA (PROVENTIL HFA) 108 (90 Base) MCG/ACT inhaler Inhale 2 puffs into the lungs every 4 hours as needed for Wheezing 1 Inhaler 0    Misc. Devices (CANE) MISC Use daily 1 each 0    fluticasone (FLONASE) 50 MCG/ACT nasal spray 2 sprays by Nasal route daily 3 Bottle 3    gabapentin (NEURONTIN) 300 MG capsule Take 1 capsule by mouth 3 times daily for 60 days. Intended supply: 30 days (Patient taking differently: Take 300 mg by mouth 3 times daily. Intended supply: 30 days  Taking PRN) 90 capsule 1    montelukast (SINGULAIR) 10 MG tablet Take 1 tablet by mouth nightly 30 tablet 3    baclofen (LIORESAL) 10 MG tablet Take 1 tablet by mouth nightly as needed (muscle spasms) 30 tablet 1    pantoprazole (PROTONIX) 40 MG tablet Take 1 tablet by mouth daily 90 tablet 3    Naproxen Sodium (ALEVE PO) Take by mouth daily as needed       No current facility-administered medications for this visit.          Allergies   Allergen Reactions    Pcn [Penicillins] Anaphylaxis     \"can't breath\"    Sulfa Antibiotics Anaphylaxis     \"can't breathe\"    Metformin And Related Other (See Comments)     Sweating and diarrhea         Family History   Adopted: Yes   Family history unknown: Yes         Social History     Socioeconomic History    Marital status:      Spouse name: Not on file    Number of WBC 8.0 01/07/2021    RBC 4.70 01/07/2021    HGB 14.2 01/07/2021    HCT 44.2 01/07/2021    MCV 94.0 01/07/2021    MCH 30.2 01/07/2021    MCHC 32.1 01/07/2021    RDW 12.5 01/07/2021     01/07/2021    MPV 10.6 01/07/2021           Chemistries    Lab Results   Component Value Date     01/07/2021    K 4.6 01/07/2021     01/07/2021    CO2 31 01/07/2021    BUN 12 01/07/2021    CREATININE 0.67 01/07/2021    CALCIUM 9.7 01/07/2021    PROT 7.5 06/18/2021    LABALBU 4.3 06/02/2020    BILITOT 0.14 06/02/2020    ALKPHOS 85 06/02/2020    AST 11 06/02/2020    ALT 9 06/02/2020     Lab Results   Component Value Date    ALKPHOS 85 06/02/2020    ALT 9 06/02/2020    AST 11 06/02/2020    PROT 7.5 06/18/2021    BILITOT 0.14 06/02/2020    LABALBU 4.3 06/02/2020     Lab Results   Component Value Date    BUN 12 01/07/2021    CREATININE 0.67 01/07/2021     Lab Results   Component Value Date    CALCIUM 9.7 01/07/2021    MG 1.9 01/15/2017     Lab Results   Component Value Date    AST 11 06/02/2020    ALT 9 06/02/2020       Lab Results   Component Value Date    [id number] 451 06/18/2021         Review of Systems   Constitutional: Negative for appetite change, chills, diaphoresis, fatigue, fever and unexpected weight change. HENT: Negative for congestion, dental problem, drooling, ear discharge, ear pain, facial swelling, hearing loss, mouth sores, nosebleeds, postnasal drip, sinus pressure, sore throat, tinnitus, trouble swallowing and voice change. Eyes: Negative for photophobia, pain, discharge, redness, itching and visual disturbance. Respiratory: Negative for apnea, cough, choking, chest tightness, shortness of breath and wheezing. Cardiovascular: Negative for chest pain, palpitations, leg swelling and near-syncope. Gastrointestinal: Negative for abdominal distention, abdominal pain, blood in stool, bowel incontinence, constipation, diarrhea, nausea and vomiting.    Endocrine: Negative for cold intolerance, heat intolerance, polydipsia, polyphagia and polyuria. Genitourinary: Negative for bladder incontinence. Musculoskeletal: Positive for back pain and neck pain. Negative for arthralgias, gait problem, joint swelling, myalgias and neck stiffness. Skin: Negative for color change, pallor, rash and wound. Allergic/Immunologic: Negative for environmental allergies, food allergies and immunocompromised state. Neurological: Positive for focal weakness, weakness, numbness and loss of balance. Negative for dizziness, vertigo, tremors, seizures, syncope, facial asymmetry, speech difficulty, light-headedness and headaches. Hematological: Negative for adenopathy. Does not bruise/bleed easily. Psychiatric/Behavioral: Negative for agitation, behavioral problems, confusion, decreased concentration, dysphoric mood, hallucinations, memory loss, self-injury, sleep disturbance and suicidal ideas. The patient is nervous/anxious. The patient is not hyperactive. OBJECTIVE:      Physical Exam  Constitutional:       Appearance: She is well-developed. HENT:      Head: Normocephalic and atraumatic. No raccoon eyes or Arreguin's sign. Right Ear: External ear normal.      Left Ear: External ear normal.      Nose: Nose normal.   Eyes:      Conjunctiva/sclera: Conjunctivae normal.      Pupils: Pupils are equal, round, and reactive to light. Neck:      Thyroid: No thyroid mass or thyromegaly. Vascular: No carotid bruit. Trachea: No tracheal deviation. Meningeal: Brudzinski's sign and Kernig's sign absent. Cardiovascular:      Rate and Rhythm: Normal rate and regular rhythm. Pulmonary:      Effort: Pulmonary effort is normal.   Musculoskeletal:         General: No tenderness. Cervical back: Normal range of motion and neck supple. No rigidity. No muscular tenderness. Normal range of motion. Skin:     General: Skin is warm. Coloration: Skin is not pale. Findings: No erythema or rash. 8 CN :  Hearing  Appears within normal limits          9, 10 CN: Normal   spontaneous, reflex   palate   movements         11 CN:   Normal  Shoulder  shrug and  strength         12 CN :   Normal  Tongue movements and  Tongue  In midline                        No tongue   Fasciculations or atrophy       C) MOTOR  EXAM:                 Strength  In upper    extremities   within   normal limits,                     EXCEPT  FOR    DECREASED  BOTH  HAND                      Strength  In   Lower   extremities   DECREASED  4 +/5                           Rapid   alternating  And  repetitions  Movements  DECREASED                 Muscle  Tone  In upper  And  Lower  Extremities  normal                No rigidity. No  Spasticity. Bradykinesia   absent                 No  Asterixis. Sustention  Tremor , Resting   Tremor   absent                    No   other  Abnormal  Movements noted           D) SENSORY :               Light   touch, pinprick,   position  And  Vibration  DECREASED                     MORE  SO  BOTH  LOWER  EXTREMITIES        E) REFLEXES:                   Deep  Tendon  Reflexes    DECREASED                  No  pathological  Reflexes  Bilaterally.                                   F) COORDINATION  AND  GAIT :                                Station and  Gait  normal                              Romberg 's test   POSITIVE                            Ataxia negative          ASSESSMENT:        Patient Active Problem List   Diagnosis    Gastroesophageal reflux disease    Irritable bowel syndrome with diarrhea    [facility])    Type 2 diabetes mellitus without complication, without long-term current use of insulin ([facility])    Depression    Tobacco use    Allergic rhinitis    Carpal tunnel syndrome of right wrist    Spinal stenosis, thoracic region    Cervical spondylosis with myelopathy    Lumbar disc disease with radiculopathy    Bilateral carpal tunnel syndrome    Chronic radicular lumbar pain    Chronic bilateral thoracic back pain    Chronic cervical radiculopathy    Numbness and tingling    Chronic cerebral ischemia    Acquired cerebral atrophy (HCC)    Polyneuropathy, peripheral sensorimotor axonal    H/O fall    Balance problem    Neuropathic pain    Muscle spasm    Positive test for herpes simplex virus (HSV) antibody    Episode of shaking           MRI OF THE BRAIN WITHOUT CONTRAST  6/8/2021 8:27 am       TECHNIQUE:   Multiplanar multisequence MRI of the brain was performed without the   administration of intravenous contrast.       COMPARISON:   None.       HISTORY:   ORDERING SYSTEM PROVIDED HISTORY: Myelopathy ([facility] [address]   TECHNOLOGIST PROVIDED HISTORY:   evaluation for demyelinating lesions   Reason for Exam: Numbness and tingling bilateral arms and legs. Patient has   been falling a lot. Symptoms for about eight months. Evaluation for   demyelinating disease. Acuity: Chronic   Type of Exam: Initial   Additional signs and symptoms: Myelopathy       FINDINGS:   INTRACRANIAL STRUCTURES/VENTRICLES: There is minimal parenchymal volume loss. There are a few scattered foci of T2/FLAIR hyperintensity in the   periventricular and subcortical white matter, likely related to minimal   chronic microvascular disease. Dewitte Peto is no acute infarct. No mass effect or   midline shift. No acute intracranial hemorrhage. There is no hydrocephalus.    The sellar/suprasellar regions appear unremarkable.  The normal signal voids   within the major intracranial vessels appear maintained.       ORBITS: The visualized portion of the orbits demonstrate no acute abnormality.       SINUSES: There is scattered minimal mucosal thickening in the paranasal   sinuses.  The bilateral mastoid air cells are well aerated.       BONES/SOFT TISSUES: The bone marrow signal intensity appears normal. The soft   tissues demonstrate no acute abnormality.  There are 2 adjacent TISSUES: No paraspinal mass identified.       DEGENERATIVE CHANGES:       There is multilevel loss of disc height and signal with endplate degenerative   marrow change.       At level T7-8, disc bulging with 4 mm left paracentral central disc   protrusion indents the anterior thecal sac.  Findings resulting in mild is   stenosis       At the level of T6-T7: Disc bulging with 3 mm central disc protrusion indents   anterior spinal cord.       At T4-T5 level, disc bulging 4 mm central disc protrusion indents the   anterior spinal cord.       At the level of T2-T3, disc bulging with 3 - 4 mm central disc protrusion   indents anterior spinal cord resulting in mild calcinosis.       Lumbar spine:       BONES/ALIGNMENT: There is normal alignment of the spine.  Mild levoscoliosis   with tip at L3-L4.       Mild the vertebral body heights are maintained.  The bone marrow signal   appears unremarkable.       SPINAL CORD: The conus terminates normally.       SOFT TISSUES: No paraspinal mass identified.       L1-L2: There is no significant disc herniation, spinal canal stenosis or   neural foraminal narrowing.       L2-L3: There is no significant disc herniation, spinal canal stenosis or   neural foraminal narrowing.       L3-L4: 1 mm disc bulging.  Mild bilateral said arthropathy.  Otherwise   unremarkable.       L4-L5: Grade 1 anterolisthesis.  2 mm disc bulging.  Moderate bilateral facet   arthropathy.  Moderate canal stenosis, mild narrowing of bilateral   subarticular recesses and mild bilateral neural foraminal narrowing.       L5-S1: 1 mm disc bulging.  Moderate bilateral set arthropathy.  Mild   bilateral neural foraminal narrowing.           Impression   No fracture within the thoracic or lumbar spine.       No cord signal abnormality within the thoracic or lumbar spine.  No cord   compression.       Mild levoscoliosis with tip at L3-L4.  Mild dextroscoliosis with tip at T7-8.       At the level of T7-T8, T6-T7, T4-T5 and T2-T3, posterior disc protrusion   indents the anterior spinal cord.       Mild canal stenosis at T7-T8 and T2-T3 level.       At L4-L5, grade 1 anterolisthesis, moderate canal stenosis and mom mild   bilateral neural foraminal narrowing.           VISITING DIAGNOSIS:      ICD-10-CM    1. Cervical spondylosis with myelopathy  M47.12    2. Chronic cerebral ischemia  I67.82    3. Positive test for herpes simplex virus (HSV) antibody  R89.4    4. Neuropathic pain  M79.2    5. Current mild episode of major depressive disorder without prior episode ([facility])  F32.0    6. Episode of shaking  R25.1    7. Acquired [facility] ([facility] [facility]  G31.9    8. Lumbar disc disease with radiculopathy  M51.16    9. Bilateral carpal tunnel syndrome  G56.03    10. Polyneuropathy, peripheral sensorimotor axonal  G60.8    11. H/O fall  Z91.81    12. Muscle spasm  M62.838    13. Numbness and tingling  R20.0     R20.2    14. Chronic radicular lumbar pain  M54.16     G89.29    15. Balance problem  R26.89    16. Type 2 diabetes mellitus without complication, without long-term current use of insulin ([facility])  E11.9    17. Tobacco use  Z72.0                 CONCERNS   &   INCREASED   RISK   FOR         * TIA,  CEREBRO  VASCULAR  ISCHEMIA       *  MONONEUROPATHIES,   RADICULOPATHY      *   PERIPHERAL  NEUROPATHY,   NEUROPATHIC  PAIN       *     BALANCE PROBLMES   AND  FALL                VARIOUS  RISK   FACTORS   WERE  REVIEWED   AND   DISCUSSED. *  PATIENT   HAS  MULTIPLE   MEDICAL, NEUROLOGICAL                        AND   MENTAL HEALTH   PROBLEMS          PATIENT'S   MANAGEMENT  IS  CHALLENGING. PLAN:                         Denise Meuse  Of  The  Diagnoses,  The  Management & Treatment  Options            AND    Care  plan  Were          Reviewed and   Discussed   With  patient. * Goals  And  Expectations  Of  The  Therapy  Discussed   And  Reviewed.           *   Benefits   And   Side  Effect  Profile  Of  Medication/s Were   Discussed                * Need   For  Further   Follow up For  The  Various  Problems Were  discussed. * Results  Of  The  Previous  Diagnostic tests were reviewed and  discussed                   Medical  Decision  Making  Was  Made  Based on the   Complexity  Of  Above  Mentioned  Diagnoses,    Data reviewed             And    Risk  Of  Significant   Co morbidities and   complicating   Factors. Medical  Decision  Was   High    Complexity   Due   To  The  Patient's  Multiple  Symptoms,  Advancing   Disease,            Complex  Treatment  Regimen,  Multiple medications           and   Risk  Of   Side  Effects,  Difficulty  In  Medication  Management  And  Diagnostic  Challenges           In  View  Of  The  Associated   Co  Morbid  Conditions   And  Problems. * FALL   PRECAUTIONS. THESE  REVIEWED   AND  DISCUSSED      *  USE   WALKING  ASSISTANCE  DEVICES     QUAD  CANE        *   BE  CAREFUL  WITH  ACTIVITIES   INCLUDING  DRIVING. *   AVOID   NECK  AND/ BACK  STRAINING  ACTIVITIES          *   TO   WEAR   BILATERAL   WRIST  BRACES       *   TO  AVOID  PRESSURE  OVER   ULNAR  ASPECT   OF   ELBOWS            *   ADEQUATE   FLUID  INTAKE   AND  ELECTROLYTE  BALANCE           * KEEP  DAIRY  OF   THE  NEUROLOGICAL  SYMPTOMS        RECORDING THE    DURATION  AND  FREQUENCY. *  AVOID    CONDITIONS  AND  FACTORS   THAT  MAKE                  NEUROLOGICAL  SYMPTOMS  WORSE.                      *TO  MAINTAIN  REGULAR  SLEEP  WAKE  CYCLES.      *   TO  HAVE  ADEQUATE  REST  AND   SLEEP    HOURS.              *    AVOID  ANY USAGE OF    TOBACCO,          AVOID  EXCESSIVE  ALCOHOL  AND   ILLEGAL   SUBSTANCES          *  CONTINUE   MEDICATIONS    PRESCRIBED       AS    RECOMMENDED       *   Compliance   With  Medications   And  Instructions              *    Antiplatelet  therapy    As   Recommended  Was   Discussed      *    Prophylactic  Use   Of Vitamin   B   Complex,  Folic  Acid,    Vitamin  B12    Multivitamin,       Calcium  With  magnesium  And  Vit D    Supplementations   Over  The  Counter  Discussed             *FOOT  CARE, DAILY  INSPECTION  OF  FEET   AND         PERIODIC  PODIATRY EVALUATIONS . *  EVALUATIONS  AND  FOLLOW UP:                   * PHYSICAL  THERAPY   / OCCUPATIONAL THERAPY                                                  *  NEUROSURGERY                 * PAIN  MANAGEMENT               *  ORTHO                                                            *PATIENT   TO  FOLLOW  UP  WITH   PRIMARY  CARE         OTHER  CONSULTANTS  AS  BEFORE.               *TO  FOLLOW  WITH   MENTAL  HEALTH  PROFESSIONALS ,  INCLUDING            PSYCHOLOGICAL  COUNSELING   AND  PSYCHIATRIC  EVALUTIONS,                     *  Maintain   Healthy  Life Style    With   Periodic  Monitoring  Of          Any  Medical  Conditions  Including   Elevated  Blood  Pressure,  Lipid  Profile,        Blood  Sugar levels  AndHeart  Disease. *   Period   Screening  For  Cancers  Involving  Breast,  Colon,         Lungs  And  Other  Organs  As  Applicable,  In consultation   With  Your  Primary Care Providers. *Second  Neurological  Opinion  And  Evaluations  In  [facility]  Setting  If  Patient  Is  Interested. * Please   Contact   Neurology  Clinic   Early   If   Are  Any  New  Neurological   And  Any neurological  Concerns. *  If  The  Patient remains  Neurologically  Stable   Return   To  [facility] Neurology Department   IN    3    MONTHS  TIME   FOR  FURTHER              FOLLOW UP.                       *   The  Neurological   Findings,  Possible  Diagnosis,  Differential diagnoses                    And  Options  For    Further   Investigations                   And  management   Are  Discussed  Comprehensively.                     Medications   And sugar, fat, or fast foods. You can still have dessert and treats nowand then. The goal is moderation.  Start small to improve your eating habits. Pay attention to portion sizes, drink less juice and soda pop, and eat more fruits and vegetables.  Eat a healthy amount of food. A 3-ounce serving of meat, for example, is about the size of a deck of cards. Fill the rest of your plate with vegetables and whole grains.  Limit theamount of soda and sports drinks you have every day. Drink more water when you are thirsty.  Eat at least 5 servings of fruits and vegetables every day. It may seem like a lot, but it is not hard to reach this goal. Aserving or helping is 1 piece of fruit, 1 cup of vegetables, or 2 cups of leafy, raw vegetables. Have an apple or some carrot sticks as an afternoon snack instead of a candy bar. Try to have fruits and/or vegetables at everymeal.   Make exercise part of your daily routine. You may want to start with simple activities, such as walking, bicycling, or slow swimming. Try jaylene active 30 to 60 minutes every day. You do not need to do all 30 to 60 minutes all at once. For example, you can exercise 3 times a day for 10 or 20 minutes. Moderate exercise is safe for most people, but it is always agood idea to talk to your doctor before starting an exercise program.   Keep moving. Mitchell Faby the lawn, work in the garden, or [place]zii. Take the stairs instead of the elevator at work.  If you smoke, quit. Peoplewho smoke have an increased risk for heart attack, stroke, cancer, and other lung illnesses. Quitting is hard, but there are ways to boost your chance of quitting tobacco for good.  Use nicotine gum, patches, or lozenges.  Ask your doctor about stop-smoking programs and medicines.  Keep trying.    In addition to reducing your risk of diseases in the future, you will notice some benefits soon after you stop using tobacco. If you have shortness of breath or asthma symptoms, they will likely getbetter within a few weeks after you quit.  Limit how much alcohol you drink. Moderate amounts of alcohol (up to 2 drinks a day for men, 1drink a day for women) are okay. But drinking too much can lead to liver problems, high blood pressure, and other health problems.  health   If you have a family, there are many things you can do together to improve your health.  Eat meals together as a family as often as possible.  Eat healthy foods. This includes fruits, vegetables, lean meats and dairy, and whole grains.  Include your family in your fitness plan. Most peoplethink of activities such as jogging or tennis as the way to fitness, but there are many ways you and your family can be more active. Anything that makes you breathe hard and gets your heart pumping is exercise. Here are sometips:   Walk to do errands or to take your child to school or the bus.  Go for a family bike ride after dinner instead of watching TV.  Where can you learn more?  Go to[email or web address] and sign in to your [employer] account. Enter J620 in the Search HealthInformation box to learn more about \"A Healthy Lifestyle: Care Instructions. \"     If you do not have anaccount, please click on the \"Sign Up Now\" link.  Current as of: July 26, 2016   Content Version: 11.2   © 5877-3870 [employer]. Care instructions adapted under license by [facility] ([facility]). If you have questions about a medical condition or this instruction, always ask your healthcare professional. [employer] disclaims any warranty or liability for your use of this information.

## 2021-09-02 NOTE — TELEPHONE ENCOUNTER
Patient called and cancelled procedure for today, was not feeling well. Pt did not want to reschedule at this time.   Will call back to schedule

## 2021-09-20 ENCOUNTER — HOSPITAL ENCOUNTER (OUTPATIENT)
Dept: LAB | Age: 61
Discharge: HOME | End: 2021-09-20

## 2021-09-20 ENCOUNTER — OFFICE VISIT (OUTPATIENT)
Dept: FAMILY MEDICINE CLINIC | Age: 61
End: 2021-09-20

## 2021-09-20 VITALS
TEMPERATURE: 97.7 F | HEART RATE: 96 BPM | HEIGHT: 63 IN | BODY MASS INDEX: 35.26 KG/M2 | OXYGEN SATURATION: 98 % | DIASTOLIC BLOOD PRESSURE: 80 MMHG | SYSTOLIC BLOOD PRESSURE: 122 MMHG | WEIGHT: 199 LBS

## 2021-09-20 DIAGNOSIS — Z23 FLU VACCINE NEED: ICD-10-CM

## 2021-09-20 DIAGNOSIS — L20.82 FLEXURAL ECZEMA: ICD-10-CM

## 2021-09-20 DIAGNOSIS — E11.9 TYPE 2 DIABETES MELLITUS WITHOUT COMPLICATION, WITHOUT LONG-TERM CURRENT USE OF INSULIN (HCC): ICD-10-CM

## 2021-09-20 DIAGNOSIS — Z72.0 TOBACCO USE: ICD-10-CM

## 2021-09-20 DIAGNOSIS — M79.2 NEUROPATHIC PAIN: ICD-10-CM

## 2021-09-20 DIAGNOSIS — J44.9 COPD WITHOUT EXACERBATION (HCC): Primary | ICD-10-CM

## 2021-09-20 LAB
ANION GAP SERPL CALCULATED.3IONS-SCNC: 12 MMOL/L (ref 9–17)
BUN BLDV-MCNC: 14 MG/DL (ref 8–23)
BUN/CREAT BLD: 19 (ref 9–20)
CALCIUM SERPL-MCNC: 9.7 MG/DL (ref 8.6–10.4)
CHLORIDE BLD-SCNC: 99 MMOL/L (ref 98–107)
CO2: 25 MMOL/L (ref 20–31)
CREAT SERPL-MCNC: 0.74 MG/DL (ref 0.5–0.9)
GFR AFRICAN AMERICAN: >60 ML/MIN
GFR NON-AFRICAN AMERICAN: >60 ML/MIN
GFR SERPL CREATININE-BSD FRML MDRD: ABNORMAL ML/MIN/{1.73_M2}
GFR SERPL CREATININE-BSD FRML MDRD: ABNORMAL ML/MIN/{1.73_M2}
GLUCOSE BLD-MCNC: 166 MG/DL (ref 70–99)
POTASSIUM SERPL-SCNC: 4.5 MMOL/L (ref 3.7–5.3)
SODIUM BLD-SCNC: 136 MMOL/L (ref 135–144)

## 2021-09-20 PROCEDURE — 80048 BASIC METABOLIC PNL TOTAL CA: CPT

## 2021-09-20 PROCEDURE — 99212 OFFICE O/P EST SF 10 MIN: CPT | Performed by: FAMILY MEDICINE

## 2021-09-20 PROCEDURE — 90686 IIV4 VACC NO PRSV 0.5 ML IM: CPT | Performed by: FAMILY MEDICINE

## 2021-09-20 PROCEDURE — 99214 OFFICE O/P EST MOD 30 MIN: CPT | Performed by: FAMILY MEDICINE

## 2021-09-20 PROCEDURE — 83036 HEMOGLOBIN GLYCOSYLATED A1C: CPT

## 2021-09-20 PROCEDURE — 36415 COLL VENOUS BLD VENIPUNCTURE: CPT

## 2021-09-20 PROCEDURE — PBSHW INFLUENZA, QUADV, 3 YRS AND OLDER, IM PF, PREFILL SYR OR SDV, 0.5ML (AFLURIA QUADV, PF): Performed by: FAMILY MEDICINE

## 2021-09-20 RX ORDER — NICOTINE 21 MG/24HR
1 PATCH, TRANSDERMAL 24 HOURS TRANSDERMAL EVERY 24 HOURS
Qty: 42 PATCH | Refills: 0 | Status: SHIPPED | OUTPATIENT
Start: 2021-09-20 | End: 2022-10-07 | Stop reason: ALTCHOICE

## 2021-09-20 RX ORDER — BUPROPION HYDROCHLORIDE 75 MG/1
75 TABLET ORAL 2 TIMES DAILY
Qty: 60 TABLET | Refills: 4 | Status: SHIPPED | OUTPATIENT
Start: 2021-09-20 | End: 2022-08-07

## 2021-09-20 RX ORDER — VENLAFAXINE HYDROCHLORIDE 150 MG/1
CAPSULE, EXTENDED RELEASE ORAL
Qty: 90 CAPSULE | Refills: 1 | Status: SHIPPED | OUTPATIENT
Start: 2021-09-20 | End: 2022-07-29

## 2021-09-20 RX ORDER — VENLAFAXINE HYDROCHLORIDE 37.5 MG/1
CAPSULE, EXTENDED RELEASE ORAL
Qty: 90 CAPSULE | Refills: 1 | Status: SHIPPED | OUTPATIENT
Start: 2021-09-20 | End: 2022-07-29

## 2021-09-20 ASSESSMENT — ENCOUNTER SYMPTOMS
COUGH: 0
SHORTNESS OF BREATH: 0
BACK PAIN: 1
WHEEZING: 0
CHEST TIGHTNESS: 0

## 2021-09-20 NOTE — PROGRESS NOTES
[address]  [address] [address]  Dept: [phone]  Dept Fax: [phone]  Loc: [phone]    Alberto Rosales is a 63 y.o. female who presents today for her medical conditions/complaints as noted below. Alberto Rosales is c/o of   Chief Complaint   Patient presents with    3 Month Follow-Up     back pain       HPI:     HPI Here today for a follow up of her COPD, she has ear pain and her knee pain. She quit smoking about 24 hours ago. She and her  quit because they don't have the money to smoke. She has been struggling with emotional lability since she quit smoking. She has been really struggling overall. She has something wrong with her ear. She feels like there is \"something\" on her right ear and it is always just inside her ear. It has been bothering her for 2-3 months. She talked to her neurologist about and he was worried it was an infection. Nothing seems to make it better. She tried taking her glasses off, but it didn't help. Her right eye also gets a little itchy occasionally at the corner of her eye. She has noticed that her glasses rest on her temple on the right side as well. She has not put anything in her ear. She had shingles last year under her left breast. She was on valtrex for that recently and it seems to be better now. She continues to have nerve pain in her back and arms as well. She has pain in her right shoulder and arm for days after weeding her garden. She had an appointment with a psychologist in [facility] for her disability paperwork. Her knee pain improved with her injection last time, but she still has some intermittent pain and it feels tight. Her left hip has been giving her a lot of problems.      Past Medical History:   Diagnosis Date    Allergic rhinitis     Arthritis     Asthma     Bronchitis     [facility] obstructive pulmonary disease[facility]     Depression     Diabetes mellitus ([facility]     Headache     Hyperlipidemia     Hypertension     Incontinence     Irritable bowel syndrome     Kidney stone     Osteoarthritis     Pneumonia     Type 2 diabetes mellitus without complication ([facility] 7615    Ulcer           Social History     Tobacco Use    Smoking status: Current Every Day Smoker     Packs/day: 1.50     Years: 30.00     Pack years: 45.00     Types: Cigarettes     Start date: 1/1/1971    Smokeless tobacco: Never Used   Substance Use Topics    Alcohol use: No     Current Outpatient Medications   Medication Sig Dispense Refill    venlafaxine (EFFEXOR XR) 150 MG extended release capsule Take 1 capsule by mouth once daily 90 capsule 1    venlafaxine (EFFEXOR XR) 37.5 MG extended release capsule Take 1 capsule by mouth once daily 90 capsule 1    nicotine (NICODERM CQ) 21 MG/24HR Place 1 patch onto the skin every 24 hours 42 patch 0    buPROPion (WELLBUTRIN) 75 MG tablet Take 1 tablet by mouth 2 times daily 60 tablet 4    triamcinolone (KENALOG) 0.1 % ointment Apply topically 2 times daily as needed (itching or severe dry skin) 80 g 0    Multiple Vitamin (MULTI-VITAMIN DAILY PO) Take by mouth      albuterol sulfate HFA (PROVENTIL HFA) 108 (90 Base) MCG/ACT inhaler Inhale 2 puffs into the lungs every 4 hours as needed for Wheezing 1 Inhaler 0    Misc. Devices (CANE) MISC Use daily 1 each 0    fluticasone (FLONASE) 50 MCG/ACT nasal spray 2 sprays by Nasal route daily 3 Bottle 3    montelukast (SINGULAIR) 10 MG tablet Take 1 tablet by mouth nightly 30 tablet 3    baclofen (LIORESAL) 10 MG tablet Take 1 tablet by mouth nightly as needed (muscle spasms) 30 tablet 1    pantoprazole (PROTONIX) 40 MG tablet Take 1 tablet by mouth daily 90 tablet 3    Naproxen Sodium (ALEVE PO) Take by mouth daily as needed      gabapentin (NEURONTIN) 300 MG capsule Take 1 capsule by mouth 3 times daily for 60 days.  Intended supply: 30 days (Patient scaling. Neurological:      Mental Status: She is alert and oriented to person, place, and time. Psychiatric:         Mood and Affect: Mood normal.         Behavior: Behavior normal.       /80   Pulse 96   Temp 97.7 °F (36.5 °C)   Ht 5' 3\" (1.6 m)   Wt 199 lb (90.3 kg)   LMP  (LMP Unknown)   SpO2 98%   BMI 35.25 kg/m²     Assessment:       Diagnosis Orders   1. COPD without exacerbation ([address]     2. Tobacco use     3. Neuropathic pain     4. Type 2 diabetes mellitus without complication, without long-term current use of insulin ([facility])  Hemoglobin N8F    Basic Metabolic Panel   5. Flexural eczema     6. Flu vaccine need  INFLUENZA, QUADV, 3 YRS AND OLDER, IM PF, PREFILL SYR OR SDV, 0.5ML (AFLURIA QUADV, PF)             Plan:        COPD: improving; she is doing better overall and she quit smoking yesterday which I congratulated her on. Smoking cessation: she is interested in quitting smoking so I started her on Wellbutrin and nicotine patches. she will continue wellbutrin the entire time she is on the patches. The patches will taper down from 21mg to 14mg, to 7mg then stop over several months. We discussed ways to distract herself from smoking including \"smoking a pen\", sucking on a sucker or chewing on a toothpick. Neuropathic pain: stable; I suspect that is what is causing her sensation in her ear. I recommended she continue her gabapentin and maybe try the triamcinolone on it if it is getting dry. DM: stable; she is due for labs so those were ordered. Eczema: worsening; she has very dry patches of skin on her feet and left elbow. I started her on triamcinolone. Return in about 3 months (around 12/20/2021).     Orders Placed This Encounter   Procedures    INFLUENZA, QUADV, 3 YRS AND OLDER, IM PF, PREFILL SYR OR SDV, 0.5ML (AFLURIA QUADV, PF)    Hemoglobin A1C     Standing Status:   Future     Number of Occurrences:   1     Standing Expiration Date:   9/20/2022   Rich Eason Basic Metabolic Panel     Standing Status:   Future     Number of Occurrences:   1     Standing Expiration Date:   9/20/2022     Orders Placed This Encounter   Medications    venlafaxine (EFFEXOR XR) 150 MG extended release capsule     Sig: Take 1 capsule by mouth once daily     Dispense:  90 capsule     Refill:  1    venlafaxine (EFFEXOR XR) 37.5 MG extended release capsule     Sig: Take 1 capsule by mouth once daily     Dispense:  90 capsule     Refill:  1    nicotine (NICODERM CQ) 21 MG/24HR     Sig: Place 1 patch onto the skin every 24 hours     Dispense:  42 patch     Refill:  0    buPROPion (WELLBUTRIN) 75 MG tablet     Sig: Take 1 tablet by mouth 2 times daily     Dispense:  60 tablet     Refill:  4    triamcinolone (KENALOG) 0.1 % ointment     Sig: Apply topically 2 times daily as needed (itching or severe dry skin)     Dispense:  80 g     Refill:  0       Patientgiven educational materials - see patient instructions. Discussed use, benefit,and side effects of prescribed medications. All patient questions answered. Ptvoiced understanding. Reviewed health maintenance. Instructed to continue currentmedications, diet and exercise. Patient agreed with treatment plan. Follow up asdirected.      Electronically signed by Catherine Rodriguez MD on 9/20/2021 at 2:01 PM

## 2021-09-20 NOTE — LETTER
[facility] department of [address] [place] [address]  Phone: [phone]  Fax: [phone]    Zoraida Butler MD        September 20, 2021     Patient: Lillian Trimble   YOB: 1960   Date of Visit: 9/20/2021       To Whom It May Concern: It is my medical opinion that Tank Courtney may return to work on 11/1/21. She continues to be off of work due to her chronic medical conditions and the work up required for diagnosis. If you have any questions or concerns, please don't hesitate to call.     Sincerely,        Zoraida Butler MD

## 2021-09-20 NOTE — PROGRESS NOTES
Have you had an allergic reaction to the flu (influenza) shot? no  Are you allergic to eggs or any component of the flu vaccine? no  Do you have a history of Guillain-[place] Syndrome (GBS), which is paralysis after receiving the flu vaccine? no  Are you feeling well today? yes  Flu vaccine given as ordered. Patient tolerated it well. No questions re: VIS information.

## 2021-09-21 LAB
ESTIMATED AVERAGE GLUCOSE: 157 MG/DL
HBA1C MFR BLD: 7.1 % (ref 4–6)

## 2021-09-21 RX ORDER — EMPAGLIFLOZIN 10 MG/1
1 TABLET, FILM COATED ORAL DAILY
Qty: 90 TABLET | Refills: 1 | Status: SHIPPED | OUTPATIENT
Start: 2021-09-21 | End: 2022-10-07

## 2021-11-18 ENCOUNTER — IMMUNIZATION (OUTPATIENT)
Dept: LAB | Age: 61
End: 2021-11-18

## 2021-11-18 PROCEDURE — 91306 COVID-19, MODERNA BOOSTER VACCINE 0.25ML DOSE: CPT | Performed by: INTERNAL MEDICINE

## 2021-11-18 PROCEDURE — PBSHW COVID-19, MODERNA BOOSTER VACCINE 0.25ML DOSE: Performed by: INTERNAL MEDICINE

## 2022-07-29 ENCOUNTER — OFFICE VISIT (OUTPATIENT)
Dept: PRIMARY CARE CLINIC | Age: 62
End: 2022-07-29

## 2022-07-29 VITALS
HEIGHT: 63 IN | SYSTOLIC BLOOD PRESSURE: 138 MMHG | OXYGEN SATURATION: 95 % | DIASTOLIC BLOOD PRESSURE: 88 MMHG | WEIGHT: 200 LBS | HEART RATE: 107 BPM | TEMPERATURE: 98.8 F | RESPIRATION RATE: 24 BRPM | BODY MASS INDEX: 35.44 KG/M2

## 2022-07-29 DIAGNOSIS — R49.0 HOARSE VOICE QUALITY: ICD-10-CM

## 2022-07-29 DIAGNOSIS — T36.95XA ANTIBIOTIC-INDUCED YEAST INFECTION: ICD-10-CM

## 2022-07-29 DIAGNOSIS — J01.00 ACUTE NON-RECURRENT MAXILLARY SINUSITIS: Primary | ICD-10-CM

## 2022-07-29 DIAGNOSIS — R05.9 COUGH: ICD-10-CM

## 2022-07-29 DIAGNOSIS — J06.9 UPPER RESPIRATORY TRACT INFECTION, UNSPECIFIED TYPE: ICD-10-CM

## 2022-07-29 DIAGNOSIS — B37.9 ANTIBIOTIC-INDUCED YEAST INFECTION: ICD-10-CM

## 2022-07-29 LAB
Lab: NORMAL
QC PASS/FAIL: NORMAL
SARS-COV-2 RDRP RESP QL NAA+PROBE: NEGATIVE

## 2022-07-29 PROCEDURE — PBSHW POCT COVID-19 RAPID, NAAT: Performed by: NURSE PRACTITIONER

## 2022-07-29 PROCEDURE — 99212 OFFICE O/P EST SF 10 MIN: CPT | Performed by: NURSE PRACTITIONER

## 2022-07-29 PROCEDURE — 99213 OFFICE O/P EST LOW 20 MIN: CPT | Performed by: NURSE PRACTITIONER

## 2022-07-29 PROCEDURE — 87635 SARS-COV-2 COVID-19 AMP PRB: CPT | Performed by: NURSE PRACTITIONER

## 2022-07-29 RX ORDER — DOXYCYCLINE HYCLATE 100 MG
100 TABLET ORAL 2 TIMES DAILY
Qty: 20 TABLET | Refills: 0 | Status: ON HOLD | OUTPATIENT
Start: 2022-07-29 | End: 2022-08-07 | Stop reason: HOSPADM

## 2022-07-29 RX ORDER — VENLAFAXINE HYDROCHLORIDE 150 MG/1
CAPSULE, EXTENDED RELEASE ORAL
Qty: 90 CAPSULE | Refills: 1 | Status: SHIPPED | OUTPATIENT
Start: 2022-07-29

## 2022-07-29 RX ORDER — FLUCONAZOLE 150 MG/1
TABLET ORAL
Qty: 2 TABLET | Refills: 0 | Status: ON HOLD | OUTPATIENT
Start: 2022-07-29 | End: 2022-08-07 | Stop reason: HOSPADM

## 2022-07-29 RX ORDER — PREDNISONE 20 MG/1
20 TABLET ORAL 2 TIMES DAILY
Qty: 10 TABLET | Refills: 0 | Status: SHIPPED | OUTPATIENT
Start: 2022-07-29 | End: 2022-08-03

## 2022-07-29 ASSESSMENT — ENCOUNTER SYMPTOMS
SINUS COMPLAINT: 1
SINUS PRESSURE: 1
SHORTNESS OF BREATH: 0
HOARSE VOICE: 1
COUGH: 1
SORE THROAT: 0

## 2022-07-29 NOTE — PROGRESS NOTES
[facility]             [address], [address]                        Telephone [phone]             Fax [phone]  1960  AFK:[id number]   Date of visit:  7/29/2022    Subjective:    Leslie Painter is a 57 y.o.  female who presents to [facility] today (7/29/2022) for evaluation of:    Chief Complaint   Patient presents with    Sinus Problem     headache, nasal congestion, left ear pain, cough, loss of voice. Symptoms started about a week ago       Sinus Problem  This is a new problem. The current episode started 1 to 4 weeks ago (07/21/22). The problem is unchanged. There has been no fever. Her pain is at a severity of 8/10. Associated symptoms include congestion, coughing (infrequent), a hoarse voice and sinus pressure. Pertinent negatives include no chills, ear pain, headaches, shortness of breath or sore throat. (Bilateral ear pain L>R; postnasal drainage; worse lying down) Treatments tried: albuterol inhaler; tylenol sinus. The treatment provided no relief. Negative home Covid-19 test on 07/27/22. History of COPD.       She has the following problem list:  Patient Active Problem List   Diagnosis    Gastroesophageal reflux disease    Irritable bowel syndrome with diarrhea    Chronic bronchitis ([facility])    Type 2 diabetes mellitus without complication, without long-term current use of insulin (HCC)    Depression    Tobacco use    Allergic rhinitis    Carpal tunnel syndrome of right wrist    Spinal stenosis, thoracic region    Cervical spondylosis with myelopathy    Lumbar disc disease with radiculopathy    Bilateral carpal tunnel syndrome    Chronic radicular lumbar pain    Chronic bilateral thoracic back pain    Chronic cervical radiculopathy    Numbness and tingling    Chronic cerebral ischemia    Acquired cerebral atrophy (HCC)    Polyneuropathy, peripheral sensorimotor axonal    H/O fall    Balance problem    Neuropathic pain    Muscle spasm    Positive test for herpes simplex virus (HSV) antibody    Episode of shaking    Flexural eczema    COPD without exacerbation (HCC)        Current medications are:  Current Outpatient Medications   Medication Sig Dispense Refill    venlafaxine (EFFEXOR XR) 150 MG extended release capsule TAKE 1 CAPSULE BY MOUTH ONE TIME A DAY 90 capsule 1    doxycycline hyclate (VIBRA-TABS) 100 MG tablet Take 1 tablet by mouth in the morning and 1 tablet before bedtime. Do all this for 10 days. 20 tablet 0    predniSONE (DELTASONE) 20 MG tablet Take 1 tablet by mouth in the morning and 1 tablet before bedtime. Do all this for 5 days. 10 tablet 0    fluconazole (DIFLUCAN) 150 MG tablet Take 1 tab now then repeat in 72 hours. 2 tablet 0    nicotine (NICODERM CQ) 21 MG/24HR Place 1 patch onto the skin every 24 hours 42 patch 0    Multiple Vitamin (MULTI-VITAMIN DAILY PO) Take by mouth      albuterol sulfate HFA (PROVENTIL HFA) 108 (90 Base) MCG/ACT inhaler Inhale 2 puffs into the lungs every 4 hours as needed for Wheezing 1 Inhaler 0    fluticasone (FLONASE) 50 MCG/ACT nasal spray 2 sprays by Nasal route daily 3 Bottle 3    baclofen (LIORESAL) 10 MG tablet Take 1 tablet by mouth nightly as needed (muscle spasms) 30 tablet 1    pantoprazole (PROTONIX) 40 MG tablet Take 1 tablet by mouth daily 90 tablet 3    Naproxen Sodium (ALEVE PO) Take by mouth daily as needed      empagliflozin (JARDIANCE) 10 MG tablet Take 1 tablet by mouth daily (Patient not taking: Reported on 7/29/2022) 90 tablet 1    buPROPion (WELLBUTRIN) 75 MG tablet Take 1 tablet by mouth 2 times daily (Patient not taking: Reported on 7/29/2022) 60 tablet 4    triamcinolone (KENALOG) 0.1 % ointment Apply topically 2 times daily as needed (itching or severe dry skin) (Patient not taking: Reported on 7/29/2022) 80 g 0    Misc.  Devices (CANE) MISC Use daily 1 each 0    gabapentin (NEURONTIN) 300 MG capsule Take 1 capsule by mouth 3 times daily for 60 days. Intended supply: 30 days (Patient taking differently: Take 300 mg by mouth 3 times daily. Intended supply: 30 days  Taking PRN) 90 capsule 1    montelukast (SINGULAIR) 10 MG tablet Take 1 tablet by mouth nightly (Patient not taking: Reported on 7/29/2022) 30 tablet 3     No current facility-administered medications for this visit. She is allergic to pcn [penicillins], sulfa antibiotics, and metformin and related. .    She  reports that she has been smoking cigarettes. She started smoking about 51 years ago. She has a 45.00 pack-year smoking history. She has never used smokeless tobacco.      Objective:    Vitals:    07/29/22 1436   BP: 138/88   Site: Left Upper Arm   Position: Sitting   Cuff Size: Medium Adult   Pulse: (!) 107   Resp: 24   Temp: 98.8 °F (37.1 °C)   TempSrc: Tympanic   SpO2: 95%   Weight: 200 lb (90.7 kg)   Height: 5' 3\" (1.6 m)     Body mass index is 35.43 kg/m². Review of Systems   Constitutional:  Negative for chills. HENT:  Positive for congestion, hoarse voice and sinus pressure. Negative for ear pain and sore throat. Respiratory:  Positive for cough (infrequent). Negative for shortness of breath. Neurological:  Negative for headaches. Physical Exam  Vitals and nursing note reviewed. Constitutional:       Appearance: She is well-developed. HENT:      Head: Normocephalic. Jaw: There is normal jaw occlusion. Right Ear: Tympanic membrane, ear canal and external ear normal.      Left Ear: Tympanic membrane, ear canal and external ear normal.      Nose: Congestion present. Right Turbinates: Swollen (erythema). Left Turbinates: Swollen (erythema). Right Sinus: Maxillary sinus tenderness present. No frontal sinus tenderness. Left Sinus: Maxillary sinus tenderness present. No frontal sinus tenderness. Comments: Postnasal drainage noted     Mouth/Throat:      Lips: Pink.       Mouth: Mucous membranes are moist.      Pharynx: Oropharynx is clear. Uvula midline. Tonsils: 0 on the right. 0 on the left. Eyes:      Pupils: Pupils are equal, round, and reactive to light. Cardiovascular:      Rate and Rhythm: Regular rhythm. Tachycardia present. Heart sounds: Normal heart sounds. Pulmonary:      Effort: Pulmonary effort is normal.      Breath sounds: Normal breath sounds and air entry. Musculoskeletal:      Cervical back: Normal range of motion and neck supple. Lymphadenopathy:      Cervical: Cervical adenopathy present. Skin:     General: Skin is warm and dry. Neurological:      General: No focal deficit present. Mental Status: She is alert and oriented to person, place, and time. Psychiatric:         Behavior: Behavior normal.         Thought Content: Thought content normal.       Assessment and Plan:    Office Visit on 07/29/2022   Component Date Value Ref Range Status    SARS-COV-2, RdRp gene 07/29/2022 Negative  Negative Final    Lot Number 07/29/2022 [id number]   Final    QC Pass/Fail 07/29/2022 PASS   Final          Diagnosis Orders   1. Acute non-recurrent maxillary sinusitis  doxycycline hyclate (VIBRA-TABS) 100 MG tablet    predniSONE (DELTASONE) 20 MG tablet      2. Upper respiratory tract infection, unspecified type  POCT COVID-19 Rapid, NAAT      3. Hoarse voice quality  predniSONE (DELTASONE) 20 MG tablet      4. Cough        5. Antibiotic-induced yeast infection  fluconazole (DIFLUCAN) 150 MG tablet        Complete full course of antibiotic. Take prednisone as directed. Take Diflucan as directed. I also recommended Flonase for sinus symptoms. she was also encouraged to use tylenol for pain/fever. Increase water intake. Use cool mist humidifier at bedtime. Use nasal saline flush as needed. Good hand hygiene. she was instructed to return if there is no improvement or symptoms worsen.      The use, risks, benefits, and side effects of prescribed or recommended medications were discussed. All questions were answered and the patient/caregiver voiced understanding. No orders of the defined types were placed in this encounter.         Electronically signed by ETHAN Brice CNP on 7/29/22 at 3:29 PM EDT

## 2022-07-29 NOTE — TELEPHONE ENCOUNTER
Alayna called requesting a refill of the below medication which has been pended for you:     Requested Prescriptions     Pending Prescriptions Disp Refills    venlafaxine (EFFEXOR XR) 150 MG extended release capsule [Pharmacy Med Name: VENLAFAXINE HCL ER 150MG CP24] 90 capsule 1     Sig: TAKE 1 CAPSULE BY MOUTH ONE TIME A DAY       Last Appointment Date: 9/20/2021  Next Appointment Date: 10/7/2022    Allergies   Allergen Reactions    Pcn [Penicillins] Anaphylaxis     \"can't breath\"    Sulfa Antibiotics Anaphylaxis     \"can't breathe\"    Metformin And Related Other (See Comments)     Sweating and diarrhea

## 2022-08-05 ENCOUNTER — HOSPITAL ENCOUNTER (INPATIENT)
Age: 62
LOS: 2 days | Discharge: HOME | DRG: 145 | End: 2022-08-07
Attending: INTERNAL MEDICINE | Admitting: INTERNAL MEDICINE
Payer: MEDICAID

## 2022-08-05 ENCOUNTER — APPOINTMENT (OUTPATIENT)
Dept: GENERAL RADIOLOGY | Age: 62
End: 2022-08-05

## 2022-08-05 ENCOUNTER — HOSPITAL ENCOUNTER (EMERGENCY)
Age: 62
Discharge: ANOTHER HOSPITAL | End: 2022-08-05
Attending: EMERGENCY MEDICINE

## 2022-08-05 ENCOUNTER — APPOINTMENT (OUTPATIENT)
Dept: CT IMAGING | Age: 62
End: 2022-08-05

## 2022-08-05 VITALS
RESPIRATION RATE: 16 BRPM | WEIGHT: 200 LBS | OXYGEN SATURATION: 94 % | HEART RATE: 113 BPM | BODY MASS INDEX: 35.44 KG/M2 | SYSTOLIC BLOOD PRESSURE: 157 MMHG | HEIGHT: 63 IN | DIASTOLIC BLOOD PRESSURE: 102 MMHG | TEMPERATURE: 97.8 F

## 2022-08-05 DIAGNOSIS — J38.7 LARYNGEAL MASS: ICD-10-CM

## 2022-08-05 DIAGNOSIS — J39.8 TRACHEAL MASS: Primary | ICD-10-CM

## 2022-08-05 LAB
ABSOLUTE EOS #: 0.19 K/UL (ref 0–0.4)
ABSOLUTE IMMATURE GRANULOCYTE: 0 K/UL (ref 0–0.3)
ABSOLUTE LYMPH #: 6.73 K/UL (ref 1–4.8)
ABSOLUTE MONO #: 0.94 K/UL (ref 0.1–1.2)
ALBUMIN SERPL-MCNC: 4 G/DL (ref 3.5–5.2)
ALBUMIN/GLOBULIN RATIO: 1.2 (ref 1–2.5)
ALP BLD-CCNC: 128 U/L (ref 35–104)
ALT SERPL-CCNC: 15 U/L (ref 5–33)
ANION GAP SERPL CALCULATED.3IONS-SCNC: 13 MMOL/L (ref 9–17)
AST SERPL-CCNC: 14 U/L
BASOPHILS # BLD: 1 % (ref 0–1)
BASOPHILS ABSOLUTE: 0.19 K/UL (ref 0–0.2)
BILIRUB SERPL-MCNC: 0.17 MG/DL (ref 0.3–1.2)
BUN BLDV-MCNC: 14 MG/DL (ref 8–23)
BUN/CREAT BLD: 18 (ref 9–20)
CALCIUM SERPL-MCNC: 9.6 MG/DL (ref 8.6–10.4)
CHLORIDE BLD-SCNC: 95 MMOL/L (ref 98–107)
CO2: 26 MMOL/L (ref 20–31)
CREAT SERPL-MCNC: 0.8 MG/DL (ref 0.5–0.9)
D-DIMER QUANTITATIVE: 0.63 MG/L FEU (ref 0–0.59)
EOSINOPHILS RELATIVE PERCENT: 1 % (ref 1–7)
GFR AFRICAN AMERICAN: >60 ML/MIN
GFR NON-AFRICAN AMERICAN: >60 ML/MIN
GFR SERPL CREATININE-BSD FRML MDRD: ABNORMAL ML/MIN/{1.73_M2}
GLUCOSE BLD-MCNC: 192 MG/DL (ref 70–99)
HCT VFR BLD CALC: 47.1 % (ref 36.3–47.1)
HEMOGLOBIN: 15.9 G/DL (ref 11.9–15.1)
IMMATURE GRANULOCYTES: 0 %
LACTIC ACID, SEPSIS: 1.7 MMOL/L (ref 0.5–1.9)
LACTIC ACID, SEPSIS: 2.1 MMOL/L (ref 0.5–1.9)
LYMPHOCYTES # BLD: 36 % (ref 16–46)
MCH RBC QN AUTO: 28.7 PG (ref 25.2–33.5)
MCHC RBC AUTO-ENTMCNC: 33.8 G/DL (ref 25.2–33.5)
MCV RBC AUTO: 85 FL (ref 82.6–102.9)
MONOCYTES # BLD: 5 % (ref 4–11)
MORPHOLOGY: ABNORMAL
MORPHOLOGY: ABNORMAL
NRBC AUTOMATED: 0 PER 100 WBC
PDW BLD-RTO: 13.1 % (ref 11.8–14.4)
PLATELET # BLD: 236 K/UL (ref 138–453)
PMV BLD AUTO: 12.1 FL (ref 8.1–13.5)
POTASSIUM SERPL-SCNC: 3.8 MMOL/L (ref 3.7–5.3)
PRO-BNP: 91 PG/ML
RBC # BLD: 5.54 M/UL (ref 3.95–5.11)
SARS-COV-2, RAPID: NOT DETECTED
SEG NEUTROPHILS: 57 % (ref 43–77)
SEGMENTED NEUTROPHILS ABSOLUTE COUNT: 10.65 K/UL (ref 1.5–8.1)
SODIUM BLD-SCNC: 134 MMOL/L (ref 135–144)
SPECIMEN DESCRIPTION: NORMAL
TOTAL PROTEIN: 7.3 G/DL (ref 6.4–8.3)
TROPONIN, HIGH SENSITIVITY: 7 NG/L (ref 0–14)
WBC # BLD: 18.7 K/UL (ref 3.5–11.3)

## 2022-08-05 PROCEDURE — 99285 EMERGENCY DEPT VISIT HI MDM: CPT

## 2022-08-05 PROCEDURE — 6360000002 HC RX W HCPCS: Performed by: EMERGENCY MEDICINE

## 2022-08-05 PROCEDURE — 94640 AIRWAY INHALATION TREATMENT: CPT

## 2022-08-05 PROCEDURE — 36415 COLL VENOUS BLD VENIPUNCTURE: CPT

## 2022-08-05 PROCEDURE — 71045 X-RAY EXAM CHEST 1 VIEW: CPT

## 2022-08-05 PROCEDURE — 85025 COMPLETE CBC W/AUTO DIFF WBC: CPT

## 2022-08-05 PROCEDURE — 87635 SARS-COV-2 COVID-19 AMP PRB: CPT

## 2022-08-05 PROCEDURE — 70360 X-RAY EXAM OF NECK: CPT

## 2022-08-05 PROCEDURE — 2709999900 CT CHEST PULMONARY EMBOLISM W CONTRAST

## 2022-08-05 PROCEDURE — 0BJ08ZZ INSPECTION OF TRACHEOBRONCHIAL TREE, VIA NATURAL OR ARTIFICIAL OPENING ENDOSCOPIC: ICD-10-PCS | Performed by: OTOLARYNGOLOGY

## 2022-08-05 PROCEDURE — 6370000000 HC RX 637 (ALT 250 FOR IP): Performed by: EMERGENCY MEDICINE

## 2022-08-05 PROCEDURE — 80053 COMPREHEN METABOLIC PANEL: CPT

## 2022-08-05 PROCEDURE — 85379 FIBRIN DEGRADATION QUANT: CPT

## 2022-08-05 PROCEDURE — 6360000004 HC RX CONTRAST MEDICATION: Performed by: EMERGENCY MEDICINE

## 2022-08-05 PROCEDURE — 83880 ASSAY OF NATRIURETIC PEPTIDE: CPT

## 2022-08-05 PROCEDURE — 96374 THER/PROPH/DIAG INJ IV PUSH: CPT

## 2022-08-05 PROCEDURE — 83605 ASSAY OF LACTIC ACID: CPT

## 2022-08-05 PROCEDURE — 93005 ELECTROCARDIOGRAM TRACING: CPT | Performed by: EMERGENCY MEDICINE

## 2022-08-05 PROCEDURE — 70491 CT SOFT TISSUE NECK W/DYE: CPT

## 2022-08-05 PROCEDURE — 71260 CT THORAX DX C+: CPT | Performed by: EMERGENCY MEDICINE

## 2022-08-05 PROCEDURE — 84484 ASSAY OF TROPONIN QUANT: CPT

## 2022-08-05 PROCEDURE — 2060000000 HC ICU INTERMEDIATE R&B

## 2022-08-05 PROCEDURE — 87040 BLOOD CULTURE FOR BACTERIA: CPT

## 2022-08-05 PROCEDURE — 0CBS8ZZ EXCISION OF LARYNX, VIA NATURAL OR ARTIFICIAL OPENING ENDOSCOPIC: ICD-10-PCS | Performed by: OTOLARYNGOLOGY

## 2022-08-05 RX ORDER — HYDRALAZINE HYDROCHLORIDE 20 MG/ML
10 INJECTION INTRAMUSCULAR; INTRAVENOUS EVERY 6 HOURS PRN
Status: DISCONTINUED | OUTPATIENT
Start: 2022-08-05 | End: 2022-08-07 | Stop reason: HOSPADM

## 2022-08-05 RX ORDER — DEXAMETHASONE SODIUM PHOSPHATE 10 MG/ML
10 INJECTION INTRAMUSCULAR; INTRAVENOUS ONCE
Status: COMPLETED | OUTPATIENT
Start: 2022-08-05 | End: 2022-08-05

## 2022-08-05 RX ORDER — IPRATROPIUM BROMIDE AND ALBUTEROL SULFATE 2.5; .5 MG/3ML; MG/3ML
1 SOLUTION RESPIRATORY (INHALATION)
Status: DISCONTINUED | OUTPATIENT
Start: 2022-08-05 | End: 2022-08-05 | Stop reason: HOSPADM

## 2022-08-05 RX ADMIN — IPRATROPIUM BROMIDE AND ALBUTEROL SULFATE 1 AMPULE: .5; 2.5 SOLUTION RESPIRATORY (INHALATION) at 15:11

## 2022-08-05 RX ADMIN — DEXAMETHASONE SODIUM PHOSPHATE 10 MG: 10 INJECTION INTRAMUSCULAR; INTRAVENOUS at 18:23

## 2022-08-05 RX ADMIN — IOPAMIDOL 125 ML: 755 INJECTION, SOLUTION INTRAVENOUS at 16:52

## 2022-08-05 ASSESSMENT — PAIN - FUNCTIONAL ASSESSMENT: PAIN_FUNCTIONAL_ASSESSMENT: NONE - DENIES PAIN

## 2022-08-05 ASSESSMENT — ENCOUNTER SYMPTOMS
COUGH: 1
SORE THROAT: 1
VOICE CHANGE: 1
SHORTNESS OF BREATH: 1
EYE PAIN: 0
BACK PAIN: 0
DIARRHEA: 0
ABDOMINAL PAIN: 0
BLOOD IN STOOL: 0
VOMITING: 0
CONSTIPATION: 0
NAUSEA: 0

## 2022-08-05 NOTE — ED PROVIDER NOTES
Return [facility]  eMERGENCY dEPARTMENT eNCOUnter      Pt Name: Cameron Max  MRN: [id number]  [place] 1960  Date of evaluation: 8/5/2022      CHIEF COMPLAINT       Chief Complaint   Patient presents with    Shortness of Breath         HISTORY OF PRESENT ILLNESS    Cameron Max is a 57 y.o. female who presents with a chief complaint of shortness of breath she says is been going on for over a week she was seen in urgent care with shortness of breath and sore throat they placed her on doxycycline and steroids she said she still has a real hoarse voice and feels active swelling in her throat she is finished her steroid course and she still has doxycycline left. Is been no nausea vomiting she is a smoker with a history of COPD      REVIEW OF SYSTEMS         Review of Systems   Constitutional:  Positive for fatigue. Negative for chills and fever. HENT:  Positive for sore throat and voice change. Negative for congestion and ear pain. Eyes:  Negative for pain and visual disturbance. Respiratory:  Positive for cough and shortness of breath. Cardiovascular:  Negative for chest pain, palpitations and leg swelling. Gastrointestinal:  Negative for abdominal pain, blood in stool, constipation, diarrhea, nausea and vomiting. Endocrine: Negative for polydipsia and polyuria. Genitourinary:  Negative for difficulty urinating, dysuria, frequency, vaginal bleeding and vaginal discharge. Musculoskeletal:  Negative for back pain, joint swelling, myalgias, neck pain and neck stiffness. Skin:  Negative for rash. Neurological:  Negative for dizziness, weakness and headaches. Hematological:  Negative for adenopathy. Does not bruise/bleed easily. Psychiatric/Behavioral:  Negative for confusion, self-injury and suicidal ideas.         PAST MEDICAL HISTORY    has a past medical history of Allergic rhinitis, Arthritis, Asthma, Bronchitis, COPD (chronic obstructive pulmonary disease) ([facility] [address] Depression, Diabetes mellitus ([facility] [address] Headache, Hyperlipidemia, Hypertension, Incontinence, Irritable bowel syndrome, Kidney stone, Osteoarthritis, Pneumonia, Type 2 diabetes mellitus without complication ([address] and Ulcer. SURGICAL HISTORY      has a past surgical history that includes Tonsillectomy; Colonoscopy (7/11/208); Meckel's diverticulum excision (1970); Cholecystectomy, laparoscopic (1997); Hysterectomy, total abdominal (1986); laparoscopy (2008); Colonoscopy (08/02/2017); Upper gastrointestinal endoscopy (08/02/2017); Carpal tunnel release (Right, 5/7/2019); Colonoscopy (Left, 10/8/2019); Upper gastrointestinal endoscopy (Left, 10/8/2019); and Pain management procedure (N/A, 7/8/2021). CURRENT MEDICATIONS       Previous Medications    ALBUTEROL SULFATE HFA (PROVENTIL HFA) 108 (90 BASE) MCG/ACT INHALER    Inhale 2 puffs into the lungs every 4 hours as needed for Wheezing    BACLOFEN (LIORESAL) 10 MG TABLET    Take 1 tablet by mouth nightly as needed (muscle spasms)    BUPROPION (WELLBUTRIN) 75 MG TABLET    Take 1 tablet by mouth 2 times daily    DOXYCYCLINE HYCLATE (VIBRA-TABS) 100 MG TABLET    Take 1 tablet by mouth in the morning and 1 tablet before bedtime. Do all this for 10 days. EMPAGLIFLOZIN (JARDIANCE) 10 MG TABLET    Take 1 tablet by mouth daily    FLUCONAZOLE (DIFLUCAN) 150 MG TABLET    Take 1 tab now then repeat in 72 hours. FLUTICASONE (FLONASE) 50 MCG/ACT NASAL SPRAY    2 sprays by Nasal route daily    GABAPENTIN (NEURONTIN) 300 MG CAPSULE    Take 1 capsule by mouth 3 times daily for 60 days. Intended supply: 30 days    MISC.  DEVICES (CANE) MISC    Use daily    MONTELUKAST (SINGULAIR) 10 MG TABLET    Take 1 tablet by mouth nightly    MULTIPLE VITAMIN (MULTI-VITAMIN DAILY PO)    Take by mouth    NAPROXEN SODIUM (ALEVE PO)    Take by mouth daily as needed    NICOTINE (NICODERM CQ) 21 MG/24HR    Place 1 patch onto the skin every 24 hours    PANTOPRAZOLE (PROTONIX) 40 MG TABLET    Take 1 tablet by mouth daily    TRIAMCINOLONE (KENALOG) 0.1 % OINTMENT    Apply topically 2 times daily as needed (itching or severe dry skin)    VENLAFAXINE (EFFEXOR XR) 150 MG EXTENDED RELEASE CAPSULE    TAKE 1 CAPSULE BY MOUTH ONE TIME A DAY       ALLERGIES     is allergic to pcn [penicillins], sulfa antibiotics, and metformin and related. FAMILY HISTORY     is adopted. She was adopted. Family history is unknown by patient. SOCIAL HISTORY      reports that she has been smoking cigarettes. She started smoking about 51 years ago. She has a 45.00 pack-year smoking history. She has never used smokeless tobacco. She reports that she does not drink alcohol and does not use drugs. PHYSICAL EXAM     INITIAL VITALS:  height is 5' 3\" (1.6 m) and weight is 200 lb (90.7 kg). Her tympanic temperature is 97.8 °F (36.6 °C). Her blood pressure is 161/98 (abnormal) and her pulse is 122 (abnormal). Her respiration is 18 and oxygen saturation is 92%. Physical Exam  Constitutional:       Appearance: She is well-developed. HENT:      Head: Normocephalic and atraumatic. Right Ear: External ear normal.      Left Ear: External ear normal.      Mouth/Throat:      Pharynx: No pharyngeal swelling or oropharyngeal exudate. Comments: No obvious swelling seen on oropharynx no erythema no exudates there is some cobblestoning to the posterior pharynx  Eyes:      Conjunctiva/sclera: Conjunctivae normal.      Pupils: Pupils are equal, round, and reactive to light. Neck:      Thyroid: No thyromegaly. Cardiovascular:      Rate and Rhythm: Normal rate and regular rhythm. Pulmonary:      Effort: Pulmonary effort is normal. Tachypnea present. No respiratory distress. Breath sounds: Stridor present. Wheezing present. Comments: Patient's voice is stridorous hoarse there is an occasional end expiratory wheeze  Abdominal:      General: Bowel sounds are normal.      Palpations: Abdomen is soft.    Musculoskeletal: General: No tenderness. Cervical back: Normal range of motion. Lymphadenopathy:      Cervical: Cervical adenopathy present. Skin:     General: Skin is warm and dry. Neurological:      Mental Status: She is alert and oriented to person, place, and time. Psychiatric:         Behavior: Behavior normal.         DIFFERENTIAL DIAGNOSIS/ MDM:     Exacerbation of COPD with hoarseness and some stridor normal respiratory distress this is been going on over a week we will do a chest x-ray lateral soft tissue neck possible CT of the neck depending on what we find    DIAGNOSTIC RESULTS     EKG: All EKG's are interpreted by the Emergency Department Physician who either signs or Co-signs this chart in the absence of a cardiologist.  Sinus tachycardia rate of 119 bpm AK interval is 128 ms QRS duration 72 ms 107 ms axis is 45 there is no acute ST or T wave changes seen      RADIOLOGY:   I directly visualized the following  images and reviewed the radiologist interpretations:       EXAMINATION:   TWO XRAY VIEWS OF THE NECK SOFT TISSUES       8/5/2022 12:06 pm       COMPARISON:   03/02/2021       HISTORY:   ORDERING SYSTEM PROVIDED HISTORY: dff breathing/ voice change   TECHNOLOGIST PROVIDED HISTORY:   dff breathing/ voice change   Reason for Exam: trouble breathing SOB       FINDINGS:   Airway is midline. There is narrowing of the airway at the level of the   glottis. Epiglottis appears grossly normal in caliber. No radiopaque   retained foreign body. No retropharyngeal/prevertebral soft tissue swelling. No soft tissue gas. Degenerative changes throughout the cervical spine with   2 mm grade 1 degenerative anterolisthesis at C3 on C4 and 3 mm grade 1   degenerative anterolisthesis at C4 on C5 which is similar to prior comparison. Impression   Narrowing of the airway at the level of the glottis which may be due to   closed glottis at the time of imaging, though clinical correlation is   required. EXAMINATION:   CT OF THE NECK SOFT TISSUE WITH CONTRAST; CTA OF THE CHEST  8/5/2022       TECHNIQUE:   CT of the neck was performed with the administration of intravenous contrast.   Multiplanar reformatted images are provided for review. Automated exposure   control, iterative reconstruction, and/or weight based adjustment of the   mA/kV was utilized to reduce the radiation dose to as low as reasonably   achievable.; CTA of the chest was performed after the administration of   intravenous contrast.  Multiplanar reformatted images are provided for   review. MIP images are provided for review. Automated exposure control,   iterative reconstruction, and/or weight based adjustment of the mA/kV was   utilized to reduce the radiation dose to as low as reasonably achievable. COMPARISON:   None       HISTORY:   ORDERING SYSTEM PROVIDED HISTORY: Stridor and supraglottic swelling seen on   plain x-ray   TECHNOLOGIST PROVIDED HISTORY:   Stridor and supraglottic swelling seen on plain x-ray   Decision Support Exception - unselect if not a suspected or confirmed   emergency medical condition->Emergency Medical Condition (MA)   Reason for Exam:  Shortness of breath, elevated d-dimer, stridor,   supraglottic swelling seen on xray       FINDINGS:   PHARYNX/LARYNX:  The palatine tonsils are normal in appearance. The tongue   is normal in appearance. There is a polypoid mass lesion arising anterior   aspect of the sub glottis measuring approximately 15 by 9 by 8 mm causing   moderate narrowing of the laryngeal airways. The valleculae, epiglottis,   aryepiglottic folds and pyriform sinuses appear otherwise unremarkable. The   true and false vocal cords are normal in appearance. No mass or abscess is   seen. SALIVARY GLANDS/THYROID:  The parotid and submandibular glands appear   unremarkable. Multiple left-sided thyroid nodules measuring 2.6 cm and 2.7   cm. Algis Broaden        LYMPH NODES:  No cervical or supraclavicular lymphadenopathy is seen. SOFT TISSUES:  No appreciable soft tissue swelling or mass is seen. BRAIN/ORBITS/SINUSES:  The visualized portion of the intracranial contents   appear unremarkable. The visualized portion of the orbits, paranasal sinuses   and mastoid air cells demonstrate no acute abnormality. BONES:  No aggressive appearing lytic or blastic bony lesion. CT chest:       Chest CT angiogram: No pulmonary embolism. No acute aortic pathology. Lines and tubes:  None. Lungs and Airways and Pleura:  Central airways are patent. No lung   consolidation. No pleural effusion. No pneumothorax. Mild linear atelectatic   changes at the bases. Lymph nodes: No pathologically enlarged mediastinal, hilar, lower cervical,   or chest wall lymph nodes. Cardiovascular and Mediastinum: No acute aortic pathology. Cardiac chamber   sizes appear to measure within normal limits on this non ECG gated study. No   pericardial effusion. The thyroid gland is unremarkable. The esophagus is   unremarkable. Bones/Soft tissues: No fracture. No definite suspicious lytic or blastic   foci. Visualized upper abdomen: 2.8 cm are right adrenal gland nodule likely an   adenoma. Status post cholecystectomy. Impression   Neck soft tissues:       15 mm polypoid mass lesion arising anterior aspect of the sub glottis causing   moderate narrowing of the laryngeal airways. The area is amenable to direct   visualization for further evaluation. Multiple left-sided thyroid nodules measuring 2.6 and 2.7 cm. For further   evaluation thyroid ultrasound is recommended. Chest CT angiogram:       No pulmonary embolism. No acute pulmonary findings. Unchanged 2.8 cm right adrenal adenoma.        RECOMMENDATIONS:   Unavailable           ED BEDSIDE ULTRASOUND:       LABS:  Labs Reviewed   CBC WITH AUTO DIFFERENTIAL - Abnormal; Notable for the following components:       Result Value    WBC 18.7 (*)     RBC 5.54 (*)     Hemoglobin 15.9 (*)     MCHC 33.8 (*)     Absolute Lymph # 6.73 (*)     Segs Absolute 10.65 (*)     All other components within normal limits   COMPREHENSIVE METABOLIC PANEL W/ REFLEX TO MG FOR LOW K - Abnormal; Notable for the following components:    Glucose 192 (*)     Sodium 134 (*)     Chloride 95 (*)     Alkaline Phosphatase 128 (*)     Total Bilirubin 0.17 (*)     All other components within normal limits   LACTATE, SEPSIS - Abnormal; Notable for the following components:    Lactic Acid, Sepsis 2.1 (*)     All other components within normal limits   D-DIMER, QUANTITATIVE - Abnormal; Notable for the following components:    D-Dimer, Quant 0.63 (*)     All other components within normal limits   COVID-19, RAPID   CULTURE, BLOOD 1   CULTURE, BLOOD 1   BRAIN NATRIURETIC PEPTIDE   LACTATE, SEPSIS   TROPONIN           EMERGENCY DEPARTMENT COURSE:   Vitals:    Vitals:    08/05/22 1436 08/05/22 1734 08/05/22 1800   BP: (!) 154/107 (!) 165/96 (!) 161/98   Pulse: (!) 130 (!) 124 (!) 122   Resp: 26 20 18   Temp: 97.8 °F (36.6 °C)     TempSrc: Tympanic     SpO2: 96% 95% 92%   Weight: 200 lb (90.7 kg)     Height: 5' 3\" (1.6 m)       -------------------------  BP: (!) 161/98, Temp: 97.8 °F (36.6 °C), Heart Rate: (!) 122, Resp: 18        Re-evaluation Notes  Resting comfortably no respiratory distress but voice is still very hoarse      CRITICAL CARE:   None        CONSULTS: ENT was consulted I discussed the case with Dr. Jeny Gramajo who will accept the patient in transfer but will be a consultant I discussed the case with the hospitalist Dr. Jade Saez who will accept the patient at admission      PROCEDURES:  None    FINAL IMPRESSION      1. Tracheal mass          DISPOSITION/PLAN   DISPOSITION Decision To Transfer      Condition on Disposition  Stable    PATIENT REFERRED TO:  No follow-up provider specified.     DISCHARGE MEDICATIONS:  New Prescriptions No medications on file       (Please note that portions of this note were completed with a voice recognition program.  Efforts were made to edit the dictations but occasionally words are mis-transcribed.)    Avel Reeves MD,, MD, F.A.A.E.M.   Attending Emergency Physician                           Avel Reeves MD  08/05/22 [id number]

## 2022-08-06 ENCOUNTER — ANESTHESIA EVENT (OUTPATIENT)
Dept: OPERATING ROOM | Age: 62
DRG: 145 | End: 2022-08-06

## 2022-08-06 ENCOUNTER — ANESTHESIA (OUTPATIENT)
Dept: OPERATING ROOM | Age: 62
DRG: 145 | End: 2022-08-06

## 2022-08-06 PROBLEM — E66.9 OBESITY (BMI 30-39.9): Status: ACTIVE | Noted: 2022-08-06

## 2022-08-06 PROBLEM — D35.01 ADRENAL ADENOMA, RIGHT: Status: ACTIVE | Noted: 2022-08-06

## 2022-08-06 PROBLEM — E04.2 MULTIPLE THYROID NODULES: Status: ACTIVE | Noted: 2022-08-06

## 2022-08-06 PROBLEM — R49.0 DYSPHONIA: Status: ACTIVE | Noted: 2022-08-06

## 2022-08-06 LAB
GLUCOSE BLD-MCNC: 172 MG/DL (ref 65–105)
GLUCOSE BLD-MCNC: 197 MG/DL (ref 65–105)
GLUCOSE BLD-MCNC: 197 MG/DL (ref 65–105)
GLUCOSE BLD-MCNC: 222 MG/DL (ref 65–105)
GLUCOSE BLD-MCNC: 251 MG/DL (ref 65–105)

## 2022-08-06 PROCEDURE — 3700000001 HC ADD 15 MINUTES (ANESTHESIA): Performed by: OTOLARYNGOLOGY

## 2022-08-06 PROCEDURE — 2580000003 HC RX 258: Performed by: NURSE ANESTHETIST, CERTIFIED REGISTERED

## 2022-08-06 PROCEDURE — 7100000000 HC PACU RECOVERY - FIRST 15 MIN: Performed by: OTOLARYNGOLOGY

## 2022-08-06 PROCEDURE — 99253 IP/OBS CNSLTJ NEW/EST LOW 45: CPT | Performed by: OTOLARYNGOLOGY

## 2022-08-06 PROCEDURE — 7100000001 HC PACU RECOVERY - ADDTL 15 MIN: Performed by: OTOLARYNGOLOGY

## 2022-08-06 PROCEDURE — 1200000000 HC SEMI PRIVATE

## 2022-08-06 PROCEDURE — 2500000003 HC RX 250 WO HCPCS: Performed by: NURSE ANESTHETIST, CERTIFIED REGISTERED

## 2022-08-06 PROCEDURE — 3700000000 HC ANESTHESIA ATTENDED CARE: Performed by: OTOLARYNGOLOGY

## 2022-08-06 PROCEDURE — 3600000004 HC SURGERY LEVEL 4 BASE: Performed by: OTOLARYNGOLOGY

## 2022-08-06 PROCEDURE — 2500000003 HC RX 250 WO HCPCS: Performed by: OTOLARYNGOLOGY

## 2022-08-06 PROCEDURE — 2709999900 HC NON-CHARGEABLE SUPPLY: Performed by: OTOLARYNGOLOGY

## 2022-08-06 PROCEDURE — 6370000000 HC RX 637 (ALT 250 FOR IP): Performed by: OTOLARYNGOLOGY

## 2022-08-06 PROCEDURE — 6360000002 HC RX W HCPCS: Performed by: INTERNAL MEDICINE

## 2022-08-06 PROCEDURE — 6360000002 HC RX W HCPCS: Performed by: NURSE ANESTHETIST, CERTIFIED REGISTERED

## 2022-08-06 PROCEDURE — 3600000014 HC SURGERY LEVEL 4 ADDTL 15MIN: Performed by: OTOLARYNGOLOGY

## 2022-08-06 PROCEDURE — 2580000003 HC RX 258: Performed by: INTERNAL MEDICINE

## 2022-08-06 PROCEDURE — 99222 1ST HOSP IP/OBS MODERATE 55: CPT | Performed by: INTERNAL MEDICINE

## 2022-08-06 PROCEDURE — 88305 TISSUE EXAM BY PATHOLOGIST: CPT

## 2022-08-06 PROCEDURE — 82947 ASSAY GLUCOSE BLOOD QUANT: CPT

## 2022-08-06 PROCEDURE — 2720000010 HC SURG SUPPLY STERILE: Performed by: OTOLARYNGOLOGY

## 2022-08-06 RX ORDER — ONDANSETRON 4 MG/1
4 TABLET, ORALLY DISINTEGRATING ORAL EVERY 8 HOURS PRN
Status: DISCONTINUED | OUTPATIENT
Start: 2022-08-06 | End: 2022-08-07 | Stop reason: HOSPADM

## 2022-08-06 RX ORDER — ONDANSETRON 2 MG/ML
INJECTION INTRAMUSCULAR; INTRAVENOUS PRN
Status: DISCONTINUED | OUTPATIENT
Start: 2022-08-06 | End: 2022-08-06 | Stop reason: SDUPTHER

## 2022-08-06 RX ORDER — GLYCOPYRROLATE 1 MG/5 ML
SYRINGE (ML) INTRAVENOUS PRN
Status: DISCONTINUED | OUTPATIENT
Start: 2022-08-06 | End: 2022-08-06 | Stop reason: SDUPTHER

## 2022-08-06 RX ORDER — SODIUM CHLORIDE 0.9 % (FLUSH) 0.9 %
5-40 SYRINGE (ML) INJECTION PRN
Status: DISCONTINUED | OUTPATIENT
Start: 2022-08-06 | End: 2022-08-06 | Stop reason: HOSPADM

## 2022-08-06 RX ORDER — SODIUM CHLORIDE, SODIUM LACTATE, POTASSIUM CHLORIDE, CALCIUM CHLORIDE 600; 310; 30; 20 MG/100ML; MG/100ML; MG/100ML; MG/100ML
INJECTION, SOLUTION INTRAVENOUS CONTINUOUS PRN
Status: DISCONTINUED | OUTPATIENT
Start: 2022-08-06 | End: 2022-08-06 | Stop reason: SDUPTHER

## 2022-08-06 RX ORDER — ONDANSETRON 2 MG/ML
4 INJECTION INTRAMUSCULAR; INTRAVENOUS
Status: DISCONTINUED | OUTPATIENT
Start: 2022-08-06 | End: 2022-08-06 | Stop reason: HOSPADM

## 2022-08-06 RX ORDER — KETAMINE HYDROCHLORIDE 10 MG/ML
INJECTION, SOLUTION INTRAMUSCULAR; INTRAVENOUS PRN
Status: DISCONTINUED | OUTPATIENT
Start: 2022-08-06 | End: 2022-08-06 | Stop reason: SDUPTHER

## 2022-08-06 RX ORDER — DIPHENHYDRAMINE HYDROCHLORIDE 50 MG/ML
12.5 INJECTION INTRAMUSCULAR; INTRAVENOUS
Status: DISCONTINUED | OUTPATIENT
Start: 2022-08-06 | End: 2022-08-06 | Stop reason: HOSPADM

## 2022-08-06 RX ORDER — FENTANYL CITRATE 50 UG/ML
INJECTION, SOLUTION INTRAMUSCULAR; INTRAVENOUS PRN
Status: DISCONTINUED | OUTPATIENT
Start: 2022-08-06 | End: 2022-08-06 | Stop reason: SDUPTHER

## 2022-08-06 RX ORDER — LIDOCAINE HYDROCHLORIDE 40 MG/ML
SOLUTION TOPICAL PRN
Status: DISCONTINUED | OUTPATIENT
Start: 2022-08-06 | End: 2022-08-06 | Stop reason: ALTCHOICE

## 2022-08-06 RX ORDER — INSULIN LISPRO 100 [IU]/ML
0.08 INJECTION, SOLUTION INTRAVENOUS; SUBCUTANEOUS EVERY 6 HOURS
Status: DISCONTINUED | OUTPATIENT
Start: 2022-08-06 | End: 2022-08-07 | Stop reason: HOSPADM

## 2022-08-06 RX ORDER — DEXTROSE MONOHYDRATE 100 MG/ML
INJECTION, SOLUTION INTRAVENOUS CONTINUOUS PRN
Status: DISCONTINUED | OUTPATIENT
Start: 2022-08-06 | End: 2022-08-07 | Stop reason: HOSPADM

## 2022-08-06 RX ORDER — ONDANSETRON 2 MG/ML
4 INJECTION INTRAMUSCULAR; INTRAVENOUS EVERY 6 HOURS PRN
Status: DISCONTINUED | OUTPATIENT
Start: 2022-08-06 | End: 2022-08-07 | Stop reason: HOSPADM

## 2022-08-06 RX ORDER — PROPOFOL 10 MG/ML
INJECTION, EMULSION INTRAVENOUS PRN
Status: DISCONTINUED | OUTPATIENT
Start: 2022-08-06 | End: 2022-08-06 | Stop reason: SDUPTHER

## 2022-08-06 RX ORDER — FENTANYL CITRATE 50 UG/ML
25 INJECTION, SOLUTION INTRAMUSCULAR; INTRAVENOUS EVERY 5 MIN PRN
Status: DISCONTINUED | OUTPATIENT
Start: 2022-08-06 | End: 2022-08-06 | Stop reason: HOSPADM

## 2022-08-06 RX ORDER — MORPHINE SULFATE 2 MG/ML
2 INJECTION, SOLUTION INTRAMUSCULAR; INTRAVENOUS EVERY 5 MIN PRN
Status: DISCONTINUED | OUTPATIENT
Start: 2022-08-06 | End: 2022-08-06 | Stop reason: HOSPADM

## 2022-08-06 RX ORDER — ENOXAPARIN SODIUM 100 MG/ML
40 INJECTION SUBCUTANEOUS DAILY
Status: DISCONTINUED | OUTPATIENT
Start: 2022-08-06 | End: 2022-08-07 | Stop reason: HOSPADM

## 2022-08-06 RX ORDER — LIDOCAINE HYDROCHLORIDE AND EPINEPHRINE 10; 10 MG/ML; UG/ML
INJECTION, SOLUTION INFILTRATION; PERINEURAL PRN
Status: DISCONTINUED | OUTPATIENT
Start: 2022-08-06 | End: 2022-08-06 | Stop reason: HOSPADM

## 2022-08-06 RX ORDER — NICOTINE 21 MG/24HR
1 PATCH, TRANSDERMAL 24 HOURS TRANSDERMAL EVERY 24 HOURS
Status: DISCONTINUED | OUTPATIENT
Start: 2022-08-06 | End: 2022-08-07 | Stop reason: HOSPADM

## 2022-08-06 RX ORDER — MIDAZOLAM HYDROCHLORIDE 1 MG/ML
INJECTION INTRAMUSCULAR; INTRAVENOUS PRN
Status: DISCONTINUED | OUTPATIENT
Start: 2022-08-06 | End: 2022-08-06 | Stop reason: SDUPTHER

## 2022-08-06 RX ORDER — POTASSIUM CHLORIDE 7.45 MG/ML
10 INJECTION INTRAVENOUS PRN
Status: DISCONTINUED | OUTPATIENT
Start: 2022-08-06 | End: 2022-08-07 | Stop reason: HOSPADM

## 2022-08-06 RX ORDER — ACETAMINOPHEN 325 MG/1
650 TABLET ORAL EVERY 6 HOURS PRN
Status: DISCONTINUED | OUTPATIENT
Start: 2022-08-06 | End: 2022-08-07 | Stop reason: HOSPADM

## 2022-08-06 RX ORDER — ALBUTEROL SULFATE 90 UG/1
2 AEROSOL, METERED RESPIRATORY (INHALATION) EVERY 4 HOURS PRN
Status: DISCONTINUED | OUTPATIENT
Start: 2022-08-06 | End: 2022-08-07 | Stop reason: HOSPADM

## 2022-08-06 RX ORDER — PANTOPRAZOLE SODIUM 40 MG/1
40 TABLET, DELAYED RELEASE ORAL DAILY
Status: DISCONTINUED | OUTPATIENT
Start: 2022-08-06 | End: 2022-08-07 | Stop reason: HOSPADM

## 2022-08-06 RX ORDER — POTASSIUM CHLORIDE 20 MEQ/1
40 TABLET, EXTENDED RELEASE ORAL PRN
Status: DISCONTINUED | OUTPATIENT
Start: 2022-08-06 | End: 2022-08-07 | Stop reason: HOSPADM

## 2022-08-06 RX ORDER — SODIUM CHLORIDE 0.9 % (FLUSH) 0.9 %
5-40 SYRINGE (ML) INJECTION EVERY 12 HOURS SCHEDULED
Status: DISCONTINUED | OUTPATIENT
Start: 2022-08-06 | End: 2022-08-06 | Stop reason: HOSPADM

## 2022-08-06 RX ORDER — MAGNESIUM SULFATE 1 G/100ML
1000 INJECTION INTRAVENOUS PRN
Status: DISCONTINUED | OUTPATIENT
Start: 2022-08-06 | End: 2022-08-07 | Stop reason: HOSPADM

## 2022-08-06 RX ORDER — SODIUM CHLORIDE 0.9 % (FLUSH) 0.9 %
5-40 SYRINGE (ML) INJECTION EVERY 12 HOURS SCHEDULED
Status: DISCONTINUED | OUTPATIENT
Start: 2022-08-06 | End: 2022-08-07 | Stop reason: HOSPADM

## 2022-08-06 RX ORDER — DEXAMETHASONE SODIUM PHOSPHATE 10 MG/ML
INJECTION INTRAMUSCULAR; INTRAVENOUS PRN
Status: DISCONTINUED | OUTPATIENT
Start: 2022-08-06 | End: 2022-08-06 | Stop reason: SDUPTHER

## 2022-08-06 RX ORDER — POLYETHYLENE GLYCOL 3350 17 G/17G
17 POWDER, FOR SOLUTION ORAL DAILY PRN
Status: DISCONTINUED | OUTPATIENT
Start: 2022-08-06 | End: 2022-08-07 | Stop reason: HOSPADM

## 2022-08-06 RX ORDER — SODIUM CHLORIDE 0.9 % (FLUSH) 0.9 %
10 SYRINGE (ML) INJECTION PRN
Status: DISCONTINUED | OUTPATIENT
Start: 2022-08-06 | End: 2022-08-07 | Stop reason: HOSPADM

## 2022-08-06 RX ORDER — ACETAMINOPHEN 650 MG/1
650 SUPPOSITORY RECTAL EVERY 6 HOURS PRN
Status: DISCONTINUED | OUTPATIENT
Start: 2022-08-06 | End: 2022-08-07 | Stop reason: HOSPADM

## 2022-08-06 RX ORDER — SODIUM CHLORIDE 9 MG/ML
INJECTION, SOLUTION INTRAVENOUS PRN
Status: DISCONTINUED | OUTPATIENT
Start: 2022-08-06 | End: 2022-08-06 | Stop reason: HOSPADM

## 2022-08-06 RX ORDER — ROCURONIUM BROMIDE 10 MG/ML
INJECTION, SOLUTION INTRAVENOUS PRN
Status: DISCONTINUED | OUTPATIENT
Start: 2022-08-06 | End: 2022-08-06 | Stop reason: SDUPTHER

## 2022-08-06 RX ORDER — SODIUM CHLORIDE 9 MG/ML
INJECTION, SOLUTION INTRAVENOUS PRN
Status: DISCONTINUED | OUTPATIENT
Start: 2022-08-06 | End: 2022-08-07 | Stop reason: HOSPADM

## 2022-08-06 RX ADMIN — KETAMINE HYDROCHLORIDE 10 MG: 10 INJECTION INTRAMUSCULAR; INTRAVENOUS at 14:21

## 2022-08-06 RX ADMIN — MIDAZOLAM HYDROCHLORIDE 2 MG: 2 INJECTION, SOLUTION INTRAMUSCULAR; INTRAVENOUS at 14:14

## 2022-08-06 RX ADMIN — FENTANYL CITRATE 25 MCG: 50 INJECTION, SOLUTION INTRAMUSCULAR; INTRAVENOUS at 14:14

## 2022-08-06 RX ADMIN — SODIUM CHLORIDE, POTASSIUM CHLORIDE, SODIUM LACTATE AND CALCIUM CHLORIDE: 600; 310; 30; 20 INJECTION, SOLUTION INTRAVENOUS at 14:51

## 2022-08-06 RX ADMIN — ROCURONIUM BROMIDE 50 MG: 10 INJECTION, SOLUTION INTRAVENOUS at 14:42

## 2022-08-06 RX ADMIN — ACETAMINOPHEN 650 MG: 325 TABLET ORAL at 17:39

## 2022-08-06 RX ADMIN — ONDANSETRON 4 MG: 2 INJECTION INTRAMUSCULAR; INTRAVENOUS at 15:16

## 2022-08-06 RX ADMIN — SODIUM CHLORIDE, POTASSIUM CHLORIDE, SODIUM LACTATE AND CALCIUM CHLORIDE: 600; 310; 30; 20 INJECTION, SOLUTION INTRAVENOUS at 13:52

## 2022-08-06 RX ADMIN — SUGAMMADEX 350 MG: 100 INJECTION, SOLUTION INTRAVENOUS at 15:11

## 2022-08-06 RX ADMIN — DEXAMETHASONE SODIUM PHOSPHATE 10 MG: 10 INJECTION INTRAMUSCULAR; INTRAVENOUS at 14:36

## 2022-08-06 RX ADMIN — KETAMINE HYDROCHLORIDE 50 MG: 10 INJECTION INTRAMUSCULAR; INTRAVENOUS at 14:23

## 2022-08-06 RX ADMIN — KETAMINE HYDROCHLORIDE 10 MG: 10 INJECTION INTRAMUSCULAR; INTRAVENOUS at 14:16

## 2022-08-06 RX ADMIN — ROCURONIUM BROMIDE 50 MG: 10 INJECTION, SOLUTION INTRAVENOUS at 14:29

## 2022-08-06 RX ADMIN — FENTANYL CITRATE 50 MCG: 50 INJECTION, SOLUTION INTRAMUSCULAR; INTRAVENOUS at 14:22

## 2022-08-06 RX ADMIN — KETAMINE HYDROCHLORIDE 50 MG: 10 INJECTION INTRAMUSCULAR; INTRAVENOUS at 14:25

## 2022-08-06 RX ADMIN — KETAMINE HYDROCHLORIDE 20 MG: 10 INJECTION INTRAMUSCULAR; INTRAVENOUS at 14:15

## 2022-08-06 RX ADMIN — SODIUM CHLORIDE, PRESERVATIVE FREE 10 ML: 5 INJECTION INTRAVENOUS at 11:34

## 2022-08-06 RX ADMIN — HYDRALAZINE HYDROCHLORIDE 10 MG: 20 INJECTION INTRAMUSCULAR; INTRAVENOUS at 11:33

## 2022-08-06 RX ADMIN — KETAMINE HYDROCHLORIDE 10 MG: 10 INJECTION INTRAMUSCULAR; INTRAVENOUS at 14:18

## 2022-08-06 RX ADMIN — FENTANYL CITRATE 25 MCG: 50 INJECTION, SOLUTION INTRAMUSCULAR; INTRAVENOUS at 14:19

## 2022-08-06 RX ADMIN — PROPOFOL 2 MG: 10 INJECTION, EMULSION INTRAVENOUS at 14:15

## 2022-08-06 RX ADMIN — INSULIN LISPRO 7 UNITS: 100 INJECTION, SOLUTION INTRAVENOUS; SUBCUTANEOUS at 18:30

## 2022-08-06 RX ADMIN — Medication 0.1 MG: at 14:17

## 2022-08-06 RX ADMIN — SODIUM CHLORIDE, PRESERVATIVE FREE 10 ML: 5 INJECTION INTRAVENOUS at 09:47

## 2022-08-06 RX ADMIN — PANTOPRAZOLE SODIUM 40 MG: 40 TABLET, DELAYED RELEASE ORAL at 17:31

## 2022-08-06 RX ADMIN — PROPOFOL 120 MCG/KG/MIN: 10 INJECTION, EMULSION INTRAVENOUS at 14:14

## 2022-08-06 ASSESSMENT — PAIN SCALES - GENERAL
PAINLEVEL_OUTOF10: 4
PAINLEVEL_OUTOF10: 6
PAINLEVEL_OUTOF10: 0

## 2022-08-06 ASSESSMENT — PAIN DESCRIPTION - LOCATION
LOCATION: HEAD
LOCATION: EAR

## 2022-08-06 ASSESSMENT — PAIN DESCRIPTION - DESCRIPTORS: DESCRIPTORS: ACHING

## 2022-08-06 ASSESSMENT — ENCOUNTER SYMPTOMS
STRIDOR: 1
SORE THROAT: 1
SHORTNESS OF BREATH: 1
WHEEZING: 0
VOMITING: 0
PHOTOPHOBIA: 0
VOICE CHANGE: 1
NAUSEA: 0
ABDOMINAL DISTENTION: 0
DYSPNEA ACTIVITY LEVEL: AFTER AMBULATING 1 FLIGHT OF STAIRS
BLOOD IN STOOL: 0
COUGH: 0

## 2022-08-06 ASSESSMENT — PAIN - FUNCTIONAL ASSESSMENT: PAIN_FUNCTIONAL_ASSESSMENT: NONE - DENIES PAIN

## 2022-08-06 ASSESSMENT — LIFESTYLE VARIABLES: SMOKING_STATUS: 1

## 2022-08-06 ASSESSMENT — PAIN DESCRIPTION - ORIENTATION
ORIENTATION: RIGHT
ORIENTATION: ANTERIOR

## 2022-08-06 NOTE — CONSULTS
CONSULTING SERVICE: Otolaryngology-Head and Neck Surgery    REASON FOR CONSULT:  Chuck Martínez is a 57 y.o. female seen today for Hoarseness    HISTORY OF PRESENT ILLNESS:   57year old female admitted from an outside facility for voice change and concern for a laryngeal mass. She reports that she has always had a \"gravely\" voice, but that about 10 days ago, she developed congestion, fatigue, sore throat, and worsening hoarseness. She was seen in the ED and treated with Augmentin for a sinus infection. She reports that she had minimal improvement in symptoms with the antibiotic and returned today after developing significant dyspnea and trouble catching her breath over the past 2 days. She could not sleep last night. She reports that she can lie flat but cannot sleep because she feels like she cannot breathe when she is sleeping. She denies difficulty swallowing solids. Over the past several years, she has had more trouble with coughing when she is drinking liquids. She has some discomfort and soreness over her thyroid cartilage. She has smoked 1-2 packs of cigarettes a day for the past 40 years. She quit with this current illness. She denies use of alcohol. She was treated with a breathing treatment and IV steroid at the outside ED and feels this did help her breathing.     REVIEW OF SYSTEMS:   General ROS: positive for - sleep disturbance and fatigue  Psychological ROS: negative  Ophthalmic ROS: negative for - blurry vision or loss of vision  ENT ROS: as noted in HPI  Allergy and Immunology ROS: negative for - hives or nasal congestion  Hematological and Lymphatic ROS: negative for - bleeding problems or bruising  Endocrine ROS: negative for - temperature intolerance or unexpected weight changes  Respiratory ROS: positive for - cough and shortness of breath  Cardiovascular ROS: no chest pain  Gastrointestinal ROS: \"no abdominal pain, diarrhea, tarry stools, change in bowel habit  Musculoskeletal ROS: negative for - gait disturbance or joint swelling    PAST HISTORY:   Past Medical History:   Diagnosis Date    Allergic rhinitis     Arthritis     Asthma     Bronchitis     COPD (chronic obstructive pulmonary disease) ([facility] [place] [address]     Depression     Diabetes mellitus ([facility] [place] [address]     Headache     Hyperlipidemia     Hypertension     Incontinence     Irritable bowel syndrome     Kidney stone     Osteoarthritis     Pneumonia     Type 2 diabetes mellitus without complication ([facility] [place] [address] 2125    Ulcer      Past Surgical History:   Procedure Laterality Date    CARPAL TUNNEL RELEASE Right 5/7/2019    Right Carpal Tunnel Release performed by Reagan Aj MD at [address] [facility] [facility]  7/11/208    Serated polyp (1)    COLONOSCOPY  08/02/2017    [id number]    COLONOSCOPY Left 10/8/2019    [facility]SY [facility] [address] ([address])  1986    with Right oopherectomy still has left ovary    LAPAROSCOPY  2008    Diagnostic for pain - no findings    MECKEL DIVERTICULUM EXCISION  1970    PAIN MANAGEMENT PROCEDURE N/A 7/8/2021    L4-L5 INTERLAMINAR JEAN performed by Maty Milligan MD at [address]  08/02/2017    [id number]    UPPER GASTROINTESTINAL ENDOSCOPY Left 10/8/2019    EGD BIOPSY performed by Doug Coburn MD at [facility]     Family History   Adopted: Yes   Family history unknown: Yes      Social Connections: Not on file        MEDICATIONS:   Current Facility-Administered Medications   Medication Dose Route Frequency Provider Last Rate Last Admin    hydrALAZINE (APRESOLINE) injection 10 mg  10 mg IntraVENous Q6H PRN ETHAN Solomon - CNP            ALLERGIES:   Allergies   Allergen Reactions    Pcn [Penicillins] Anaphylaxis     \"can't breath\"    Sulfa Antibiotics Anaphylaxis     \"can't breathe\"    Metformin And Related Other (See Comments)     Sweating and diarrhea        PHYSICAL EXAM:  Vitals:    08/05/22 2348   BP: (!) 151/92   Pulse: (!) 123   Temp: 98.6 °F (37 °C)   TempSrc: Oral   SpO2: 92%      GENERAL: well developed and well nourished and in no acute distress  HEAD: normocephalic and atraumatic  EYES: no eyelid swelling, no conjunctival injection or exudate, pupils equal round and reactive to light  EXTERNAL EARS: normal  NOSE: nares patent, normal mucosa and septum deviated  to the right with a bony spur  MOUTH/THROAT: mucous membranes moist, no focal lesions, and tonsils are absent  NECK:  There is no focal adenopathy or neck mass noted. There is some tenderness to palpation of the thyroid cartilage. There is mild fullness over the bilateral parotid glands, but no discrete mass. RESPIRATORY: Biphasic stridor, more notable with deep inspiration and quiet with quiet respiration  NEUROLOGICAL:  cranial nerves II-XII are grossly intact    RELEVANT LABS/STUDIES:  CT Neck:  Soft tissue lesion noted involving the level of the glottis and extending into the subglottis. The airway is otherwise widely patent. There is a left large thyroid nodule, measuring 2.5 cm in greatest dimension  Bilaterally, the parotid glands are mildly enlarged without any discrete mass  No significant lymphadenopathy is noted. PROCEDURE:  Flexible fiberoptic nasopharyngolaryngoscopy    DATE AND TIME OF PROCEDURE: 8/6/2022 1:17 AM    SURGEON:  Nancy Valentine MD     ASSISTANT SURGEON: None    SCOPE USED: Disposable [employer]    INDICATION(S): visualization of nasopharynx and larynx    TEACHING: Procedure, benefits, and risks were explained to the parent/guardian Consent obtained. TIME OUT: A time out was conducted immediately before starting the procedure that confirmed a final verification of the correct patient, correct procedure, correct patient position, correct site and availability of special equipment.     SITE: Nose, Nasopharynx, Oropharynx, Hypopharynx, Larynx    PROCEDURAL ANALGESIA/ANESTHESIA: no local anesthesia    PROCEDURE: Scope advanced through the bilateral nostril(s) to sequentially examine the nasopharynx, palate, oropharynx, base of tongue, epiglottis, larynx, hypopharynx, and piriform sinuses. TOLERANCE: Good    COMPLICATIONS: none    ESTIMATED BLOOD LOSS: none    PATHOLOGIC SPECIMEN: none    FINDINGS:   Nasal cavity:     Right:     Patent: Yes   Masses:  No   Posterior turbinate hypertrophy:  No   Septal deviation: Yes       Left:    Patent: Yes   Masses:  No   Posterior turbinate hypertrophy:  No   Septal deviation:  No    Nasopharynx:      Mucosa:     normal Eustachian Tube:  clear     Inflammation: none     Adenoids: small     Masses: No       Palate and oropharynx:     Palatal movement: normal     Tonsils: nonobstructive     Lingual tonsil tissue: is not hypertrophic     Base of tongue: No masses, lesions, or mucosal abnormality     Vallecula:  No masses, lesions, or mucosal abnormality    Larynx and Hypopharynx:     Hypopharynx: No edema, Moderate erythema, No cobblestoning     Epiglottis: normal     Arytenoids: normal     Aryepiglottic folds:normal     Vocal Cords: The movement of the vocal folds is normal. There is a pedunculated partially obstructing mass that appears to be originating from the right vocal fold that is ball-valving in and out   of the glottic opening with respiration. The subglottis is not well seen     Pyriform Sinuses: patent with no masses, lesions, or visualized tracts    EVALUATION OF SWALLOWING:    not indicated    IMPRESSION:  Right vocal fold mass      IMPRESSION:  Dysphonia  Dyspnea- new onset  Laryngeal mass  Left Thyroid nodule    RECOMMENDATIONS/PLAN:  The differential diagnosis for this mass includes neoplasm, papilloma or infection. I recommended direct laryngoscopy with excision of the lesion, bronchoscopy, and possible tracheostomy, pending the status of the subglottic airway.  I discussed the risks, benefits, and alternatives of direct laryngoscopy and bronchoscopy with Ms. Naqvi and her . Risks include, but are not limited to: anesthesia, bleeding, pain, infection, need for further treatment or surgery, continued symptoms, damage or scarring of the larynx or trachea, loss of the airway, damage to the teeth, voice change, and other unforeseen risks. They would like to proceed with surgery as recommended. It has been added for the OR in the morning. The left thyroid nodule should also be evaluated, however thyroid ultrasound  and needle biopsy can be performed as an outpatient once the airway has been established.     Surgery planned: Direct laryngoscopy, with excisional biopsy, Bronchoscopy, Tracheostomy  Disposition: Inpatient  Follow up: to be determined at the time of surgery    --------------------------------------------------  Debi Hays MD  Pediatric Otolaryngology-Head and Neck [address] [facility]    Office  ph# [phone]    Also available in [facility]

## 2022-08-06 NOTE — PROGRESS NOTES
Physical Therapy        Physical Therapy Cancel Note      DATE: 2022    NAME: Symone Toledo  MRN: [id number]   : 1960      Patient not seen this date for Physical Therapy due to:    Surgery/Procedure: in surgery      Electronically signed by Marina Du PT on 2022 at 1:25 PM

## 2022-08-06 NOTE — PROGRESS NOTES
Pt  notified writer of nicotine patch on patient's shoulder. Writer notified anesthesia and was told to leave it there for surgery.

## 2022-08-06 NOTE — H&P
[facility]  Office: [address], DO, Cris Lenz, DO, Mauro Dry, DO, Fior Sargentu Blood, DO, Carmen Alston MD, Layne Fowler MD, oNe Scott MD, Blaise Kidd MD,  Nikko Edge MD, Aury Yun MD, Gunjan Vines, DO, Zechariah Murphy MD,  Marleen Cary MD, Casper Flores MD, Héctor Flynn DO, Jesus Dave MD, Alexy Kessler MD, Danuta Queen MD, Claudell Orion, DO, Eddy Lipscomb MD, Sherron Bro MD, Teresa Hoff, CNP,  Thompson Peterson, CNP, Clara Resendiz, CNP, Carlitos Birmingham, CNP, Haydee Raygoza PA-C, Gudelia Hartmann, [facility], Alejandro Stubbs, CNP, Berlinda Boast, CNP, Letty Gomez, CNP, Asha Harrell, CNP, Mandi Islas, CNP, Davian Adam, CNS, Juani Cagle, [facility], Carlton Swanson, CNP, Loretta Addison, CNP, Kanu Walker, CNP           IN-PATIENT SERVICE  History and Physical  [facility]          Name: Mateus Norman  MRN: [id number]     Acct: [de-identified]  Room: [facility]-    Admit Date: 8/5/2022  PCP: Karena Rodríguez MD    Chief Complaint:  Laryngeal mass     History of Present Illness:  Mateus Norman is a 57 y.o.  female who presents with laryngeal mass from [facility]    Patient was admitted thru ER with:  \" Mateus Norman is a 57 y.o. female who presents with a chief complaint of shortness of breath she says is been going on for over a week she was seen in urgent care with shortness of breath and sore throat they placed her on doxycycline and steroids she said she still has a real hoarse voice and feels active swelling in her throat she is finished her steroid course and she still has doxycycline left. Is been no nausea vomiting she is a smoker with a history of COPD\"     The patient reports her problems began approximately 10 days ago with what she thought was an upper respiratory tract infection.   She developed sinus congestion and a sore throat and hoarseness  Patient was seen and urgent care facility and provided with a course of antibiotics  When her symptoms continued and she developed increasing shortness of breath and stridor she presented to the emergency room    CT has revealed:  Neck soft tissues:   15 mm polypoid mass lesion arising anterior aspect of the sub glottis causing moderate narrowing of the laryngeal airways. The area is amenable to direct visualization for further evaluation. Multiple left-sided thyroid nodules measuring 2.6 and 2.7 cm. For further evaluation thyroid ultrasound is recommended. Chest CT angiogram:   No pulmonary embolism. No acute pulmonary findings. Unchanged 2.8 cm right adrenal adenoma. The patient has been admitted  ENT consulted    Laryngoscopy has revealed:  Nasal cavity:     Right:                Patent: Yes              Masses:  No              Posterior turbinate hypertrophy:  No              Septal deviation: Yes       Left:              Patent: Yes              Masses:  No              Posterior turbinate hypertrophy:  No              Septal deviation:  No   Nasopharynx:     Mucosa:    normal Eustachian Tube:  clear     Inflammation: none     Adenoids: small     Masses: No     Palate and oropharynx:     Palatal movement: normal     Tonsils: nonobstructive     Lingual tonsil tissue: is not hypertrophic     Base of tongue: No masses, lesions, or mucosal abnormality     Vallecula:  No masses, lesions, or mucosal abnormality   Larynx and Hypopharynx:     Hypopharynx: No edema, Moderate erythema, No cobblestoning     Epiglottis: normal     Arytenoids: normal     Aryepiglottic folds:normal     Vocal Cords: The movement of the vocal folds is normal. There is a pedunculated partially obstructing mass that appears to be originating from the right vocal fold that is ball-valving in and out   of the glottic opening with respiration.  The subglottis is not well seen     Pyriform Sinuses: patent with no masses, lesions, or visualized tracts    Patient has a fairly long Home medication list many of which have been listed is no longer taking  The patient reports that due to lack of insurance coverage she has not been taking medications due to financial difficulties    PMHx:  Past Medical History:   Diagnosis Date    Allergic rhinitis     Arthritis     Asthma     Bronchitis     COPD (chronic obstructive pulmonary disease) ([facility]     Depression     Diabetes mellitus ([facility]     Headache     Hyperlipidemia     Hypertension     Incontinence     Irritable bowel syndrome     Kidney stone     Osteoarthritis     Pneumonia     Type 2 diabetes mellitus without complication ([facility] 0497    Ulcer         PSHx:  Past Surgical History:   Procedure Laterality Date    CARPAL TUNNEL RELEASE Right 5/7/2019    Right Carpal Tunnel Release performed by Otto Roberts MD at [address]  7/11/208    Serated polyp (1)    COLONOSCOPY  08/02/2017    [id number]    COLONOSCOPY Left 10/8/2019    COLONOSCOPY WITH BIOPSY performed by Jessica Ham MD at [address] [facility], [address] ([address])  1986    with Right oopherectomy still has left ovary    LAPAROSCOPY  2008    Diagnostic for pain - no findings    MECKEL DIVERTICULUM EXCISION  1970    PAIN MANAGEMENT PROCEDURE N/A 7/8/2021    L4-L5 INTERLAMINAR JEAN performed by Chelsy Gtz MD at [facility]  08/02/2017    [id number]    UPPER GASTROINTESTINAL ENDOSCOPY Left 10/8/2019    EGD BIOPSY performed by Jessica Ham MD at [address]:  Prior to Admission medications    Medication Sig Start Date End Date Taking? Authorizing Provider   venlafaxine (EFFEXOR XR) 150 MG extended release capsule TAKE 1 CAPSULE BY MOUTH ONE TIME A DAY 7/29/22   Eugene Wood MD   doxycycline hyclate (VIBRA-TABS) 100 MG tablet Take 1 tablet by mouth in the morning and 1 tablet before bedtime. Do all this for 10 days.   Patient not taking: Reported on 8/6/2022 7/29/22 8/8/22  ETHAN Polanco CNP   fluconazole (DIFLUCAN) 150 MG tablet Take 1 tab now then repeat in 72 hours. Patient not taking: Reported on 8/6/2022 7/29/22   ETHAN Polanco CNP   empagliflozin (JARDIANCE) 10 MG tablet Take 1 tablet by mouth daily  Patient not taking: No sig reported 9/21/21   Frankey Delaware, MD   nicotine (NICODERM CQ) 21 MG/24HR Place 1 patch onto the skin every 24 hours 9/20/21 9/20/22  Frankey Delaware, MD   buPROPion [facility]) 75 MG tablet Take 1 tablet by mouth 2 times daily  Patient not taking: Reported on 7/29/2022 9/20/21   Frankey Delaware, MD   triamcinolone (KENALOG) 0.1 % ointment Apply topically 2 times daily as needed (itching or severe dry skin)  Patient not taking: No sig reported 9/20/21   Frankey Delaware, MD   Multiple Vitamin (MULTI-VITAMIN DAILY PO) Take by mouth  Patient not taking: Reported on 8/6/2022    Historical Provider, MD   albuterol sulfate HFA (PROVENTIL HFA) 108 (90 Base) MCG/ACT inhaler Inhale 2 puffs into the lungs every 4 hours as needed for Wheezing 6/14/21   Frankey Delaware, MD   Misc. Devices [facility]) MISC Use daily  Patient not taking: Reported on 8/6/2022 6/14/21   Frankey Delaware, MD   fluticasone [facility]) 50 MCG/ACT nasal spray 2 sprays by Nasal route daily  Patient not taking: Reported on 8/6/2022 4/30/21   Frankey Delaware, MD   gabapentin (NEURONTIN) 300 MG capsule Take 1 capsule by mouth 3 times daily for 60 days. Intended supply: 30 days  Patient taking differently: Take 300 mg by mouth 3 times daily.  Intended supply: 30 days  Taking PRN 4/30/21 9/2/21  Frankey Delaware, MD   montelukast (SINGULAIR) 10 MG tablet Take 1 tablet by mouth nightly  Patient not taking: No sig reported 4/30/21   Frankey Delaware, MD   baclofen (LIORESAL) 10 MG tablet Take 1 tablet by mouth nightly as needed (muscle spasms)  Patient not taking: Reported on 8/6/2022 3/16/21   Frankey Delaware, MD   pantoprazole (PROTONIX) 40 MG tablet Take 1 tablet by mouth daily 21   Neri Juarez MD   Naproxen Sodium (ALEVE PO) Take by mouth daily as needed    Historical Provider, MD         Allergies:   Pcn [penicillins], Sulfa antibiotics, and Metformin and related    Social Hx:  Tobacco:    reports that she has been smoking cigarettes. She started smoking about 51 years ago. She has a 45.00 pack-year smoking history. She has never used smokeless tobacco.  Alcohol:      reports no history of alcohol use. Drug Use:  reports no history of drug use. Family Hx; Family History   Adopted: Yes   Family history unknown: Yes       ROS:  Review of Systems   Constitutional:  Negative for activity change, appetite change, chills, diaphoresis, fatigue and fever. HENT:  Positive for congestion, sore throat and voice change (Increasing hoarseness). Negative for nosebleeds. Eyes:  Negative for photophobia and visual disturbance. Respiratory:  Positive for shortness of breath and stridor. Negative for cough and wheezing. Cardiovascular:  Negative for chest pain and palpitations. Gastrointestinal:  Negative for abdominal distention, blood in stool, nausea and vomiting. Genitourinary:  Negative for flank pain and hematuria. Musculoskeletal:  Negative for arthralgias and myalgias. Skin:  Negative for rash and wound. Neurological:  Positive for numbness (History of peripheral neuropathy). Negative for dizziness and light-headedness. Physical Exam:  Vitals:  BP (!) 167/87   Pulse 82   Temp 98.2 °F (36.8 °C) (Oral)   Resp 15   Ht 5' 3\" (1.6 m)   Wt 200 lb (90.7 kg)   LMP  (LMP Unknown)   SpO2 92%   BMI 35.43 kg/m²   Temp (24hrs), Av.3 °F (36.8 °C), Min:97.8 °F (36.6 °C), Max:98.6 °F (37 °C)    Physical Exam  Vitals reviewed. Constitutional:       General: She is not in acute distress. Appearance: She is not diaphoretic. HENT:      Head: Normocephalic. Nose: Nose normal.   Eyes:      General: No scleral icterus. % 1 1 - 7 %    Basophils 1 0 - 1 %    Immature Granulocytes 0 0 %    Absolute Mono # 0.94 0.1 - 1.2 k/uL    Absolute Lymph # 6.73 (H) 1.0 - 4.8 k/uL    Segs Absolute 10.65 (H) 1.50 - 8.10 k/uL    Absolute Eos # 0.19 0.0 - 0.4 k/uL    Basophils Absolute 0.19 0.0 - 0.2 k/uL    Absolute Immature Granulocyte 0.00 0.00 - 0.30 k/uL    Morphology Platelet count adequate     Morphology RBC morphology normal.    Brain Natriuretic Peptide    Collection Time: 08/05/22  2:50 PM   Result Value Ref Range    Pro-BNP 91 <300 pg/mL   Comprehensive Metabolic Panel w/ Reflex to MG    Collection Time: 08/05/22  2:50 PM   Result Value Ref Range    Glucose 192 (H) 70 - 99 mg/dL    BUN 14 8 - 23 mg/dL    Creatinine 0.80 0.50 - 0.90 mg/dL    Bun/Cre Ratio 18 9 - 20    Calcium 9.6 8.6 - 10.4 mg/dL    Sodium 134 (L) 135 - 144 mmol/L    Potassium 3.8 3.7 - 5.3 mmol/L    Chloride 95 (L) 98 - 107 mmol/L    CO2 26 20 - 31 mmol/L    Anion Gap 13 9 - 17 mmol/L    Alkaline Phosphatase 128 (H) 35 - 104 U/L    ALT 15 5 - 33 U/L    AST 14 <32 U/L    Total Bilirubin 0.17 (L) 0.3 - 1.2 mg/dL    Total Protein 7.3 6.4 - 8.3 g/dL    Albumin 4.0 3.5 - 5.2 g/dL    Albumin/Globulin Ratio 1.2 1.0 - 2.5    GFR Non-African American >60 >60 mL/min    GFR African American >60 >60 mL/min    GFR Comment         Lactate, Sepsis    Collection Time: 08/05/22  2:50 PM   Result Value Ref Range    Lactic Acid, Sepsis 2.1 (H) 0.5 - 1.9 mmol/L   Culture, Blood 1    Collection Time: 08/05/22  2:50 PM    Specimen: Blood   Result Value Ref Range    Specimen Description . BLOOD [id number]     Culture NO GROWTH 12 HOURS    Troponin    Collection Time: 08/05/22  2:50 PM   Result Value Ref Range    Troponin, High Sensitivity 7 0 - 14 ng/L   D-Dimer, Quantitative    Collection Time: 08/05/22  2:50 PM   Result Value Ref Range    D-Dimer, Quant 0.63 (H) 0.00 - 0.59 mg/L FEU   Culture, Blood 1    Collection Time: 08/05/22  3:00 PM    Specimen: Blood   Result Value Ref Range Specimen Description . BLOOD [id number]     Culture NO GROWTH 12 HOURS    Lactate, Sepsis    Collection Time: 08/05/22  5:00 PM   Result Value Ref Range    Lactic Acid, Sepsis 1.7 0.5 - 1.9 mmol/L       Imaging/Diagnostics:  XR NECK SOFT TISSUE    Result Date: 8/5/2022  Narrowing of the airway at the level of the glottis which may be due to closed glottis at the time of imaging, though clinical correlation is required. CT SOFT TISSUE NECK W CONTRAST    Result Date: 8/5/2022  Neck soft tissues: 15 mm polypoid mass lesion arising anterior aspect of the sub glottis causing moderate narrowing of the laryngeal airways. The area is amenable to direct visualization for further evaluation. Multiple left-sided thyroid nodules measuring 2.6 and 2.7 cm. For further evaluation thyroid ultrasound is recommended. Chest CT angiogram: No pulmonary embolism. No acute pulmonary findings. Unchanged 2.8 cm right adrenal adenoma. RECOMMENDATIONS: Unavailable     XR CHEST PORTABLE    Result Date: 8/5/2022  Minimal focal opacity at the right lung base which may be due to atelectasis, though clinical correlation is required to exclude concern for contributory infection. Recommend imaging follow-up to resolution. CT CHEST PULMONARY EMBOLISM W CONTRAST    Result Date: 8/5/2022  Neck soft tissues: 15 mm polypoid mass lesion arising anterior aspect of the sub glottis causing moderate narrowing of the laryngeal airways. The area is amenable to direct visualization for further evaluation. Multiple left-sided thyroid nodules measuring 2.6 and 2.7 cm. For further evaluation thyroid ultrasound is recommended. Chest CT angiogram: No pulmonary embolism. No acute pulmonary findings. Unchanged 2.8 cm right adrenal adenoma.  RECOMMENDATIONS: Unavailable       Assessment:  Primary Problem  Laryngeal mass    Active Hospital Problems    Diagnosis Date Noted    Multiple thyroid nodules [E04.2] 08/06/2022     Priority: Medium    Adrenal adenoma, right [D35.01] 08/06/2022     Priority: Medium    Obesity (BMI 30-39. 9) [E66.9] 08/06/2022     Priority: Medium    Laryngeal mass [J38.7] 08/05/2022     Priority: Medium    COP[facility]ation [facility] [J44.9] 09/20/2021    Tobacco use [Z72.0] 10/31/2018    Type 2 diabetes mellitus without complication, without long-term current use of insulin ([facility] [E11.9] 07/25/2017       Plan:  Patient has been admitted  ENT evaluation in progress  On-call for laryngoscopy and biopsy, bronchoscopy and possible tracheostomy  N.p.o.  Respiratory Therapy and Bronchodilators prn   Will need thyroid ultrasound arranged at future date  Ongoing surveillance of adrenal adenoma  Glycemic contol - Monitor and control blood sugars  Patient reports she is intolerant to metformin  Risk factor management / weight loss    Smoking cessation / education   DVT prophylaxis   Social service consult to assist with prescription / medical assistance     Patient is admitted as inpatient status because of co-morbidities listed above, severity of signs and symptoms as outlined, requirement for current medical therapies and most importantly because of direct risk to patient if care not provided in a hospital setting. Expected length of stay > 48 hours.      Consultations:   IP CONSULT TO SOCIAL WORK    Electronically signed by Medina Nash DO on 8/6/2022 at 11:53 AM

## 2022-08-06 NOTE — ANESTHESIA PRE PROCEDURE
Department of Anesthesiology  Preprocedure Note       Name:  Lev Lovell   Age:  57 y.o.  :  1960                                          MRN:  [id number]         Date:  2022      Surgeon: Toan Castellanos):  Debi Hays MD    Procedure: Procedure(s):  LARYNGOSCOPY WITH EXCISION OF MASS  BRONCHOSCOPY RIGID  POSSIBLE TRACHEOTOMY    Department of Anesthesiology  Pre-Anesthesia Evaluation/Consultation         Name:  Lev Lovell                                         Age:  57 y.o.   MRN:  [id number]             Medications  Current Facility-Administered Medications   Medication Dose Route Frequency Provider Last Rate Last Admin    albuterol sulfate HFA (PROVENTIL;VENTOLIN;PROAIR) 108 (90 Base) MCG/ACT inhaler 2 puff  2 puff Inhalation Q4H PRN Pilar Greener, DO        nicotine (NICODERM CQ) 21 MG/24HR 1 patch  1 patch TransDERmal Q24H Pilar Greener, DO        pantoprazole (PROTONIX) tablet 40 mg  40 mg Oral Daily Pilar Greener, DO        sodium chloride flush 0.9 % injection 5-40 mL  5-40 mL IntraVENous 2 times per day Pilar Poonam, DO   10 mL at 22 0947    sodium chloride flush 0.9 % injection 10 mL  10 mL IntraVENous PRN Pilar Greener, DO   10 mL at 22 1134    0.9 % sodium chloride infusion   IntraVENous PRN Pilar Greener, DO        potassium chloride (KLOR-CON M) extended release tablet 40 mEq  40 mEq Oral PRN Pilar Greener, DO        Or    potassium bicarb-citric acid (EFFER-K) effervescent tablet 40 mEq  40 mEq Oral PRN Pilar Greener, DO        Or    potassium chloride 10 mEq/100 mL IVPB (Peripheral Line)  10 mEq IntraVENous PRN Pilar Greener, DO        magnesium sulfate 1000 mg in dextrose 5% 100 mL IVPB  1,000 mg IntraVENous PRN Pilar Greener, DO        enoxaparin (LOVENOX) injection 40 mg  40 mg SubCUTAneous Daily Pilar Greener, DO        ondansetron (ZOFRAN-ODT) disintegrating tablet 4 mg  4 mg Oral Q8H PRN Pilar Greener, DO Or    ondansetron (ZOFRAN) injection 4 mg  4 mg IntraVENous Q6H PRN Earlyne Gale, DO        polyethylene glycol [facility]) packet 17 g  17 g Oral Daily PRN Earlyne Gale, DO        acetaminophen (TYLENOL) tablet 650 mg  650 mg Oral Q6H PRN Earlyne Gale, DO        Or    acetaminophen (TYLENOL) suppository 650 mg  650 mg Rectal Q6H PRN Earlyne Gale, DO        glucose chewable tablet 16 g  4 tablet Oral PRN Earlyne Gale, DO        dextrose bolus 10% 125 mL  125 mL IntraVENous PRN Earlyne Gale, DO        Or    dextrose bolus 10% 250 mL  250 mL IntraVENous PRN Earlyne Gale, DO        glucagon (rDNA) injection 1 mg  1 mg SubCUTAneous PRN Earlyne Gale, DO        dextrose 10 % infusion   IntraVENous Continuous PRN Earlyne Gale, DO        insulin lispro (HUMALOG) injection vial 7 Units  0.08 Units/kg SubCUTAneous Q6H Earlyne Gale, DO        hydrALAZINE (APRESOLINE) injection 10 mg  10 mg IntraVENous Q6H PRN Earlyne Gale, DO   10 mg at 08/06/22 1133       Allergies   Allergen Reactions    Pcn [Penicillins] Anaphylaxis     \"can't breath\"    Sulfa Antibiotics Anaphylaxis     \"can't breathe\"    Metformin And Related Other (See Comments)     Sweating and diarrhea     Patient Active Problem List   Diagnosis    Gastroesophageal reflux disease    Irritable bowel syndrome with diarrhea    Chronic bronchitis (HCC)    Type 2 diabetes mellitus without complication, without long-term current use of insulin (HCC)    Depression    Tobacco use    Allergic rhinitis    Carpal tunnel syndrome of right wrist    Spinal stenosis, thoracic region    Cervical spondylosis with myelopathy    Lumbar disc disease with radiculopathy    Bilateral carpal tunnel syndrome    Chronic radicular lumbar pain    Chronic bilateral thoracic back pain    Chronic cervical radiculopathy    Numbness and tingling    Chronic cerebral ischemia    Acquired cerebral atrophy ([facility]    Polyneuropathy, peripheral sensorimotor axonal    H/O fall    Balance problem    Neuropathic pain    Muscle spasm    Positive test for herpes simplex virus (HSV) antibody    Episode of shaking    Flexural eczema    COPD without exacerbation (HCC)    Laryngeal mass    Multiple thyroid nodules    Adrenal adenoma, right    Obesity (BMI 30-39. 9)     Past Medical History:   Diagnosis Date    Allergic rhinitis     Arthritis     Asthma     Bronchitis     COPD (chronic obstructive pulmonary disease) ([facility]     Depression     Diabetes mellitus ([facility]     Headache     Hyperlipidemia     Hypertension     Incontinence     Irritable bowel syndrome     Kidney stone     Osteoarthritis     Pneumonia     Type 2 diabetes mellitus without complication ([facility] [address] [facility] 6906    Ulcer      Past Surgical History:   Procedure Laterality Date    CARPAL TUNNEL RELEASE Right 5/7/2019    Right Carpal Tunnel Release performed by Collin Matson MD at [address] [facility], [address] [facility] COLONOSCOPY  7/11/208    Serated polyp (1)    COLONOSCOPY  08/02/2017    [id number]    COLONOSCOPY Left 10/8/2019    COLONOSCOPY WITH BIOPSY performed by Yuriy Copeland MD at [address] [facility], [address] [facility])  1986    with Right oopherectomy still has left ovary    LAPAROSCOPY  2008    Diagnostic for pain - no findings    MECKEL DIVERTICULUM EXCISION  1970    PAIN MANAGEMENT PROCEDURE N/A 7/8/2021    L4-L5 INTERLAMINAR JEAN performed by Kelly Bhakta MD at [address] [facility]  08/02/2017    [id number]    UPPER GASTROINTESTINAL ENDOSCOPY Left 10/8/2019    EGD BIOPSY performed by Yuriy Copeland MD at [address] [facility]     Social History     Tobacco Use    Smoking status: Every Day     Packs/day: 1.50     Years: 30.00     Pack years: 45.00     Types: Cigarettes     Start date: 1/1/1971    Smokeless tobacco: Never   Vaping Use    Vaping Use: Never used   Substance Use Topics    Alcohol use: No    Drug use: No         Vital Signs (Current)   Vitals:    22 1126   BP: (!) 167/87   Pulse: 82   Resp: 15   Temp: 98.2 °F (36.8 °C)   SpO2: 92%     Vital Signs Statistics (for past 48 hrs)     Temp  Av.3 °F (36.8 °C)  Min: 97.8 °F (36.6 °C)   Min taken time: 22 1436  Max: 98.6 °F (37 °C)   Max taken time: 22 0201  Pulse  Av.1  Min: 80   Min taken time: 22 112  Max: 130   Max taken time: 22 1436  Resp  Av.8  Min: 15   Min taken time: 22 1126  Max: 32   Max taken time: 22 1436  BP  Min: 147/127   Min taken time: 22 0815  Max: 167/87   Max taken time: 22 1126  MAP (mmHg)  Av.3  Min: 103   Min taken time: 22 0942  Max: 122   Max taken time: 22 1800  SpO2  Av.5 %  Min: 92 %   Min taken time: 22 1800  Max: 97 %   Max taken time: 22 0330  BP Readings from Last 3 Encounters:   22 (!) 167/87   22 (!) 157/102   22 138/88       BMI  Body mass index is 35.43 kg/m².     CBC   Lab Results   Component Value Date/Time    WBC 18.7 2022 02:50 PM    RBC 5.54 2022 02:50 PM    HGB 15.9 2022 02:50 PM    HCT 47.1 2022 02:50 PM    MCV 85.0 2022 02:50 PM    RDW 13.1 2022 02:50 PM     2022 02:50 PM       CMP    Lab Results   Component Value Date/Time     2022 02:50 PM    K 3.8 2022 02:50 PM    CL 95 2022 02:50 PM    CO2 26 2022 02:50 PM    BUN 14 2022 02:50 PM    CREATININE 0.80 2022 02:50 PM    GFRAA >60 2022 02:50 PM    LABGLOM >60 2022 02:50 PM    GLUCOSE 192 2022 02:50 PM    PROT 7.3 2022 02:50 PM    CALCIUM 9.6 2022 02:50 PM    BILITOT 0.17 2022 02:50 PM    ALKPHOS 128 2022 02:50 PM    AST 14 2022 02:50 PM    ALT 15 2022 02:50 PM       BMP    Lab Results   Component Value Date/Time     2022 02:50 PM    K 3.8 08/05/2022 02:50 PM    CL 95 08/05/2022 02:50 PM    CO2 26 08/05/2022 02:50 PM    BUN 14 08/05/2022 02:50 PM    CREATININE 0.80 08/05/2022 02:50 PM    CALCIUM 9.6 08/05/2022 02:50 PM    GFRAA >60 08/05/2022 02:50 PM    LABGLOM >60 08/05/2022 02:50 PM    GLUCOSE 192 08/05/2022 02:50 PM       POC Testing  No results for input(s): POCGLU, POCNA, POCK, POCCL, POCBUN, POCHEMO, POCHCT in the last 72 hours. [facility]    Lab Results   Component Value Date/Time    PROTIME 10.8 01/15/2017 07:41 PM    INR 1.0 01/15/2017 07:41 PM    APTT 27.1 01/15/2017 07:41 PM       HCG (If Applicable) No results found for: PREGTESTUR, PREGSERUM, HCG, HCGQUANT     ABGs No results found for: PHART, PO2ART, ZOY6CUB, RTY8AYP, BEART, X5CIMSTR     Type & Screen (If Applicable)  No results found for: LABABO, [address]    Radiology (If Applicable)    Cardiac Testing (If Applicable)     EKG (If Applicable) ST,,septal MI,biatrial enlargement          Medications prior to admission:   Prior to Admission medications    Medication Sig Start Date End Date Taking? Authorizing Provider   venlafaxine (EFFEXOR XR) 150 MG extended release capsule TAKE 1 CAPSULE BY MOUTH ONE TIME A DAY 7/29/22   Karena Rodríguez MD   doxycycline hyclate (VIBRA-TABS) 100 MG tablet Take 1 tablet by mouth in the morning and 1 tablet before bedtime. Do all this for 10 days. Patient not taking: Reported on 8/6/2022 7/29/22 8/8/22  Clearance ETHAN Campos CNP   fluconazole (DIFLUCAN) 150 MG tablet Take 1 tab now then repeat in 72 hours.   Patient not taking: Reported on 8/6/2022 7/29/22   Clearance ETHAN Campos CNP   empagliflozin (JARDIANCE) 10 MG tablet Take 1 tablet by mouth daily  Patient not taking: No sig reported 9/21/21   Karena Rodríguez MD   nicotine (NICODERM CQ) 21 MG/24HR Place 1 patch onto the skin every 24 hours 9/20/21 9/20/22  Karena Rodríguez MD   buPROPion [facility]) 75 MG tablet Take 1 tablet by mouth 2 times daily  Patient not taking: Reported on 7/29/2022 9/20/21   Fortino Frias MD   triamcinolone (KENALOG) 0.1 % ointment Apply topically 2 times daily as needed (itching or severe dry skin)  Patient not taking: No sig reported 9/20/21   Fortino Frias MD   Multiple Vitamin (MULTI-VITAMIN DAILY PO) Take by mouth  Patient not taking: Reported on 8/6/2022    Historical Provider, MD   albuterol sulfate HFA (PROVENTIL HFA) 108 (90 Base) MCG/ACT inhaler Inhale 2 puffs into the lungs every 4 hours as needed for Wheezing 6/14/21   Fortino Frias MD   Misc. Devices [facility]) MISC Use daily  Patient not taking: Reported on 8/6/2022 6/14/21   Fortino Frias MD   fluticasone [facility]) 50 MCG/ACT nasal spray 2 sprays by Nasal route daily  Patient not taking: Reported on 8/6/2022 4/30/21   Fortino Frias MD   gabapentin (NEURONTIN) 300 MG capsule Take 1 capsule by mouth 3 times daily for 60 days. Intended supply: 30 days  Patient taking differently: Take 300 mg by mouth 3 times daily.  Intended supply: 30 days  Taking PRN 4/30/21 9/2/21  Fortino Frias MD   montelukast (SINGULAIR) 10 MG tablet Take 1 tablet by mouth nightly  Patient not taking: No sig reported 4/30/21   Fortino Frias MD   baclofen (LIORESAL) 10 MG tablet Take 1 tablet by mouth nightly as needed (muscle spasms)  Patient not taking: Reported on 8/6/2022 3/16/21   Fortino Frias MD   pantoprazole (PROTONIX) 40 MG tablet Take 1 tablet by mouth daily 1/7/21   Fortino Frias MD   Naproxen Sodium (ALEVE PO) Take by mouth daily as needed    Historical Provider, MD       Current medications:    Current Facility-Administered Medications   Medication Dose Route Frequency Provider Last Rate Last Admin    albuterol sulfate HFA (PROVENTIL;VENTOLIN;PROAIR) 108 (90 Base) MCG/ACT inhaler 2 puff  2 puff Inhalation Q4H PRN Medina Welcht,         nicotine (NICODERM CQ) 21 MG/24HR 1 patch  1 patch TransDERmal Q24H Medina Nash DO        pantoprazole (PROTONIX) tablet 40 mg  40 mg Oral Daily Janusz Edward Jeffy Saab, DO        sodium chloride flush 0.9 % injection 5-40 mL  5-40 mL IntraVENous 2 times per day Exie Fail, DO   10 mL at 08/06/22 0947    sodium chloride flush 0.9 % injection 10 mL  10 mL IntraVENous PRN Exie Fail, DO   10 mL at 08/06/22 1134    0.9 % sodium chloride infusion   IntraVENous PRN Exie Fail, DO        potassium chloride (KLOR-CON M) extended release tablet 40 mEq  40 mEq Oral PRN Exie Fail, DO        Or    potassium bicarb-citric acid (EFFER-K) effervescent tablet 40 mEq  40 mEq Oral PRN Exie Fail, DO        Or    potassium chloride 10 mEq/100 mL IVPB (Peripheral Line)  10 mEq IntraVENous PRN Exie Fail, DO        magnesium sulfate 1000 mg in dextrose 5% 100 mL IVPB  1,000 mg IntraVENous PRN Exie Fail, DO        enoxaparin (LOVENOX) injection 40 mg  40 mg SubCUTAneous Daily Exie Fail, DO        ondansetron (ZOFRAN-ODT) disintegrating tablet 4 mg  4 mg Oral Q8H PRN Exie Fail, DO        Or    ondansetron [facility]) injection 4 mg  4 mg IntraVENous Q6H PRN Exie Fail, DO        polyethylene glycol (GLYCOLAX) packet 17 g  17 g Oral Daily PRN Exie Fail, DO        acetaminophen (TYLENOL) tablet 650 mg  650 mg Oral Q6H PRN Exie Fail, DO        Or    acetaminophen (TYLENOL) suppository 650 mg  650 mg Rectal Q6H PRN Exie Fail, DO        glucose chewable tablet 16 g  4 tablet Oral PRN Exie Fail, DO        dextrose bolus 10% 125 mL  125 mL IntraVENous PRN Exie Fail, DO        Or    dextrose bolus 10% 250 mL  250 mL IntraVENous PRN Exie Fail, DO        glucagon (rDNA) injection 1 mg  1 mg SubCUTAneous PRN Exie Fail, DO        dextrose 10 % infusion   IntraVENous Continuous PRN Exie Fail, DO        insulin lispro (HUMALOG) injection vial 7 Units  0.08 Units/kg SubCUTAneous Q6H Exie Fail, DO        hydrALAZINE (APRESOLINE) injection 10 mg  10 mg IntraVENous Q6H PRN Owen Coffee, DO   10 mg at 08/06/22 1133       Allergies: Allergies   Allergen Reactions    Pcn [Penicillins] Anaphylaxis     \"can't breath\"    Sulfa Antibiotics Anaphylaxis     \"can't breathe\"    Metformin And Related Other (See Comments)     Sweating and diarrhea       Problem List:    Patient Active Problem List   Diagnosis Code    Gastroesophageal reflux disease K21.9    Irritable bowel syndrome with diarrhea K58.0    Chronic bronchitis ([address] J42    Type 2 diabetes mellitus without complication, without long-term current use of insulin ([address] E11.9    Depression F32. A    Tobacco use Z72.0    Allergic rhinitis J30.9    Carpal tunnel syndrome of right wrist G56.01    Spinal stenosis, thoracic region M48.04    Cervical spondylosis with myelopathy M47.12    Lumbar disc disease with radiculopathy M51.16    Bilateral carpal tunnel syndrome G56.03    Chronic radicular lumbar pain M54.16, G89.29    Chronic bilateral thoracic back pain M54.6, G89.29    Chronic cervical radiculopathy M54.12    Numbness and tingling R20.0, R20.2    Chronic cerebral ischemia I67.82    Acquired cerebral atrophy (HCC) G31.9    Polyneuropathy, peripheral sensorimotor axonal G60.8    H/O fall Z91.81    Balance problem R26.89    Neuropathic pain M79.2    Muscle spasm M62.838    Positive test for herpes simplex virus (HSV) antibody R89.4    Episode of shaking R25.1    Flexural eczema L20.82    COPD without exacerbation (HCC) J44.9    Laryngeal mass J38.7    Multiple thyroid nodules E04.2    Adrenal adenoma, right D35.01    Obesity (BMI 30-39. 9) E66.9       Past Medical History:        Diagnosis Date    Allergic rhinitis     Arthritis     Asthma     Bronchitis     COPD (chronic obstructive pulmonary disease) ([address]     Depression     Diabetes mellitus ([address]     Headache     Hyperlipidemia     Hypertension     Incontinence     Irritable bowel syndrome     Kidney stone     Osteoarthritis     Pneumonia     Type 2 diabetes mellitus without complication ([address] 1234    Ulcer        Past Surgical History:        Procedure Laterality Date    CARPAL TUNNEL RELEASE Right 5/7/2019    Right Carpal Tunnel Release [facility]z Wang MD [facility] [address] [facility] [address] [facility] COLONOSCOPY  7/11/208    Serated polyp (1)    COLONOSCOPY  08/02/2017    [id number]    COLONOSCOPY Left 10/8/2019    COLONOSCOPY WITH BIOPSY performed by Kristian Sotelo MD at [facility], [address] ([address] [facility])  1986    with Right oopherectomy still has left ovary    LAPAROSCOPY  2008    Diagnostic for pain - no findings    MECKEL DIVERTICULUM EXCISION  1970    PAIN MANAGEMENT PROCEDURE N/A 7/8/2021    L4-L5 INTERLAMINAR JEAN performed by Marilyn Mendoza MD at [address]  08/02/2017    [id number]    UPPER GASTROINTESTINAL ENDOSCOPY Left 10/8/2019    EGD BIOPSY performed by Kristian Sotelo MD at [facility]       Social History:    Social History     Tobacco Use    Smoking status: Every Day     Packs/day: 1.50     Years: 30.00     Pack years: 45.00     Types: Cigarettes     Start date: 1/1/1971    Smokeless tobacco: Never   Substance Use Topics    Alcohol use:  No                                Ready to quit: Not Answered  Counseling given: Not Answered      Vital Signs (Current):   Vitals:    08/06/22 0330 08/06/22 0815 08/06/22 0942 08/06/22 1126   BP: (!) 156/85 (!) 147/127 (!) 161/67 (!) 167/87   Pulse: 87 86 85 82   Resp: 16 19 16 15   Temp: 98.4 °F (36.9 °C) 98.4 °F (36.9 °C) 98 °F (36.7 °C) 98.2 °F (36.8 °C)   TempSrc: Oral Oral Oral Oral   SpO2: 97% 96% 96% 92%   Weight:       Height:                                                  BP Readings from Last 3 Encounters:   08/06/22 (!) 167/87   08/05/22 (!) 157/102   07/29/22 138/88       NPO Status:  coffee 1:30 AM,solids MN BMI:   Wt Readings from Last 3 Encounters:   08/06/22 200 lb (90.7 kg)   08/05/22 200 lb (90.7 kg)   07/29/22 200 lb (90.7 kg)     Body mass index is 35.43 kg/m². CBC:   Lab Results   Component Value Date/Time    WBC 18.7 08/05/2022 02:50 PM    RBC 5.54 08/05/2022 02:50 PM    HGB 15.9 08/05/2022 02:50 PM    HCT 47.1 08/05/2022 02:50 PM    MCV 85.0 08/05/2022 02:50 PM    RDW 13.1 08/05/2022 02:50 PM     08/05/2022 02:50 PM       CMP:   Lab Results   Component Value Date/Time     08/05/2022 02:50 PM    K 3.8 08/05/2022 02:50 PM    CL 95 08/05/2022 02:50 PM    CO2 26 08/05/2022 02:50 PM    BUN 14 08/05/2022 02:50 PM    CREATININE 0.80 08/05/2022 02:50 PM    GFRAA >60 08/05/2022 02:50 PM    LABGLOM >60 08/05/2022 02:50 PM    GLUCOSE 192 08/05/2022 02:50 PM    PROT 7.3 08/05/2022 02:50 PM    CALCIUM 9.6 08/05/2022 02:50 PM    BILITOT 0.17 08/05/2022 02:50 PM    ALKPHOS 128 08/05/2022 02:50 PM    AST 14 08/05/2022 02:50 PM    ALT 15 08/05/2022 02:50 PM       POC Tests: No results for input(s): POCGLU, POCNA, POCK, POCCL, POCBUN, POCHEMO, POCHCT in the last 72 hours.     Coags:   Lab Results   Component Value Date/Time    PROTIME 10.8 01/15/2017 07:41 PM    INR 1.0 01/15/2017 07:41 PM    APTT 27.1 01/15/2017 07:41 PM       HCG (If Applicable): No results found for: PREGTESTUR, PREGSERUM, HCG, HCGQUANT     ABGs: No results found for: PHART, PO2ART, XIE5JLX, JHG9MLF, BEART, R1KLINGA     Type & Screen (If Applicable):  No results found for: LABABO, LABRH    Drug/Infectious Status (If Applicable):  Lab Results   Component Value Date/Time    HEPCAB NONREACTIVE 05/05/2018 10:31 AM       COVID-19 Screening (If Applicable):   Lab Results   Component Value Date/Time    COVID19 Not Detected 08/05/2022 02:45 PM    COVID19 Not Detected 02/09/2021 10:20 AM           Anesthesia Evaluation   no history of anesthetic complications:   Airway: Mallampati: III     Neck ROM: full     Dental:          Pulmonary:   (+) COPD:  asthma: current smoker                          ROS comment: Laryngeal mass  45 pk yrs   Cardiovascular:    (+) hypertension:, VILLELA: after ambulating 1 flight of stairs,                   Neuro/Psych:   (+) neuromuscular disease:,    (-) seizures and CVA            ROS comment: Cervical myelopathy  Neuropathy  Cerebral atrophy  depression GI/Hepatic/Renal:   (+) GERD:, renal disease: kidney stones,          ROS comment: IBS. Endo/Other:    (+) DiabetesType II DM, no interval change, , .                 Abdominal:             Vascular: Other Findings: Many missing,poor repair          Anesthesia Plan      MAC     ASA 4       Induction: intravenous.                             Marielle Thornton MD   8/6/2022

## 2022-08-06 NOTE — OP NOTE
Operative Note      Patient: Ely Cushing  YOB: 1960  MRN: [id number]    Date of Procedure: 8/6/2022    Pre-Op Diagnosis: Laryngeal mass [J38.7]    Post-Op Diagnosis: Same       Procedure(s):  LARYNGOSCOPY WITH EXCISION OF MASS  BRONCHOSCOPY RIGID    Surgeon(s):  Maggie Pfeiffer MD    Assistant:   * No surgical staff found *    Anesthesia: General    Estimated Blood Loss (mL): Minimal    Complications: None    Specimens:   ID Type Source Tests Collected by Time Destination   A : LARYNGEAL MASS Tissue Larynx SURGICAL PATHOLOGY Maggie Pfeiffer MD 8/6/2022 1430        Implants:  * No implants in log *      Drains: * No LDAs found *    Findings:   Limited neck extension, Limited mouth opening  Larynx: abnormal - Difficult visualization/ instrumentation  Grade II/ III view with cricoid pressure with Andrade 2 blade  Easy visualization with Size 3 Glidescope    Epiglottis: normal  False folds: normal  AE folds: normal  Arytenoids: normal    Glottis:    Vocal folds:  abnormal - large pedunculated mass on the LEFT true vocal fold that was removed piecemeal using a cupped forcep and the Glidescope  Mobility:  mobility was assessed, vocal folds are mobile    Subglottis:  normal  Trachea:  normal  Love:normal  Right bronchus: normal  Left bronchus: normal    Detailed Description of Procedure:   OPERATIVE REPORT  TIME OUT: A time out was conducted immediately before starting the procedure that confirmed a final verification of the correct patient, correct procedure, correct patient position, correct site and availability of special equipment. DESCRIPTION OF PROCEDURE:   The patient was brought into the operating room and placed in the supine position on the operating room table. After induction of general anesthesia, the patient was positioned and a tooth guard was placed.   Visualization of the larynx was then attempted using a size 5 and 4 Storz laryngoscope, however this was very difficult due to limited neck extension and limited mouth opening. Ultimately, with a Andrade 2 blade and cricoid pressure, a Grade 2/3 view of the larynx was obtained. At this point, the patient was desaturating and a size 6 cuffed endotracheal tube was placed without difficulty. After improving saturations, the larynx was again visualized but no better view could be obtained with a suspension laryngoscope. At this point, the Size 3 Glidescope was utilized to excise the mass. Using the Glidescope to visualize the larynx, an angled cupped forcep was used with difficulty to grasp the mass and remove it piecemeal. After largely debulking the mass, the endotracheal tube was removed. A flexible bronchoscope was then used to visualize the glottic and subglottic airway. The subglottis and trachea were widely patent and without masses or lesions. Finally, the glottis was reexamined and the airway was noted to be widely patent. An endotracheal tube was then replaced and the patient was allowed to wake up. The endotracheal tube was removed. The patient was breathing quietly and comfortably without stridor on extubation. The patient was then was transferred to the Recovery Room in stable condition. I performed the entire procedure. All counts were correct at the completion of the procedure.       Mary Bermeo MD    Pediatric Otolaryngology- Head and Neck Surgery  [facility] [place]       Electronically signed by Mary Bermeo MD on 8/6/2022 at 3:42 PM

## 2022-08-06 NOTE — PROGRESS NOTES
Patient reports her breathing is feeling a little better this morning. She did have some desaturation into the high 80%'s overnight but has been stable on 2 liters nasal cannula. Plan for OR today as discussed. Surgical consent obtained and questions answered.

## 2022-08-06 NOTE — PLAN OF CARE
Problem: Discharge Planning  Goal: Discharge to home or other facility with appropriate resources  Outcome: Progressing     Problem: Safety - Adult  Goal: Free from fall injury  Outcome: Progressing     Problem: Pain  Goal: Verbalizes/displays adequate comfort level or baseline comfort level  Outcome: Progressing   - will continue monitor the patient

## 2022-08-06 NOTE — PLAN OF CARE
Problem: Discharge Planning  Goal: Discharge to home or other facility with appropriate resources  Outcome: Progressing  Flowsheets (Taken 8/6/2022 0202)  Discharge to home or other facility with appropriate resources: Identify barriers to discharge with patient and caregiver     Problem: Safety - Adult  Goal: Free from fall injury  Outcome: Progressing  Flowsheets (Taken 8/6/2022 0202)  Free From Fall Injury: Instruct family/caregiver on patient safety

## 2022-08-07 VITALS
HEIGHT: 63 IN | TEMPERATURE: 97.7 F | RESPIRATION RATE: 16 BRPM | OXYGEN SATURATION: 100 % | SYSTOLIC BLOOD PRESSURE: 165 MMHG | WEIGHT: 200 LBS | HEART RATE: 70 BPM | DIASTOLIC BLOOD PRESSURE: 77 MMHG | BODY MASS INDEX: 35.44 KG/M2

## 2022-08-07 PROBLEM — E11.42 DIABETIC PERIPHERAL NEUROPATHY (HCC): Status: ACTIVE | Noted: 2022-08-07

## 2022-08-07 PROBLEM — I10 ESSENTIAL (PRIMARY) HYPERTENSION: Status: ACTIVE | Noted: 2022-08-07

## 2022-08-07 PROBLEM — E11.00 UNCONTROLLED TYPE 2 DM WITH HYPEROSMOLAR NONKETOTIC HYPERGLYCEMIA (HCC): Status: ACTIVE | Noted: 2017-07-25

## 2022-08-07 LAB
ANION GAP SERPL CALCULATED.3IONS-SCNC: 12 MMOL/L (ref 9–17)
BUN BLDV-MCNC: 14 MG/DL (ref 8–23)
CALCIUM SERPL-MCNC: 9.2 MG/DL (ref 8.6–10.4)
CHLORIDE BLD-SCNC: 99 MMOL/L (ref 98–107)
CO2: 25 MMOL/L (ref 20–31)
CREAT SERPL-MCNC: 0.69 MG/DL (ref 0.5–0.9)
EKG ATRIAL RATE: 119 BPM
EKG P AXIS: 84 DEGREES
EKG P-R INTERVAL: 128 MS
EKG Q-T INTERVAL: 290 MS
EKG QRS DURATION: 72 MS
EKG QTC CALCULATION (BAZETT): 407 MS
EKG R AXIS: 45 DEGREES
EKG T AXIS: 87 DEGREES
EKG VENTRICULAR RATE: 119 BPM
GFR AFRICAN AMERICAN: >60 ML/MIN
GFR NON-AFRICAN AMERICAN: >60 ML/MIN
GFR SERPL CREATININE-BSD FRML MDRD: ABNORMAL ML/MIN/{1.73_M2}
GLUCOSE BLD-MCNC: 160 MG/DL (ref 65–105)
GLUCOSE BLD-MCNC: 170 MG/DL (ref 70–99)
GLUCOSE BLD-MCNC: 178 MG/DL (ref 65–105)
GLUCOSE BLD-MCNC: 354 MG/DL (ref 65–105)
INR BLD: 1
POTASSIUM SERPL-SCNC: 4.4 MMOL/L (ref 3.7–5.3)
PROTHROMBIN TIME: 10.5 SEC (ref 9.1–12.3)
SODIUM BLD-SCNC: 136 MMOL/L (ref 135–144)

## 2022-08-07 PROCEDURE — 6370000000 HC RX 637 (ALT 250 FOR IP): Performed by: OTOLARYNGOLOGY

## 2022-08-07 PROCEDURE — 6360000002 HC RX W HCPCS: Performed by: OTOLARYNGOLOGY

## 2022-08-07 PROCEDURE — 82947 ASSAY GLUCOSE BLOOD QUANT: CPT

## 2022-08-07 PROCEDURE — 36415 COLL VENOUS BLD VENIPUNCTURE: CPT

## 2022-08-07 PROCEDURE — 85610 PROTHROMBIN TIME: CPT

## 2022-08-07 PROCEDURE — 99232 SBSQ HOSP IP/OBS MODERATE 35: CPT | Performed by: OTOLARYNGOLOGY

## 2022-08-07 PROCEDURE — 99238 HOSP IP/OBS DSCHRG MGMT 30/<: CPT | Performed by: INTERNAL MEDICINE

## 2022-08-07 PROCEDURE — 80048 BASIC METABOLIC PNL TOTAL CA: CPT

## 2022-08-07 RX ORDER — LISINOPRIL 10 MG/1
20 TABLET ORAL DAILY
Qty: 30 TABLET | Refills: 3 | Status: SHIPPED | OUTPATIENT
Start: 2022-08-07

## 2022-08-07 RX ADMIN — PANTOPRAZOLE SODIUM 40 MG: 40 TABLET, DELAYED RELEASE ORAL at 09:19

## 2022-08-07 RX ADMIN — ACETAMINOPHEN 650 MG: 325 TABLET ORAL at 01:58

## 2022-08-07 RX ADMIN — INSULIN LISPRO 7 UNITS: 100 INJECTION, SOLUTION INTRAVENOUS; SUBCUTANEOUS at 09:16

## 2022-08-07 RX ADMIN — INSULIN LISPRO 7 UNITS: 100 INJECTION, SOLUTION INTRAVENOUS; SUBCUTANEOUS at 01:56

## 2022-08-07 RX ADMIN — HYDRALAZINE HYDROCHLORIDE 10 MG: 20 INJECTION INTRAMUSCULAR; INTRAVENOUS at 04:18

## 2022-08-07 ASSESSMENT — ENCOUNTER SYMPTOMS
COUGH: 0
VOICE CHANGE: 1
ABDOMINAL PAIN: 0
VOMITING: 0
SORE THROAT: 1
NAUSEA: 0
SHORTNESS OF BREATH: 0
TROUBLE SWALLOWING: 0

## 2022-08-07 NOTE — PROGRESS NOTES
ENT/OTOLARYNGOLOGY PROGRESS NOTE     REASON FOR CARE: Laryngeal mass     HISTORY OF PRESENT ILLNESS:   Ligia Araiza is a 57 y.o. who is being seen POD 1 after excision of a laryngeal mass. She reports significant improvement in her breathing and voice since the procedure. She has been eating and drinking without difficulty and is no longer coughing with drinking liquids. She denies concerns today. PAST MEDICAL HISTORY:   She  has a past medical history of Allergic rhinitis, Arthritis, Asthma, Bronchitis, COPD (chronic obstructive pulmonary disease) ([address] Depression, Diabetes mellitus ([address] Headache, Hyperlipidemia, Hypertension, Incontinence, Irritable bowel syndrome, Kidney stone, Osteoarthritis, Pneumonia, Type 2 diabetes mellitus without complication ([address] and Ulcer. PAST SURGICAL HISTORY:   She  has a past surgical history that includes Tonsillectomy; Colonoscopy (7/11/208); Meckel's diverticulum excision (1970); Cholecystectomy, laparoscopic (1997); Hysterectomy, total abdominal (1986); laparoscopy (2008); Colonoscopy (08/02/2017); Upper gastrointestinal endoscopy (08/02/2017); Carpal tunnel release (Right, 05/07/2019); Colonoscopy (Left, 10/08/2019); Upper gastrointestinal endoscopy (Left, 10/08/2019); Pain management procedure (N/A, 07/08/2021); and laryngoscopy (08/06/2022). FAMILY HISTORY:   No family history related to presenting problem. SOCIAL HISTORY:   No social history related to presenting problem. MEDICATIONS:   As reviewed in the Medication Activity.      ALLERGIES:     Allergies   Allergen Reactions    Pcn [Penicillins] Anaphylaxis     \"can't breath\"    Sulfa Antibiotics Anaphylaxis     \"can't breathe\"    Metformin And Related Other (See Comments)     Sweating and diarrhea             PHYSICAL EXAM:      Vitals:    08/06/22 1625 08/06/22 2000 08/07/22 0035 08/07/22 0415   BP: (!) 145/77 (!) 153/90 (!) 148/82 (!) 165/77   Pulse: 83   70   Resp: 17 16 20 16   Temp: 97.7 °F (36.5 °C) 98.3 °F (36.8 °C) 97.5 °F (36.4 °C) 97.7 °F (36.5 °C)   TempSrc: Axillary Oral Oral Oral   SpO2: 100%      Weight:       Height:            GENERAL: well developed and well nourished and in no acute distress  HEAD: normocephalic and atraumatic  EYES: no eyelid swelling, no conjunctival injection or exudate, pupils equal round and reactive to light  EXTERNAL EARS: normal  NOSE: nares patent, normal mucosa  MOUTH/THROAT: mucous membranes moist, no focal lesions, no tonsillar enlargement or exudate  NECK: non-tender, full range of motion, no mass, no focal lymphadenopathy  RESPIRATORY: Normal expansion. Clear to auscultation. No rales, rhonchi, or wheezing. , stridor resolved  NEUROLOGICAL:  cranial nerves II-XII are grossly intact     RELEVANT LABS/STUDIES:      N/A     IMPRESSION AND RECOMMENDATIONS:     Patient doing well after excision of left vocal fold mass yesterday. Plan:  - Pathology results from mass biopsy are pending.  - From ENT standpoint, patient can be discharged today. We will call with pathology results and additional treatment recommendations. Ms. Jessica Rust understands that if the biopsy shows cancer, we will be referring her to [address] [place] [address],[address] for comprehensive cancer care there.   - If biopsy does not show cancer, she should follow up in 2-3 weeks with an Otolaryngologist. She can follow up here, or with:  OPTION 1:  Ear, Nose, and Throat surgeons at [facility], [address] [place] [address].     [facility]:  Dr. Rachel Rodas and Dr. Maya Grade [address] [facility]  [phone]    OPTION 2:  [facility]  [facility] [address]  [place], [address] [facility]  Appointment scheduling:  [phone]    - If discharged today, patient should follow up immediately should she develop return of shortness of breath or stridor.   - Patient discussed with Dr. Denice Alcantara.   --------------------------------------------------  Maggie Pfeiffer MD  Pediatric Otolaryngology-Head and Neck [address] [facility]    Office  ph# [phone]    Also available in [facility]

## 2022-08-07 NOTE — DISCHARGE SUMMARY
[facility]  Office: [address], DO, Hector Damicoroe, DO, Jt Dry, DO, Rony Owusu Blood, DO, Michelle Brannon MD, Nadia Correa MD, Lilian Valadez MD, Susan Lai MD,  Wilfrid Martinez MD, Jose Elias Teixeira MD, Marques Alvarez, DO, Lamar Horner MD,  Thomas Pittman MD, Sukumar Crawford MD, Omid Modi, DO, Carol Rodas MD, Brien Mckeon MD, Roseline Abdul MD, Ward Moy, DO, Bairon Boyd MD, Devorah Valencia MD, Ivone Dias, CNP,  Douglas Mohamud, CNP, Radha Agosto, CNP, Rosas Sinha, CNP, Jad Monsivais PA-C, Jeannine Stevens, DNP, Phyllistine Inocencio, CNP, Gianna Case, CNP, Anabella Kiser, CNP, Jake Perrin, CNP, Berenice Watson, CNP, Trena Wisdom, CNS, Simone Farrar, DNP, Francia Rose, CNP, Chelsea Alonso, CNP, Keara Torres, [address] [facility]    Discharge Summary    Patient ID: Tricia Martins  :  1960   MRN: [id number]     ACCOUNT:  [de-identified]   Patient's PCP: Parveen Davis MD  Admit Date: 2022   Discharge Date: 2022    Discharge Physician: Emi Bcukner DO     The patient was seen and examined on day of discharge and this discharge summary is in conjunction with any daily progress note from day of discharge. Active Discharge Diagnoses:     Primary Problem  Laryngeal mass      Hospital Problems  Active Hospital Problems    Diagnosis Date Noted    Essential (primary) hypertension [I10] 2022     Priority: Medium    Diabetic peripheral neuropathy ([address] [E11.42] 2022     Priority: Medium    Multiple thyroid nodules [E04.2] 2022     Priority: Medium    Adrenal adenoma, right [D35.01] 2022     Priority: Medium    Obesity (BMI 30-39. 9) [E66.9] 2022 Priority: Medium    Dysphonia [R49.0] 08/06/2022     Priority: Medium    Laryngeal mass [J38.7] 08/05/2022     Priority: Medium    COPD without exacerbation ([facility] [J44.9] 09/20/2021    Tobacco use [Z72.0] 10/31/2018    Uncontrolled type 2 DM with hyperosmolar nonketotic hyperglycemia ([facility] [E11.00] 07/25/2017         Hospital Course:     Brief History  \" Raelene Heimlich is a 57 y.o. female who presents with a chief complaint of shortness of breath she says is been going on for over a week she was seen in urgent care with shortness of breath and sore throat they placed her on doxycycline and steroids she said she still has a real hoarse voice and feels active swelling in her throat she is finished her steroid course and she still has doxycycline left. Is been no nausea vomiting she is a smoker with a history of COPD\"      The patient reports her problems began approximately 10 days ago with what she thought was an upper respiratory tract infection. She developed sinus congestion and a sore throat and hoarseness  Patient was seen and urgent care facility and provided with a course of antibiotics  When her symptoms continued and she developed increasing shortness of breath and stridor she presented to the emergency room     CT has revealed:  Neck soft tissues:   15 mm polypoid mass lesion arising anterior aspect of the sub glottis causing moderate narrowing of the laryngeal airways. The area is amenable to direct visualization for further evaluation. Multiple left-sided thyroid nodules measuring 2.6 and 2.7 cm. For further evaluation thyroid ultrasound is recommended. Chest CT angiogram:   No pulmonary embolism. No acute pulmonary findings. Unchanged 2.8 cm right adrenal adenoma.      The patient has been admitted  ENT consulted     Laryngoscopy has revealed:  Nasal cavity:     Right:                Patent: Yes              Masses:  No              Posterior turbinate hypertrophy:  No              Septal deviation: Yes       Left:              Patent: Yes              Masses:  No              Posterior turbinate hypertrophy:  No              Septal deviation:  No   Nasopharynx:     Mucosa:    normal Eustachian Tube:  clear     Inflammation: none     Adenoids: small     Masses: No     Palate and oropharynx:     Palatal movement: normal     Tonsils: nonobstructive     Lingual tonsil tissue: is not hypertrophic     Base of tongue: No masses, lesions, or mucosal abnormality     Vallecula:  No masses, lesions, or mucosal abnormality   Larynx and Hypopharynx:     Hypopharynx: No edema, Moderate erythema, No cobblestoning     Epiglottis: normal     Arytenoids: normal     Aryepiglottic folds:normal     Vocal Cords: The movement of the vocal folds is normal. There is a pedunculated partially obstructing mass that appears to be originating from the right vocal fold that is ball-valving in and out   of the glottic opening with respiration. The subglottis is not well seen     Pyriform Sinuses: patent with no masses, lesions, or visualized tracts     And 8/6 the patient underwent:  Procedure(s):  LARYNGOSCOPY WITH EXCISION OF MASS  BRONCHOSCOPY RIGID     Postoperative course was as expected     Discharge planning initiated    Discharge plan:     ENT eval & f/u as scheduled   Awaiting pathology report  Respiratory Therapy and Bronchodilators prn   Will need thyroid ultrasound arranged at future date  Ongoing surveillance of adrenal adenoma  Glycemic contol - Monitor and control blood sugars  Blood Pressure - Monitor and control   Patient reports she is intolerant to metformin  Risk factor management / weight loss    Smoking cessation / education   Social service consult to assist with prescription / medical assistance  The case has been reviewed with Dr. Danuta Spann  Discharge planning initiated  Will discharge when arrangements complete and ok with other services.   Follow-up with PCP in one week, Karen Carter MD  Notify PCP of discharge     No discharge procedures on file. Significant Diagnostic Studies:     Labs / Micro:  Labs:  Hematology:  Recent Labs     08/05/22  1450 08/07/22  0433   WBC 18.7*  --    RBC 5.54*  --    HGB 15.9*  --    HCT 47.1  --    MCV 85.0  --    MCH 28.7  --    MCHC 33.8*  --    RDW 13.1  --      --    MPV 12.1  --    INR  --  1.0   DDIMER 0.63*  --      Chemistry:  Recent Labs     08/05/22  1450 08/07/22  0433   * 136   K 3.8 4.4   CL 95* 99   CO2 26 25   GLUCOSE 192* 170*   BUN 14 14   CREATININE 0.80 0.69   ANIONGAP 13 12   LABGLOM >60 >60   GFRAA >60 >60   CALCIUM 9.6 9.2   PROBNP 91  --    TROPHS 7  --      Recent Labs     08/05/22  1450 08/06/22  1206 08/06/22  1621 08/06/22  1828 08/06/22  2114 08/07/22  0153 08/07/22  0555 08/07/22  0709   PROT 7.3  --   --   --   --   --   --   --    LABALBU 4.0  --   --   --   --   --   --   --    AST 14  --   --   --   --   --   --   --    ALT 15  --   --   --   --   --   --   --    ALKPHOS 128*  --   --   --   --   --   --   --    BILITOT 0.17*  --   --   --   --   --   --   --    POCGLU  --    < > 197* 251* 197* 354* 160* 178*    < > = values in this interval not displayed. Radiology:    XR NECK SOFT TISSUE    Result Date: 8/5/2022  EXAMINATION: TWO XRAY VIEWS OF THE NECK SOFT TISSUES 8/5/2022 12:06 pm COMPARISON: 03/02/2021 HISTORY: ORDERING SYSTEM PROVIDED HISTORY: dff breathing/ voice change TECHNOLOGIST PROVIDED HISTORY: dff breathing/ voice change Reason for Exam: trouble breathing SOB FINDINGS: Airway is midline. There is narrowing of the airway at the level of the glottis. Epiglottis appears grossly normal in caliber. No radiopaque retained foreign body. No retropharyngeal/prevertebral soft tissue swelling. No soft tissue gas. Degenerative changes throughout the cervical spine with 2 mm grade 1 degenerative anterolisthesis at C3 on C4 and 3 mm grade 1 degenerative anterolisthesis at C4 on C5 which is similar to prior comparison. unremarkable. Multiple left-sided thyroid nodules measuring 2.6 cm and 2.7 cm. Vanesa Raddle LYMPH NODES:  No cervical or supraclavicular lymphadenopathy is seen. SOFT TISSUES:  No appreciable soft tissue swelling or mass is seen. BRAIN/ORBITS/SINUSES:  The visualized portion of the intracranial contents appear unremarkable. The visualized portion of the orbits, paranasal sinuses and mastoid air cells demonstrate no acute abnormality. BONES:  No aggressive appearing lytic or blastic bony lesion. CT chest: Chest CT angiogram: No pulmonary embolism. No acute aortic pathology. Lines and tubes:  None. Lungs and Airways and Pleura:  Central airways are patent. No lung consolidation. No pleural effusion. No pneumothorax. Mild linear atelectatic changes at the bases. Lymph nodes: No pathologically enlarged mediastinal, hilar, lower cervical, or chest wall lymph nodes. Cardiovascular and Mediastinum: No acute aortic pathology. Cardiac chamber sizes appear to measure within normal limits on this non ECG gated study. No pericardial effusion. The thyroid gland is unremarkable. The esophagus is unremarkable. Bones/Soft tissues: No fracture. No definite suspicious lytic or blastic foci. Visualized upper abdomen: 2.8 cm are right adrenal gland nodule likely an adenoma. Status post cholecystectomy. Neck soft tissues: 15 mm polypoid mass lesion arising anterior aspect of the sub glottis causing moderate narrowing of the laryngeal airways. The area is amenable to direct visualization for further evaluation. Multiple left-sided thyroid nodules measuring 2.6 and 2.7 cm. For further evaluation thyroid ultrasound is recommended. Chest CT angiogram: No pulmonary embolism. No acute pulmonary findings. Unchanged 2.8 cm right adrenal adenoma.  RECOMMENDATIONS: Unavailable     XR CHEST PORTABLE    Result Date: 8/5/2022  EXAMINATION: ONE XRAY VIEW OF THE CHEST 8/5/2022 12:08 pm COMPARISON: 06/18/2021, 01/15/2017 HISTORY: [address] Marnie Landry PROVIDED HISTORY: shortness of breath TECHNOLOGIST PROVIDED HISTORY: shortness of breath Reason for Exam: trouble breathing SOB FINDINGS: Minimal focal opacity at the right lung base with the remainder of the lungs otherwise clear. No pneumothorax or pleural effusion. Cardiomediastinal contours are within normal limits. Atherosclerotic aorta. No acute bony findings. Minimal focal opacity at the right lung base which may be due to atelectasis, though clinical correlation is required to exclude concern for contributory infection. Recommend imaging follow-up to resolution. CT CHEST PULMONARY EMBOLISM W CONTRAST    Result Date: 8/5/2022  EXAMINATION: CT OF THE NECK SOFT TISSUE WITH CONTRAST; CTA OF THE CHEST  8/5/2022 TECHNIQUE: CT of the neck was performed with the administration of intravenous contrast. Multiplanar reformatted images are provided for review. Automated exposure control, iterative reconstruction, and/or weight based adjustment of the mA/kV was utilized to reduce the radiation dose to as low as reasonably achievable.; CTA of the chest was performed after the administration of intravenous contrast.  Multiplanar reformatted images are provided for review. MIP images are provided for review. Automated exposure control, iterative reconstruction, and/or weight based adjustment of the mA/kV was utilized to reduce the radiation dose to as low as reasonably achievable. COMPARISON: None HISTORY: ORDERING SYSTEM PROVIDED HISTORY: Stridor and supraglottic swelling seen on plain x-ray TECHNOLOGIST PROVIDED HISTORY: Stridor and supraglottic swelling seen on plain x-ray Decision Support Exception - unselect if not a suspected or confirmed emergency medical condition->Emergency Medical Condition (MA) Reason for Exam:  Shortness of breath, elevated d-dimer, stridor, supraglottic swelling seen on xray FINDINGS: PHARYNX/LARYNX:  The palatine tonsils are normal in appearance.   The tongue is normal in appearance. There is a polypoid mass lesion arising anterior aspect of the sub glottis measuring approximately 15 by 9 by 8 mm causing moderate narrowing of the laryngeal airways. The valleculae, epiglottis, aryepiglottic folds and pyriform sinuses appear otherwise unremarkable. The true and false vocal cords are normal in appearance. No mass or abscess is seen. SALIVARY GLANDS/THYROID:  The parotid and submandibular glands appear unremarkable. Multiple left-sided thyroid nodules measuring 2.6 cm and 2.7 cm. Erleen Plata LYMPH NODES:  No cervical or supraclavicular lymphadenopathy is seen. SOFT TISSUES:  No appreciable soft tissue swelling or mass is seen. BRAIN/ORBITS/SINUSES:  The visualized portion of the intracranial contents appear unremarkable. The visualized portion of the orbits, paranasal sinuses and mastoid air cells demonstrate no acute abnormality. BONES:  No aggressive appearing lytic or blastic bony lesion. CT chest: Chest CT angiogram: No pulmonary embolism. No acute aortic pathology. Lines and tubes:  None. Lungs and Airways and Pleura:  Central airways are patent. No lung consolidation. No pleural effusion. No pneumothorax. Mild linear atelectatic changes at the bases. Lymph nodes: No pathologically enlarged mediastinal, hilar, lower cervical, or chest wall lymph nodes. Cardiovascular and Mediastinum: No acute aortic pathology. Cardiac chamber sizes appear to measure within normal limits on this non ECG gated study. No pericardial effusion. The thyroid gland is unremarkable. The esophagus is unremarkable. Bones/Soft tissues: No fracture. No definite suspicious lytic or blastic foci. Visualized upper abdomen: 2.8 cm are right adrenal gland nodule likely an adenoma. Status post cholecystectomy. Neck soft tissues: 15 mm polypoid mass lesion arising anterior aspect of the sub glottis causing moderate narrowing of the laryngeal airways.   The area is amenable to direct visualization for further

## 2022-08-07 NOTE — PROGRESS NOTES
[facility]  Office: [address], DO, Sheela Press, DO, Veronica Cushing, DO, Timothy Simmons Blood, DO, Tavon Wheatley MD, Umm Miguel MD, Erika Cadena MD, Jessica Landaverde MD,  Shrala Anaya MD, Zulma Paul MD, Reyna Nava, DO, Carlton Nelson MD,  Jerilyn Oliveira MD, Adriana Iraheta MD, Shannan Ortiz, DO, Manuel Sanchez MD, Mariana Velazquez MD, Alicia Espinosa MD, Keyon Amaya, DO, Rashad Colindres MD, Eriberto Bueno MD, Lucho Mejia, CNP,  Justyn Lind, CNP, Bubba Leach, CNP, Patricia Hernandez, CNP, Roylene Goodell, PA-C, Liz Al, [facility], Laurel Strauss, CNP, Vibha Santiago, CNP, Jasmina Carpenter, CNP, Fina Abbasi, [facility], [facility] No, CNP, Nash Melissa, CNS, Martha Goncalves, [facility], Dory Mera, CNP, Chelsea Velazquez, CNP, Crystal Meyers, [address]    Progress Note    8/7/2022    10:33 AM    Name:   Alexey Valdivia  MRN:     [id number]     Acct:      [de-identified]   Room:   [address] [place], [place] [address] Day:  2  Admit Date:  8/5/2022 11:21 PM    PCP:   Merton Bloch, MD  Code Status:  Full Code    Subjective:     C/C:   Laryngeal mass    Interval History Status:   POD 1  Being controlled  Doing okay with liquids    Data Base Updates:  [id number] High mg/dL    BP still labile    ZHE94ur/dL   Creatinine0.69mg/dL     Calcium9.2mg/dL   [id number]     Brief History:     \" Alexey Valdivia is a 57 y.o. female who presents with a chief complaint of shortness of breath she says is been going on for over a week she was seen in urgent care with shortness of breath and sore throat they placed her on doxycycline and steroids she said she still has a real hoarse voice and feels active swelling in her throat she is finished her steroid course and she still has doxycycline left.   Is been no nausea vomiting she is a smoker with a history of COPD\"      The patient reports her problems began approximately 10 days ago with what she thought was an upper respiratory tract infection. She developed sinus congestion and a sore throat and hoarseness  Patient was seen and urgent care facility and provided with a course of antibiotics  When her symptoms continued and she developed increasing shortness of breath and stridor she presented to the emergency room     CT has revealed:  Neck soft tissues:   15 mm polypoid mass lesion arising anterior aspect of the sub glottis causing moderate narrowing of the laryngeal airways. The area is amenable to direct visualization for further evaluation. Multiple left-sided thyroid nodules measuring 2.6 and 2.7 cm. For further evaluation thyroid ultrasound is recommended. Chest CT angiogram:   No pulmonary embolism. No acute pulmonary findings. Unchanged 2.8 cm right adrenal adenoma. The patient has been admitted  ENT consulted     Laryngoscopy has revealed:  Nasal cavity:     Right:                Patent: Yes              Masses:  No              Posterior turbinate hypertrophy:  No              Septal deviation: Yes       Left:              Patent: Yes              Masses:  No              Posterior turbinate hypertrophy:  No              Septal deviation:  No   Nasopharynx:     Mucosa:    normal Eustachian Tube:  clear     Inflammation: none     Adenoids: small     Masses: No     Palate and oropharynx:     Palatal movement: normal     Tonsils: nonobstructive     Lingual tonsil tissue: is not hypertrophic     Base of tongue: No masses, lesions, or mucosal abnormality     Vallecula:  No masses, lesions, or mucosal abnormality   Larynx and Hypopharynx:     Hypopharynx: No edema, Moderate erythema, No cobblestoning     Epiglottis: normal     Arytenoids: normal     Aryepiglottic folds:normal     Vocal Cords:   The movement of the vocal folds is normal. There is a pedunculated partially obstructing mass that appears to be originating from the right vocal fold that is ball-valving in and out   of the glottic opening with respiration. The subglottis is not well seen     Pyriform Sinuses: patent with no masses, lesions, or visualized tracts     And  the patient underwent:  Procedure(s):  LARYNGOSCOPY WITH EXCISION OF MASS  BRONCHOSCOPY RIGID    Postoperative course was as expected    Discharge planning initiated    Past Medical History:   has a past medical history of Allergic rhinitis, Arthritis, Asthma, Bronchitis, COPD (chronic obstructive pulmonary disease) ([address] Depression, Diabetes mellitus ([address] Headache, Hyperlipidemia, Hypertension, Incontinence, Irritable bowel syndrome, Kidney stone, Osteoarthritis, Pneumonia, Type 2 diabetes mellitus without complication ([address] and Ulcer. Social History:   reports that she has been smoking cigarettes. She started smoking about 51 years ago. She has a 45.00 pack-year smoking history. She has never used smokeless tobacco. She reports that she does not drink alcohol and does not use drugs. Family History:   Family History   Adopted: Yes   Family history unknown: Yes       Review of Systems:     Review of Systems   HENT:  Positive for sore throat and voice change (Still quite hours). Negative for trouble swallowing. Respiratory:  Negative for cough and shortness of breath. Cardiovascular:  Negative for chest pain and palpitations. Gastrointestinal:  Negative for abdominal pain, nausea and vomiting. Genitourinary:  Negative for flank pain and hematuria. Physical Examination:        Vitals  BP (!) 165/77   Pulse 70   Temp 97.7 °F (36.5 °C) (Oral)   Resp 16   Ht 5' 3\" (1.6 m)   Wt 200 lb (90.7 kg)   LMP  (LMP Unknown)   SpO2 100%   BMI 35.43 kg/m²   Temp (24hrs), Av.6 °F (36.4 °C), Min:96.8 °F (36 °C), Max:98.3 °F (36.8 °C)    Recent Labs     22  2114 22  0153 22  0555 22  0709   POCGLU 197* 354* 160* 178*       Physical Exam  Vitals reviewed. Constitutional:       General: She is not in acute distress.      Appearance: She is not diaphoretic. HENT:      Head: Normocephalic. Nose: Nose normal.   Eyes:      General: No scleral icterus. Conjunctiva/sclera: Conjunctivae normal.   Neck:      Trachea: No tracheal deviation. Cardiovascular:      Rate and Rhythm: Normal rate and regular rhythm. Pulmonary:      Effort: Pulmonary effort is normal. No respiratory distress. Breath sounds: Normal breath sounds. No wheezing or rales. Chest:      Chest wall: No tenderness. Abdominal:      General: There is no distension. Palpations: Abdomen is soft. Tenderness: There is no abdominal tenderness. Musculoskeletal:         General: No tenderness. Cervical back: Neck supple. Skin:     General: Skin is warm and dry. Medications: Allergies: Allergies   Allergen Reactions    Pcn [Penicillins] Anaphylaxis     \"can't breath\"    Sulfa Antibiotics Anaphylaxis     \"can't breathe\"    Metformin And Related Other (See Comments)     Sweating and diarrhea       Current Meds:   Scheduled Meds:    nicotine  1 patch TransDERmal Q24H    pantoprazole  40 mg Oral Daily    sodium chloride flush  5-40 mL IntraVENous 2 times per day    enoxaparin  40 mg SubCUTAneous Daily    insulin lispro  0.08 Units/kg SubCUTAneous Q6H     Continuous Infusions:    sodium chloride      dextrose       PRN Meds: albuterol sulfate HFA, sodium chloride flush, sodium chloride, potassium chloride **OR** potassium alternative oral replacement **OR** potassium chloride, magnesium sulfate, ondansetron **OR** ondansetron, polyethylene glycol, acetaminophen **OR** acetaminophen, glucose, dextrose bolus **OR** dextrose bolus, glucagon (rDNA), dextrose, hydrALAZINE    Data:     I/O (24Hr):     Intake/Output Summary (Last 24 hours) at 8/7/2022 1033  Last data filed at 8/6/2022 2100  Gross per 24 hour   Intake 1740 ml   Output --   Net 1740 ml       Labs:  Hematology:  Recent Labs     08/05/22  1450 08/07/22  0433   WBC 18.7*  --    RBC 5.54*  --    HGB 15.9* --    HCT 47.1  --    MCV 85.0  --    MCH 28.7  --    MCHC 33.8*  --    RDW 13.1  --      --    MPV 12.1  --    INR  --  1.0   DDIMER 0.63*  --      Chemistry:  Recent Labs     08/05/22  1450 08/07/22  0433   * 136   K 3.8 4.4   CL 95* 99   CO2 26 25   GLUCOSE 192* 170*   BUN 14 14   CREATININE 0.80 0.69   ANIONGAP 13 12   LABGLOM >60 >60   GFRAA >60 >60   CALCIUM 9.6 9.2   PROBNP 91  --    TROPHS 7  --      Recent Labs     08/05/22  1450 08/06/22  1206 08/06/22  1621 08/06/22  1828 08/06/22  2114 08/07/22  0153 08/07/22  0555 08/07/22  0709   PROT 7.3  --   --   --   --   --   --   --    LABALBU 4.0  --   --   --   --   --   --   --    AST 14  --   --   --   --   --   --   --    ALT 15  --   --   --   --   --   --   --    ALKPHOS 128*  --   --   --   --   --   --   --    BILITOT 0.17*  --   --   --   --   --   --   --    POCGLU  --    < > 197* 251* 197* 354* 160* 178*    < > = values in this interval not displayed. ABG:No results found for: POCPH, PHART, PH, POCPCO2, KBC3BIR, PCO2, POCPO2, PO2ART, PO2, POCHCO3, DSF8DZT, HCO3, NBEA, PBEA, BEART, BE, THGBART, THB, TLB4NZU, KEBO6IHZ, V8CFXUFG, O2SAT, FIO2  No results found for: SPECIAL  Lab Results   Component Value Date/Time    CULTURE NO GROWTH 1 DAY 08/05/2022 03:00 PM       Radiology:  XR NECK SOFT TISSUE    Result Date: 8/5/2022  Narrowing of the airway at the level of the glottis which may be due to closed glottis at the time of imaging, though clinical correlation is required. CT SOFT TISSUE NECK W CONTRAST    Result Date: 8/5/2022  Neck soft tissues: 15 mm polypoid mass lesion arising anterior aspect of the sub glottis causing moderate narrowing of the laryngeal airways. The area is amenable to direct visualization for further evaluation. Multiple left-sided thyroid nodules measuring 2.6 and 2.7 cm. For further evaluation thyroid ultrasound is recommended. Chest CT angiogram: No pulmonary embolism. No acute pulmonary findings. Unchanged 2.8 cm right adrenal adenoma. RECOMMENDATIONS: Unavailable     XR CHEST PORTABLE    Result Date: 8/5/2022  Minimal focal opacity at the right lung base which may be due to atelectasis, though clinical correlation is required to exclude concern for contributory infection. Recommend imaging follow-up to resolution. CT CHEST PULMONARY EMBOLISM W CONTRAST    Result Date: 8/5/2022  Neck soft tissues: 15 mm polypoid mass lesion arising anterior aspect of the sub glottis causing moderate narrowing of the laryngeal airways. The area is amenable to direct visualization for further evaluation. Multiple left-sided thyroid nodules measuring 2.6 and 2.7 cm. For further evaluation thyroid ultrasound is recommended. Chest CT angiogram: No pulmonary embolism. No acute pulmonary findings. Unchanged 2.8 cm right adrenal adenoma. RECOMMENDATIONS: Unavailable       Assessment:        Primary Problem  Laryngeal mass    Active Hospital Problems    Diagnosis Date Noted    Essential (primary) hypertension [I10] 08/07/2022     Priority: Medium    Diabetic peripheral neuropathy ([address] [E11.42] 08/07/2022     Priority: Medium    Multiple thyroid nodules [E04.2] 08/06/2022     Priority: Medium    Adrenal adenoma, right [D35.01] 08/06/2022     Priority: Medium    Obesity (BMI 30-39. 9) [E66.9] 08/06/2022     Priority: Medium    Dysphonia [R49.0] 08/06/2022     Priority: Medium    Laryngeal mass [J38.7] 08/05/2022     Priority: Medium    COPD without exacerbation ([address] [J44.9] 09/20/2021    Tobacco use [Z72.0] 10/31/2018    Uncontrolled type 2 DM with hyperosmolar nonketotic hyperglycemia ([address] [E11.00] 07/25/2017         Plan:        ENT eval & f/u as scheduled   Awaiting pathology report  Respiratory Therapy and Bronchodilators prn   Will need thyroid ultrasound arranged at future date  Ongoing surveillance of adrenal adenoma  Glycemic contol - Monitor and control blood sugars  Blood Pressure - Monitor and control   Patient

## 2022-08-07 NOTE — PLAN OF CARE
Problem: Discharge Planning  Goal: Discharge to home or other facility with appropriate resources  8/7/2022 0621 by Kezia Saenz RN  Outcome: Progressing  8/6/2022 1928 by Kaveh Smith RN  Outcome: Progressing     Problem: Safety - Adult  Goal: Free from fall injury  8/7/2022 0621 by Kezia Saenz RN  Outcome: Adequate for Discharge  8/6/2022 1928 by Kaveh Smith RN  Outcome: Progressing     Problem: Pain  Goal: Verbalizes/displays adequate comfort level or baseline comfort level  8/7/2022 0621 by Kezia Saenz RN  Outcome: Adequate for Discharge  8/6/2022 1928 by Kaveh Smith RN  Outcome: Progressing

## 2022-08-07 NOTE — PLAN OF CARE
Problem: Discharge Planning  Goal: Discharge to home or other facility with appropriate resources  8/7/2022 1155 by Prateek Sibley RN  Outcome: Completed  8/7/2022 0621 by Chris Leigh RN  Outcome: Progressing     Problem: Safety - Adult  Goal: Free from fall injury  8/7/2022 1155 by Prateek Sibley RN  Outcome: Completed  8/7/2022 0621 by Chirs Leigh RN  Outcome: Adequate for Discharge     Problem: Pain  Goal: Verbalizes/displays adequate comfort level or baseline comfort level  8/7/2022 1155 by Prateek Sibley RN  Outcome: Completed  8/7/2022 0621 by Chris Leigh RN  Outcome: Adequate for Discharge

## 2022-08-08 ENCOUNTER — TELEPHONE (OUTPATIENT)
Dept: FAMILY MEDICINE CLINIC | Age: 62
End: 2022-08-08

## 2022-08-08 LAB — GLUCOSE BLD-MCNC: 291 MG/DL (ref 65–105)

## 2022-08-08 NOTE — TELEPHONE ENCOUNTER
[facility]  Diagnosis:Laryngeal Mass

## 2022-08-09 ENCOUNTER — HOSPITAL ENCOUNTER (EMERGENCY)
Age: 62
Discharge: HOME | End: 2022-08-10
Attending: EMERGENCY MEDICINE

## 2022-08-09 DIAGNOSIS — L03.114 CELLULITIS OF LEFT UPPER EXTREMITY: Primary | ICD-10-CM

## 2022-08-09 PROCEDURE — 6370000000 HC RX 637 (ALT 250 FOR IP): Performed by: EMERGENCY MEDICINE

## 2022-08-09 PROCEDURE — 99283 EMERGENCY DEPT VISIT LOW MDM: CPT

## 2022-08-09 RX ORDER — DOXYCYCLINE 100 MG/1
100 CAPSULE ORAL ONCE
Status: COMPLETED | OUTPATIENT
Start: 2022-08-10 | End: 2022-08-09

## 2022-08-09 RX ORDER — DOXYCYCLINE HYCLATE 100 MG/1
100 CAPSULE ORAL ONCE
Status: DISCONTINUED | OUTPATIENT
Start: 2022-08-10 | End: 2022-08-09

## 2022-08-09 RX ORDER — DOXYCYCLINE HYCLATE 100 MG
100 TABLET ORAL 2 TIMES DAILY
Qty: 14 TABLET | Refills: 0 | Status: SHIPPED | OUTPATIENT
Start: 2022-08-09 | End: 2022-08-16

## 2022-08-09 RX ADMIN — DOXYCYCLINE 100 MG: 100 CAPSULE ORAL at 23:59

## 2022-08-09 ASSESSMENT — PAIN SCALES - GENERAL: PAINLEVEL_OUTOF10: 5

## 2022-08-09 ASSESSMENT — PAIN - FUNCTIONAL ASSESSMENT: PAIN_FUNCTIONAL_ASSESSMENT: 0-10

## 2022-08-09 NOTE — TELEPHONE ENCOUNTER
Pt declines follow up from [facility] hospitalization. Would like to follow up with Dr Saeid Lugo, ENT.

## 2022-08-10 VITALS
BODY MASS INDEX: 33.66 KG/M2 | HEART RATE: 98 BPM | WEIGHT: 190 LBS | HEIGHT: 63 IN | SYSTOLIC BLOOD PRESSURE: 145 MMHG | RESPIRATION RATE: 17 BRPM | DIASTOLIC BLOOD PRESSURE: 93 MMHG | TEMPERATURE: 98.1 F | OXYGEN SATURATION: 98 %

## 2022-08-10 LAB
CULTURE: NORMAL
CULTURE: NORMAL
SPECIMEN DESCRIPTION: NORMAL
SPECIMEN DESCRIPTION: NORMAL
SURGICAL PATHOLOGY REPORT: NORMAL

## 2022-08-10 NOTE — ED PROVIDER NOTES
eMERGENCY dEPARTMENT eNCOUnter      Pt Name: Ligia Araiza  MRN: [id number]  [place] 1960  Date of evaluation: 8/9/2022      CHIEF COMPLAINT       Chief Complaint   Patient presents with    Arm Pain     L upper arm, red, hot, swollen         HISTORY OF PRESENT ILLNESS    Ligia Araiza is a 57 y.o. female who presents with swelling to left arm. Patient states she was hospitalized for a mass in her her tonsils over the last several days and was discharged yesterday had an IV in the left upper extremity has noticed some pain and swelling to the area which started yesterday is worse today she is here for evaluation        REVIEW OF SYSTEMS       Positive for redness swelling and pain left arm negative for numbness tingling weakness loss of function or fever    PAST MEDICAL HISTORY    has a past medical history of Allergic rhinitis, Arthritis, Asthma, Bronchitis, COPD (chronic obstructive pulmonary disease) ([address] Depression, Diabetes mellitus ([address] Headache, Hyperlipidemia, Hypertension, Incontinence, Irritable bowel syndrome, Kidney stone, Osteoarthritis, Pneumonia, Type 2 diabetes mellitus without complication ([address] and Ulcer. SURGICAL HISTORY      has a past surgical history that includes Tonsillectomy; Colonoscopy (7/11/208); Meckel's diverticulum excision (1970); Cholecystectomy, laparoscopic (1997); Hysterectomy, total abdominal (1986); laparoscopy (2008); Colonoscopy (08/02/2017); Upper gastrointestinal endoscopy (08/02/2017); Carpal tunnel release (Right, 05/07/2019); Colonoscopy (Left, 10/08/2019); Upper gastrointestinal endoscopy (Left, 10/08/2019); Pain management procedure (N/A, 07/08/2021); laryngoscopy (08/06/2022); laryngoscopy (N/A, 8/6/2022); and bronchoscopy (N/A, 8/6/2022).     CURRENT MEDICATIONS       Discharge Medication List as of 8/9/2022 11:50 PM        CONTINUE these medications which have NOT CHANGED    Details   lisinopril (PRINIVIL;ZESTRIL) 10 MG tablet Take 2 tablets by mouth in the morning., Disp-30 tablet, R-3Normal      venlafaxine (EFFEXOR XR) 150 MG extended release capsule TAKE 1 CAPSULE BY MOUTH ONE TIME A DAY, Disp-90 capsule, R-1Normal      empagliflozin (JARDIANCE) 10 MG tablet Take 1 tablet by mouth daily, Disp-90 tablet, R-1Normal      nicotine (NICODERM CQ) 21 MG/24HR Place 1 patch onto the skin every 24 hours, Disp-42 patch, R-0Normal      albuterol sulfate HFA (PROVENTIL HFA) 108 (90 Base) MCG/ACT inhaler Inhale 2 puffs into the lungs every 4 hours as needed for Wheezing, Disp-1 Inhaler, R-0Normal      Misc. Devices (CANE) MISC Disp-1 each, R-0, PrintUse daily      pantoprazole (PROTONIX) 40 MG tablet Take 1 tablet by mouth daily, Disp-90 tablet, R-3Normal             ALLERGIES     is allergic to pcn [penicillins], sulfa antibiotics, and metformin and related. FAMILY HISTORY     is adopted. She was adopted. Family history is unknown by patient. SOCIAL HISTORY      reports that she has been smoking cigarettes. She started smoking about 51 years ago. She has a 45.00 pack-year smoking history. She has never used smokeless tobacco. She reports that she does not drink alcohol and does not use drugs. PHYSICAL EXAM     INITIAL VITALS:  height is 5' 3\" (1.6 m) and weight is 190 lb (86.2 kg). Her tympanic temperature is 98.1 °F (36.7 °C). Her blood pressure is 145/93 (abnormal) and her pulse is 98. Her respiration is 17 and oxygen saturation is 98%.       General: Patient alert nontoxic-appearing female in no apparent distress  HEENT: Head is atraumatic  Respiratory: Patient has nonlabored respirations  Extremity: Examination left upper extremity reveals an area of approximately 3 x 6cm which is erythematous slightly raised slightly firm but no specific induration or fluctuance rest arm is unremarkable with good neurovascular status distally    DIFFERENTIAL DIAGNOSIS/ MDM:     Cellulitis thrombophlebitis    DIAGNOSTIC RESULTS     EKG: All EKG's are interpreted by the Emergency Department Physician who either signs or Co-signs this chart in the absence of a cardiologist.        RADIOLOGY:   I directly visualized the following  images and reviewed the radiologist interpretations:  No orders to display         LABS:  Labs Reviewed - No data to display      EMERGENCY DEPARTMENT COURSE:   Vitals:    Vitals:    08/09/22 2335 08/10/22 0000   BP: (!) 156/101 (!) 145/93   Pulse: (!) 104 98   Resp: 18 17   Temp: 98.1 °F (36.7 °C)    TempSrc: Tympanic    SpO2: 98% 98%   Weight: 190 lb (86.2 kg)    Height: 5' 3\" (1.6 m)      -------------------------  BP: (!) 145/93, Temp: 98.1 °F (36.7 °C), Heart Rate: 98, Resp: 17    Orders Placed This Encounter   Medications    DISCONTD: doxycycline hyclate (VIBRAMYCIN) capsule 100 mg     Order Specific Question:   Antimicrobial Indications     Answer:   Skin and Soft Tissue Infection    doxycycline hyclate (VIBRA-TABS) 100 MG tablet     Sig: Take 1 tablet by mouth in the morning and 1 tablet before bedtime. Do all this for 7 days. Dispense:  14 tablet     Refill:  0    doxycycline monohydrate (MONODOX) capsule 100 mg     Order Specific Question:   Antimicrobial Indications     Answer:   Skin and Soft Tissue Infection           Re-evaluation Notes    Patient has a small area of erythema warmth and minimal swelling to the area we will treat her for presumed cellulitis no indication of DVT return if worse    CRITICAL CARE:   None      CONSULTS:      PROCEDURES:  None    FINAL IMPRESSION      1.  Cellulitis of left upper extremity          DISPOSITION/PLAN   DISPOSITION Decision To Discharge 08/09/2022 11:49:41 PM      Condition on Disposition    Stable    PATIENT REFERRED TO:  Ming Wheat MD  [address] [place] [address]  [phone]    In 1 day        DISCHARGE MEDICATIONS:  Discharge Medication List as of 8/9/2022 11:50 PM        START taking these medications    Details   doxycycline hyclate (VIBRA-TABS) 100 MG tablet Take 1 tablet by mouth in the morning and 1 tablet before bedtime. Do all this for 7 days. , Disp-14 tablet, R-0Normal             (Please note that portions of this note were completed with a voice recognition program.  Efforts were made to edit the dictations but occasionally words are mis-transcribed.)    Chepe Roca MD,, MD, F.A.C.E.P.   Attending Emergency Physician        Chepe Roca MD  08/10/22 7888

## 2022-09-20 DIAGNOSIS — Z12.31 VISIT FOR SCREENING MAMMOGRAM: ICD-10-CM

## 2022-09-20 RX ORDER — PANTOPRAZOLE SODIUM 40 MG/1
40 TABLET, DELAYED RELEASE ORAL DAILY
Qty: 90 TABLET | Refills: 3 | Status: SHIPPED | OUTPATIENT
Start: 2022-09-20

## 2022-09-20 NOTE — TELEPHONE ENCOUNTER
Alayna called requesting a refill of the below medication which has been pended for you:     Requested Prescriptions     Pending Prescriptions Disp Refills    pantoprazole (PROTONIX) 40 MG tablet 90 tablet 3     Sig: Take 1 tablet by mouth daily       Last Appointment Date: 9/20/2021  Next Appointment Date: 10/7/2022    Allergies   Allergen Reactions    Pcn [Penicillins] Anaphylaxis     \"can't breath\"    Sulfa Antibiotics Anaphylaxis     \"can't breathe\"    Metformin And Related Other (See Comments)     Sweating and diarrhea

## 2022-10-07 ENCOUNTER — HOSPITAL ENCOUNTER (OUTPATIENT)
Dept: LAB | Age: 62
Discharge: HOME | End: 2022-10-07
Payer: MEDICAID

## 2022-10-07 ENCOUNTER — OFFICE VISIT (OUTPATIENT)
Dept: FAMILY MEDICINE CLINIC | Age: 62
End: 2022-10-07
Payer: MEDICAID

## 2022-10-07 ENCOUNTER — IMMUNIZATION (OUTPATIENT)
Dept: LAB | Age: 62
End: 2022-10-07
Payer: MEDICAID

## 2022-10-07 ENCOUNTER — TELEPHONE (OUTPATIENT)
Dept: FAMILY MEDICINE CLINIC | Age: 62
End: 2022-10-07

## 2022-10-07 VITALS
HEIGHT: 63 IN | BODY MASS INDEX: 36.68 KG/M2 | OXYGEN SATURATION: 98 % | HEART RATE: 96 BPM | DIASTOLIC BLOOD PRESSURE: 78 MMHG | WEIGHT: 207 LBS | SYSTOLIC BLOOD PRESSURE: 128 MMHG

## 2022-10-07 DIAGNOSIS — E11.42 DIABETIC PERIPHERAL NEUROPATHY (HCC): ICD-10-CM

## 2022-10-07 DIAGNOSIS — E11.42 TYPE 2 DIABETES MELLITUS WITH DIABETIC POLYNEUROPATHY, WITHOUT LONG-TERM CURRENT USE OF INSULIN (HCC): Primary | ICD-10-CM

## 2022-10-07 LAB
ALBUMIN SERPL-MCNC: 4 G/DL (ref 3.5–5.2)
ALBUMIN/GLOBULIN RATIO: 1.2 (ref 1–2.5)
ALP BLD-CCNC: 132 U/L (ref 35–104)
ALT SERPL-CCNC: 174 U/L (ref 5–33)
ANION GAP SERPL CALCULATED.3IONS-SCNC: 10 MMOL/L (ref 9–17)
AST SERPL-CCNC: 155 U/L
BILIRUB SERPL-MCNC: 0.3 MG/DL (ref 0.3–1.2)
BUN BLDV-MCNC: 8 MG/DL (ref 8–23)
BUN/CREAT BLD: 11 (ref 9–20)
CALCIUM SERPL-MCNC: 9.7 MG/DL (ref 8.6–10.4)
CHLORIDE BLD-SCNC: 99 MMOL/L (ref 98–107)
CO2: 28 MMOL/L (ref 20–31)
CREAT SERPL-MCNC: 0.71 MG/DL (ref 0.5–0.9)
CREATININE URINE: 67.6 MG/DL (ref 28–217)
GFR SERPL CREATININE-BSD FRML MDRD: >60 ML/MIN/1.73M2
GLUCOSE BLD-MCNC: 154 MG/DL (ref 70–99)
MICROALBUMIN/CREAT 24H UR: <12 MG/L
MICROALBUMIN/CREAT UR-RTO: NORMAL MCG/MG CREAT
POTASSIUM SERPL-SCNC: 4.2 MMOL/L (ref 3.7–5.3)
SODIUM BLD-SCNC: 137 MMOL/L (ref 135–144)
TOTAL PROTEIN: 7.3 G/DL (ref 6.4–8.3)

## 2022-10-07 PROCEDURE — 3046F HEMOGLOBIN A1C LEVEL >9.0%: CPT | Performed by: FAMILY MEDICINE

## 2022-10-07 PROCEDURE — G8417 CALC BMI ABV UP PARAM F/U: HCPCS | Performed by: FAMILY MEDICINE

## 2022-10-07 PROCEDURE — 3017F COLORECTAL CA SCREEN DOC REV: CPT | Performed by: FAMILY MEDICINE

## 2022-10-07 PROCEDURE — 99214 OFFICE O/P EST MOD 30 MIN: CPT | Performed by: FAMILY MEDICINE

## 2022-10-07 PROCEDURE — 82043 UR ALBUMIN QUANTITATIVE: CPT

## 2022-10-07 PROCEDURE — 1036F TOBACCO NON-USER: CPT | Performed by: FAMILY MEDICINE

## 2022-10-07 PROCEDURE — 36415 COLL VENOUS BLD VENIPUNCTURE: CPT

## 2022-10-07 PROCEDURE — 80061 LIPID PANEL: CPT

## 2022-10-07 PROCEDURE — 99213 OFFICE O/P EST LOW 20 MIN: CPT

## 2022-10-07 PROCEDURE — 83036 HEMOGLOBIN GLYCOSYLATED A1C: CPT

## 2022-10-07 PROCEDURE — 80053 COMPREHEN METABOLIC PANEL: CPT

## 2022-10-07 PROCEDURE — 82570 ASSAY OF URINE CREATININE: CPT

## 2022-10-07 PROCEDURE — G8427 DOCREV CUR MEDS BY ELIG CLIN: HCPCS | Performed by: FAMILY MEDICINE

## 2022-10-07 PROCEDURE — PBSHW COVID-19, MODERNA BIVALENT BOOSTER, (AGE 18Y+), IM, 50 MCG/0.5 ML: Performed by: FAMILY MEDICINE

## 2022-10-07 PROCEDURE — G8484 FLU IMMUNIZE NO ADMIN: HCPCS | Performed by: FAMILY MEDICINE

## 2022-10-07 PROCEDURE — 91313 COVID-19, MODERNA BIVALENT BOOSTER, (AGE 18Y+), IM, 50 MCG/0.5 ML: CPT | Performed by: FAMILY MEDICINE

## 2022-10-07 PROCEDURE — 2022F DILAT RTA XM EVC RTNOPTHY: CPT | Performed by: FAMILY MEDICINE

## 2022-10-07 SDOH — ECONOMIC STABILITY: FOOD INSECURITY: WITHIN THE PAST 12 MONTHS, YOU WORRIED THAT YOUR FOOD WOULD RUN OUT BEFORE YOU GOT MONEY TO BUY MORE.: NEVER TRUE

## 2022-10-07 SDOH — ECONOMIC STABILITY: FOOD INSECURITY: WITHIN THE PAST 12 MONTHS, THE FOOD YOU BOUGHT JUST DIDN'T LAST AND YOU DIDN'T HAVE MONEY TO GET MORE.: NEVER TRUE

## 2022-10-07 ASSESSMENT — PATIENT HEALTH QUESTIONNAIRE - PHQ9
1. LITTLE INTEREST OR PLEASURE IN DOING THINGS: 1
SUM OF ALL RESPONSES TO PHQ QUESTIONS 1-9: 4
8. MOVING OR SPEAKING SO SLOWLY THAT OTHER PEOPLE COULD HAVE NOTICED. OR THE OPPOSITE, BEING SO FIGETY OR RESTLESS THAT YOU HAVE BEEN MOVING AROUND A LOT MORE THAN USUAL: 0
SUM OF ALL RESPONSES TO PHQ9 QUESTIONS 1 & 2: 2
7. TROUBLE CONCENTRATING ON THINGS, SUCH AS READING THE NEWSPAPER OR WATCHING TELEVISION: 1
5. POOR APPETITE OR OVEREATING: 0
2. FEELING DOWN, DEPRESSED OR HOPELESS: 1
SUM OF ALL RESPONSES TO PHQ QUESTIONS 1-9: 4
6. FEELING BAD ABOUT YOURSELF - OR THAT YOU ARE A FAILURE OR HAVE LET YOURSELF OR YOUR FAMILY DOWN: 0
9. THOUGHTS THAT YOU WOULD BE BETTER OFF DEAD, OR OF HURTING YOURSELF: 0
4. FEELING TIRED OR HAVING LITTLE ENERGY: 0
SUM OF ALL RESPONSES TO PHQ QUESTIONS 1-9: 4
3. TROUBLE FALLING OR STAYING ASLEEP: 1
SUM OF ALL RESPONSES TO PHQ QUESTIONS 1-9: 4

## 2022-10-07 ASSESSMENT — ANXIETY QUESTIONNAIRES
4. TROUBLE RELAXING: 0
3. WORRYING TOO MUCH ABOUT DIFFERENT THINGS: 0
2. NOT BEING ABLE TO STOP OR CONTROL WORRYING: 0
5. BEING SO RESTLESS THAT IT IS HARD TO SIT STILL: 0
1. FEELING NERVOUS, ANXIOUS, OR ON EDGE: 0
IF YOU CHECKED OFF ANY PROBLEMS ON THIS QUESTIONNAIRE, HOW DIFFICULT HAVE THESE PROBLEMS MADE IT FOR YOU TO DO YOUR WORK, TAKE CARE OF THINGS AT HOME, OR GET ALONG WITH OTHER PEOPLE: SOMEWHAT DIFFICULT
7. FEELING AFRAID AS IF SOMETHING AWFUL MIGHT HAPPEN: 0

## 2022-10-07 ASSESSMENT — SOCIAL DETERMINANTS OF HEALTH (SDOH): HOW HARD IS IT FOR YOU TO PAY FOR THE VERY BASICS LIKE FOOD, HOUSING, MEDICAL CARE, AND HEATING?: NOT HARD AT ALL

## 2022-10-07 ASSESSMENT — ENCOUNTER SYMPTOMS
CHEST TIGHTNESS: 0
COUGH: 0
SHORTNESS OF BREATH: 0
WHEEZING: 0

## 2022-10-07 NOTE — TELEPHONE ENCOUNTER
Patient needs a 3 month follow up for 40 mins for a cyst removal. No available appointments open. Please call patient to schedule.

## 2022-10-07 NOTE — PROGRESS NOTES
[facility] [address]  [address] [address]  Dept: [phone]  Dept Fax: [phone]  Loc: [phone]    Radha Gregory is a 57 y.o. female who presents today for her medical conditions/complaints as noted below. Radha Gregory is c/o of   Chief Complaint   Patient presents with    Diabetes     Follow up, check up       HPI:     HPI Here today for a follow up of her DM. She has been having trouble with a mass on her vocal chords and she is having surgery on that in Nov. She is seeing Dr. Froylan Hooper. She is also having ranke's nodules biopsied as well. She also has to see an endocrinologist in [facility] as well because of thyroid nodules. She has a dry spot in her throat that causes her to cough. DM: she has not really been checking her blood sugars much. When she was first in the hospital for all of this she was on a lot of prednisone and it shot her sugars up really high. Since then she has been doing better. She is not currently taking anything for her sugars. I had ordered jardiance but it was too expensive. If she eats too much carbs she gets very sleepy. No issues with lightheadedness or dizziness. She quit smoking on Aug 5th.      Past Medical History:   Diagnosis Date    Allergic rhinitis     Arthritis     Asthma     Bronchitis     COPD (chronic obstructive pulmonary disease) ([facility]     Depression     Diabetes mellitus ([facility]     Headache     Hyperlipidemia     Hypertension     Incontinence     Irritable bowel syndrome     Kidney stone     Osteoarthritis     Pneumonia     Type 2 diabetes mellitus without complication ([facility] 2969    Ulcer           Social History     Tobacco Use    Smoking status: Former     Packs/day: 1.50     Years: 30.00     Pack years: 45.00     Types: Cigarettes     Start date: 1971     Quit date: 2022     Years since quittin.1    Smokeless tobacco: Never Substance Use Topics    Alcohol use: No     Current Outpatient Medications   Medication Sig Dispense Refill    pantoprazole (PROTONIX) 40 MG tablet Take 1 tablet by mouth daily 90 tablet 3    lisinopril (PRINIVIL;ZESTRIL) 10 MG tablet Take 2 tablets by mouth in the morning. 30 tablet 3    venlafaxine (EFFEXOR XR) 150 MG extended release capsule TAKE 1 CAPSULE BY MOUTH ONE TIME A DAY 90 capsule 1    albuterol sulfate HFA (PROVENTIL HFA) 108 (90 Base) MCG/ACT inhaler Inhale 2 puffs into the lungs every 4 hours as needed for Wheezing 1 Inhaler 0    Misc. Devices (CANE) MISC Use daily 1 each 0     No current facility-administered medications for this visit. Allergies   Allergen Reactions    Pcn [Penicillins] Anaphylaxis     \"can't breath\"    Sulfa Antibiotics Anaphylaxis     \"can't breathe\"    Metformin And Related Other (See Comments)     Sweating and diarrhea       Subjective:     Review of Systems   Constitutional:  Negative for activity change, appetite change, chills and fever. Eyes:  Negative for visual disturbance. Respiratory:  Negative for cough, chest tightness, shortness of breath and wheezing. Cardiovascular:  Negative for palpitations and leg swelling. Endocrine: Positive for polydipsia and polyuria. Genitourinary:  Negative for difficulty urinating. Neurological:  Negative for syncope and light-headedness. Objective:      Physical Exam  Vitals and nursing note reviewed. Constitutional:       General: She is not in acute distress. Appearance: She is well-developed. Eyes:      Conjunctiva/sclera: Conjunctivae normal.   Neck:      Thyroid: No thyromegaly. Cardiovascular:      Rate and Rhythm: Normal rate and regular rhythm. Heart sounds: Normal heart sounds. No murmur heard. Pulmonary:      Effort: Pulmonary effort is normal. No respiratory distress. Breath sounds: Normal breath sounds. No wheezing.    Musculoskeletal:      Cervical back: Normal range of motion and neck supple. Lymphadenopathy:      Cervical: No cervical adenopathy. Skin:     General: Skin is warm and dry. Findings: No erythema or rash. Neurological:      Mental Status: She is alert and oriented to person, place, and time. Psychiatric:         Mood and Affect: Mood normal.         Behavior: Behavior normal.         Thought Content: Thought content normal.         Judgment: Judgment normal.     Monofilament Exam Reveals:  Pulses: normal  Edema:absent  Skin Lesions:calluses bilatearlly    Right Foot:      Normal sensation at 1, 2, and 3  Diminished sensation at 0  No sensation at 4, 5, 6, 7, 8, 9, and 10       Left Foot:  Normal sensation at 1, 2, 3, 4, 5, 6, 7, and 10  Diminished sensation at 0  No sensation at 8 and 9          /78 (Site: Left Upper Arm, Position: Sitting, Cuff Size: Large Adult)   Pulse 96   Ht 5' 3\" (1.6 m)   Wt 207 lb (93.9 kg)   LMP  (LMP Unknown)   SpO2 98%   BMI 36.67 kg/m²     Assessment:       Diagnosis Orders   1. Type 2 diabetes mellitus with diabetic polyneuropathy, without long-term current use of insulin ([facility])  Basic Metabolic Panel    Hemoglobin A1C      2. Diabetic peripheral neuropathy ([facility])  Comprehensive Metabolic Panel    Lipid Panel    Hemoglobin A1C    Microalbumin, Ur    HM DIABETES FOOT EXAM                Plan:       DM: worsening; she has not had an a1c done in over a year and she has not been taking anything for her sugars. She is open to a glp-1 if her a1c suggests that she needs medication. Her foot exam was done today and she has significant neuropathy with very little light touch sensation in her feet. She will be notified of results and medications adjusted at that time. Return in about 3 months (around 1/7/2023) for sebaecous cyst removal.    Orders Placed This Encounter   Procedures    Comprehensive Metabolic Panel     Please fast for 12 - 14 hours prior to blood draw. May take medication with a sip of water.      Standing Status: Future     Number of Occurrences:   1     Standing Expiration Date:   10/7/2023    Lipid Panel     Standing Status:   Future     Number of Occurrences:   1     Standing Expiration Date:   10/7/2023     Order Specific Question:   Is Patient Fasting?/# of Hours     Answer:   12    Hemoglobin A1C     Standing Status:   Future     Number of Occurrences:   1     Standing Expiration Date:   10/7/2023    Microalbumin, Ur     Standing Status:   Future     Number of Occurrences:   1     Standing Expiration Date:   67/0/1436    Basic Metabolic Panel     Standing Status:   Future     Standing Expiration Date:   10/7/2023    Hemoglobin A1C     Standing Status:   Future     Standing Expiration Date:   10/7/2023     DIABETES FOOT EXAM             Patientgiven educational materials - see patient instructions. Discussed use, benefit,and side effects of prescribed medications. All patient questions answered. Ptvoiced understanding. Reviewed health maintenance. Instructed to continue currentmedications, diet and exercise. Patient agreed with treatment plan. Follow up asdirected.      Electronically signed by Andria Cisneros MD on 10/7/2022 at 1:36 PM

## 2022-10-08 LAB
CHOLESTEROL/HDL RATIO: 4.2
CHOLESTEROL: 209 MG/DL
HDLC SERPL-MCNC: 50 MG/DL
LDL CHOLESTEROL: 137 MG/DL (ref 0–130)
TRIGL SERPL-MCNC: 109 MG/DL

## 2022-10-09 DIAGNOSIS — E11.42 TYPE 2 DIABETES MELLITUS WITH DIABETIC POLYNEUROPATHY, WITHOUT LONG-TERM CURRENT USE OF INSULIN (HCC): Primary | ICD-10-CM

## 2022-10-09 LAB
ESTIMATED AVERAGE GLUCOSE: 171 MG/DL
HBA1C MFR BLD: 7.6 % (ref 4–6)

## 2022-10-09 RX ORDER — DULAGLUTIDE 0.75 MG/.5ML
0.75 INJECTION, SOLUTION SUBCUTANEOUS WEEKLY
Qty: 4 ADJUSTABLE DOSE PRE-FILLED PEN SYRINGE | Refills: 3 | Status: SHIPPED | OUTPATIENT
Start: 2022-10-09

## 2022-10-25 ENCOUNTER — HOSPITAL ENCOUNTER (OUTPATIENT)
Age: 62
Setting detail: SPECIMEN
Discharge: HOME | End: 2022-10-25
Payer: MEDICAID

## 2022-10-25 ENCOUNTER — OFFICE VISIT (OUTPATIENT)
Dept: PRIMARY CARE CLINIC | Age: 62
End: 2022-10-25
Payer: MEDICAID

## 2022-10-25 VITALS
BODY MASS INDEX: 36.14 KG/M2 | SYSTOLIC BLOOD PRESSURE: 160 MMHG | TEMPERATURE: 100 F | OXYGEN SATURATION: 97 % | WEIGHT: 204 LBS | HEART RATE: 98 BPM | DIASTOLIC BLOOD PRESSURE: 82 MMHG

## 2022-10-25 DIAGNOSIS — L95.9 VASCULITIS OF SKIN: ICD-10-CM

## 2022-10-25 DIAGNOSIS — M25.40 JOINT SWELLING: ICD-10-CM

## 2022-10-25 DIAGNOSIS — M25.40 JOINT SWELLING: Primary | ICD-10-CM

## 2022-10-25 LAB
ABSOLUTE EOS #: <0.03 K/UL (ref 0–0.44)
ABSOLUTE IMMATURE GRANULOCYTE: <0.03 K/UL (ref 0–0.3)
ABSOLUTE LYMPH #: 1.03 K/UL (ref 1.1–3.7)
ABSOLUTE MONO #: 0.3 K/UL (ref 0.1–1.2)
ALBUMIN SERPL-MCNC: 3.6 G/DL (ref 3.5–5.2)
ALBUMIN/GLOBULIN RATIO: 0.9 (ref 1–2.5)
ALP BLD-CCNC: 228 U/L (ref 35–104)
ALT SERPL-CCNC: 217 U/L (ref 5–33)
ANION GAP SERPL CALCULATED.3IONS-SCNC: 12 MMOL/L (ref 9–17)
AST SERPL-CCNC: 182 U/L
BASOPHILS # BLD: 1 % (ref 0–2)
BASOPHILS ABSOLUTE: 0.03 K/UL (ref 0–0.2)
BILIRUB SERPL-MCNC: 0.4 MG/DL (ref 0.3–1.2)
BUN BLDV-MCNC: 14 MG/DL (ref 8–23)
BUN/CREAT BLD: 17 (ref 9–20)
C-REACTIVE PROTEIN: 35.2 MG/L (ref 0–5)
CALCIUM SERPL-MCNC: 9.4 MG/DL (ref 8.6–10.4)
CHLORIDE BLD-SCNC: 93 MMOL/L (ref 98–107)
CO2: 23 MMOL/L (ref 20–31)
CREAT SERPL-MCNC: 0.82 MG/DL (ref 0.5–0.9)
EOSINOPHILS RELATIVE PERCENT: 0 % (ref 1–4)
GFR SERPL CREATININE-BSD FRML MDRD: >60 ML/MIN/1.73M2
GLUCOSE BLD-MCNC: 218 MG/DL (ref 70–99)
HCT VFR BLD CALC: 40.8 % (ref 36.3–47.1)
HEMOGLOBIN: 13.8 G/DL (ref 11.9–15.1)
IMMATURE GRANULOCYTES: 0 %
LYMPHOCYTES # BLD: 16 % (ref 24–43)
MCH RBC QN AUTO: 28.3 PG (ref 25.2–33.5)
MCHC RBC AUTO-ENTMCNC: 33.8 G/DL (ref 25.2–33.5)
MCV RBC AUTO: 83.6 FL (ref 82.6–102.9)
MONOCYTES # BLD: 5 % (ref 3–12)
NRBC AUTOMATED: 0 PER 100 WBC
PDW BLD-RTO: 13.1 % (ref 11.8–14.4)
PLATELET # BLD: 187 K/UL (ref 138–453)
PMV BLD AUTO: 10.8 FL (ref 8.1–13.5)
POTASSIUM SERPL-SCNC: 4.6 MMOL/L (ref 3.7–5.3)
RBC # BLD: 4.88 M/UL (ref 3.95–5.11)
SEDIMENTATION RATE, ERYTHROCYTE: 52 MM/HR (ref 0–30)
SEG NEUTROPHILS: 78 % (ref 36–65)
SEGMENTED NEUTROPHILS ABSOLUTE COUNT: 4.88 K/UL (ref 1.5–8.1)
SODIUM BLD-SCNC: 128 MMOL/L (ref 135–144)
TOTAL PROTEIN: 7.7 G/DL (ref 6.4–8.3)
URIC ACID: 5.7 MG/DL (ref 2.4–5.7)
WBC # BLD: 6.3 K/UL (ref 3.5–11.3)

## 2022-10-25 PROCEDURE — G8484 FLU IMMUNIZE NO ADMIN: HCPCS | Performed by: FAMILY MEDICINE

## 2022-10-25 PROCEDURE — 3017F COLORECTAL CA SCREEN DOC REV: CPT | Performed by: FAMILY MEDICINE

## 2022-10-25 PROCEDURE — G8427 DOCREV CUR MEDS BY ELIG CLIN: HCPCS | Performed by: FAMILY MEDICINE

## 2022-10-25 PROCEDURE — 99214 OFFICE O/P EST MOD 30 MIN: CPT | Performed by: FAMILY MEDICINE

## 2022-10-25 PROCEDURE — 86140 C-REACTIVE PROTEIN: CPT

## 2022-10-25 PROCEDURE — 83516 IMMUNOASSAY NONANTIBODY: CPT

## 2022-10-25 PROCEDURE — 84550 ASSAY OF BLOOD/URIC ACID: CPT

## 2022-10-25 PROCEDURE — 86225 DNA ANTIBODY NATIVE: CPT

## 2022-10-25 PROCEDURE — 1036F TOBACCO NON-USER: CPT | Performed by: FAMILY MEDICINE

## 2022-10-25 PROCEDURE — 3074F SYST BP LT 130 MM HG: CPT | Performed by: FAMILY MEDICINE

## 2022-10-25 PROCEDURE — 99214 OFFICE O/P EST MOD 30 MIN: CPT

## 2022-10-25 PROCEDURE — 80053 COMPREHEN METABOLIC PANEL: CPT

## 2022-10-25 PROCEDURE — G8417 CALC BMI ABV UP PARAM F/U: HCPCS | Performed by: FAMILY MEDICINE

## 2022-10-25 PROCEDURE — 3078F DIAST BP <80 MM HG: CPT | Performed by: FAMILY MEDICINE

## 2022-10-25 PROCEDURE — 36415 COLL VENOUS BLD VENIPUNCTURE: CPT

## 2022-10-25 PROCEDURE — 86431 RHEUMATOID FACTOR QUANT: CPT

## 2022-10-25 PROCEDURE — 86038 ANTINUCLEAR ANTIBODIES: CPT

## 2022-10-25 PROCEDURE — 85652 RBC SED RATE AUTOMATED: CPT

## 2022-10-25 PROCEDURE — 85025 COMPLETE CBC W/AUTO DIFF WBC: CPT

## 2022-10-25 RX ORDER — PREDNISONE 20 MG/1
TABLET ORAL
Qty: 15 TABLET | Refills: 0 | Status: SHIPPED | OUTPATIENT
Start: 2022-10-25 | End: 2022-11-02

## 2022-10-25 ASSESSMENT — PATIENT HEALTH QUESTIONNAIRE - PHQ9
SUM OF ALL RESPONSES TO PHQ QUESTIONS 1-9: 0
SUM OF ALL RESPONSES TO PHQ QUESTIONS 1-9: 0
2. FEELING DOWN, DEPRESSED OR HOPELESS: 0
SUM OF ALL RESPONSES TO PHQ QUESTIONS 1-9: 0
SUM OF ALL RESPONSES TO PHQ QUESTIONS 1-9: 0
SUM OF ALL RESPONSES TO PHQ9 QUESTIONS 1 & 2: 0
1. LITTLE INTEREST OR PLEASURE IN DOING THINGS: 0

## 2022-10-25 ASSESSMENT — ENCOUNTER SYMPTOMS
WHEEZING: 0
COUGH: 0
CHEST TIGHTNESS: 0
SHORTNESS OF BREATH: 0

## 2022-10-25 NOTE — PROGRESS NOTES
[facility] [address] [place] [address] [address]  [address] [address]  Dept: [phone]  Dept Fax: [phone]    Trena Colon is a 57 y.o. female who presents today for her medical conditions/complaints as noted below. Trena Colon is c/o of   Chief Complaint   Patient presents with    Joint Pain    Rash    Leg Swelling       HPI:     HPI Here today for joint pain and a rash. She is having severe joint pain and she has a rash on her lower legs. Her left foot is also swollen. All of her joints are hurting. It started in her hands and elbows and now it is in her knees and feet and ankles. Her symptoms started about a week ago. The rash started at the same time. The rash also started out itchy but it is not anymore. She has been having trouble with ehr SI joint on the left side and it caused her foot to usama. She wonders if she sprained her ankle when that happened. Her neck has been hurting as well. The rash did not go anywhere else. She did not put any cream on the rash. She is taking aleve and tylenol for the joint pain.      Past Medical History:   Diagnosis Date    Allergic rhinitis     Arthritis     Asthma     Bronchitis     COPD (chronic obstructive pulmonary disease) ([facility]     Depression     Diabetes mellitus ([facility]     Headache     Hyperlipidemia     Hypertension     Incontinence     Irritable bowel syndrome     Kidney stone     Osteoarthritis     Pneumonia     Type 2 diabetes mellitus without complication [facility] 4527    Ulcer           Social History     Tobacco Use    Smoking status: Former     Packs/day: 1.50     Years: 30.00     Pack years: 45.00     Types: Cigarettes     Start date: 1971     Quit date: 2022     Years since quittin.2    Smokeless tobacco: Never   Substance Use Topics    Alcohol use: No     Current Outpatient Medications   Medication Sig Dispense Refill predniSONE (DELTASONE) 20 MG tablet 1 bid for 5 days, 1 qd for 5 days 15 tablet 0    Dulaglutide (TRULICITY) 5.28 RL/6.2QB SOPN Inject 0.75 mg into the skin once a week 4 Adjustable Dose Pre-filled Pen Syringe 3    pantoprazole (PROTONIX) 40 MG tablet Take 1 tablet by mouth daily 90 tablet 3    venlafaxine (EFFEXOR XR) 150 MG extended release capsule TAKE 1 CAPSULE BY MOUTH ONE TIME A DAY 90 capsule 1    albuterol sulfate HFA (PROVENTIL HFA) 108 (90 Base) MCG/ACT inhaler Inhale 2 puffs into the lungs every 4 hours as needed for Wheezing 1 Inhaler 0    lisinopril (PRINIVIL;ZESTRIL) 10 MG tablet Take 2 tablets by mouth in the morning. (Patient not taking: Reported on 10/25/2022) 30 tablet 3    Misc. Devices (CANE) MISC Use daily 1 each 0     No current facility-administered medications for this visit. Allergies   Allergen Reactions    Pcn [Penicillins] Anaphylaxis     \"can't breath\"    Sulfa Antibiotics Anaphylaxis     \"can't breathe\"    Metformin And Related Other (See Comments)     Sweating and diarrhea       Subjective:     Review of Systems   Constitutional:  Positive for fatigue and fever. Negative for activity change, appetite change and chills. Eyes:  Negative for visual disturbance. Respiratory:  Negative for cough, chest tightness, shortness of breath and wheezing. Cardiovascular:  Negative for chest pain, palpitations and leg swelling. Genitourinary:  Negative for difficulty urinating. Musculoskeletal:  Positive for arthralgias (multiple joints) and joint swelling (right elbow). Neurological:  Negative for dizziness, syncope, weakness, light-headedness and headaches. Objective:      Physical Exam  Vitals and nursing note reviewed. Constitutional:       General: She is not in acute distress. Appearance: She is well-developed.    HENT:      Right Ear: Tympanic membrane, ear canal and external ear normal.      Left Ear: Tympanic membrane, ear canal and external ear normal.   Eyes: Conjunctiva/sclera: Conjunctivae normal.   Neck:      Thyroid: No thyromegaly. Cardiovascular:      Rate and Rhythm: Regular rhythm. Heart sounds: Normal heart sounds. No murmur heard. Pulmonary:      Effort: Pulmonary effort is normal. No respiratory distress. Breath sounds: Normal breath sounds. No wheezing. Musculoskeletal:      Right shoulder: Swelling and tenderness present. Normal range of motion. Arms:       Cervical back: Normal range of motion and neck supple. Left ankle: Swelling present. Tenderness present over the lateral malleolus and medial malleolus. Normal range of motion. Lymphadenopathy:      Cervical: No cervical adenopathy. Skin:     General: Skin is warm and dry. Findings: Petechiae present. No erythema or rash. Neurological:      Mental Status: She is alert and oriented to person, place, and time. Psychiatric:         Mood and Affect: Mood normal.         Behavior: Behavior normal.     BP (!) 160/82 (Site: Left Upper Arm, Position: Sitting, Cuff Size: Large Adult)   Pulse 98   Temp 100 °F (37.8 °C) (Tympanic)   Wt 204 lb (92.5 kg)   LMP  (LMP Unknown)   SpO2 97%   BMI 36.14 kg/m²     Assessment:       Diagnosis Orders   1. Joint swelling  SHIV Screen With Reflex    Sedimentation Rate    C-Reactive Protein    Rheumatoid Factor    Uric Acid    Anti-Neutrophilic Cytoplasmic Antibody    Myeloperoxidase Ab    Comprehensive Metabolic Panel    CBC with Auto Differential      2. Vasculitis of skin  SHIV Screen With Reflex    Sedimentation Rate    C-Reactive Protein    Rheumatoid Factor    Uric Acid    Anti-Neutrophilic Cytoplasmic Antibody    Myeloperoxidase Ab    Comprehensive Metabolic Panel    CBC with Auto Differential                Plan:       Joint swelling/vasculitis: new; unclear of the cause. I suspect this is due to a viral cause but I will look for an autoimmune cause. I also will check a p-anca and c-anca.  For now I will treat her inflammation with prednisone. Return if symptoms worsen or fail to improve. Orders Placed This Encounter   Procedures    SHIV Screen With Reflex     Standing Status:   Future     Number of Occurrences:   1     Standing Expiration Date:   10/25/2023    Sedimentation Rate     Standing Status:   Future     Number of Occurrences:   1     Standing Expiration Date:   10/25/2023    C-Reactive Protein     Standing Status:   Future     Number of Occurrences:   1     Standing Expiration Date:   10/25/2023    Rheumatoid Factor     Standing Status:   Future     Number of Occurrences:   1     Standing Expiration Date:   10/25/2023    Uric Acid     Standing Status:   Future     Number of Occurrences:   1     Standing Expiration Date:   10/25/2023    Anti-Neutrophilic Cytoplasmic Antibody     Standing Status:   Future     Number of Occurrences:   1     Standing Expiration Date:   10/25/2023    Myeloperoxidase Ab     Standing Status:   Future     Number of Occurrences:   1     Standing Expiration Date:   10/25/2023    Comprehensive Metabolic Panel     Standing Status:   Future     Number of Occurrences:   1     Standing Expiration Date:   10/25/2023    CBC with Auto Differential     Standing Status:   Future     Number of Occurrences:   1     Standing Expiration Date:   10/25/2023     Orders Placed This Encounter   Medications    predniSONE (DELTASONE) 20 MG tablet     Si bid for 5 days, 1 qd for 5 days     Dispense:  15 tablet     Refill:  0       Patientgiven educational materials - see patient instructions. Discussed use, benefit,and side effects of prescribed medications. All patient questions answered. Ptvoiced understanding. Reviewed health maintenance. Instructed to continue currentmedications, diet and exercise. Patient agreed with treatment plan. Follow up asdirected.      Electronically signed by Tiffany Appiah MD on 10/25/2022 at 7:43 PM

## 2022-10-27 LAB — RHEUMATOID FACTOR: 86.3 IU/ML

## 2022-10-28 LAB
ANCA MYELOPEROXIDASE: 0.3 AU/ML (ref 0–3.5)
ANCA PROTEINASE 3: 0.7 AU/ML (ref 0–2)
ANTI DNA DOUBLE STRANDED: 4.9 IU/ML
ANTI-NUCLEAR ANTIBODY (ANA): NEGATIVE
ENA ANTIBODIES SCREEN: 0.5 U/ML

## 2022-10-29 LAB — MYELOPEROXIDASE AB: 0 AU/ML (ref 0–19)

## 2022-11-01 DIAGNOSIS — L95.9 VASCULITIS OF SKIN: ICD-10-CM

## 2022-11-01 DIAGNOSIS — M05.721 RHEUMATOID ARTHRITIS INVOLVING RIGHT ELBOW WITH POSITIVE RHEUMATOID FACTOR (HCC): Primary | ICD-10-CM

## 2022-11-02 RX ORDER — PREDNISONE 20 MG/1
TABLET ORAL
Qty: 15 TABLET | Refills: 0 | Status: SHIPPED | OUTPATIENT
Start: 2022-11-02

## 2022-11-04 ENCOUNTER — HOSPITAL ENCOUNTER (OUTPATIENT)
Dept: NON INVASIVE DIAGNOSTICS | Age: 62
Discharge: HOME | End: 2022-11-04
Payer: MEDICAID

## 2022-11-04 ENCOUNTER — HOSPITAL ENCOUNTER (OUTPATIENT)
Dept: LAB | Age: 62
Discharge: HOME | End: 2022-11-04
Payer: MEDICAID

## 2022-11-04 ENCOUNTER — HOSPITAL ENCOUNTER (OUTPATIENT)
Dept: GENERAL RADIOLOGY | Age: 62
Discharge: HOME | End: 2022-11-06
Payer: MEDICAID

## 2022-11-04 DIAGNOSIS — Z01.818 PREOP EXAMINATION: ICD-10-CM

## 2022-11-04 LAB
ABSOLUTE EOS #: <0.03 K/UL (ref 0–0.44)
ABSOLUTE IMMATURE GRANULOCYTE: 0.09 K/UL (ref 0–0.3)
ABSOLUTE LYMPH #: 3.08 K/UL (ref 1.1–3.7)
ABSOLUTE MONO #: 0.92 K/UL (ref 0.1–1.2)
ANION GAP SERPL CALCULATED.3IONS-SCNC: 13 MMOL/L (ref 9–17)
BASOPHILS # BLD: 1 % (ref 0–2)
BASOPHILS ABSOLUTE: 0.09 K/UL (ref 0–0.2)
BUN BLDV-MCNC: 17 MG/DL (ref 8–23)
BUN/CREAT BLD: 18 (ref 9–20)
CALCIUM SERPL-MCNC: 9.6 MG/DL (ref 8.6–10.4)
CHLORIDE BLD-SCNC: 93 MMOL/L (ref 98–107)
CO2: 27 MMOL/L (ref 20–31)
CREAT SERPL-MCNC: 0.92 MG/DL (ref 0.5–0.9)
EKG ATRIAL RATE: 121 BPM
EKG P AXIS: 77 DEGREES
EKG P-R INTERVAL: 116 MS
EKG Q-T INTERVAL: 284 MS
EKG QRS DURATION: 84 MS
EKG QTC CALCULATION (BAZETT): 403 MS
EKG R AXIS: 15 DEGREES
EKG T AXIS: 78 DEGREES
EKG VENTRICULAR RATE: 121 BPM
EOSINOPHILS RELATIVE PERCENT: 0 % (ref 1–4)
GFR SERPL CREATININE-BSD FRML MDRD: >60 ML/MIN/1.73M2
GLUCOSE BLD-MCNC: 209 MG/DL (ref 70–99)
HCT VFR BLD CALC: 45.7 % (ref 36.3–47.1)
HEMOGLOBIN: 15.3 G/DL (ref 11.9–15.1)
IMMATURE GRANULOCYTES: 1 %
LYMPHOCYTES # BLD: 23 % (ref 24–43)
MCH RBC QN AUTO: 28.1 PG (ref 25.2–33.5)
MCHC RBC AUTO-ENTMCNC: 33.5 G/DL (ref 25.2–33.5)
MCV RBC AUTO: 83.9 FL (ref 82.6–102.9)
MONOCYTES # BLD: 7 % (ref 3–12)
NRBC AUTOMATED: 0 PER 100 WBC
PDW BLD-RTO: 12.8 % (ref 11.8–14.4)
PLATELET # BLD: 308 K/UL (ref 138–453)
PMV BLD AUTO: 10.8 FL (ref 8.1–13.5)
POTASSIUM SERPL-SCNC: 4 MMOL/L (ref 3.7–5.3)
RBC # BLD: 5.45 M/UL (ref 3.95–5.11)
SEG NEUTROPHILS: 69 % (ref 36–65)
SEGMENTED NEUTROPHILS ABSOLUTE COUNT: 9.27 K/UL (ref 1.5–8.1)
SODIUM BLD-SCNC: 133 MMOL/L (ref 135–144)
WBC # BLD: 13.5 K/UL (ref 3.5–11.3)

## 2022-11-04 PROCEDURE — 36415 COLL VENOUS BLD VENIPUNCTURE: CPT

## 2022-11-04 PROCEDURE — 93005 ELECTROCARDIOGRAM TRACING: CPT

## 2022-11-04 PROCEDURE — 85025 COMPLETE CBC W/AUTO DIFF WBC: CPT

## 2022-11-04 PROCEDURE — 80048 BASIC METABOLIC PNL TOTAL CA: CPT

## 2022-11-04 PROCEDURE — 71046 X-RAY EXAM CHEST 2 VIEWS: CPT

## 2022-11-16 ENCOUNTER — HOSPITAL ENCOUNTER (OUTPATIENT)
Dept: LAB | Age: 62
Discharge: HOME | End: 2022-11-16
Payer: MEDICAID

## 2022-11-16 LAB
ABSOLUTE EOS #: <0.03 K/UL (ref 0–0.44)
ABSOLUTE IMMATURE GRANULOCYTE: 0.07 K/UL (ref 0–0.3)
ABSOLUTE LYMPH #: 0.9 K/UL (ref 1.1–3.7)
ABSOLUTE MONO #: 0.27 K/UL (ref 0.1–1.2)
ALBUMIN SERPL-MCNC: 3.4 G/DL (ref 3.5–5.2)
ALBUMIN/GLOBULIN RATIO: 0.7 (ref 1–2.5)
ALP BLD-CCNC: 175 U/L (ref 35–104)
ALT SERPL-CCNC: 308 U/L (ref 5–33)
ANION GAP SERPL CALCULATED.3IONS-SCNC: 13 MMOL/L (ref 9–17)
AST SERPL-CCNC: 251 U/L
BACTERIA: ABNORMAL
BASOPHILS # BLD: 0 % (ref 0–2)
BASOPHILS ABSOLUTE: <0.03 K/UL (ref 0–0.2)
BILIRUB SERPL-MCNC: 1.3 MG/DL (ref 0.3–1.2)
BILIRUBIN URINE: NEGATIVE
BUN BLDV-MCNC: 9 MG/DL (ref 8–23)
BUN/CREAT BLD: 10 (ref 9–20)
C-REACTIVE PROTEIN: 59.1 MG/L (ref 0–5)
CALCIUM SERPL-MCNC: 9 MG/DL (ref 8.6–10.4)
CHLORIDE BLD-SCNC: 88 MMOL/L (ref 98–107)
CO2: 24 MMOL/L (ref 20–31)
CREAT SERPL-MCNC: 0.91 MG/DL (ref 0.5–0.9)
EOSINOPHILS RELATIVE PERCENT: 0 % (ref 1–4)
EPITHELIAL CELLS UA: ABNORMAL /HPF (ref 0–5)
GFR SERPL CREATININE-BSD FRML MDRD: >60 ML/MIN/1.73M2
GLUCOSE BLD-MCNC: 439 MG/DL (ref 70–99)
GLUCOSE URINE: ABNORMAL
HCT VFR BLD CALC: 43.8 % (ref 36.3–47.1)
HEMOGLOBIN: 14.3 G/DL (ref 11.9–15.1)
IMMATURE GRANULOCYTES: 1 %
KETONES, URINE: ABNORMAL
LEUKOCYTE ESTERASE, URINE: ABNORMAL
LYMPHOCYTES # BLD: 9 % (ref 24–43)
MCH RBC QN AUTO: 28.1 PG (ref 25.2–33.5)
MCHC RBC AUTO-ENTMCNC: 32.6 G/DL (ref 25.2–33.5)
MCV RBC AUTO: 86.2 FL (ref 82.6–102.9)
MONOCYTES # BLD: 3 % (ref 3–12)
MUCUS: ABNORMAL
NITRITE, URINE: NEGATIVE
NRBC AUTOMATED: 0 PER 100 WBC
PDW BLD-RTO: 13 % (ref 11.8–14.4)
PH UA: 5.5 (ref 5–6)
PLATELET # BLD: 205 K/UL (ref 138–453)
PMV BLD AUTO: 11.3 FL (ref 8.1–13.5)
POTASSIUM SERPL-SCNC: 4.3 MMOL/L (ref 3.7–5.3)
PROTEIN UA: NEGATIVE
RBC # BLD: 5.08 M/UL (ref 3.95–5.11)
RBC UA: ABNORMAL /HPF (ref 0–4)
SEDIMENTATION RATE, ERYTHROCYTE: 85 MM/HR (ref 0–30)
SEG NEUTROPHILS: 87 % (ref 36–65)
SEGMENTED NEUTROPHILS ABSOLUTE COUNT: 9.38 K/UL (ref 1.5–8.1)
SODIUM BLD-SCNC: 125 MMOL/L (ref 135–144)
SPECIFIC GRAVITY UA: 1.01 (ref 1.01–1.02)
TOTAL CK: 28 U/L (ref 26–192)
TOTAL PROTEIN: 8.2 G/DL (ref 6.4–8.3)
TSH SERPL DL<=0.05 MIU/L-ACNC: 0.07 UIU/ML (ref 0.3–5)
URINE HGB: NEGATIVE
UROBILINOGEN, URINE: NORMAL
WBC # BLD: 10.6 K/UL (ref 3.5–11.3)
WBC UA: ABNORMAL /HPF (ref 0–4)

## 2022-11-16 PROCEDURE — 84439 ASSAY OF FREE THYROXINE: CPT

## 2022-11-16 PROCEDURE — 81001 URINALYSIS AUTO W/SCOPE: CPT

## 2022-11-16 PROCEDURE — 86317 IMMUNOASSAY INFECTIOUS AGENT: CPT

## 2022-11-16 PROCEDURE — 86431 RHEUMATOID FACTOR QUANT: CPT

## 2022-11-16 PROCEDURE — 86709 HEPATITIS A IGM ANTIBODY: CPT

## 2022-11-16 PROCEDURE — 86803 HEPATITIS C AB TEST: CPT

## 2022-11-16 PROCEDURE — 84165 PROTEIN E-PHORESIS SERUM: CPT

## 2022-11-16 PROCEDURE — 82164 ANGIOTENSIN I ENZYME TEST: CPT

## 2022-11-16 PROCEDURE — 82085 ASSAY OF ALDOLASE: CPT

## 2022-11-16 PROCEDURE — 86200 CCP ANTIBODY: CPT

## 2022-11-16 PROCEDURE — 82595 ASSAY OF CRYOGLOBULIN: CPT

## 2022-11-16 PROCEDURE — 84443 ASSAY THYROID STIM HORMONE: CPT

## 2022-11-16 PROCEDURE — 84156 ASSAY OF PROTEIN URINE: CPT

## 2022-11-16 PROCEDURE — 80053 COMPREHEN METABOLIC PANEL: CPT

## 2022-11-16 PROCEDURE — 83516 IMMUNOASSAY NONANTIBODY: CPT

## 2022-11-16 PROCEDURE — 86747 PARVOVIRUS ANTIBODY: CPT

## 2022-11-16 PROCEDURE — 84155 ASSAY OF PROTEIN SERUM: CPT

## 2022-11-16 PROCEDURE — 87040 BLOOD CULTURE FOR BACTERIA: CPT

## 2022-11-16 PROCEDURE — 85025 COMPLETE CBC W/AUTO DIFF WBC: CPT

## 2022-11-16 PROCEDURE — 36415 COLL VENOUS BLD VENIPUNCTURE: CPT

## 2022-11-16 PROCEDURE — 84166 PROTEIN E-PHORESIS/URINE/CSF: CPT

## 2022-11-16 PROCEDURE — 87340 HEPATITIS B SURFACE AG IA: CPT

## 2022-11-16 PROCEDURE — 86334 IMMUNOFIX E-PHORESIS SERUM: CPT

## 2022-11-16 PROCEDURE — 84481 FREE ASSAY (FT-3): CPT

## 2022-11-16 PROCEDURE — 86335 IMMUNFIX E-PHORSIS/URINE/CSF: CPT

## 2022-11-16 PROCEDURE — 85652 RBC SED RATE AUTOMATED: CPT

## 2022-11-16 PROCEDURE — 86704 HEP B CORE ANTIBODY TOTAL: CPT

## 2022-11-16 PROCEDURE — 82550 ASSAY OF CK (CPK): CPT

## 2022-11-16 PROCEDURE — 86140 C-REACTIVE PROTEIN: CPT

## 2022-11-17 LAB
ANGIOTENSIN-CONVERTING ENZYME: 6 U/L (ref 8–52)
HAV IGM SER IA-ACNC: NONREACTIVE
HBV SURFACE AB TITR SER: <3.5 MIU/ML
HEPATITIS B CORE TOTAL ANTIBODY: NONREACTIVE
HEPATITIS B SURFACE ANTIGEN: NONREACTIVE
HEPATITIS C ANTIBODY: NONREACTIVE
RHEUMATOID FACTOR: 172.3 IU/ML
T3 FREE: 3.3 PG/ML (ref 2.02–4.43)
THYROXINE, FREE: 1.6 NG/DL (ref 0.93–1.7)

## 2022-11-18 LAB
ALBUMIN (CALCULATED): 3.7 G/DL (ref 3.2–5.2)
ALBUMIN PERCENT: 49 % (ref 45–65)
ALDOLASE: 17.7 U/L (ref 1.2–7.6)
ALPHA 1 PERCENT: 4 % (ref 3–6)
ALPHA 2 PERCENT: 11 % (ref 6–13)
ALPHA-1-GLOBULIN: 0.3 G/DL (ref 0.1–0.4)
ALPHA-2-GLOBULIN: 0.8 G/DL (ref 0.5–0.9)
ANCA MYELOPEROXIDASE: 0.4 AU/ML (ref 0–3.5)
ANCA PROTEINASE 3: <0.7 AU/ML (ref 0–2)
BETA GLOBULIN: 0.8 G/DL (ref 0.5–1.1)
BETA PERCENT: 11 % (ref 11–19)
CCP IGG ANTIBODIES: 1 U/ML (ref 0–7)
GAMMA GLOBULIN %: 25 % (ref 9–20)
GAMMA GLOBULIN: 1.9 G/DL (ref 0.5–1.5)
P E INTERPRETATION, U: NORMAL
PATHOLOGIST: ABNORMAL
PATHOLOGIST: NORMAL
PATHOLOGIST: NORMAL
PROTEIN ELECTROPHORESIS, SERUM: ABNORMAL
SERUM IFX INTERP: NORMAL
SPECIMEN TYPE: NORMAL
TOTAL PROT. SUM,%: 100 % (ref 98–102)
TOTAL PROT. SUM: 7.5 G/DL (ref 6.3–8.2)
TOTAL PROTEIN: 7.5 G/DL (ref 6.4–8.3)
URINE IFX INTERP: NORMAL
URINE IFX SPECIMEN: NORMAL
URINE TOTAL PROTEIN: 19 MG/DL
URINE TOTAL PROTEIN: 19 MG/DL
VOLUME: NORMAL ML

## 2022-11-21 LAB
CRYOGLOBULIN: 0 MG/DL (ref 0–10)
CULTURE: NORMAL
CULTURE: NORMAL
PARVOVIRUS B19 IGG ANTIBODY: 10.73 IV
PARVOVIRUS B19 IGM ANTIBODY: 1.36 IV
SPECIMEN DESCRIPTION: NORMAL
SPECIMEN DESCRIPTION: NORMAL

## 2022-12-20 RX ORDER — LISINOPRIL 10 MG/1
20 TABLET ORAL DAILY
Qty: 60 TABLET | Refills: 3 | Status: SHIPPED | OUTPATIENT
Start: 2022-12-20

## 2022-12-20 NOTE — TELEPHONE ENCOUNTER
Alayna called requesting a refill of the below medication which has been pended for you:     Requested Prescriptions     Pending Prescriptions Disp Refills    lisinopril (PRINIVIL;ZESTRIL) 10 MG tablet 60 tablet 3     Sig: Take 2 tablets by mouth daily       Last Appointment Date: 10/25/2022  Next Appointment Date: 1/10/2023    Allergies   Allergen Reactions    Pcn [Penicillins] Anaphylaxis     \"can't breath\"    Sulfa Antibiotics Anaphylaxis     \"can't breathe\"    Metformin And Related Other (See Comments)     Sweating and diarrhea

## 2023-01-06 ENCOUNTER — APPOINTMENT (OUTPATIENT)
Dept: GENERAL RADIOLOGY | Age: 63
DRG: 310 | End: 2023-01-06
Payer: COMMERCIAL

## 2023-01-06 ENCOUNTER — INITIAL CONSULT (OUTPATIENT)
Dept: VASCULAR SURGERY | Age: 63
End: 2023-01-06
Payer: COMMERCIAL

## 2023-01-06 ENCOUNTER — HOSPITAL ENCOUNTER (INPATIENT)
Age: 63
LOS: 3 days | Discharge: HOME | DRG: 310 | End: 2023-01-09
Attending: EMERGENCY MEDICINE | Admitting: EMERGENCY MEDICINE
Payer: COMMERCIAL

## 2023-01-06 VITALS
WEIGHT: 188 LBS | OXYGEN SATURATION: 100 % | HEIGHT: 63 IN | SYSTOLIC BLOOD PRESSURE: 151 MMHG | RESPIRATION RATE: 17 BRPM | BODY MASS INDEX: 33.31 KG/M2 | DIASTOLIC BLOOD PRESSURE: 91 MMHG | HEART RATE: 64 BPM

## 2023-01-06 DIAGNOSIS — M35.3 POLYMYALGIA RHEUMATICA (HCC): Primary | ICD-10-CM

## 2023-01-06 DIAGNOSIS — I49.3 VENTRICULAR PREMATURE DEPOLARIZATION: Primary | ICD-10-CM

## 2023-01-06 PROBLEM — I49.9 ARRHYTHMIA: Status: ACTIVE | Noted: 2023-01-06

## 2023-01-06 LAB
ADENOVIRUS PCR: NOT DETECTED
ANION GAP SERPL CALCULATED.3IONS-SCNC: 15 MMOL/L (ref 9–17)
BORDETELLA PARAPERTUSSIS: NOT DETECTED
BORDETELLA PERTUSSIS PCR: NOT DETECTED
BUN BLDV-MCNC: 9 MG/DL (ref 8–23)
CALCIUM SERPL-MCNC: 9.1 MG/DL (ref 8.6–10.4)
CHLAMYDIA PNEUMONIAE BY PCR: NOT DETECTED
CHLORIDE BLD-SCNC: 97 MMOL/L (ref 98–107)
CO2: 24 MMOL/L (ref 20–31)
CORONAVIRUS 229E PCR: NOT DETECTED
CORONAVIRUS HKU1 PCR: NOT DETECTED
CORONAVIRUS NL63 PCR: NOT DETECTED
CORONAVIRUS OC43 PCR: NOT DETECTED
CREAT SERPL-MCNC: 0.69 MG/DL (ref 0.5–0.9)
GFR SERPL CREATININE-BSD FRML MDRD: >60 ML/MIN/1.73M2
GLUCOSE BLD-MCNC: 179 MG/DL (ref 70–99)
GLUCOSE BLD-MCNC: 274 MG/DL (ref 65–105)
HCT VFR BLD CALC: 43.4 % (ref 36.3–47.1)
HEMOGLOBIN: 14.1 G/DL (ref 11.9–15.1)
HUMAN METAPNEUMOVIRUS PCR: NOT DETECTED
INFLUENZA A BY PCR: NOT DETECTED
INFLUENZA B BY PCR: NOT DETECTED
MAGNESIUM: 1.9 MG/DL (ref 1.6–2.6)
MCH RBC QN AUTO: 27.8 PG (ref 25.2–33.5)
MCHC RBC AUTO-ENTMCNC: 32.5 G/DL (ref 28.4–34.8)
MCV RBC AUTO: 85.4 FL (ref 82.6–102.9)
MYCOPLASMA PNEUMONIAE PCR: NOT DETECTED
NRBC AUTOMATED: 0 PER 100 WBC
PARAINFLUENZA 1 PCR: NOT DETECTED
PARAINFLUENZA 2 PCR: NOT DETECTED
PARAINFLUENZA 3 PCR: NOT DETECTED
PARAINFLUENZA 4 PCR: NOT DETECTED
PDW BLD-RTO: 14.1 % (ref 11.8–14.4)
PLATELET # BLD: ABNORMAL K/UL (ref 138–453)
PLATELET, FLUORESCENCE: NORMAL K/UL (ref 138–453)
POTASSIUM SERPL-SCNC: 3.3 MMOL/L (ref 3.7–5.3)
PRO-BNP: 56 PG/ML
RBC # BLD: 5.08 M/UL (ref 3.95–5.11)
RESP SYNCYTIAL VIRUS PCR: NOT DETECTED
RHINO/ENTEROVIRUS PCR: NOT DETECTED
SARS-COV-2, PCR: NOT DETECTED
SODIUM BLD-SCNC: 136 MMOL/L (ref 135–144)
SPECIMEN DESCRIPTION: NORMAL
THYROXINE, FREE: 1.34 NG/DL (ref 0.93–1.7)
TROPONIN, HIGH SENSITIVITY: 13 NG/L (ref 0–14)
TROPONIN, HIGH SENSITIVITY: 8 NG/L (ref 0–14)
TSH SERPL DL<=0.05 MIU/L-ACNC: 0.19 UIU/ML (ref 0.3–5)
WBC # BLD: 14.4 K/UL (ref 3.5–11.3)

## 2023-01-06 PROCEDURE — 84439 ASSAY OF FREE THYROXINE: CPT

## 2023-01-06 PROCEDURE — 6360000002 HC RX W HCPCS

## 2023-01-06 PROCEDURE — 71046 X-RAY EXAM CHEST 2 VIEWS: CPT

## 2023-01-06 PROCEDURE — 93005 ELECTROCARDIOGRAM TRACING: CPT

## 2023-01-06 PROCEDURE — G0378 HOSPITAL OBSERVATION PER HR: HCPCS

## 2023-01-06 PROCEDURE — 84443 ASSAY THYROID STIM HORMONE: CPT

## 2023-01-06 PROCEDURE — 2580000003 HC RX 258

## 2023-01-06 PROCEDURE — 83735 ASSAY OF MAGNESIUM: CPT

## 2023-01-06 PROCEDURE — 80048 BASIC METABOLIC PNL TOTAL CA: CPT

## 2023-01-06 PROCEDURE — 3077F SYST BP >= 140 MM HG: CPT | Performed by: SURGERY

## 2023-01-06 PROCEDURE — 3080F DIAST BP >= 90 MM HG: CPT | Performed by: SURGERY

## 2023-01-06 PROCEDURE — 84484 ASSAY OF TROPONIN QUANT: CPT

## 2023-01-06 PROCEDURE — 99285 EMERGENCY DEPT VISIT HI MDM: CPT

## 2023-01-06 PROCEDURE — 1200000000 HC SEMI PRIVATE

## 2023-01-06 PROCEDURE — 0202U NFCT DS 22 TRGT SARS-COV-2: CPT

## 2023-01-06 PROCEDURE — 85055 RETICULATED PLATELET ASSAY: CPT

## 2023-01-06 PROCEDURE — 83880 ASSAY OF NATRIURETIC PEPTIDE: CPT

## 2023-01-06 PROCEDURE — 6370000000 HC RX 637 (ALT 250 FOR IP)

## 2023-01-06 PROCEDURE — 85027 COMPLETE CBC AUTOMATED: CPT

## 2023-01-06 PROCEDURE — 82947 ASSAY GLUCOSE BLOOD QUANT: CPT

## 2023-01-06 PROCEDURE — 99203 OFFICE O/P NEW LOW 30 MIN: CPT | Performed by: SURGERY

## 2023-01-06 PROCEDURE — 2500000003 HC RX 250 WO HCPCS: Performed by: EMERGENCY MEDICINE

## 2023-01-06 RX ORDER — LISINOPRIL 20 MG/1
20 TABLET ORAL DAILY
Status: DISCONTINUED | OUTPATIENT
Start: 2023-01-06 | End: 2023-01-09 | Stop reason: HOSPADM

## 2023-01-06 RX ORDER — ALBUTEROL SULFATE 90 UG/1
2 AEROSOL, METERED RESPIRATORY (INHALATION) EVERY 4 HOURS PRN
Status: DISCONTINUED | OUTPATIENT
Start: 2023-01-06 | End: 2023-01-09 | Stop reason: HOSPADM

## 2023-01-06 RX ORDER — ONDANSETRON 4 MG/1
4 TABLET, ORALLY DISINTEGRATING ORAL EVERY 8 HOURS PRN
Status: DISCONTINUED | OUTPATIENT
Start: 2023-01-06 | End: 2023-01-09 | Stop reason: HOSPADM

## 2023-01-06 RX ORDER — SODIUM CHLORIDE 0.9 % (FLUSH) 0.9 %
5-40 SYRINGE (ML) INJECTION EVERY 12 HOURS SCHEDULED
Status: DISCONTINUED | OUTPATIENT
Start: 2023-01-06 | End: 2023-01-09 | Stop reason: HOSPADM

## 2023-01-06 RX ORDER — SODIUM CHLORIDE 9 MG/ML
INJECTION, SOLUTION INTRAVENOUS PRN
Status: DISCONTINUED | OUTPATIENT
Start: 2023-01-06 | End: 2023-01-09 | Stop reason: HOSPADM

## 2023-01-06 RX ORDER — METOPROLOL TARTRATE 5 MG/5ML
5 INJECTION INTRAVENOUS ONCE
Status: COMPLETED | OUTPATIENT
Start: 2023-01-06 | End: 2023-01-06

## 2023-01-06 RX ORDER — PREDNISONE 1 MG/1
1 TABLET ORAL DAILY
Status: DISCONTINUED | OUTPATIENT
Start: 2023-01-06 | End: 2023-01-09

## 2023-01-06 RX ORDER — INSULIN LISPRO 100 [IU]/ML
0-4 INJECTION, SOLUTION INTRAVENOUS; SUBCUTANEOUS
Status: DISCONTINUED | OUTPATIENT
Start: 2023-01-06 | End: 2023-01-09 | Stop reason: HOSPADM

## 2023-01-06 RX ORDER — POLYETHYLENE GLYCOL 3350 17 G/17G
17 POWDER, FOR SOLUTION ORAL DAILY PRN
Status: DISCONTINUED | OUTPATIENT
Start: 2023-01-06 | End: 2023-01-09 | Stop reason: HOSPADM

## 2023-01-06 RX ORDER — VENLAFAXINE HYDROCHLORIDE 150 MG/1
150 CAPSULE, EXTENDED RELEASE ORAL
Status: DISCONTINUED | OUTPATIENT
Start: 2023-01-07 | End: 2023-01-09 | Stop reason: HOSPADM

## 2023-01-06 RX ORDER — SODIUM CHLORIDE 0.9 % (FLUSH) 0.9 %
5-40 SYRINGE (ML) INJECTION PRN
Status: DISCONTINUED | OUTPATIENT
Start: 2023-01-06 | End: 2023-01-09 | Stop reason: HOSPADM

## 2023-01-06 RX ORDER — ACETAMINOPHEN 325 MG/1
650 TABLET ORAL EVERY 6 HOURS PRN
Status: DISCONTINUED | OUTPATIENT
Start: 2023-01-06 | End: 2023-01-09 | Stop reason: HOSPADM

## 2023-01-06 RX ORDER — ONDANSETRON 2 MG/ML
4 INJECTION INTRAMUSCULAR; INTRAVENOUS EVERY 6 HOURS PRN
Status: DISCONTINUED | OUTPATIENT
Start: 2023-01-06 | End: 2023-01-09 | Stop reason: HOSPADM

## 2023-01-06 RX ORDER — ENOXAPARIN SODIUM 100 MG/ML
40 INJECTION SUBCUTANEOUS DAILY
Status: DISCONTINUED | OUTPATIENT
Start: 2023-01-06 | End: 2023-01-09 | Stop reason: HOSPADM

## 2023-01-06 RX ORDER — ACETAMINOPHEN 650 MG/1
650 SUPPOSITORY RECTAL EVERY 6 HOURS PRN
Status: DISCONTINUED | OUTPATIENT
Start: 2023-01-06 | End: 2023-01-09 | Stop reason: HOSPADM

## 2023-01-06 RX ORDER — INSULIN LISPRO 100 [IU]/ML
0-4 INJECTION, SOLUTION INTRAVENOUS; SUBCUTANEOUS NIGHTLY
Status: DISCONTINUED | OUTPATIENT
Start: 2023-01-06 | End: 2023-01-09 | Stop reason: HOSPADM

## 2023-01-06 RX ORDER — PANTOPRAZOLE SODIUM 40 MG/1
40 TABLET, DELAYED RELEASE ORAL DAILY
Status: DISCONTINUED | OUTPATIENT
Start: 2023-01-06 | End: 2023-01-09 | Stop reason: HOSPADM

## 2023-01-06 RX ADMIN — LISINOPRIL 20 MG: 20 TABLET ORAL at 21:57

## 2023-01-06 RX ADMIN — POTASSIUM BICARBONATE 40 MEQ: 782 TABLET, EFFERVESCENT ORAL at 13:04

## 2023-01-06 RX ADMIN — PREDNISONE 1 MG: 1 TABLET ORAL at 21:57

## 2023-01-06 RX ADMIN — PANTOPRAZOLE SODIUM 40 MG: 40 TABLET, DELAYED RELEASE ORAL at 21:56

## 2023-01-06 RX ADMIN — METOPROLOL TARTRATE 5 MG: 5 INJECTION, SOLUTION INTRAVENOUS at 17:47

## 2023-01-06 RX ADMIN — METOPROLOL TARTRATE 25 MG: 25 TABLET ORAL at 21:57

## 2023-01-06 RX ADMIN — SODIUM CHLORIDE, PRESERVATIVE FREE 10 ML: 5 INJECTION INTRAVENOUS at 21:58

## 2023-01-06 RX ADMIN — ENOXAPARIN SODIUM 40 MG: 100 INJECTION SUBCUTANEOUS at 21:56

## 2023-01-06 ASSESSMENT — ENCOUNTER SYMPTOMS
COLOR CHANGE: 0
CHEST TIGHTNESS: 1
ABDOMINAL PAIN: 0
DIARRHEA: 0
ABDOMINAL DISTENTION: 0
VOMITING: 0
EYE PAIN: 0
COUGH: 0
TROUBLE SWALLOWING: 0
EYE DISCHARGE: 0
COUGH: 0
VOMITING: 0
SHORTNESS OF BREATH: 1
WHEEZING: 0
SHORTNESS OF BREATH: 0
CHOKING: 0
SORE THROAT: 0
NAUSEA: 0
VOICE CHANGE: 0
CHEST TIGHTNESS: 0

## 2023-01-06 ASSESSMENT — HEART SCORE: ECG: 1

## 2023-01-06 ASSESSMENT — PAIN - FUNCTIONAL ASSESSMENT: PAIN_FUNCTIONAL_ASSESSMENT: NONE - DENIES PAIN

## 2023-01-06 ASSESSMENT — LIFESTYLE VARIABLES
HOW MANY STANDARD DRINKS CONTAINING ALCOHOL DO YOU HAVE ON A TYPICAL DAY: PATIENT DOES NOT DRINK
HOW OFTEN DO YOU HAVE A DRINK CONTAINING ALCOHOL: NEVER
HOW MANY STANDARD DRINKS CONTAINING ALCOHOL DO YOU HAVE ON A TYPICAL DAY: PATIENT DOES NOT DRINK
HOW OFTEN DO YOU HAVE A DRINK CONTAINING ALCOHOL: NEVER

## 2023-01-06 NOTE — PROGRESS NOTES
[facility]  [address] [facility] [address] [address]  Dept: [phone]     Patient: Liz Melendez  : 1960  MRN: [id number]  DOS: 2023    Referring provider:  No ref. provider found         HPI:  Liz Melendez is a 60 y.o. female who comes to the office for the first time regarding the need for temporal artery biopsy. Specifically she has been struggling with joint pain and aches and pains everywhere in her body. She has occasional headaches. She had an elevated sed rate and an elevated rheumatology factor as well as other laboratory abnormalities. She has been on steroids since November. She has not had any vision changes and does not get consistent temporal headaches. She has frontal headaches. She was sent here for a temporal artery biopsy but I am truly at a loss as to what this will gain at this point in time.   Past Medical History:   Diagnosis Date    Allergic rhinitis     Arthritis     Asthma     Bronchitis     COPD (chronic obstructive pulmonary disease) ([facility]     Depression     Diabetes mellitus ([facility]     Headache     Hyperlipidemia     Hypertension     Incontinence     Irritable bowel syndrome     Kidney stone     Osteoarthritis     Pneumonia     Type 2 [facility] 9550    Ulcer      Family History   Adopted: Yes   Family history unknown: Yes      Social History     Socioeconomic History    Marital status:      Spouse name: Not on file    Number of children: Not on file    Years of education: Not on file    Highest education level: Not on file   Occupational History    Occupation:      Employer: [employer]   Tobacco Use    Smoking status: Former     Packs/day: 1.50     Years: 30.00     Pack years: 45.00     Types: Cigarettes     Start date: 1971     Quit date: 2022     Years since quittin.4    Smokeless tobacco: Never   Vaping Use    Vaping Use: Never used   Substance and Sexual Activity    Alcohol use: No    Drug use: No    Sexual activity: Yes     Comment:  x 11 yrs   Other Topics Concern    Not on file   Social History Narrative    Not on file     Social Determinants of Health     Financial Resource Strain: Low Risk     Difficulty of Paying Living Expenses: Not hard at all   Food Insecurity: No Food Insecurity    Worried About Running Out of Food in the Last Year: Never true    Ran Out of Food in the Last Year: Never true   Transportation Needs: Not on file   Physical Activity: Not on file   Stress: Not on file   Social Connections: Not on file   Intimate Partner Violence: Not on file   Housing Stability: Not on file      Past Surgical History:   Procedure Laterality Date    BRONCHOSCOPY N/A 8/6/2022    BRONCHOSCOPY RIGID performed by Pako Julian MD at [address] Right 05/07/2019    Right Carpal Tunnel Release performed by Andrew Fenton MD at [address]  7/11/208    Serated polyp (1)    COLONOSCOPY  08/02/2017    [id number]    COLONOSCOPY Left 10/08/2019    COLONOSCOPY WITH BIOPSY performed by Rosaura Swenson MD at [address] [facility], [address] ([address] [facility])  1986    with Right oopherectomy still has left ovary    LAPAROSCOPY  2008    Diagnostic for pain - no findings    LARYNGOSCOPY  08/06/2022    w/excision of mass    LARYNGOSCOPY N/A 8/6/2022    LARYNGOSCOPY WITH EXCISION OF MASS performed by Pako Julian MD at [address] [facility] N/A 07/08/2021    L4-L5 INTERLAMINAR JEAN performed by Ophelia Stoll MD at [address] [facility]  08/02/2017    [id number]    UPPER GASTROINTESTINAL ENDOSCOPY Left 10/08/2019    EGD BIOPSY performed by Rosaura Swenson MD at [facility]      Review of Systems Constitutional:  Negative for activity change, appetite change, fever and unexpected weight change. HENT:  Negative for congestion, trouble swallowing and voice change. Eyes:  Negative for pain and visual disturbance. Respiratory:  Negative for cough, chest tightness and shortness of breath. Cardiovascular:  Negative for chest pain and palpitations. Gastrointestinal:  Negative for abdominal distention, abdominal pain and vomiting. Endocrine: Negative for cold intolerance and heat intolerance. Genitourinary:  Negative for dysuria, flank pain and hematuria. Musculoskeletal:  Positive for arthralgias and myalgias. Negative for joint swelling and neck pain. Skin:  Positive for rash. Negative for color change. Allergic/Immunologic: Negative for immunocompromised state. Neurological:  Negative for syncope, speech difficulty, weakness, numbness and headaches. Hematological:  Negative for adenopathy. Psychiatric/Behavioral:  Negative for agitation and confusion. Vitals:    01/06/23 0929 01/06/23 0930   BP: (!) 152/96 (!) 151/91   Site: Right Upper Arm Left Upper Arm   Position: Sitting Sitting   Cuff Size: Medium Adult Medium Adult   Pulse: 64    Resp: 17    SpO2: 100%    Weight: 188 lb (85.3 kg)    Height: 5' 3\" (1.6 m)           Physical Exam  Constitutional:       General: She is not in acute distress. Appearance: She is not ill-appearing. HENT:      Mouth/Throat:      Mouth: Mucous membranes are moist.      Pharynx: Oropharynx is clear. Eyes:      General: No scleral icterus. Extraocular Movements: Extraocular movements intact. Conjunctiva/sclera: Conjunctivae normal.   Neck:      Thyroid: No thyroid mass or thyromegaly. Cardiovascular:      Comments: There are no carotid bruits. Carotid pulses are normal.  The chest is clear to auscultation bilaterally. The heart has an irregularly irregular rate and rhythm with no murmurs rubs or gallops.   The abdomen is soft without tenderness. There are no masses. There is no evidence of aneurysmal disease. The femoral pulses are palpable and equal bilaterally. The popliteal pulses as well as dorsalis pedis and posterior tibial pulses are all palpable and equal bilaterally. The feet are warm and well perfused without ulceration or gangrene. There is no significant edema. There are no varicosities. She has no temporal pain on either side. Pulmonary:      Effort: No respiratory distress. Breath sounds: No rales. Abdominal:      General: There is no distension. Palpations: There is no mass. Tenderness: There is no abdominal tenderness. There is no guarding or rebound. Musculoskeletal:      Cervical back: No rigidity or tenderness. Lymphadenopathy:      Cervical: No cervical adenopathy. Skin:     Coloration: Skin is not jaundiced. Findings: No rash. Neurological:      General: No focal deficit present. Mental Status: She is alert and oriented to person, place, and time. Cranial Nerves: No cranial nerve deficit. Psychiatric:         Mood and Affect: Mood normal.       Assessment:  1. Polymyalgia rheumatica (HCC)          Plan:  Currently she has an arrhythmia which may or may not be atrial fibrillation. I think she should go to the emergency department to have that evaluated so that she can get managed appropriately. Also at this time I am unenthusiastic about doing temporal artery biopsy after 4 to 8 weeks of steroid treatment. I do not think that this would yield a positive biopsy and I do not think that she has temporal arteritis. I have no data to suggest that temporal artery biopsy would be useful and polymyalgia rheumatica. If a skin vasculitis is of concern then a skin biopsy by dermatology may be more useful. We will send her to the emergency department now and we can see her as needed in the future.     Electronically signed by:  Buffy Rodriguez MD

## 2023-01-06 NOTE — ED TRIAGE NOTES
Pt arrives from Vascular surgery. Pt states the doctor was checking her pulses and asked her about AFIB. Pt has no history of afib. Pt reports elevated HR, shortness of breath and chest pressure. Pt recovering from [place] and has been struggling to recover from that. Pt notices fatigue lately, worsening shortness of breath the last few weeks.

## 2023-01-06 NOTE — ED PROVIDER NOTES
[facility] ED  Emergency Department Encounter  Emergency Medicine Resident     Pt Name:Alayna Francis  MRN: [id number]  Elmergfjosé manuel 1960  Date of evaluation: 1/6/23  PCP:  Arlin Garcia MD      CHIEF COMPLAINT       Chief Complaint   Patient presents with    Irregular Heart Beat       HISTORY OF PRESENT ILLNESS  (Location/Symptom, Timing/Onset, Context/Setting, Quality, Duration, Modifying Factors, Severity.)      Gus Kumar is a 60 y.o. female who presents with   States that she feels that her heart has been beating very quickly since she had covid. She describes being fatigued easily for the last 3 months. She states that housework, [employer] shopping, and walking moderate distances had all made her tired. She states that she is out of breath, and has to sit down or even feels like she cannot stay awake while sitting up. She also notes that she has been having minor pains in her chest not related to exercise or exertion. She also notes some peripheral neuropathy but states that she has a diagnosis of diabetes which on review of her medications appears to be diet controlled only. She was sent to the emergency department after consult with vascular surgeon for artery biopsy for testing of polymyalgia rheumatica. Surgeon noted Vanessa Fore, however this was not noted on arrival.      PAST MEDICAL / SURGICAL / SOCIAL / FAMILY HISTORY      has a past medical history of Allergic rhinitis, Arthritis, Asthma, Bronchitis, COPD (chronic obstructive pulmonary disease) ([address] Depression, Diabetes mellitus ([address] Headache, Hyperlipidemia, Hypertension, Incontinence, Irritable bowel syndrome, Kidney stone, Osteoarthritis, Pneumonia, Type 2 diabetes mellitus without complication ([address] and Ulcer. has a past surgical history that includes Tonsillectomy; Colonoscopy (7/11/208); Meckel's diverticulum excision (1970); Cholecystectomy, laparoscopic (1997);  Hysterectomy, total abdominal (1986); laparoscopy (); Colonoscopy (2017); Upper gastrointestinal endoscopy (2017); Carpal tunnel release (Right, 2019); Colonoscopy (Left, 10/08/2019); Upper gastrointestinal endoscopy (Left, 10/08/2019); Pain management procedure (N/A, 2021); laryngoscopy (2022); laryngoscopy (N/A, 2022); and bronchoscopy (N/A, 2022). Social History     Socioeconomic History    Marital status:      Spouse name: Not on file    Number of children: Not on file    Years of education: Not on file    Highest education level: Not on file   Occupational History    Occupation:      Employer: [employer]   Tobacco Use    Smoking status: Former     Packs/day: 1.50     Years: 30.00     Pack years: 45.00     Types: Cigarettes     Start date: 1971     Quit date: 2022     Years since quittin.4    Smokeless tobacco: Never   Vaping Use    Vaping Use: Never used   Substance and Sexual Activity    Alcohol use: No    Drug use: No    Sexual activity: Yes     Comment:  x 11 yrs   Other Topics Concern    Not on file   Social History Narrative    Not on file     Social Determinants of Health     Financial Resource Strain: Low Risk     Difficulty of Paying Living Expenses: Not hard at all   Food Insecurity: No Food Insecurity    Worried About Running Out of Food in the Last Year: Never true    Ran Out of Food in the Last Year: Never true   Transportation Needs: Not on file   Physical Activity: Not on file   Stress: Not on file   Social Connections: Not on file   Intimate Partner Violence: Not on file   Housing Stability: Not on file       Family History   Adopted: Yes   Family history unknown: Yes       Allergies:  Pcn [penicillins], Sulfa antibiotics, and Metformin and related    Home Medications:  Prior to Admission medications    Medication Sig Start Date End Date Taking?  Authorizing Provider   lisinopril (PRINIVIL;ZESTRIL) 10 MG tablet Take 2 tablets by mouth daily 12/20/22   Sarah Hernandez MD   predniSONE (DELTASONE) 20 MG tablet 1 bid for 5 days, 1 qd for 5 days 11/2/22   Sarah Hernandez MD   Dulaglutide (TRULICITY) 5.27 EL/1.7OO SOPN Inject 0.75 mg into the skin once a week 10/9/22   Sarah Hernandez MD   pantoprazole (PROTONIX) 40 MG tablet Take 1 tablet by mouth daily 9/20/22   Sarah Hernandez MD   venlafaxine (EFFEXOR XR) 150 MG extended release capsule TAKE 1 CAPSULE BY MOUTH ONE TIME A DAY 7/29/22   Sarah Hernandez MD   buPROPion [facility]) 75 MG tablet Take 1 tablet by mouth 2 times daily  Patient not taking: Reported on 7/29/2022 9/20/21 8/7/22  Sarah Hernandez MD   Multiple Vitamin (MULTI-VITAMIN DAILY PO) Take by mouth  Patient not taking: Reported on 8/6/2022 8/7/22  Historical Provider, MD   albuterol sulfate HFA (PROVENTIL HFA) 108 (90 Base) MCG/ACT inhaler Inhale 2 puffs into the lungs every 4 hours as needed for Wheezing 6/14/21   Sarah Hernandez MD   Misc. Devices (CANE) MISC Use daily 6/14/21   Sarah Hernandez MD   fluticasone [facility]) 50 MCG/ACT nasal spray 2 sprays by Nasal route daily  Patient not taking: Reported on 8/6/2022 4/30/21 8/7/22  Sarah Hernandez MD   gabapentin (NEURONTIN) 300 MG capsule Take 1 capsule by mouth 3 times daily for 60 days. Intended supply: 30 days  Patient taking differently: Take 300 mg by mouth 3 times daily. Intended supply: 30 days  Taking PRN 4/30/21 8/7/22  Sarah Hernandez MD   montelukast (SINGULAIR) 10 MG tablet Take 1 tablet by mouth nightly  Patient not taking: No sig reported 4/30/21 8/7/22  Sarah Hernandez MD   Naproxen Sodium (ALEVE PO) Take by mouth daily as needed  8/7/22  Historical Provider, MD         REVIEW OF SYSTEMS       Review of Systems   Constitutional:  Positive for unexpected weight change (downward, 2-3 months, 20 lbs). Negative for activity change, appetite change, chills and fever. HENT:  Negative for sore throat. Eyes:  Negative for discharge and visual disturbance.    Respiratory: Positive for chest tightness and shortness of breath (up the stairs). Negative for cough, choking and wheezing. Cardiovascular:  Positive for palpitations (whole life off and on). Negative for chest pain. Gastrointestinal:  Negative for diarrhea, nausea and vomiting. Skin:  Positive for rash (1 week of hives). Negative for wound. Neurological:  Positive for weakness. Negative for light-headedness. Psychiatric/Behavioral:  Negative for self-injury and suicidal ideas. The patient is nervous/anxious. PHYSICAL EXAM      INITIAL VITALS:   BP (!) 151/86   Pulse (!) 105   Temp 98.2 °F (36.8 °C) (Oral)   Resp 20   Ht 5' 3\" (1.6 m)   Wt 188 lb (85.3 kg)   LMP  (LMP Unknown)   SpO2 94%   BMI 33.30 kg/m²     Physical Exam  Constitutional:       Appearance: Normal appearance. HENT:      Head: Normocephalic. Right Ear: External ear normal.      Left Ear: External ear normal.      Nose: Nose normal.      Mouth/Throat:      Mouth: Mucous membranes are moist.      Pharynx: Oropharynx is clear. Eyes:      Extraocular Movements: Extraocular movements intact. Pupils: Pupils are equal, round, and reactive to light. Cardiovascular:      Rate and Rhythm: Normal rate and regular rhythm. Pulses: Normal pulses. Heart sounds: Normal heart sounds. Pulmonary:      Effort: Pulmonary effort is normal. No respiratory distress. Breath sounds: Normal breath sounds. No wheezing. Abdominal:      General: Abdomen is flat. There is no distension. Palpations: Abdomen is soft. Tenderness: There is no abdominal tenderness. Musculoskeletal:         General: No swelling or tenderness. Normal range of motion. Cervical back: Normal range of motion. Right lower leg: No edema. Left lower leg: No edema. Skin:     General: Skin is warm. Capillary Refill: Capillary refill takes less than 2 seconds. Neurological:      General: No focal deficit present.       Mental Status: She is alert and oriented to person, place, and time. Psychiatric:         Mood and Affect: Mood normal.         Behavior: Behavior normal.         DDX/DIAGNOSTIC RESULTS / EMERGENCY DEPARTMENT COURSE / MDM     Medical Decision Making  This is a 70-year-old woman with 3-month history of fatigue, shortness of breath, and difficulty performing activities of daily living presenting to the emergency department after being referred to by vascular surgery for possible A. fib. Her physical exam was unremarkable however given her past medical history and low potassium on labs as well as PVCs noted on both her EKG and on ultrasound, she will be admitted to the observation unit for further work-up by cardiology. Patient also states that she was to be worked up by endocrinology for possible thyroid dysfunction, this has been ordered and placed that she was unable to make her appointment due to COVID infection. Amount and/or Complexity of Data Reviewed  Labs:  Decision-making details documented in ED Course. Radiology:  Decision-making details documented in ED Course. ECG/medicine tests:  Decision-making details documented in ED Course. EKG  Ventricular rate 116 bpm  QRS duration 78 ms   MD interval 128 ms  QT/QTc 294 408    EKG reads:  Sinus tachycardia with frequent premature ventricular complexes  Possible left atrial enlargement  Cannot rule out anterior infarct  Abnormal ECG    All EKG's are interpreted by the Emergency Department Physician who either signs or Co-signs this chart in the absence of a cardiologist.    EMERGENCY DEPARTMENT COURSE:      ED Course as of 23 1514      1102 Pre troponin heart score:  History: 1  EC  Patient Age: 1  *Risk factors for Atherosclerotic disease: Obesity; Hypertension; Hypercholesterolemia;  Diabetes Mellitus  Risk Factors: 2  Score: 5   [MZ]   1159 Chest x-ray appears unchanged from prior 3 months ago [MZ]   1159 WBC(!): 14.4  Possible infection present, may be viral in nature, or possibly related to steroid use. [MZ]   1227 XR CHEST (2 VW)  IMPRESSION:  No acute intrathoracic process. [MZ]   1227 Troponin, High Sensitivity: 8 [MZ]   1227 Pro-BNP: 56 [MZ]   1227 Magnesium: 1.9 [MZ]   1229 Potassium(!): 3.3 [MZ]   1230 We will correct hypokalemia with 40 mg Effer-K tablet. [MZ]   7031 Discussion with patient yielded that they have been eventually set to be worked up for hypothyroidism, and were unable to make their appointments, added a TSH with reflex to follow-up. [MZ]   9872 Will likely admit to obs unit for further cardiac work-up [MZ]   1332 Dr Deven Almeida performed Cardiac POCUS   Notes good EF on U/S notes that she can see PVCs on ultrasound, otherwise normal  [MZ]      ED Course User Index  [MZ] Leslie Salazar MD         PROCEDURES:  none    CONSULTS:  IP CONSULT TO CARDIOLOGY    CRITICAL CARE:  There was significant risk of life threatening deterioration of patient's condition requiring my direct management. Critical care time <30 minutes, excluding any documented procedures. FINAL IMPRESSION      1. Ventricular premature depolarization          DISPOSITION / PLAN     DISPOSITION Admitted 01/06/2023 01:08:32 PM      PATIENT REFERRED TO:  No follow-up provider specified.     DISCHARGE MEDICATIONS:  New Prescriptions    No medications on file       Leslie Salazar MD  Emergency Medicine Resident    (Please note that portions of thisnote were completed with a voice recognition program.  Efforts were made to edit the dictations but occasionally words are mis-transcribed.)        Leslie Salazar MD  Resident  01/06/23 0266

## 2023-01-06 NOTE — ED PROVIDER NOTES
[facility]     Emergency Department     Faculty Attestation    I performed a history and physical examination of the patient and discussed management with the resident. I reviewed the residents note and agree with the documented findings and plan of care. Any areas of disagreement are noted on the chart. I was personally present for the key portions of any procedures. I have documented in the chart those procedures where I was not present during the key portions. I have reviewed the emergency nurses triage note. I agree with the chief complaint, past medical history, past surgical history, allergies, medications, social and family history as documented unless otherwise noted below. For Physician Assistant/ Nurse Practitioner cases/documentation I have personally evaluated this patient and have completed at least one if not all key elements of the E/M (history, physical exam, and MDM). Additional findings are as noted. Patient referred to ED for possible atrial fibrillation. Was at a vascular appointment this morning pulse was found to be irregular and sent to the ED. No history of A. fib. However does complain of increased fatigue shortness of breath with activities and a chest heaviness for the past several months. No history of A. fib or cardiac disease. On exam well-appearing nontoxic chatting with the nurse. Lungs clear heart on palpation is irregularly irregular but does correspond to PVCs on the monitor rate controlled. No murmurs. No edema extremity pulses intact. EKG does not show atrial fibrillation shows frequent PVCs. However regardless given other symptoms patient is agreeable with work-up for cardiac etiology we will also send thyroid test reevaluate need for admission    EKG interpretation: Sinus rhythm 116. Normal intervals normal axis. There are frequent PVCs none consecutive. No acute ST or T changes.     Critical Care     none    Senia Rodríguez MD, FACEP, [facility]  Attending Emergency  Physician           Baldo Cardona MD  01/06/23 9199

## 2023-01-07 LAB
ANION GAP SERPL CALCULATED.3IONS-SCNC: 9 MMOL/L (ref 9–17)
BUN BLDV-MCNC: 9 MG/DL (ref 8–23)
CALCIUM SERPL-MCNC: 9 MG/DL (ref 8.6–10.4)
CHLORIDE BLD-SCNC: 98 MMOL/L (ref 98–107)
CO2: 24 MMOL/L (ref 20–31)
CREAT SERPL-MCNC: 0.6 MG/DL (ref 0.5–0.9)
GFR SERPL CREATININE-BSD FRML MDRD: >60 ML/MIN/1.73M2
GLUCOSE BLD-MCNC: 189 MG/DL (ref 70–99)
GLUCOSE BLD-MCNC: 240 MG/DL (ref 65–105)
GLUCOSE BLD-MCNC: 270 MG/DL (ref 65–105)
GLUCOSE BLD-MCNC: 337 MG/DL (ref 65–105)
MAGNESIUM: 1.9 MG/DL (ref 1.6–2.6)
POTASSIUM SERPL-SCNC: 5.2 MMOL/L (ref 3.7–5.3)
SODIUM BLD-SCNC: 131 MMOL/L (ref 135–144)

## 2023-01-07 PROCEDURE — 83735 ASSAY OF MAGNESIUM: CPT

## 2023-01-07 PROCEDURE — 36415 COLL VENOUS BLD VENIPUNCTURE: CPT

## 2023-01-07 PROCEDURE — 1200000000 HC SEMI PRIVATE

## 2023-01-07 PROCEDURE — 82947 ASSAY GLUCOSE BLOOD QUANT: CPT

## 2023-01-07 PROCEDURE — 80048 BASIC METABOLIC PNL TOTAL CA: CPT

## 2023-01-07 PROCEDURE — 6370000000 HC RX 637 (ALT 250 FOR IP)

## 2023-01-07 PROCEDURE — 93005 ELECTROCARDIOGRAM TRACING: CPT | Performed by: EMERGENCY MEDICINE

## 2023-01-07 PROCEDURE — 6360000002 HC RX W HCPCS

## 2023-01-07 PROCEDURE — 2580000003 HC RX 258

## 2023-01-07 RX ORDER — DEXTROSE MONOHYDRATE 100 MG/ML
INJECTION, SOLUTION INTRAVENOUS CONTINUOUS PRN
Status: DISCONTINUED | OUTPATIENT
Start: 2023-01-07 | End: 2023-01-09 | Stop reason: HOSPADM

## 2023-01-07 RX ADMIN — VENLAFAXINE HYDROCHLORIDE 150 MG: 150 CAPSULE, EXTENDED RELEASE ORAL at 11:03

## 2023-01-07 RX ADMIN — SODIUM CHLORIDE, PRESERVATIVE FREE 10 ML: 5 INJECTION INTRAVENOUS at 20:34

## 2023-01-07 RX ADMIN — INSULIN LISPRO 3 UNITS: 100 INJECTION, SOLUTION INTRAVENOUS; SUBCUTANEOUS at 16:51

## 2023-01-07 RX ADMIN — LISINOPRIL 20 MG: 20 TABLET ORAL at 11:03

## 2023-01-07 RX ADMIN — SODIUM CHLORIDE, PRESERVATIVE FREE 10 ML: 5 INJECTION INTRAVENOUS at 11:04

## 2023-01-07 RX ADMIN — ENOXAPARIN SODIUM 40 MG: 100 INJECTION SUBCUTANEOUS at 11:03

## 2023-01-07 RX ADMIN — ACETAMINOPHEN 650 MG: 325 TABLET ORAL at 16:56

## 2023-01-07 RX ADMIN — PREDNISONE 1 MG: 1 TABLET ORAL at 11:04

## 2023-01-07 RX ADMIN — METOPROLOL TARTRATE 25 MG: 25 TABLET ORAL at 11:03

## 2023-01-07 RX ADMIN — PANTOPRAZOLE SODIUM 40 MG: 40 TABLET, DELAYED RELEASE ORAL at 11:04

## 2023-01-07 RX ADMIN — INSULIN LISPRO 2 UNITS: 100 INJECTION, SOLUTION INTRAVENOUS; SUBCUTANEOUS at 11:19

## 2023-01-07 RX ADMIN — METOPROLOL TARTRATE 25 MG: 25 TABLET ORAL at 20:34

## 2023-01-07 RX ADMIN — POTASSIUM BICARBONATE 40 MEQ: 782 TABLET, EFFERVESCENT ORAL at 11:03

## 2023-01-07 NOTE — PROGRESS NOTES
OBS/CDU   RESIDENT NOTE      Patients PCP is:  Arlin Garcia MD        SUBJECTIVE      No acute events overnight. Has been able to tolerate a full diet without nausea or vomiting. The patient is urinating on his own and is passing flatus. Denies fever, chills, nausea, vomiting, chest pain, shortness of breath, abdominal pain, focal weakness, numbness, tingling, urinary/bowel symptoms, vision changes, visual hallucinations, or headache. Patient is feeling well this morning, no current complaints. PHYSICAL EXAM      General: NAD, AO X 3  Heent: EMOI, PERRL  Neck: SUPPLE, NO JVD  Cardiovascular: RRR, S1S2  Pulmonary: CTAB, NO SOB  Abdomen: SOFT, NTTP, ND, +BS  Extremities: +2/4 PULSES DISTAL, NO SWELLING   Neuro / Psych: NO NUMBNESS OR TINGLING, MENTATION AT BASELINE    PERTINENT TEST /EXAMS      I have reviewed all available laboratory results.     MEDICATIONS CURRENT   potassium bicarb-citric acid (EFFER-K) effervescent tablet 40 mEq, Daily  sodium chloride flush 0.9 % injection 5-40 mL, 2 times per day  sodium chloride flush 0.9 % injection 5-40 mL, PRN  0.9 % sodium chloride infusion, PRN  enoxaparin (LOVENOX) injection 40 mg, Daily  ondansetron (ZOFRAN-ODT) disintegrating tablet 4 mg, Q8H PRN   Or  ondansetron (ZOFRAN) injection 4 mg, Q6H PRN  polyethylene glycol (GLYCOLAX) packet 17 g, Daily PRN  acetaminophen (TYLENOL) tablet 650 mg, Q6H PRN   Or  acetaminophen (TYLENOL) suppository 650 mg, Q6H PRN  albuterol sulfate HFA (PROVENTIL;VENTOLIN;PROAIR) 108 (90 Base) MCG/ACT inhaler 2 puff, Q4H PRN  Dulaglutide SOPN 0.75 mg (Patient Supplied), Weekly  lisinopril (PRINIVIL;ZESTRIL) tablet 20 mg, Daily  pantoprazole (PROTONIX) tablet 40 mg, Daily  predniSONE (DELTASONE) tablet 1 mg, Daily  venlafaxine (EFFEXOR XR) extended release capsule 150 mg, Daily with breakfast  insulin lispro (HUMALOG) injection vial 0-4 Units, TID WC  insulin lispro (HUMALOG) injection vial 0-4 Units, Nightly  metoprolol tartrate (LOPRESSOR) tablet 25 mg, BID        All medication charted and reviewed. CONSULTS      IP CONSULT TO CARDIOLOGY    ASSESSMENT/PLAN       Kiera Bailey is a 60 y.o. female who presents with sinus tachycardia and frequent PVCs    Patient with sinus tachycardia with unifocal PVCs. Admitted for cardiology consultation and reevaluation  Check thyroid function. Patient for beta-blocker. Ongoing monitoring. Cardiology recommends holter monitor and ECHO. Both are ordered. Will f/u with ECHO results. If no abnormality patient will be okay for discharge with holter monitor and outpatient follow up with cardiology. Continue home medications and pain control  Monitor vitals, labs, and imaging    DISPO: pending ECHO results    --  Arnulfo López MD  Emergency Medicine Resident Physician     This dictation was generated by voice recognition computer software. Although all attempts are made to edit the dictation for accuracy, there may be errors in the transcription that are not intended.

## 2023-01-07 NOTE — H&P
[address] [facility]  CDU / OBSERVATION ENCOUNTER  ATTENDING NOTE     Pt Name: Joseph Dweitt  MRN: [id number]  [place] 1960  Date of evaluation: 1/6/23  Patient's PCP is :  Gera Peterson MD    CHIEF COMPLAINT       Chief Complaint   Patient presents with    Irregular Heart Beat         HISTORY OF PRESENT ILLNESS    Joseph Dewitt is a 60 y.o. female who presents with racing heartbeat. Patient has been fatigued easily for the last 3 months. Patient is unable to do simple things like housework and [employer] shopping. Patient has been getting tired walking moderate distances. Patient states that she feels she cannot stay awake and sitting up. Patient is having moderate pain to her chest.  These do not seem related to exertion. Patient notes some peripheral neuropathy. Patient does have a history of diabetes. Patient was sent to the emergency department after vascular surgeon felt he noted A. fib. Patient was found to moderately have frequent unifocal PVCs. Location/Symptom: Easy fatigability and palpitations  Timing/Onset: Over several weeks  Provocation: Unclear  Quality: Easy fatigability and slight shortness of breath. No chest pain  Radiation: None  Severity: Mild to moderate. Persistent. Timing/Duration: Weeks  Modifying Factors: Unclear. REVIEW OF SYSTEMS        Constitutional:  Positive for unexpected weight change (downward, 2-3 months, 20 lbs). Negative for activity change, appetite change, chills and fever. HENT:  Negative for sore throat. Eyes:  Negative for discharge and visual disturbance. Respiratory:  Positive for chest tightness and shortness of breath (up the stairs). Negative for cough, choking and wheezing. Cardiovascular:  Positive for palpitations (whole life off and on). Negative for chest pain. Gastrointestinal:  Negative for diarrhea, nausea and vomiting. Skin:  Positive for rash (1 week of hives). Negative for wound.    Neurological: Positive for weakness. Negative for light-headedness. Psychiatric/Behavioral:  Negative for self-injury and suicidal ideas. The patient is nervous/anxious. (PQRS) Advance directives on face sheet per hospital policy. No change unless specifically mentioned in chart    PAST MEDICAL HISTORY    has a past medical history of Allergic rhinitis, Arthritis, Asthma, Bronchitis, COPD (chronic obstructive pulmonary disease) ([address] Depression, Diabetes mellitus ([address] Headache, Hyperlipidemia, Hypertension, Incontinence, Irritable bowel syndrome, Kidney stone, Osteoarthritis, Pneumonia, Type 2 diabetes mellitus without complication ([address] and Ulcer. I have reviewed the past medical history with the patient and it is  pertinent to this complaint. SURGICAL HISTORY      has a past surgical history that includes Tonsillectomy; Colonoscopy (7/11/208); Meckel's diverticulum excision (1970); Cholecystectomy, laparoscopic (1997); Hysterectomy, total abdominal (1986); laparoscopy (2008); Colonoscopy (08/02/2017); Upper gastrointestinal endoscopy (08/02/2017); Carpal tunnel release (Right, 05/07/2019); Colonoscopy (Left, 10/08/2019); Upper gastrointestinal endoscopy (Left, 10/08/2019); Pain management procedure (N/A, 07/08/2021); laryngoscopy (08/06/2022); laryngoscopy (N/A, 8/6/2022); and bronchoscopy (N/A, 8/6/2022). I have reviewed and agree with Surgical History entered and it is  pertinent to this complaint.      CURRENT MEDICATIONS     potassium bicarb-citric acid (EFFER-K) effervescent tablet 40 mEq, Daily  sodium chloride flush 0.9 % injection 5-40 mL, 2 times per day  sodium chloride flush 0.9 % injection 5-40 mL, PRN  0.9 % sodium chloride infusion, PRN  enoxaparin (LOVENOX) injection 40 mg, Daily  ondansetron (ZOFRAN-ODT) disintegrating tablet 4 mg, Q8H PRN   Or  ondansetron (ZOFRAN) injection 4 mg, Q6H PRN  polyethylene glycol (GLYCOLAX) packet 17 g, Daily PRN  acetaminophen (TYLENOL) tablet 650 mg, Q6H PRN Or  acetaminophen (TYLENOL) suppository 650 mg, Q6H PRN  albuterol sulfate HFA (PROVENTIL;VENTOLIN;PROAIR) 108 (90 Base) MCG/ACT inhaler 2 puff, Q4H PRN  Dulaglutide SOPN 0.75 mg (Patient Supplied), Weekly  lisinopril (PRINIVIL;ZESTRIL) tablet 20 mg, Daily  pantoprazole (PROTONIX) tablet 40 mg, Daily  predniSONE (DELTASONE) tablet 1 mg, Daily  [START ON 1/7/2023] venlafaxine (EFFEXOR XR) extended release capsule 150 mg, Daily with breakfast  insulin lispro (HUMALOG) injection vial 0-4 Units, TID WC  insulin lispro (HUMALOG) injection vial 0-4 Units, Nightly  metoprolol tartrate (LOPRESSOR) tablet 25 mg, BID        All medication charted and reviewed. ALLERGIES     is allergic to pcn [penicillins], sulfa antibiotics, and metformin and related. FAMILY HISTORY     is adopted. She was adopted. Family history is unknown by patient. The patient denies any pertinent family history. I have reviewed and agree with the family history entered. I have reviewed the Family History and it is not significant to the case    SOCIAL HISTORY      reports that she quit smoking about 5 months ago. Her smoking use included cigarettes. She started smoking about 52 years ago. She has a 45.00 pack-year smoking history. She has never used smokeless tobacco. She reports that she does not drink alcohol and does not use drugs. I have reviewed and agree with all Social.  There are no  concerns for substance abuse/use. PHYSICAL EXAM     INITIAL VITALS:  height is 5' 3\" (1.6 m) and weight is 188 lb (85.3 kg). Her oral temperature is 98.2 °F (36.8 °C). Her blood pressure is 131/63 and her pulse is 89. Her respiration is 20 and oxygen saturation is 93%.       CONSTITUTIONAL: Patient has slight anxiety   HEAD: normocephalic, atraumatic   EYES: PERRLA, EOMI    ENT: moist mucous membranes, uvula midline   NECK: supple, symmetric   BACK: symmetric   LUNGS: clear to auscultation bilaterally   CARDIOVASCULAR: regular rate and rhythm, no murmurs, rubs or gallops   ABDOMEN: soft, non-tender, non-distended with normal active bowel sounds   NEUROLOGIC:  MAEx4, no focal sensory or motor deficits   MUSCULOSKELETAL: no clubbing, cyanosis or edema   SKIN: no rash or wounds       DIFFERENTIAL DIAGNOSIS/MDM:     For medical decision making and ED chart:     Medical Decision Making  This is a 50-year-old woman with 3-month history of fatigue, shortness of breath, and difficulty performing activities of daily living presenting to the emergency department after being referred to by vascular surgery for possible A. fib. Her physical exam was unremarkable however given her past medical history and low potassium on labs as well as PVCs noted on both her EKG and on ultrasound, she will be admitted to the observation unit for further work-up by cardiology. Patient also states that she was to be worked up by endocrinology for possible thyroid dysfunction, this has been ordered and placed that she was unable to make her appointment due to COVID infection.          DIAGNOSTIC RESULTS     EKG: All EKG's are interpreted by the Observation Physician who either signs or Co-signs this chart in the absence of a cardiologist.    EKG Interpretation    Interpreted by observation physician    Rhythm: normal sinus   Rate: tachycardia  Axis: normal  Ectopy: premature ventricular contractions (frequent)  Conduction: normal  ST Segments: no acute change  T Waves: no acute change  Q Waves: none    Clinical Impression: Sinus tachycardia with frequent unifocal PVCs    Kennedy Villafana MD         RADIOLOGY:   I directly visualized the following  images and reviewed the radiologist interpretations:    XR CHEST (2 VW)    Result Date: 1/6/2023  EXAMINATION: TWO XRAY VIEWS OF THE CHEST 1/6/2023 11:39 am COMPARISON: 11/04/2022 HISTORY: ORDERING SYSTEM PROVIDED HISTORY: chest tightness TECHNOLOGIST PROVIDED HISTORY: chest tightness Reason for Exam: tightness in chest x 4months FINDINGS: Cardiomediastinal silhouette and pulmonary vasculature are within normal limits. No focal airspace consolidation, pneumothorax, or pleural effusion. No free air beneath the diaphragm. No acute osseous abnormality. No acute intrathoracic process. LABS:  I have reviewed and interpreted all available lab results. Labs Reviewed   CBC - Abnormal; Notable for the following components:       Result Value    WBC 14.4 (*)     All other components within normal limits   BASIC METABOLIC PANEL - Abnormal; Notable for the following components:    Glucose 179 (*)     Potassium 3.3 (*)     Chloride 97 (*)     All other components within normal limits   TSH WITH REFLEX - Abnormal; Notable for the following components:    TSH 0.19 (*)     All other components within normal limits   RESPIRATORY PANEL, MOLECULAR, WITH COVID-19   TROPONIN   TROPONIN   MAGNESIUM   BRAIN NATRIURETIC PEPTIDE   IMMATURE PLATELET FRACTION   T4, FREE         CDU IMPRESSION / PLAN      Alayna Collazo is a 60 y.o. female who presents with sinus tachycardia with frequent focal PVCs. Patient with sinus tachycardia with unifocal PVCs. Admitted for cardiology consultation and reevaluation  Check thyroid function. Patient for beta-blocker. Ongoing monitoring. Reevaluation after cardiology assessment  Continue home medications and pain control  Monitor vitals, labs, and imaging  DISPO: pending consults and clinical improvement    CONSULTS:    IP CONSULT TO CARDIOLOGY    PROCEDURES:  Not indicated        PATIENT REFERRED TO:    No follow-up provider specified. --  Merritt Bob MD   Emergency Medicine Attending    This dictation was generated by voice recognition computer software. Although all attempts are made to edit the dictation for accuracy, there may be errors in the transcription that are not intended.

## 2023-01-07 NOTE — PROGRESS NOTES
[address]  CDU / OBSERVATION ENCOUNTER  ATTENDING NOTE       I performed a history and physical examination of the patient and discussed management with the resident or midlevel provider. I reviewed the resident or midlevel provider's note and agree with the documented findings and plan of care. Any areas of disagreement are noted on the chart. I was personally present for the key portions of any procedures. I have documented in the chart those procedures where I was not present during the key portions. I have reviewed the nurses notes. I agree with the chief complaint, past medical history, past surgical history, allergies, medications, social and family history as documented unless otherwise noted below. The Family history, social history, and ROS are effectively unchanged since admission unless noted elsewhere in the chart. Complaints of palpitations. Patient had unifocal PVCs noted yesterday. Patient is admitted for cardiology evaluation. Appreciate input. Patient was more comfortable overnight. Patient improved after beta-blockers last night with resolution of sinus tachycardia after administration. Patient has had normal heart rates overnight and is currently comfortable. Appreciate cardiology input. Patient's chart reviewed. Patient does run tachycardic on a regular basis. For reevaluation after echo with anticipated discharge if negative.     Lily Loredo MD  Attending Emergency  Physician

## 2023-01-07 NOTE — CONSULTS
[facility]   Consult Note         Today's Date: 1/7/2023  Patient Name: Joseph Dewitt  Date of admission: 1/6/2023 10:25 AM  Patient's age: 60 y. o., 1960  Admission Dx: Arrhythmia [I49.9]  Ventricular premature depolarization [I49.3]    Reason for Consult:  Cardiac evaluation    Requesting Physician: Geo Chand MD    REASON FOR CONSULT:  Arrhythmia . Concern for A.fib in vascular office. History Obtained From:  Patient, chart, staff, records    HISTORY OF PRESENT ILLNESS:      Briefly, patient is 60year-old female who initially went to the vascular surgery office for possible temporal artery biopsy, patient's pulse was found to be irregular and EKG was concerning for arrhythmia. Patient was sent to the ED. Patient does not have any history of A. fib or cardiac disease. However patient occasionally reports that she feels her heart racing almost every day, has been fatigued for the last 3 to 4 months during work. Past Medical History:   has a past medical history of Allergic rhinitis, Arthritis, Asthma, Bronchitis, COPD (chronic obstructive pulmonary disease) ([address] Depression, Diabetes mellitus ([address] Headache, Hyperlipidemia, Hypertension, Incontinence, Irritable bowel syndrome, Kidney stone, Osteoarthritis, Pneumonia, Type 2 diabetes mellitus without complication ([address] and Ulcer. Past Surgical History:   has a past surgical history that includes Tonsillectomy; Colonoscopy (7/11/208); Meckel's diverticulum excision (1970); Cholecystectomy, laparoscopic (1997); Hysterectomy, total abdominal (1986); laparoscopy (2008); Colonoscopy (08/02/2017); Upper gastrointestinal endoscopy (08/02/2017); Carpal tunnel release (Right, 05/07/2019); Colonoscopy (Left, 10/08/2019); Upper gastrointestinal endoscopy (Left, 10/08/2019); Pain management procedure (N/A, 07/08/2021); laryngoscopy (08/06/2022); laryngoscopy (N/A, 8/6/2022); and bronchoscopy (N/A, 8/6/2022).      Home Medications:    Prior to Admission medications    Medication Sig Start Date End Date Taking? Authorizing Provider   lisinopril (PRINIVIL;ZESTRIL) 10 MG tablet Take 2 tablets by mouth daily 12/20/22   Priyanka Wolf MD   predniSONE (DELTASONE) 20 MG tablet 1 bid for 5 days, 1 qd for 5 days 11/2/22   Priyanka Wolf MD   Dulaglutide (TRULICITY) 9.79 IJ/8.5ZP SOPN Inject 0.75 mg into the skin once a week 10/9/22   Priyanka Wolf MD   pantoprazole (PROTONIX) 40 MG tablet Take 1 tablet by mouth daily 9/20/22   Priyanka Wolf MD   venlafaxine (EFFEXOR XR) 150 MG extended release capsule TAKE 1 CAPSULE BY MOUTH ONE TIME A DAY 7/29/22   Priyanka Wolf MD   buPROPion [facility]) 75 MG tablet Take 1 tablet by mouth 2 times daily  Patient not taking: Reported on 7/29/2022 9/20/21 8/7/22  Priyanka Wolf MD   Multiple Vitamin (MULTI-VITAMIN DAILY PO) Take by mouth  Patient not taking: Reported on 8/6/2022 8/7/22  Historical Provider, MD   albuterol sulfate HFA (PROVENTIL HFA) 108 (90 Base) MCG/ACT inhaler Inhale 2 puffs into the lungs every 4 hours as needed for Wheezing 6/14/21   Priyanka Wolf MD   Misc. Devices (CANE) MISC Use daily 6/14/21   Priyanka Wolf MD   fluticasone [facility]) 50 MCG/ACT nasal spray 2 sprays by Nasal route daily  Patient not taking: Reported on 8/6/2022 4/30/21 8/7/22  Priyanka Wolf MD   gabapentin (NEURONTIN) 300 MG capsule Take 1 capsule by mouth 3 times daily for 60 days. Intended supply: 30 days  Patient taking differently: Take 300 mg by mouth 3 times daily.  Intended supply: 30 days  Taking PRN 4/30/21 8/7/22  Priyanka Wolf MD   montelukast (SINGULAIR) 10 MG tablet Take 1 tablet by mouth nightly  Patient not taking: No sig reported 4/30/21 8/7/22  Priyanka Wolf MD   Naproxen Sodium (ALEVE PO) Take by mouth daily as needed  8/7/22  Historical Provider, MD       potassium bicarb-citric acid, 40 mEq, Oral, Daily    sodium chloride flush, 5-40 mL, IntraVENous, 2 times per day enoxaparin, 40 mg, SubCUTAneous, Daily    Dulaglutide, 0.75 mg, SubCUTAneous, Weekly    lisinopril, 20 mg, Oral, Daily    pantoprazole, 40 mg, Oral, Daily    predniSONE, 1 mg, Oral, Daily    venlafaxine, 150 mg, Oral, Daily with breakfast    insulin lispro, 0-4 Units, SubCUTAneous, TID WC    insulin lispro, 0-4 Units, SubCUTAneous, Nightly    metoprolol tartrate, 25 mg, Oral, BID      Allergies:  Pcn [penicillins], Sulfa antibiotics, and Metformin and related    Social History:   reports that she quit smoking about 5 months ago. Her smoking use included cigarettes. She started smoking about 52 years ago. She has a 45.00 pack-year smoking history. She has never used smokeless tobacco. She reports that she does not drink alcohol and does not use drugs. Family History: She was adopted. Family history is unknown by patient. No h/o sudden cardiac death. REVIEW OF SYSTEMS:    Constitutional: there has been no unanticipated weight loss. There's been No change in energy level, No change in activity level. Eyes: No visual changes or diplopia. No scleral icterus. ENT: No Headaches, hearing loss or vertigo. No mouth sores or sore throat. Cardiovascular: per HPI  Respiratory: per HPI  Gastrointestinal: No abdominal pain, appetite loss, blood in stools. No change in bowel or bladder habits. Genitourinary: No dysuria, trouble voiding, or hematuria. Musculoskeletal:  No gait disturbance, No weakness or joint complaints. Integumentary: No rash or pruritis. Neurological: No headache, diplopia, change in muscle strength, numbness or tingling. No change in gait, balance, coordination, mood, affect, memory, mentation, behavior. Psychiatric: No anxiety, or depression. Endocrine: No temperature intolerance. No excessive thirst, fluid intake, or urination. No tremor. Hematologic/Lymphatic: No abnormal bruising or bleeding, blood clots or swollen lymph nodes.   Allergic/Immunologic: No nasal congestion or hives.      PHYSICAL EXAM:      /76   Pulse 72   Temp 97.4 °F (36.3 °C) (Oral)   Resp 16   Ht 5' 3\" (1.6 m)   Wt 188 lb (85.3 kg)   LMP  (LMP Unknown)   SpO2 98%   BMI 33.30 kg/m²    Constitutional and General Appearance: alert, cooperative, no distress and appears stated age  HEENT: PERRL, no cervical lymphadenopathy. No masses palpable. Normal oral mucosa  Respiratory:  Normal excursion and expansion without use of accessory muscles  Resp Auscultation: Good respiratory effort. No for increased work of breathing. On auscultation: clear to auscultation bilaterally  Cardiovascular: The apical impulse is not displaced  Heart tones are crisp and normal. regular S1 and S2.  Jugular venous pulsation Normal  The carotid upstroke is normal in amplitude and contour without delay or bruit  Peripheral pulses are symmetrical and full   Abdomen:   No masses or tenderness  Bowel sounds present  Extremities:   No Cyanosis or Clubbing   Lower extremity edema: No   Skin: Warm and dry  Neurological:  Alert and oriented. Moves all extremities well  No abnormalities of mood, affect, memory, mentation, or behavior are noted      Labs:     CBC:   Recent Labs     01/06/23  1129   WBC 14.4*   HGB 14.1   HCT 43.4   PLT See Reflexed IPF Result     BMP:   Recent Labs     01/06/23  1129      K 3.3*   CO2 24   BUN 9   CREATININE 0.69   LABGLOM >60   GLUCOSE 179*     BNP: No results for input(s): BNP in the last 72 hours. PT/INR: No results for input(s): PROTIME, INR in the last 72 hours. APTT:No results for input(s): APTT in the last 72 hours. CARDIAC ENZYMES:No results for input(s): CKTOTAL, CKMB, CKMBINDEX, TROPONINI in the last 72 hours. FASTING LIPID PANEL:  Lab Results   Component Value Date/Time    HDL 50 10/07/2022 12:34 PM    LDLCALC 87 09/15/2016 12:00 AM    TRIG 109 10/07/2022 12:34 PM     LIVER PROFILE:No results for input(s): AST, ALT, LABALBU in the last 72 hours.   Troponins: Invalid input(s): TROPONIN Other Current Problems  Patient Active Problem List   Diagnosis    Gastroesophageal reflux disease    Irritable bowel syndrome with diarrhea    Chronic bronchitis (HCC)    Uncontrolled type 2 DM with hyperosmolar nonketotic hyperglycemia (HCC)    Depression    Tobacco use    Allergic rhinitis    Carpal tunnel syndrome of right wrist    Spinal stenosis, thoracic region    Cervical spondylosis with myelopathy    Lumbar disc disease with radiculopathy    Bilateral carpal tunnel syndrome    Chronic radicular lumbar pain    Chronic bilateral thoracic back pain    Chronic cervical radiculopathy    Numbness and tingling    Chronic cerebral ischemia    Acquired cerebral atrophy (HCC)    Polyneuropathy, peripheral sensorimotor axonal    H/O fall    Balance problem    Neuropathic pain    Muscle spasm    Positive test for herpes simplex virus (HSV) antibody    Episode of shaking    Flexural eczema    COPD without exacerbation (HCC)    Laryngeal mass    Multiple thyroid nodules    Adrenal adenoma, right    Obesity (BMI 30-39. 9)    Dysphonia    Essential (primary) hypertension    Diabetic peripheral neuropathy ([facility]    Arrhythmia       EKG:    Date: 01/07/23  Reading: No acute ischemia      LAST ECHO:  Date:  Findings Summary:      LAST Stress Test:   Date of last ST:  Major Findings:    LAST Cardiac Angiography:.  Date:  Findings:    IMPRESSION&Recommendations:    -Concern for A. Fib  -Hypertension  -Type 2 Diabetes Mellitus  -Rheumatoid Arthritis    -EKGs in the ED show normal sinus tachycardia with PVC's and the patient is currently in sinus rhythm. No sign of A. fib. -We will do echo to rule out any cardiac structural and valvular abnormalities. -Recommend Holter monitor for 5 days and outpatient follow-up with Cardiology to evaluate for any further arrhythmias.  -Okay to discharge the patient after echo and Holter monitor placement.   -Monitor and Replace Electrolytes as Appropriate.  -Rest of the Management as per Primary. Inga Gupta MD  PGY-1, Internal Medicine Resident  [facility]         1/7/2023, 7:05 AM         Attending Physician Statement  I have discussed the care of Honey Horner, including pertinent history and exam findings,  with the cardiology fellow/resident. I have seen and examined the patient and the key elements of all parts of the encounter have been performed by me. I have completed at least one if not all key elements of the E/M (history, physical exam, and MDM). I agree with the assessment, plan and orders as documented by the resident with additional recommendations as below:     Sent from vascular surgery office for irregular heart beat. ECG and telemetry reviewed. No evidence of any arrhythmia so far. No prior cardiac hx. Does report frequent palpitations. Will obtain holter monitor x 5 days for further evaluation. Obtain echo due to right atrial abnormality on ecg. Ok to d.c home today if echo low risk.      Matthew Jerez MD, [facility] - [place], [place]

## 2023-01-07 NOTE — CARE COORDINATION
Case Management Assessment  Initial Evaluation    Date/Time of Evaluation: 1/7/2023 9:27 AM  Assessment Completed by: Krista Mcfarland RN    If patient is discharged prior to next notation, then this note serves as note for discharge by case management. Patient Name: Gulshan Mena                   YOB: 1960  Diagnosis: Arrhythmia [I49.9]  Ventricular premature depolarization [I49.3]                   Date / Time: 1/6/2023 10:25 AM    Patient Admission Status: Inpatient   Readmission Risk (Low < 19, Mod (19-27), High > 27): Readmission Risk Score: 10    Current PCP: Angela Chin MD  PCP verified by CM? (P) Yes (Dr. Kennedy Suárez)    Chart Reviewed: Yes      History Provided by: (P) Patient  Patient Orientation: (P) Alert and Oriented, Person, Place, Situation    Patient Cognition:      Hospitalization in the last 30 days (Readmission):  No    If yes, Readmission Assessment in CM Navigator will be completed.     Advance Directives:      Code Status: Full Code   Patient's Primary Decision Maker is: (P) Legal Next of Kin      Discharge Planning:    Patient lives with: (P) Spouse/Significant Other, Family Members Type of Home: (P) Other (Comment) (condo)  Primary Care Giver: (P) Self  Patient Support Systems include: (P) Spouse/Significant Other, Children, Family Members   Current Financial resources: (P) Other (Comment) ([facility])  Current community resources:    Current services prior to admission: (P) None            Current DME:              Type of Home Care services:  (P) None    ADLS  Prior functional level: (P) Independent in ADLs/IADLs  Current functional level: (P) Independent in ADLs/IADLs    PT AM-PAC:   /24  OT AM-PAC:   /24    Family can provide assistance at DC: (P) Yes  Would you like Case Management to discuss the discharge plan with any other family members/significant others, and if so, who? (P) No  Plans to Return to Present Housing: (P) Yes  Other Identified Issues/Barriers to RETURNING to current housing: na  Potential Assistance needed at discharge: (P) N/A            Potential DME:    Patient expects to discharge to: (P) Other (comment) (condo)  Plan for transportation at discharge: (P) Family    Financial    Payor: /     Does insurance require precert for SNF: na  Potential assistance Purchasing Medications: (P) No  Meds-to-Beds request:        [address] [phone] - F [phone]  [address]  [facility] [address]  Phone: [phone] Fax: [address], [place] - [address] [facility] [phone] - f [phone]  [address] [facility] [address]  Phone: [phone] Fax: [phone]    [facility] [id number] [address] [phone] [address] [address]  Phone: [phone] Fax: [phone]      Notes:    Factors facilitating achievement of predicted outcomes: Family support, Motivated, Cooperative, and Pleasant    Barriers to discharge: Decreased endurance    Additional Case Management Notes: Spoke with patient, role explained. Plan to return home with spouse. Has transportation. Address, telephone numbers, PCP, ER contacts and insurance reviewed. The Plan for Transition of Care is related to the following treatment goals of Arrhythmia [I49.9]  Ventricular premature depolarization [K91.9]    IF APPLICABLE: The Patient and/or patient representative Alayna and her family were provided with a choice of provider and agrees with the discharge plan. Freedom of choice list with basic dialogue that supports the patient's individualized plan of care/goals and shares the quality data associated with the providers was provided to: (P) Patient   Patient Representative Name:       The Patient and/or Patient Representative Agree with the Discharge Plan?  (P) Yes    Zachary Mack RN  Case Management Department  [phone]

## 2023-01-07 NOTE — PLAN OF CARE
Problem: Safety - Adult  Goal: Free from fall injury  1/7/2023 1712 by Jw Younger RN  Outcome: Progressing  1/7/2023 0520 by Ashutosh Preston RN  Outcome: Progressing     Problem: ABCDS Injury Assessment  Goal: Absence of physical injury  1/7/2023 1712 by Jw Younger RN  Outcome: Progressing  1/7/2023 0520 by Ashutosh Preston RN  Outcome: Progressing     Problem: Nutrition Deficit:  Goal: Optimize nutritional status  Outcome: Progressing

## 2023-01-07 NOTE — PROGRESS NOTES
Comprehensive Nutrition Assessment    Type and Reason for Visit:  Initial    Nutrition Recommendations/Plan:   Recommend starting ONS x2/day  Will order new weight  Continue to monitor weight, labs, and intake. Malnutrition Assessment:  Malnutrition Status: At risk for malnutrition (Comment) (01/07/23 5531)    Context:  Acute Illness     Findings of the 6 clinical characteristics of malnutrition:  Energy Intake:  Mild decrease in energy intake (Comment)  Weight Loss:  Unable to assess     Body Fat Loss:  Unable to assess     Muscle Mass Loss:  Unable to assess    Fluid Accumulation:  Unable to assess     Strength:  Not Performed    Nutrition Assessment:    Patient seen for potential weight loss and decreased appetite. Diet Regular. Per RN, patient ate 75% of her breakfast. Per Chart review patient has stated she has been fatigue x 3 months after having COVID 19. Will order new weight. Meds/ labs reviewed. Nutrition Related Findings:    Meds/ labs reviewed Wound Type: None       Current Nutrition Intake & Therapies:    Average Meal Intake: 51-75%  Average Supplements Intake: None Ordered  ADULT DIET; Regular    Anthropometric Measures:  Height: 5' 3\" (160 cm)  Ideal Body Weight (IBW): 115 lbs (52 kg)       Current Body Weight: 188 lb (85.3 kg), 163.5 % IBW. Weight Source: Stated  Current BMI (kg/m2): 33.3                          BMI Categories: Obese Class 1 (BMI 30.0-34. 9)    Estimated Daily Nutrient Needs:  Energy Requirements Based On: Formula  Weight Used for Energy Requirements: Current  Energy (kcal/day): 9327-7270 kcal/day  Weight Used for Protein Requirements: Ideal  Protein (g/day): 75-80g/day     Fluid (ml/day): per MD    Nutrition Diagnosis:   Inadequate oral intake related to impaired respiratory function, catabolic illness (Recovering from COVID-19, fatigue, SOB the last few weeks) as evidenced by intake 51-75%, poor intake prior to admission    Nutrition Interventions:   Food and/or Nutrient Delivery: Continue Current Diet, Start Oral Nutrition Supplement  Nutrition Education/Counseling: No recommendation at this time  Coordination of Nutrition Care: Continue to monitor while inpatient  Plan of Care discussed with: RN    Goals:     Goals: Meet at least 75% of estimated needs, prior to discharge       Nutrition Monitoring and Evaluation:   Behavioral-Environmental Outcomes: None Identified  Food/Nutrient Intake Outcomes: Food and Nutrient Intake, Supplement Intake  Physical Signs/Symptoms Outcomes: Biochemical Data, GI Status, Fluid Status or Edema, Nutrition Focused Physical Findings, Skin, Weight    Discharge Planning:     Too soon to determine     Florian Hill RD  Contact: [address]

## 2023-01-08 LAB
ANION GAP SERPL CALCULATED.3IONS-SCNC: 10 MMOL/L (ref 9–17)
BUN BLDV-MCNC: 11 MG/DL (ref 8–23)
CALCIUM SERPL-MCNC: 9.1 MG/DL (ref 8.6–10.4)
CHLORIDE BLD-SCNC: 96 MMOL/L (ref 98–107)
CO2: 25 MMOL/L (ref 20–31)
CREAT SERPL-MCNC: 0.85 MG/DL (ref 0.5–0.9)
GFR SERPL CREATININE-BSD FRML MDRD: >60 ML/MIN/1.73M2
GLUCOSE BLD-MCNC: 231 MG/DL (ref 65–105)
GLUCOSE BLD-MCNC: 269 MG/DL (ref 70–99)
GLUCOSE BLD-MCNC: 288 MG/DL (ref 65–105)
GLUCOSE BLD-MCNC: 301 MG/DL (ref 65–105)
GLUCOSE BLD-MCNC: 342 MG/DL (ref 65–105)
POTASSIUM SERPL-SCNC: 4.6 MMOL/L (ref 3.7–5.3)
SODIUM BLD-SCNC: 131 MMOL/L (ref 135–144)

## 2023-01-08 PROCEDURE — 36415 COLL VENOUS BLD VENIPUNCTURE: CPT

## 2023-01-08 PROCEDURE — 2580000003 HC RX 258

## 2023-01-08 PROCEDURE — 80048 BASIC METABOLIC PNL TOTAL CA: CPT

## 2023-01-08 PROCEDURE — 82947 ASSAY GLUCOSE BLOOD QUANT: CPT

## 2023-01-08 PROCEDURE — 6370000000 HC RX 637 (ALT 250 FOR IP)

## 2023-01-08 PROCEDURE — 6360000002 HC RX W HCPCS

## 2023-01-08 PROCEDURE — 1200000000 HC SEMI PRIVATE

## 2023-01-08 RX ADMIN — INSULIN LISPRO 3 UNITS: 100 INJECTION, SOLUTION INTRAVENOUS; SUBCUTANEOUS at 12:20

## 2023-01-08 RX ADMIN — SODIUM CHLORIDE, PRESERVATIVE FREE 10 ML: 5 INJECTION INTRAVENOUS at 20:23

## 2023-01-08 RX ADMIN — METOPROLOL TARTRATE 25 MG: 25 TABLET ORAL at 20:22

## 2023-01-08 RX ADMIN — INSULIN LISPRO 3 UNITS: 100 INJECTION, SOLUTION INTRAVENOUS; SUBCUTANEOUS at 17:36

## 2023-01-08 RX ADMIN — INSULIN LISPRO 1 UNITS: 100 INJECTION, SOLUTION INTRAVENOUS; SUBCUTANEOUS at 08:25

## 2023-01-08 RX ADMIN — VENLAFAXINE HYDROCHLORIDE 150 MG: 150 CAPSULE, EXTENDED RELEASE ORAL at 08:25

## 2023-01-08 RX ADMIN — METOPROLOL TARTRATE 25 MG: 25 TABLET ORAL at 08:25

## 2023-01-08 RX ADMIN — LISINOPRIL 20 MG: 20 TABLET ORAL at 08:25

## 2023-01-08 RX ADMIN — PANTOPRAZOLE SODIUM 40 MG: 40 TABLET, DELAYED RELEASE ORAL at 08:25

## 2023-01-08 RX ADMIN — SODIUM CHLORIDE, PRESERVATIVE FREE 10 ML: 5 INJECTION INTRAVENOUS at 08:26

## 2023-01-08 RX ADMIN — PREDNISONE 1 MG: 1 TABLET ORAL at 08:25

## 2023-01-08 RX ADMIN — ENOXAPARIN SODIUM 40 MG: 100 INJECTION SUBCUTANEOUS at 08:25

## 2023-01-08 RX ADMIN — ACETAMINOPHEN 650 MG: 325 TABLET ORAL at 08:33

## 2023-01-08 ASSESSMENT — PAIN SCALES - GENERAL: PAINLEVEL_OUTOF10: 3

## 2023-01-08 NOTE — PLAN OF CARE
Problem: Safety - Adult  Goal: Free from fall injury  1/8/2023 1549 by Jayshree Londono RN  Outcome: Progressing  1/8/2023 0506 by Lang Cortés RN  Outcome: Progressing     Problem: ABCDS Injury Assessment  Goal: Absence of physical injury  1/8/2023 1549 by Jayshree Londono RN  Outcome: Progressing  1/8/2023 0506 by Lang Cortés RN  Outcome: Progressing     Problem: Nutrition Deficit:  Goal: Optimize nutritional status  1/8/2023 1549 by Jayshree Londono RN  Outcome: Progressing  1/8/2023 0506 by Lang Cortés RN  Outcome: Progressing     Problem: Skin/Tissue Integrity  Goal: Absence of new skin breakdown  Description: 1. Monitor for areas of redness and/or skin breakdown  2. Assess vascular access sites hourly  3. Every 4-6 hours minimum:  Change oxygen saturation probe site  4. Every 4-6 hours:  If on nasal continuous positive airway pressure, respiratory therapy assess nares and determine need for appliance change or resting period.   Outcome: Progressing

## 2023-01-08 NOTE — PROGRESS NOTES
OBS/CDU   RESIDENT NOTE      Patients PCP is:  Adelaida Loja MD        SUBJECTIVE      Patient is doing well today, had an episode of chest pain this morning. No current complaints. PHYSICAL EXAM      General: NAD, AO X 3  Heent: EMOI, PERRL  Neck: SUPPLE, NO JVD  Cardiovascular: RRR, S1S2  Pulmonary: CTAB, NO SOB  Abdomen: SOFT, NTTP, ND, +BS  Extremities: +2/4 PULSES DISTAL, NO SWELLING  Neuro / Psych: NO NUMBNESS OR TINGLING, MENTATION AT BASELINE    PERTINENT TEST /EXAMS      I have reviewed all available laboratory results. MEDICATIONS CURRENT   glucose chewable tablet 16 g, PRN  dextrose bolus 10% 125 mL, PRN   Or  dextrose bolus 10% 250 mL, PRN  glucagon (rDNA) injection 1 mg, PRN  dextrose 10 % infusion, Continuous PRN  sodium chloride flush 0.9 % injection 5-40 mL, 2 times per day  sodium chloride flush 0.9 % injection 5-40 mL, PRN  0.9 % sodium chloride infusion, PRN  enoxaparin (LOVENOX) injection 40 mg, Daily  ondansetron (ZOFRAN-ODT) disintegrating tablet 4 mg, Q8H PRN   Or  ondansetron (ZOFRAN) injection 4 mg, Q6H PRN  polyethylene glycol (GLYCOLAX) packet 17 g, Daily PRN  acetaminophen (TYLENOL) tablet 650 mg, Q6H PRN   Or  acetaminophen (TYLENOL) suppository 650 mg, Q6H PRN  albuterol sulfate HFA (PROVENTIL;VENTOLIN;PROAIR) 108 (90 Base) MCG/ACT inhaler 2 puff, Q4H PRN  Dulaglutide SOPN 0.75 mg (Patient Supplied), Weekly  lisinopril (PRINIVIL;ZESTRIL) tablet 20 mg, Daily  pantoprazole (PROTONIX) tablet 40 mg, Daily  predniSONE (DELTASONE) tablet 1 mg, Daily  venlafaxine (EFFEXOR XR) extended release capsule 150 mg, Daily with breakfast  insulin lispro (HUMALOG) injection vial 0-4 Units, TID WC  insulin lispro (HUMALOG) injection vial 0-4 Units, Nightly  metoprolol tartrate (LOPRESSOR) tablet 25 mg, BID        All medication charted and reviewed.     CONSULTS      IP CONSULT TO CARDIOLOGY    ASSESSMENT/PLAN       Ethan Turner is a 60 y.o. female who presents with sinus tachycardia and frequent PVCs. Patient with sinus tachycardia with unifocal PVCs. Admitted for cardiology consultation and reevaluation  Check thyroid function. Patient for beta-blocker. Ongoing monitoring. Cardiology recommends holter monitor, echo, stress test  N.p.o. at midnight  Continue home medications and pain control  Monitor vitals, labs, and imaging     DISPO: pending consults and clinical improvement    --  Fam Canela MD  Emergency Medicine Resident Physician     This dictation was generated by voice recognition computer software. Although all attempts are made to edit the dictation for accuracy, there may be errors in the transcription that are not intended.

## 2023-01-08 NOTE — PROGRESS NOTES
[facility]   Progress Note                   Date:   1/8/2023  Patient name: Radha Gregory  Date of admission:  1/6/2023 10:25 AM  MRN:   [id number]  YOB: 1960  PCP: Padmini Madrigal MD    Reason for Admission: Arrhythmia [I49.9]  Ventricular premature depolarization [I49.3]    Subjective:       Clinical Changes / Abnormalities: Pt seen and examined in the room. Pt had episode of chest pain this am. She states that it comes and goes. Stabbing pain. She is also feeling palpitations still. NSR on monitor. Medications:   Scheduled Meds:   sodium chloride flush  5-40 mL IntraVENous 2 times per day    enoxaparin  40 mg SubCUTAneous Daily    Dulaglutide  0.75 mg SubCUTAneous Weekly    lisinopril  20 mg Oral Daily    pantoprazole  40 mg Oral Daily    predniSONE  1 mg Oral Daily    venlafaxine  150 mg Oral Daily with breakfast    insulin lispro  0-4 Units SubCUTAneous TID WC    insulin lispro  0-4 Units SubCUTAneous Nightly    metoprolol tartrate  25 mg Oral BID     Continuous Infusions:   dextrose      sodium chloride       CBC:   Recent Labs     01/06/23  1129   WBC 14.4*   HGB 14.1   PLT See Reflexed IPF Result     BMP:    Recent Labs     01/06/23  1129 01/07/23  1319    131*   K 3.3* 5.2   CL 97* 98   CO2 24 24   BUN 9 9   CREATININE 0.69 0.60   GLUCOSE 179* 189*     Hepatic: No results for input(s): AST, ALT, ALB, BILITOT, ALKPHOS in the last 72 hours. Troponin:   Recent Labs     01/06/23  1129 01/06/23  1308   TROPHS 8 13     BNP: No results for input(s): BNP in the last 72 hours. Lipids: No results for input(s): CHOL, HDL in the last 72 hours. Invalid input(s): LDLCALCU  INR: No results for input(s): INR in the last 72 hours.     Objective:   Vitals: /86   Pulse 82   Temp 97.8 °F (36.6 °C) (Oral)   Resp 16   Ht 5' 3\" (1.6 m)   Wt 188 lb (85.3 kg)   LMP  (LMP Unknown)   SpO2 95%   BMI 33.30 kg/m²   General appearance: alert and cooperative with exam  HEENT: Head: Normocephalic, no lesions, without obvious abnormality. Neck: no JVD, trachea midline, no adenopathy  Lungs: Clear to auscultation  Heart: Regular rate and rhythm, s1/s2 auscultated, no murmurs  Abdomen: soft, non-tender, bowel sounds active  Extremities: no edema  Neurologic: not done        Assessment / Acute Cardiac Problems:   Chest pain   -Concern for A. Fib  -Hypertension  -Type 2 Diabetes Mellitus  -Rheumatoid Arthritis    Patient Active Problem List:     Gastroesophageal reflux disease     Irritable bowel syndrome with diarrhea     Chronic bronchitis (HCC)     Uncontrolled type 2 DM with hyperosmolar nonketotic hyperglycemia (HCC)     Depression     Tobacco use     Allergic rhinitis     Carpal tunnel syndrome of right wrist     Spinal stenosis, thoracic region     Cervical spondylosis with myelopathy     Lumbar disc disease with radiculopathy     Bilateral carpal tunnel syndrome     Chronic radicular lumbar pain     Chronic bilateral thoracic back pain     Chronic cervical radiculopathy     Numbness and tingling     Chronic cerebral ischemia     Acquired cerebral atrophy (HCC)     Polyneuropathy, peripheral sensorimotor axonal     H/O fall     Balance problem     Neuropathic pain     Muscle spasm     Positive test for herpes simplex virus (HSV) antibody     Episode of shaking     Flexural eczema     COPD without exacerbation (HCC)     Laryngeal mass     Multiple thyroid nodules     Adrenal adenoma, right     Obesity (BMI 30-39. 9)     Dysphonia     Essential (primary) hypertension     Diabetic peripheral neuropathy ([address]     Arrhythmia      Plan of Treatment:   No afib seen on monitor. Will repeat Labs this am  Chest pain this am.  Discussed with pt. Will order stress test in the am  Await ECHO results. Plan for holter monitor on discharge.       Electronically signed by ETHAN Mancilla CNP on 1/8/2023 at 10:28 [address] [facility].  [phone]

## 2023-01-08 NOTE — PLAN OF CARE
Problem: Safety - Adult  Goal: Free from fall injury  1/8/2023 0506 by Gamaliel Cui RN  Outcome: Progressing  1/7/2023 1712 by Analia Figueredo RN  Outcome: Progressing     Problem: ABCDS Injury Assessment  Goal: Absence of physical injury  1/8/2023 0506 by Gamaliel Cui RN  Outcome: Progressing  1/7/2023 1712 by Analia Figueredo, RN  Outcome: Progressing     Problem: Nutrition Deficit:  Goal: Optimize nutritional status  1/8/2023 0506 by Gamaliel Cui RN  Outcome: Progressing  1/7/2023 1712 by Analia Figueredo RN  Outcome: Progressing

## 2023-01-08 NOTE — PROGRESS NOTES
[facility]  CDU / OBSERVATION ENCOUNTER  ATTENDING NOTE       I performed a history and physical examination of the patient and discussed management with the resident or midlevel provider. I reviewed the resident or midlevel provider's note and agree with the documented findings and plan of care. Any areas of disagreement are noted on the chart. I was personally present for the key portions of any procedures. I have documented in the chart those procedures where I was not present during the key portions. I have reviewed the nurses notes. I agree with the chief complaint, past medical history, past surgical history, allergies, medications, social and family history as documented unless otherwise noted below. The Family history, social history, and ROS are effectively unchanged since admission unless noted elsewhere in the chart. Patient with admission after outpatient appointment where physician noted irregular heartbeat. No irregularity noted on telemetry. Patient does describe occasional chest tightness, and dyspnea on exertion. She did have an episode of chest pain this morning. Was seen by cardiology with plan for echo to be done and placement of Holter monitor on discharge. But given anginal symptoms, stress test was also ordered which will likely be done tomorrow. N.p.o. at midnight. Patient asymptomatic at this time. Does express some congestion and postnasal drip so nasal spray will be added.     Cheryl Whittington  Attending Emergency  Physician

## 2023-01-09 ENCOUNTER — APPOINTMENT (OUTPATIENT)
Dept: NUCLEAR MEDICINE | Age: 63
DRG: 310 | End: 2023-01-09
Payer: COMMERCIAL

## 2023-01-09 VITALS
WEIGHT: 188 LBS | HEART RATE: 92 BPM | OXYGEN SATURATION: 95 % | DIASTOLIC BLOOD PRESSURE: 82 MMHG | TEMPERATURE: 98.1 F | RESPIRATION RATE: 16 BRPM | BODY MASS INDEX: 33.31 KG/M2 | SYSTOLIC BLOOD PRESSURE: 120 MMHG | HEIGHT: 63 IN

## 2023-01-09 LAB
EKG ATRIAL RATE: 116 BPM
EKG ATRIAL RATE: 77 BPM
EKG P AXIS: 55 DEGREES
EKG P AXIS: 68 DEGREES
EKG P-R INTERVAL: 128 MS
EKG P-R INTERVAL: 136 MS
EKG Q-T INTERVAL: 294 MS
EKG Q-T INTERVAL: 366 MS
EKG QRS DURATION: 76 MS
EKG QRS DURATION: 78 MS
EKG QTC CALCULATION (BAZETT): 408 MS
EKG QTC CALCULATION (BAZETT): 414 MS
EKG R AXIS: 16 DEGREES
EKG R AXIS: 9 DEGREES
EKG T AXIS: 66 DEGREES
EKG T AXIS: 73 DEGREES
EKG VENTRICULAR RATE: 116 BPM
EKG VENTRICULAR RATE: 77 BPM
GLUCOSE BLD-MCNC: 248 MG/DL (ref 65–105)
GLUCOSE BLD-MCNC: 316 MG/DL (ref 65–105)
GLUCOSE BLD-MCNC: 359 MG/DL (ref 65–105)
LV EF: 43 %
LV EF: 69 %
LVEF MODALITY: NORMAL
LVEF MODALITY: NORMAL

## 2023-01-09 PROCEDURE — 6360000002 HC RX W HCPCS

## 2023-01-09 PROCEDURE — 6370000000 HC RX 637 (ALT 250 FOR IP): Performed by: EMERGENCY MEDICINE

## 2023-01-09 PROCEDURE — 93306 TTE W/DOPPLER COMPLETE: CPT

## 2023-01-09 PROCEDURE — 3430000000 HC RX DIAGNOSTIC RADIOPHARMACEUTICAL: Performed by: NURSE PRACTITIONER

## 2023-01-09 PROCEDURE — 78452 HT MUSCLE IMAGE SPECT MULT: CPT

## 2023-01-09 PROCEDURE — 2580000003 HC RX 258: Performed by: NURSE PRACTITIONER

## 2023-01-09 PROCEDURE — 2580000003 HC RX 258

## 2023-01-09 PROCEDURE — 82947 ASSAY GLUCOSE BLOOD QUANT: CPT

## 2023-01-09 PROCEDURE — A9500 TC99M SESTAMIBI: HCPCS | Performed by: NURSE PRACTITIONER

## 2023-01-09 PROCEDURE — 6360000002 HC RX W HCPCS: Performed by: NURSE PRACTITIONER

## 2023-01-09 PROCEDURE — 93017 CV STRESS TEST TRACING ONLY: CPT

## 2023-01-09 PROCEDURE — 93242 EXT ECG>48HR<7D RECORDING: CPT

## 2023-01-09 PROCEDURE — 6370000000 HC RX 637 (ALT 250 FOR IP)

## 2023-01-09 PROCEDURE — 93243 EXT ECG>48HR<7D SCAN A/R: CPT

## 2023-01-09 RX ORDER — PREDNISONE 10 MG/1
30 TABLET ORAL DAILY
Qty: 42 TABLET | Refills: 0 | Status: SHIPPED | OUTPATIENT
Start: 2023-01-09 | End: 2023-01-23

## 2023-01-09 RX ORDER — AMINOPHYLLINE DIHYDRATE 25 MG/ML
50 INJECTION, SOLUTION INTRAVENOUS PRN
Status: DISCONTINUED | OUTPATIENT
Start: 2023-01-09 | End: 2023-01-09 | Stop reason: ALTCHOICE

## 2023-01-09 RX ORDER — SODIUM CHLORIDE 9 MG/ML
500 INJECTION, SOLUTION INTRAVENOUS CONTINUOUS PRN
Status: DISCONTINUED | OUTPATIENT
Start: 2023-01-09 | End: 2023-01-09 | Stop reason: ALTCHOICE

## 2023-01-09 RX ORDER — TECHNETIUM TC-99M SESTAMIBI 1 MG/10ML
37.2 INJECTION INTRAVENOUS
Status: COMPLETED | OUTPATIENT
Start: 2023-01-09 | End: 2023-01-09

## 2023-01-09 RX ORDER — NITROGLYCERIN 0.4 MG/1
0.4 TABLET SUBLINGUAL EVERY 5 MIN PRN
Status: DISCONTINUED | OUTPATIENT
Start: 2023-01-09 | End: 2023-01-09 | Stop reason: ALTCHOICE

## 2023-01-09 RX ORDER — SODIUM CHLORIDE 0.9 % (FLUSH) 0.9 %
10 SYRINGE (ML) INJECTION PRN
Status: DISCONTINUED | OUTPATIENT
Start: 2023-01-09 | End: 2023-01-09 | Stop reason: HOSPADM

## 2023-01-09 RX ORDER — FLUTICASONE PROPIONATE 50 MCG
2 SPRAY, SUSPENSION (ML) NASAL 2 TIMES DAILY
Qty: 15.8 EACH | Refills: 0 | Status: SHIPPED | OUTPATIENT
Start: 2023-01-09 | End: 2023-01-14

## 2023-01-09 RX ORDER — FLUTICASONE PROPIONATE 50 MCG
2 SPRAY, SUSPENSION (ML) NASAL 2 TIMES DAILY
Status: DISCONTINUED | OUTPATIENT
Start: 2023-01-09 | End: 2023-01-09 | Stop reason: HOSPADM

## 2023-01-09 RX ORDER — METOPROLOL TARTRATE 5 MG/5ML
5 INJECTION INTRAVENOUS EVERY 5 MIN PRN
Status: DISCONTINUED | OUTPATIENT
Start: 2023-01-09 | End: 2023-01-09 | Stop reason: ALTCHOICE

## 2023-01-09 RX ORDER — ALBUTEROL SULFATE 90 UG/1
2 AEROSOL, METERED RESPIRATORY (INHALATION) PRN
Status: DISCONTINUED | OUTPATIENT
Start: 2023-01-09 | End: 2023-01-09 | Stop reason: ALTCHOICE

## 2023-01-09 RX ORDER — TECHNETIUM TC-99M SESTAMIBI 1 MG/10ML
14.9 INJECTION INTRAVENOUS
Status: COMPLETED | OUTPATIENT
Start: 2023-01-09 | End: 2023-01-09

## 2023-01-09 RX ORDER — SODIUM CHLORIDE 0.9 % (FLUSH) 0.9 %
5-40 SYRINGE (ML) INJECTION PRN
Status: DISCONTINUED | OUTPATIENT
Start: 2023-01-09 | End: 2023-01-09 | Stop reason: ALTCHOICE

## 2023-01-09 RX ORDER — ATROPINE SULFATE 0.1 MG/ML
0.5 INJECTION INTRAVENOUS EVERY 5 MIN PRN
Status: DISCONTINUED | OUTPATIENT
Start: 2023-01-09 | End: 2023-01-09 | Stop reason: ALTCHOICE

## 2023-01-09 RX ADMIN — INSULIN LISPRO 4 UNITS: 100 INJECTION, SOLUTION INTRAVENOUS; SUBCUTANEOUS at 17:32

## 2023-01-09 RX ADMIN — SODIUM CHLORIDE, PRESERVATIVE FREE 10 ML: 5 INJECTION INTRAVENOUS at 07:58

## 2023-01-09 RX ADMIN — TETRAKIS(2-METHOXYISOBUTYLISOCYANIDE)COPPER(I) TETRAFLUOROBORATE 14.9 MILLICURIE: 1 INJECTION, POWDER, LYOPHILIZED, FOR SOLUTION INTRAVENOUS at 07:58

## 2023-01-09 RX ADMIN — METOPROLOL TARTRATE 25 MG: 25 TABLET ORAL at 11:40

## 2023-01-09 RX ADMIN — INSULIN LISPRO 1 UNITS: 100 INJECTION, SOLUTION INTRAVENOUS; SUBCUTANEOUS at 11:43

## 2023-01-09 RX ADMIN — TETRAKIS(2-METHOXYISOBUTYLISOCYANIDE)COPPER(I) TETRAFLUOROBORATE 37.2 MILLICURIE: 1 INJECTION, POWDER, LYOPHILIZED, FOR SOLUTION INTRAVENOUS at 09:52

## 2023-01-09 RX ADMIN — PANTOPRAZOLE SODIUM 40 MG: 40 TABLET, DELAYED RELEASE ORAL at 11:40

## 2023-01-09 RX ADMIN — SODIUM CHLORIDE, PRESERVATIVE FREE 10 ML: 5 INJECTION INTRAVENOUS at 09:46

## 2023-01-09 RX ADMIN — LISINOPRIL 20 MG: 20 TABLET ORAL at 11:40

## 2023-01-09 RX ADMIN — ENOXAPARIN SODIUM 40 MG: 100 INJECTION SUBCUTANEOUS at 11:41

## 2023-01-09 RX ADMIN — VENLAFAXINE HYDROCHLORIDE 150 MG: 150 CAPSULE, EXTENDED RELEASE ORAL at 11:40

## 2023-01-09 RX ADMIN — PREDNISONE 1 MG: 1 TABLET ORAL at 11:40

## 2023-01-09 RX ADMIN — SODIUM CHLORIDE, PRESERVATIVE FREE 10 ML: 5 INJECTION INTRAVENOUS at 09:52

## 2023-01-09 RX ADMIN — REGADENOSON 0.4 MG: 0.08 INJECTION, SOLUTION INTRAVENOUS at 09:50

## 2023-01-09 RX ADMIN — SODIUM CHLORIDE, PRESERVATIVE FREE 10 ML: 5 INJECTION INTRAVENOUS at 11:41

## 2023-01-09 RX ADMIN — SODIUM CHLORIDE, PRESERVATIVE FREE 10 ML: 5 INJECTION INTRAVENOUS at 09:51

## 2023-01-09 RX ADMIN — PREDNISONE 30 MG: 10 TABLET ORAL at 16:50

## 2023-01-09 RX ADMIN — ACETAMINOPHEN 650 MG: 325 TABLET ORAL at 15:27

## 2023-01-09 ASSESSMENT — PAIN SCALES - GENERAL: PAINLEVEL_OUTOF10: 6

## 2023-01-09 ASSESSMENT — PAIN DESCRIPTION - DESCRIPTORS: DESCRIPTORS: ACHING

## 2023-01-09 ASSESSMENT — PAIN DESCRIPTION - LOCATION: LOCATION: HEAD

## 2023-01-09 NOTE — PROGRESS NOTES
OBS/CDU   RESIDENT NOTE      Patients PCP is:  Marina Rock MD        SUBJECTIVE      Patient is doing well today, complains of a mild headache. PHYSICAL EXAM      General: NAD, AO X 3  Heent: EMOI, PERRL  Neck: SUPPLE, NO JVD  Cardiovascular: RRR, S1S2  Pulmonary: CTAB, NO SOB  Abdomen: SOFT, NTTP, ND, +BS  Extremities: +2/4 PULSES DISTAL, NO SWELLING  Neuro / Psych: NO NUMBNESS OR TINGLING, MENTATION AT BASELINE    PERTINENT TEST /EXAMS      I have reviewed all available laboratory results. MEDICATIONS CURRENT   sodium chloride flush 0.9 % injection 10 mL, PRN  sodium chloride flush 0.9 % injection 10 mL, PRN  glucose chewable tablet 16 g, PRN  dextrose bolus 10% 125 mL, PRN   Or  dextrose bolus 10% 250 mL, PRN  glucagon (rDNA) injection 1 mg, PRN  dextrose 10 % infusion, Continuous PRN  sodium chloride flush 0.9 % injection 5-40 mL, 2 times per day  sodium chloride flush 0.9 % injection 5-40 mL, PRN  0.9 % sodium chloride infusion, PRN  enoxaparin (LOVENOX) injection 40 mg, Daily  ondansetron (ZOFRAN-ODT) disintegrating tablet 4 mg, Q8H PRN   Or  ondansetron (ZOFRAN) injection 4 mg, Q6H PRN  polyethylene glycol (GLYCOLAX) packet 17 g, Daily PRN  acetaminophen (TYLENOL) tablet 650 mg, Q6H PRN   Or  acetaminophen (TYLENOL) suppository 650 mg, Q6H PRN  albuterol sulfate HFA (PROVENTIL;VENTOLIN;PROAIR) 108 (90 Base) MCG/ACT inhaler 2 puff, Q4H PRN  Dulaglutide SOPN 0.75 mg (Patient Supplied), Weekly  lisinopril (PRINIVIL;ZESTRIL) tablet 20 mg, Daily  pantoprazole (PROTONIX) tablet 40 mg, Daily  predniSONE (DELTASONE) tablet 1 mg, Daily  venlafaxine (EFFEXOR XR) extended release capsule 150 mg, Daily with breakfast  insulin lispro (HUMALOG) injection vial 0-4 Units, TID WC  insulin lispro (HUMALOG) injection vial 0-4 Units, Nightly  metoprolol tartrate (LOPRESSOR) tablet 25 mg, BID        All medication charted and reviewed.     CONSULTS      IP CONSULT TO CARDIOLOGY    ASSESSMENT/PLAN       Iqra Neumann is a 60 y.o. female who presents with      Patient with sinus tachycardia with unifocal PVCs. Cardiology consulted  Stress and echo were obtained. Holter monitor x5 days  Continue home medications and pain control  Monitor vitals, labs, and imaging    DISPO: pending consults and clinical improvement    --  Angeles Soares MD  Emergency Medicine Resident Physician     This dictation was generated by voice recognition computer software. Although all attempts are made to edit the dictation for accuracy, there may be errors in the transcription that are not intended.

## 2023-01-09 NOTE — PLAN OF CARE
Problem: Safety - Adult  Goal: Free from fall injury  1/9/2023 0514 by Daisy Bryson RN  Outcome: Progressing  1/8/2023 1549 by Nathan Mujica RN  Outcome: Progressing     Problem: ABCDS Injury Assessment  Goal: Absence of physical injury  1/9/2023 0514 by Daisy Bryson RN  Outcome: Progressing  1/8/2023 1549 by Nathan Mujica RN  Outcome: Progressing     Problem: Nutrition Deficit:  Goal: Optimize nutritional status  1/9/2023 0514 by Daisy Bryson RN  Outcome: Progressing  1/8/2023 1549 by Nathan Mujica RN  Outcome: Progressing     Problem: Skin/Tissue Integrity  Goal: Absence of new skin breakdown  Description: 1. Monitor for areas of redness and/or skin breakdown  2. Assess vascular access sites hourly  3. Every 4-6 hours minimum:  Change oxygen saturation probe site  4. Every 4-6 hours:  If on nasal continuous positive airway pressure, respiratory therapy assess nares and determine need for appliance change or resting period.   1/9/2023 0514 by Daisy Bryson RN  Outcome: Progressing  1/8/2023 1549 by Nathan Mujica RN  Outcome: Progressing

## 2023-01-09 NOTE — PROCEDURES
[address] [address]                              CARDIAC STRESS TEST    PATIENT NAME: Julianna Larson                    :        1960  MED REC NO:   [id number]                             ROOM:       0265  ACCOUNT NO:   [de-identified]                           ADMIT DATE: 2023  PROVIDER:     Bernie Hewitt    DATE OF STUDY:  2023    ORDERING PROVIDER:  Rob Mason CNP    INTERPRETING PHYSICIAN:  MD LUDIN Sumner STRESS STUDY:  Indication:  chest pain    Procedure was explained and consent form was signed    Medications:  Lexiscan, 0.4 mg  Resting heart rate:  85 bpm  Resting blood pressure:  131/69 mm/Hg  Infusion heart rate:  127 bpm  Infusion blood pressure:  153/80 mm/Hg  Resting EKG:  Normal  Stress heart response:  Normal response  Stress BP response:  Appropriate  Stress EKG(S):  No changes seen  Chest discomfort:  None  Ischemic EKG changes:  None    IMPRESSION:  Electrocardiographically negative Lexiscan stress study. Radio-isotope  results to follow from the department of Nuclear Medicine.           Liudmila Arrieta    D: 2023 13:50:12       T: 2023 13:52:09     AK/RAHEL  Job#: [id number]     Doc#: Unknown    CC:    ()

## 2023-01-09 NOTE — DISCHARGE INSTRUCTIONS
You need to call and schedule a follow up appointment with your primary care provider as well as cardiology. Take your medications as prescribed. Return to the emergency department immediately if you experience worsening symptoms including fever, chest pain, shortness of breath, abdominal pain, back pain, dizziness, palpitations, feel that you are going to pass out or passing out, changes in balance, walking, changes in vision, or any other concerns.

## 2023-01-09 NOTE — PLAN OF CARE
Problem: Safety - Adult  Goal: Free from fall injury  1/9/2023 1820 by Sindy Jenkins RN  Outcome: Adequate for Discharge  1/9/2023 0514 by Santa Diego RN  Outcome: Progressing

## 2023-01-09 NOTE — PROGRESS NOTES
[facility]   Progress Note                   Date:   1/9/2023  Patient name: Ilan Vargas  Date of admission:  1/6/2023 10:25 AM  MRN:   [id number]  YOB: 1960  PCP: Vivian Kirk MD    Reason for Admission: Arrhythmia [I49.9]  Ventricular premature depolarization [I49.3]    Subjective:       Clinical Changes / Abnormalities: Pt seen and examined in stress test .  Denies chest pain or shortness of breath    Medications:   Scheduled Meds:   sodium chloride flush  5-40 mL IntraVENous 2 times per day    enoxaparin  40 mg SubCUTAneous Daily    Dulaglutide  0.75 mg SubCUTAneous Weekly    lisinopril  20 mg Oral Daily    pantoprazole  40 mg Oral Daily    predniSONE  1 mg Oral Daily    venlafaxine  150 mg Oral Daily with breakfast    insulin lispro  0-4 Units SubCUTAneous TID WC    insulin lispro  0-4 Units SubCUTAneous Nightly    metoprolol tartrate  25 mg Oral BID     Continuous Infusions:   dextrose      sodium chloride       CBC:   No results for input(s): WBC, HGB, PLT in the last 72 hours. BMP:    Recent Labs     01/07/23  1319 01/08/23  1253   * 131*   K 5.2 4.6   CL 98 96*   CO2 24 25   BUN 9 11   CREATININE 0.60 0.85   GLUCOSE 189* 269*       Hepatic: No results for input(s): AST, ALT, ALB, BILITOT, ALKPHOS in the last 72 hours. Troponin:   Recent Labs     01/06/23  1308   TROPHS 13       BNP: No results for input(s): BNP in the last 72 hours. Lipids: No results for input(s): CHOL, HDL in the last 72 hours. Invalid input(s): LDLCALCU  INR: No results for input(s): INR in the last 72 hours. Objective:   Vitals: /82   Pulse 92   Temp 98.1 °F (36.7 °C) (Oral)   Resp 16   Ht 5' 3\" (1.6 m)   Wt 188 lb (85.3 kg)   LMP  (LMP Unknown)   SpO2 95%   BMI 33.30 kg/m²   General appearance: alert and cooperative with exam  HEENT: Head: Normocephalic, no lesions, without obvious abnormality.   Neck: no JVD, trachea midline, no adenopathy  Lungs: Clear to auscultation  Heart: Regular rate and rhythm, s1/s2 auscultated, no murmurs  Abdomen: soft, non-tender, bowel sounds active  Extremities: no edema  Neurologic: not done        Assessment / Acute Cardiac Problems:   Chest pain   -Concern for A.Fib-ruled out  -Hypertension  -Type 2 Diabetes Mellitus  -Rheumatoid Arthritis  Palpitation    Patient Active Problem List:     Gastroesophageal reflux disease     Irritable bowel syndrome with diarrhea     Chronic bronchitis (HCC)     Uncontrolled type 2 DM with hyperosmolar nonketotic hyperglycemia (HCC)     Depression     Tobacco use     Allergic rhinitis     Carpal tunnel syndrome of right wrist     Spinal stenosis, thoracic region     Cervical spondylosis with myelopathy     Lumbar disc disease with radiculopathy     Bilateral carpal tunnel syndrome     Chronic radicular lumbar pain     Chronic bilateral thoracic back pain     Chronic cervical radiculopathy     Numbness and tingling     Chronic cerebral ischemia     Acquired cerebral atrophy (HCC)     Polyneuropathy, peripheral sensorimotor axonal     H/O fall     Balance problem     Neuropathic pain     Muscle spasm     Positive test for herpes simplex virus (HSV) antibody     Episode of shaking     Flexural eczema     COPD without exacerbation (HCC)     Laryngeal mass     Multiple thyroid nodules     Adrenal adenoma, right     Obesity (BMI 30-39. 9)     Dysphonia     Essential (primary) hypertension     Diabetic peripheral neuropathy (HCC)     Arrhythmia      Plan of Treatment:   Awaiting stress test results and alcohol if low risk patient can be discharged from cardiology standpoint follow-up as outpatient   holter monitor on discharge.       Electronically signed by ETHAN Odonnell NP on 1/9/2023 at [facility].  [phone]

## 2023-01-09 NOTE — PROGRESS NOTES
[address]  CDU / OBSERVATION ENCOUNTER  ATTENDING NOTE       I performed a history and physical examination of the patient and discussed management with the resident or midlevel provider. I reviewed the resident or midlevel provider's note and agree with the documented findings and plan of care. Any areas of disagreement are noted on the chart. I was personally present for the key portions of any procedures. I have documented in the chart those procedures where I was not present during the key portions. I have reviewed the nurses notes. I agree with the chief complaint, past medical history, past surgical history, allergies, medications, social and family history as documented unless otherwise noted below. The Family history, social history, and ROS are effectively unchanged since admission unless noted elsewhere in the chart. Patient for stress and echo today. Patient with some ongoing intermittent palpitations. Patient had cardiology evaluation. Initial improvement with beta-blocker. Patient awaiting studies. Disposition based on results. Possible discharge today if all negative.     Nicci Cota MD  Attending Emergency  Physician

## 2023-01-09 NOTE — DISCHARGE SUMMARY
CDU Discharge Summary        Patient:  William Salazar  YOB: 1960    MRN: [id number]   Acct: [de-identified]    Primary Care Physician: Danielle Junior MD    Admit date:  1/6/2023 10:25 AM  Discharge date: 1/9/2023    Discharge Diagnoses:     Acute chest pain / palpitations / tachycardia / PVCs due to unknown etiology  Improved with metoprolol    Follow-up:  Call today/tomorrow for a follow up appointment with Danielle Junior MD , or return to the Emergency Room with worsening symptoms    Stressed to patient the importance of following up with primary care doctor for further workup/management of symptoms. Pt verbalizes understanding and agrees with plan.     Discharge Medications:  Changes to medications            Medication List        START taking these medications      metoprolol tartrate 25 MG tablet  Commonly known as: LOPRESSOR  Take 1 tablet by mouth 2 times daily            CHANGE how you take these medications      fluticasone 50 MCG/ACT nasal spray  Commonly known as: FLONASE  2 sprays by Each Nostril route 2 times daily for 5 days  What changed:   how to take this  when to take this     predniSONE 10 MG tablet  Commonly known as: DELTASONE  Take 3 tablets by mouth daily for 14 days  What changed:   medication strength  how much to take  how to take this  when to take this  additional instructions            CONTINUE taking these medications      albuterol sulfate  (90 Base) MCG/ACT inhaler  Commonly known as: Proventil HFA  Inhale 2 puffs into the lungs every 4 hours as needed for Wheezing     Cane Misc  Use daily     lisinopril 10 MG tablet  Commonly known as: PRINIVIL;ZESTRIL  Take 2 tablets by mouth daily     pantoprazole 40 MG tablet  Commonly known as: Protonix  Take 1 tablet by mouth daily     Trulicity 3.13 TY/6.0NS Sopn  Generic drug: Dulaglutide  Inject 0.75 mg into the skin once a week     venlafaxine 150 MG extended release capsule  Commonly known as: EFFEXOR XR  TAKE 1 CAPSULE BY MOUTH ONE TIME A DAY            STOP taking these medications      ALEVE PO     buPROPion 75 MG tablet  Commonly known as: WELLBUTRIN     gabapentin 300 MG capsule  Commonly known as: NEURONTIN     montelukast 10 MG tablet  Commonly known as: SINGULAIR     MULTI-VITAMIN DAILY PO               Where to Get Your Medications        You can get these medications from any pharmacy    Bring a paper prescription for each of these medications  fluticasone 50 MCG/ACT nasal spray  metoprolol tartrate 25 MG tablet  predniSONE 10 MG tablet         Diet:  ADULT DIET; Regular , Advance as tolerated     Activity:  As tolerated    Consultants: IP CONSULT TO CARDIOLOGY    Procedures:  Not indicated     Diagnostic Test:   Results for orders placed or performed during the hospital encounter of 01/06/23   Respiratory Panel, Molecular, with COVID-19 (Restricted: peds pts or suitable admitted adults)    Specimen: Nasopharyngeal Swab   Result Value Ref Range    Specimen Description . NASOPHARYNGEAL SWAB     Adenovirus PCR Not Detected Not Detected    Coronavirus 229E PCR Not Detected Not Detected    Coronavirus HKU1 PCR Not Detected Not Detected    Coronavirus NL63 PCR Not Detected Not Detected    Coronavirus OC43 PCR Not Detected Not Detected    SARS-CoV-2, PCR Not Detected Not Detected    Human Metapneumovirus PCR Not Detected Not Detected    Rhino/Enterovirus PCR Not Detected Not Detected    Influenza A by PCR Not Detected Not Detected    Influenza B by PCR Not Detected Not Detected    Parainfluenza 1 PCR Not Detected Not Detected    Parainfluenza 2 PCR Not Detected Not Detected    Parainfluenza 3 PCR Not Detected Not Detected    Parainfluenza 4 PCR Not Detected Not Detected    Resp Syncytial Virus PCR Not Detected Not Detected    Bordetella Parapertussis Not Detected Not Detected    B Pertussis by PCR Not Detected Not Detected    Chlamydia pneumoniae By PCR Not Detected Not Detected    Mycoplasma pneumo by PCR Not Detected Not Detected   CBC   Result Value Ref Range    WBC 14.4 (H) 3.5 - 11.3 k/uL    RBC 5.08 3.95 - 5.11 m/uL    Hemoglobin 14.1 11.9 - 15.1 g/dL    Hematocrit 43.4 36.3 - 47.1 %    MCV 85.4 82.6 - 102.9 fL    MCH 27.8 25.2 - 33.5 pg    MCHC 32.5 28.4 - 34.8 g/dL    RDW 14.1 11.8 - 14.4 %    Platelets See Reflexed IPF Result 138 - 453 k/uL    NRBC Automated 0.0 0.0 per 100 WBC   Troponin   Result Value Ref Range    Troponin, High Sensitivity 8 0 - 14 ng/L   Troponin   Result Value Ref Range    Troponin, High Sensitivity 13 0 - 14 ng/L   Magnesium   Result Value Ref Range    Magnesium 1.9 1.6 - 2.6 mg/dL   Basic Metabolic Panel   Result Value Ref Range    Glucose 179 (H) 70 - 99 mg/dL    BUN 9 8 - 23 mg/dL    Creatinine 0.69 0.50 - 0.90 mg/dL    Est, Glom Filt Rate >60 >60 mL/min/1.73m2    Calcium 9.1 8.6 - 10.4 mg/dL    Sodium 136 135 - 144 mmol/L    Potassium 3.3 (L) 3.7 - 5.3 mmol/L    Chloride 97 (L) 98 - 107 mmol/L    CO2 24 20 - 31 mmol/L    Anion Gap 15 9 - 17 mmol/L   Brain Natriuretic Peptide   Result Value Ref Range    Pro-BNP 56 <300 pg/mL   Immature Platelet Fraction   Result Value Ref Range    Platelet, Fluorescence Platelet clumps present, count appears adequate.  138 - 453 k/uL   TSH with Reflex   Result Value Ref Range    TSH 0.19 (L) 0.30 - 5.00 uIU/mL   T4, Free   Result Value Ref Range    Thyroxine, Free 1.34 0.93 - 1.70 ng/dL   Basic Metabolic Panel   Result Value Ref Range    Glucose 189 (H) 70 - 99 mg/dL    BUN 9 8 - 23 mg/dL    Creatinine 0.60 0.50 - 0.90 mg/dL    Est, Glom Filt Rate >60 >60 mL/min/1.73m2    Calcium 9.0 8.6 - 10.4 mg/dL    Sodium 131 (L) 135 - 144 mmol/L    Potassium 5.2 3.7 - 5.3 mmol/L    Chloride 98 98 - 107 mmol/L    CO2 24 20 - 31 mmol/L    Anion Gap 9 9 - 17 mmol/L   Magnesium   Result Value Ref Range    Magnesium 1.9 1.6 - 2.6 mg/dL   Basic Metabolic Panel w/ Reflex to MG   Result Value Ref Range    Glucose 269 (H) 70 - 99 mg/dL    BUN 11 8 - 23 mg/dL    Creatinine 0.85 0.50 - 0.90 mg/dL    Est, Glom Filt Rate >60 >60 mL/min/1.73m2    Calcium 9.1 8.6 - 10.4 mg/dL    Sodium 131 (L) 135 - 144 mmol/L    Potassium 4.6 3.7 - 5.3 mmol/L    Chloride 96 (L) 98 - 107 mmol/L    CO2 25 20 - 31 mmol/L    Anion Gap 10 9 - 17 mmol/L   POC Glucose Fingerstick   Result Value Ref Range    POC Glucose 274 (H) 65 - 105 mg/dL   POC Glucose Fingerstick   Result Value Ref Range    POC Glucose 270 (H) 65 - 105 mg/dL   POC Glucose Fingerstick   Result Value Ref Range    POC Glucose 337 (H) 65 - 105 mg/dL   POC Glucose Fingerstick   Result Value Ref Range    POC Glucose 240 (H) 65 - 105 mg/dL   POC Glucose Fingerstick   Result Value Ref Range    POC Glucose 231 (H) 65 - 105 mg/dL   POC Glucose Fingerstick   Result Value Ref Range    POC Glucose 342 (H) 65 - 105 mg/dL   POC Glucose Fingerstick   Result Value Ref Range    POC Glucose 301 (H) 65 - 105 mg/dL   POC Glucose Fingerstick   Result Value Ref Range    POC Glucose 288 (H) 65 - 105 mg/dL   POC Glucose Fingerstick   Result Value Ref Range    POC Glucose 316 (H) 65 - 105 mg/dL   POC Glucose Fingerstick   Result Value Ref Range    POC Glucose 248 (H) 65 - 105 mg/dL   EKG 12 Lead   Result Value Ref Range    Ventricular Rate 116 BPM    Atrial Rate 116 BPM    P-R Interval 128 ms    QRS Duration 78 ms    Q-T Interval 294 ms    QTc Calculation (Bazett) 408 ms    P Axis 68 degrees    R Axis 9 degrees    T Axis 73 degrees   EKG 12 Lead   Result Value Ref Range    Ventricular Rate 77 BPM    Atrial Rate 77 BPM    P-R Interval 136 ms    QRS Duration 76 ms    Q-T Interval 366 ms    QTc Calculation (Bazett) 414 ms    P Axis 55 degrees    R Axis 16 degrees    T Axis 66 degrees     XR CHEST (2 VW)    Result Date: 1/6/2023  EXAMINATION: TWO XRAY VIEWS OF THE CHEST 1/6/2023 11:39 am COMPARISON: 11/04/2022 HISTORY: ORDERING SYSTEM PROVIDED HISTORY: chest tightness TECHNOLOGIST PROVIDED HISTORY: chest tightness Reason for Exam: tightness in chest x 4months FINDINGS: Cardiomediastinal silhouette and pulmonary vasculature are within normal limits. No focal airspace consolidation, pneumothorax, or pleural effusion. No free air beneath the diaphragm. No acute osseous abnormality. No acute intrathoracic process. NM Cardiac Stress Test Nuclear Imaging    Result Date: 1/9/2023  EXAMINATION: MYOCARDIAL PERFUSION IMAGING 1/9/2023 8:51 am TECHNIQUE: For the rest study, 14.9 mCi of Tc-99m labeled sestamibi were injected. SPECT images were acquired. Under cardiology supervision, 0.4 mg Lexiscan was infused. After pharmacologic stress, 37.2 mCi of Tc-99m labeled sestamibi were injected. SPECT images with ECG gating were acquired. COMPARISON: None Available. HISTORY: ORDERING SYSTEM PROVIDED HISTORY: Chest pain TECHNOLOGIST PROVIDED HISTORY: Reason for Exam: Chest pain Procedure Type->Rx Reason for Exam: chest pain, hypertension, DM, COPD FINDINGS: There is no evidence for a significant reversible or fixed perfusion defect. The gated images show no wall motion abnormalities. Normal myocardial thickening. Perfusion scores are visually adjusted to account for artifact. Summed stress score:  0 Summed rest score:  0 Summed reversibility score:  0 Function: End diastolic volume:  26EN Left ventricular ejection fraction:  69% TID score:  0.97 (scores greater than 1.39 are considered elevated for Lexiscan stress with Tc99m) Notes concerning risk stratification: Risk stratification incorporates both clinical history and some testing results. Final risk determination is the responsibility of the ordering provider as other patient information and test results may increase or decrease the risk assessment reported for this examination. Risk stratification criteria are adapted from \"Noninvasive Risk Stratification\" criteria from [employer].   [place], ACC/AATS/AHA/ASE/ASNC/SCAI/SCCT/STS 2017 Appropriate Use Criteria For Coronary Revascularization in Patients With Stable Ischemic Heart Disease [facility] Volume 69, Issue 17, May 2017 High risk (>3% annual death or MI) 1. Severe resting LV dysfunction (LVEF <35%) not readily explained by non coronary causes 2. Resting perfusion abnormalities greater than 10% of the myocardium in patients without prior history or evidence of MI 3. Stress-induced perfusion abnormalities encumbering greater than or equal to 10% myocardium or stress segmental scores indicating multiple vascular territories with abnormalities 4. Stress-induced LV dilatation (TID ratio greater than 1.19 for exercise and greater than 1.39 for regadenoson) Intermediate risk (1% to 3% annual death or MI) 1. Mild/moderate resting LV dysfunction (LVEF 35% to 49%) not readily explained by non coronary causes. 2. Resting perfusion abnormalities in 5%-9.9% of the myocardium in patients without a history or prior evidence of MI 3. Stress-induced perfusion abnormality encumbering 5%-9.9% of the myocardium or stress segmental scores indicating 1 vascular territory with abnormalities but without LV dilation 4. Small wall motion abnormality involving 1-2 segments and only 1 coronary bed. Low Risk (Less than 1% annual death or MI) 1. Normal or small myocardial perfusion defect at rest or with stress encumbering less than 5% of the myocardium. Normal study. Risk stratification: Low           Physical Exam:    General appearance - NAD, AOx 3   Lungs -CTAB, no R/R/R  Heart - RRR, no M/R/G  Abdomen - Soft, NT/ND  Neurological:  MAEx4, No focal motor deficit, sensory loss  Extremities - Cap refil <2 sec in all ext., no edema  Skin -warm, dry      Hospital Course:  Clinical course has improved, labs and imaging reviewed. Caitlyn Gar originally presented to the hospital on 1/6/2023 10:25 AM. with chest pain. At that time it was determined that She required further observation, cardiology evaluation, echo, stress test. She was admitted and labs and imaging were followed daily. Imaging results as above.   She is medically stable to be discharged. Disposition: Home    Patient stated that they will not drive themselves home from the hospital if they have gotten pain killers/ narcotics earlier that day and that they will arrange for transportation on their own or work with the  for a ride. Patient counseled NOT to drive while under the influence of narcotics/ pain killers. Condition: Good    Patient stable and ready for discharge home. I have discussed plan of care with patient and they are in understanding. They were instructed to read discharge paperwork. All of their questions and concerns were addressed. Time Spent: 3 day      --  Rolan Jensen MD  Emergency Medicine Resident Physician    This dictation was generated by voice recognition computer software. Although all attempts are made to edit the dictation for accuracy, there may be errors in the transcription that are not intended.

## 2023-01-09 NOTE — PROGRESS NOTES
SPIRITUAL CARE DEPARTMENT - [facility]  PROGRESS NOTE    Shift date: 1/9/2023  Shift day: Monday   Shift # 1    Room # 1045/1611-39   Name: Jesse Castro                Worship: [facility] Christianity: Unknown    Referral: Routine Visit    Admit Date & Time: 1/6/2023 10:25 AM    Assessment:  Jesse Castro is a 60 y.o. female in the hospital because of Arrhythmia. Upon entering the room writer observes patient sitting in room chair. Intervention:  Writer introduced self and title as  Writer offered space for patient  to express feelings, needs, and concerns and provided a ministry presence. Patient shared details regarding her health. Patient and writer discussed uncertainty of life, grief and yoan. Patient requested prayer. Outcome:  Writer prayed for patient. Patient thanked writer for visit. Plan:  Chaplains will remain available to offer spiritual and emotional support as needed. Electronically signed by Davida Nieves, on 1/9/2023 at 12:20 PM.  [facility]  [phone]       01/09/23 1218   Encounter Summary   Service Provided For: Patient   Referral/Consult From: [facility]   Support System Spouse; Children   Last Encounter  01/09/23   Complexity of Encounter Low   Begin Time 1135   End Time  1203   Total Time Calculated 28 min   Encounter    Type Initial Screen/Assessment   Assessment/Intervention/Outcome   Assessment Calm;Coping; Hopeful;Peaceful   Intervention Active listening;Discussed belief system/Confucianist practices/yoan;Discussed illness injury and its impact; Discussed relationship with God;Explored/Affirmed feelings, thoughts, concerns;Prayer (assurance of)/Rosanky   Outcome Comfort;Encouraged;Engaged in conversation;Expressed Gratitude

## 2023-01-10 NOTE — ADT AUTH CERT
Utilization Reviews       Cardiology [address] Day 4 (1/9/2023) by Romeo Galo RN       Review Status Review Entered   Completed 1/9/2023 1624       Created By   Romeo Galo RN      Criteria Review      Care Day: 4 Care Date: 1/9/2023 Level of Care: Inpatient Floor    Guideline Day 2    Level Of Care    (X) Floor    1/9/2023 4:24 PM EST by Umu Richardson      acute    Clinical Status    ( ) * No ICU or intermediate care needs    Interventions    (X) Inpatient interventions continue    1/9/2023 4:24 PM EST by Umu Richardson      lovenox 40 mg sc dialy,  flonase  nasal spray   2xd ,    lisinopril  20 mg daily,  lopressor 25 mg  2xd ,   prednisone 30 mg daily,    * Milestone   Additional Notes   DATE: 1.9/23       :         VITALS:   98.1 (36.7) 16 92 120/82   sp0 295%  ra pain  6                ABNL/PERTINENT LABS/RADIOLOGY/DIAGNOSTIC STUDIES:   Glcuose  216,  248       ECHO    Left ventricle is normal in size. Global left ventricular systolic function   is mildly reduced. Estimated ejection fraction is 40-45 % . Calculated EF via 3D Heart Model is 43 %. Normal left ventricular wall thickness. Normal mitral valve structure. Trivial mitral regurgitation. No mitral stenosis. CARDIAC STRESS TEST   IMPRESSION:   Electrocardiographically negative Lexiscan stress study. Radio-isotope   results to follow from the department of Nuclear Medicine. PHYSICAL EXAM:   HEENT: Head: Normocephalic, no lesions, without obvious abnormality. Neck: no JVD, trachea midline, no adenopathy   Lungs: Clear to auscultation   Heart: Regular rate and rhythm, s1/s2 auscultated, no murmurs   Abdomen: soft, non-tender, bowel sounds active   Extremities: no edema         MD CONSULTS/ASSESSMENT AND PLAN:   Ca  Denies chest pain or shortness of breathrdiology       1.  Awaiting stress test results and alcohol if low risk patient can be discharged from cardiology standpoint follow-up as outpatient             [facility] complains of a mild headache      General: NAD, AO X 3   Heent: EMOI, PERRL   Neck: SUPPLE, NO JVD   Cardiovascular: RRR, S1S2   Pulmonary: CTAB, NO SOB   Abdomen: SOFT, NTTP, ND, +BS   Extremities: +2/4 PULSES DISTAL, NO SWELLING   Neuro / Psych: NO NUMBNESS OR TINGLING, MENTATION AT BASELINE          1. Patient with sinus tachycardia with unifocal PVCs. [address],[place] [facility] consulted   º Stress and echo were obtained. º Holter monitor x5 days   2. Continue home medications and pain control   3.  Monitor vitals, labs, and imaging       MEDICATIONS:   Protonix 40 mg aily,   Tyelnol 6 50 mg   q6hprn x1 ,       ORDERS:   Glcuose   4xd,  reg diet ,  telmetry,  sp02,  up as  katie  vs       PT/OT/SLP/CM ASSESSMENT OR NOTES:   Dc plan  home                       Cardiology [address] Day 3 (1/8/2023) by Beau Garza RN       Review Status Review Entered   Completed 1/9/2023 1528       Created By   Beau Garza RN      Criteria Review      Care Day: 3 Care Date: 1/8/2023 Level of Care: Inpatient Floor    Guideline Day 2    Level Of Care    (X) Floor    1/9/2023 3:28 PM EST by Alison Hitchcock      acute    Clinical Status    ( ) * No ICU or intermediate care needs    Interventions    (X) Inpatient interventions continue    1/9/2023 3:28 PM EST by Alison Hitchcock      lovenox 40 mg  sc daily  lsinopril 20 mg daily,   lopressor 25 mg   2xd,    telemetry    * Milestone   Additional Notes   DATE: 1/8/23          VITALS:   97.8 (36.6) 16 82 137/86  sp0 297%  ra pain  3              ABNL/PERTINENT LABS/RADIOLOGY/DIAGNOSTIC STUDIES:   Na 131   Cl 96   Glcuose   269,  301  288       PHYSICAL EXAM:   General: NAD, AO X 3   Heent: EMOI, PERRL   Neck: SUPPLE, NO JVD   Cardiovascular: RRR, S1S2   Pulmonary: CTAB, NO SOB   Abdomen: SOFT, NTTP, ND, +BS   Extremities: +2/4 PULSES DISTAL, NO SWELLING   Neuro / Psych: NO NUMBNESS OR TINGLING, MENTATION AT BASELINE      MD CONSULTS/ASSESSMENT AND PLAN:   Medicine    had an episode of chest pain this morning. No current complaints      1. Patient with sinus tachycardia with unifocal PVCs. º Admitted for cardiology consultation and reevaluation   º Check thyroid function. º Patient for beta-blocker. º Ongoing monitoring. º Cardiology recommends holter monitor, echo, stress test   º N.p.o. at midnight   2. Continue home medications and pain control         Cardiology      Pt had episode of chest pain this am. She states that it comes and goes. Stabbing pain. She is also feeling palpitations still. NSR on monitor. General appearance: alert and cooperative with exam   HEENT: Head: Normocephalic, no lesions, without obvious abnormality. Neck: no JVD, trachea midline, no adenopathy   Lungs: Clear to auscultation   Heart: Regular rate and rhythm, s1/s2 auscultated, no murmurs   Abdomen: soft, non-tender, bowel sounds active   Extremities: no edema      Assessment / Acute Cardiac Problems:   1. Chest pain    2. -Concern for A. Fib   3. -Hypertension   4. -Type 2 Diabetes Mellitus   5. -Rheumatoid Arthritis      Plan of Treatment:   1. No afib seen on monitor. 2. Will repeat Labs this am   3. Chest pain this am.  Discussed with pt. Will order stress test in the am   4. Await ECHO results. 5. Plan for holter monitor on discharge.                     MEDICATIONS:   Protonix 40 mg aily,  prednisone 1 mg  daily,  tyelnol  650 mg   q6hprn x1 ,       ORDERS:   Glcuose   4xd,  reg diet ,  telmetry,  sp02,  upa s katie  vs       PT/OT/SLP/CM ASSESSMENT OR NOTES:   Dc plan  home

## 2023-01-11 ENCOUNTER — TELEPHONE (OUTPATIENT)
Dept: FAMILY MEDICINE CLINIC | Age: 63
End: 2023-01-11

## 2023-01-11 NOTE — TELEPHONE ENCOUNTER
Care Transitions Initial Follow Up Call    Outreach made within 2 business days of discharge: Yes    Patient: Jesse Castro Patient : 1960   MRN: [id number]  Reason for Admission: arrhythmia Discharge Date: 23       Spoke with: patient     Discharge department/facility: [facility] Interactive Patient Contact:  Was patient able to fill all prescriptions: Yes  Was patient instructed to bring all medications to the follow-up visit: Yes  Is patient taking all medications as directed in the discharge summary?  Yes  Does patient understand their discharge instructions: Yes  Does patient have questions or concerns that need addressed prior to 7-14 day follow up office visit: no    Scheduled appointment with PCP within 7-14 days    Follow Up  Future Appointments   Date Time Provider Reva Pierce   2023  2:20 PM MD GABBIE Barahona CMA

## 2023-01-13 ENCOUNTER — OFFICE VISIT (OUTPATIENT)
Dept: FAMILY MEDICINE CLINIC | Age: 63
End: 2023-01-13

## 2023-01-13 ENCOUNTER — HOSPITAL ENCOUNTER (OUTPATIENT)
Age: 63
Discharge: HOME | End: 2023-01-13
Payer: COMMERCIAL

## 2023-01-13 VITALS
HEART RATE: 109 BPM | HEIGHT: 63 IN | BODY MASS INDEX: 34.2 KG/M2 | WEIGHT: 193 LBS | SYSTOLIC BLOOD PRESSURE: 120 MMHG | OXYGEN SATURATION: 97 % | TEMPERATURE: 97.6 F | DIASTOLIC BLOOD PRESSURE: 80 MMHG

## 2023-01-13 DIAGNOSIS — Z09 HOSPITAL DISCHARGE FOLLOW-UP: ICD-10-CM

## 2023-01-13 DIAGNOSIS — E11.00 UNCONTROLLED TYPE 2 DM WITH HYPEROSMOLAR NONKETOTIC HYPERGLYCEMIA (HCC): Primary | ICD-10-CM

## 2023-01-13 DIAGNOSIS — E11.42 TYPE 2 DIABETES MELLITUS WITH DIABETIC POLYNEUROPATHY, WITHOUT LONG-TERM CURRENT USE OF INSULIN (HCC): ICD-10-CM

## 2023-01-13 DIAGNOSIS — L50.8 ACUTE URTICARIA: ICD-10-CM

## 2023-01-13 DIAGNOSIS — E11.00 UNCONTROLLED TYPE 2 DM WITH HYPEROSMOLAR NONKETOTIC HYPERGLYCEMIA (HCC): ICD-10-CM

## 2023-01-13 DIAGNOSIS — E04.1 THYROID NODULE: ICD-10-CM

## 2023-01-13 DIAGNOSIS — R00.2 HEART PALPITATIONS: ICD-10-CM

## 2023-01-13 DIAGNOSIS — Z12.31 ENCOUNTER FOR SCREENING MAMMOGRAM FOR MALIGNANT NEOPLASM OF BREAST: ICD-10-CM

## 2023-01-13 LAB
ANION GAP SERPL CALCULATED.3IONS-SCNC: 13 MMOL/L (ref 9–17)
BUN BLDV-MCNC: 11 MG/DL (ref 8–23)
BUN/CREAT BLD: 14 (ref 9–20)
CALCIUM SERPL-MCNC: 9.5 MG/DL (ref 8.6–10.4)
CHLORIDE BLD-SCNC: 95 MMOL/L (ref 98–107)
CO2: 25 MMOL/L (ref 20–31)
CREAT SERPL-MCNC: 0.8 MG/DL (ref 0.5–0.9)
GFR SERPL CREATININE-BSD FRML MDRD: >60 ML/MIN/1.73M2
GLUCOSE BLD-MCNC: 420 MG/DL (ref 70–99)
POTASSIUM SERPL-SCNC: 4.5 MMOL/L (ref 3.7–5.3)
SODIUM BLD-SCNC: 133 MMOL/L (ref 135–144)

## 2023-01-13 PROCEDURE — 36415 COLL VENOUS BLD VENIPUNCTURE: CPT

## 2023-01-13 PROCEDURE — 80048 BASIC METABOLIC PNL TOTAL CA: CPT

## 2023-01-13 PROCEDURE — 83036 HEMOGLOBIN GLYCOSYLATED A1C: CPT

## 2023-01-13 RX ORDER — INSULIN GLARGINE 100 [IU]/ML
10 INJECTION, SOLUTION SUBCUTANEOUS NIGHTLY
Qty: 5 ADJUSTABLE DOSE PRE-FILLED PEN SYRINGE | Refills: 0
Start: 2023-01-13

## 2023-01-13 RX ORDER — CETIRIZINE HYDROCHLORIDE 10 MG/1
10 TABLET ORAL 2 TIMES DAILY
Qty: 60 TABLET | Refills: 2 | Status: SHIPPED | OUTPATIENT
Start: 2023-01-13 | End: 2023-02-12

## 2023-01-13 RX ORDER — DULAGLUTIDE 1.5 MG/.5ML
1.5 INJECTION, SOLUTION SUBCUTANEOUS WEEKLY
Qty: 4 ADJUSTABLE DOSE PRE-FILLED PEN SYRINGE | Refills: 1 | Status: SHIPPED | OUTPATIENT
Start: 2023-01-13

## 2023-01-13 RX ORDER — VENLAFAXINE HYDROCHLORIDE 150 MG/1
CAPSULE, EXTENDED RELEASE ORAL
Qty: 90 CAPSULE | Refills: 1 | Status: SHIPPED | OUTPATIENT
Start: 2023-01-13

## 2023-01-13 ASSESSMENT — PATIENT HEALTH QUESTIONNAIRE - PHQ9
3. TROUBLE FALLING OR STAYING ASLEEP: 0
6. FEELING BAD ABOUT YOURSELF - OR THAT YOU ARE A FAILURE OR HAVE LET YOURSELF OR YOUR FAMILY DOWN: 0
SUM OF ALL RESPONSES TO PHQ QUESTIONS 1-9: 0
7. TROUBLE CONCENTRATING ON THINGS, SUCH AS READING THE NEWSPAPER OR WATCHING TELEVISION: 0
SUM OF ALL RESPONSES TO PHQ9 QUESTIONS 1 & 2: 0
8. MOVING OR SPEAKING SO SLOWLY THAT OTHER PEOPLE COULD HAVE NOTICED. OR THE OPPOSITE, BEING SO FIGETY OR RESTLESS THAT YOU HAVE BEEN MOVING AROUND A LOT MORE THAN USUAL: 0
2. FEELING DOWN, DEPRESSED OR HOPELESS: 0
SUM OF ALL RESPONSES TO PHQ QUESTIONS 1-9: 0
SUM OF ALL RESPONSES TO PHQ QUESTIONS 1-9: 0
10. IF YOU CHECKED OFF ANY PROBLEMS, HOW DIFFICULT HAVE THESE PROBLEMS MADE IT FOR YOU TO DO YOUR WORK, TAKE CARE OF THINGS AT HOME, OR GET ALONG WITH OTHER PEOPLE: 0
SUM OF ALL RESPONSES TO PHQ QUESTIONS 1-9: 0
9. THOUGHTS THAT YOU WOULD BE BETTER OFF DEAD, OR OF HURTING YOURSELF: 0
5. POOR APPETITE OR OVEREATING: 0
1. LITTLE INTEREST OR PLEASURE IN DOING THINGS: 0
4. FEELING TIRED OR HAVING LITTLE ENERGY: 0

## 2023-01-13 ASSESSMENT — ENCOUNTER SYMPTOMS
CHEST TIGHTNESS: 0
WHEEZING: 0
BACK PAIN: 1
COUGH: 0
SHORTNESS OF BREATH: 0

## 2023-01-13 NOTE — PROGRESS NOTES
Post-Discharge Transitional Care Follow Up      Trena Vila   YOB: 1960    Date of Office Visit:  1/13/2023  Date of Hospital Admission: 1/6/23  Date of Hospital Discharge: 1/9/23  Readmission Risk Score (high >=14%. Medium >=10%):Readmission Risk Score: 8.6      Care management risk score Rising risk (score 2-5) and Complex Care (Scores >=6): No Risk Score On File     Non face to face  following discharge, date last encounter closed (first attempt may have been earlier): 01/11/2023     Call initiated 2 business days of discharge: Yes     Uncontrolled type 2 DM with hyperosmolar nonketotic hyperglycemia ([address]  Worsening; she has very high sugars due to her prednisone and she has a lot of insulin at home so I started her on 10 units of lantus daily and told her to call me in 3 days with fasting readings. I also increased her trulicty to 3.8ZA weekly. She is due for labs so those were ordered for today. -     Hemoglobin A1C; Future  -     Basic Metabolic Panel; Future    Type 2 diabetes mellitus with diabetic polyneuropathy, without long-term current use of insulin (HCC)    Acute urticaria  New; possibly due to stress since she is already on prednisone when they started flaring up. I started her on zyrtec bid to try to help. -     cetirizine (ZYRTEC) 10 MG tablet; Take 1 tablet by mouth in the morning and at bedtime, Disp-60 tablet, R-2Normal    Heart palpitations  Stable; she is still having some palpitations. She is currently wearing a holter monitor and she will follow up with cardiology in a few weeks. Thyroid nodule  New; found on thyroid ultrasound with her ENT at [facility] but she wants the biopsy done more locally for convenience. I put in a referral to Dr. Lux Stone and she will contact the endocrinologist at [facility] to let them know that she is doing the biopsy up here.      -     Amb External Referral To Endocrinology    Hospital discharge follow-up  -     RI DISCHARGE MEDS RECONCILED W/ CURRENT OUTPATIENT MED LIST  Encounter for screening mammogram for malignant neoplasm of breast  -     SOURAV ANGELICA DIGITAL SCREEN BILATERAL; Future      Medical Decision Making: high complexity  Return in about 6 weeks (around 2/24/2023) for DM follow up. On this date 1/13/2023 I have spent 90 minutes reviewing previous notes, test results and face to face with the patient discussing the diagnosis and importance of compliance with the treatment plan as well as documenting on the day of the visit. Subjective:   HPI Here today for a hospital follow up for PVCs. She was in the hospital for tachycardia and it has improved with metoprolol. She still has some palpitations and she is getting shortness of breath when climbing stairs. She has not seen cardio yet, he has been wearing the holter monitor for the past 4 days and she gets it off tomorrow. She was at the vascular surgeon's office when he felt like she was in afib so she got sent to the ER and then she was admitted to the hospital.     She was seeing the vascular surgeon for a possible temporal artery biopsy but vascular does not think that she needs that. She has not heard from rheumatology yet because she was in the hospital.     If she stops her steroids her left foot swells. Her rhuematologist thinks she may have Parvo B19. He is trying to wean down her steroids. For the past few weeks she has been getting huge hives across her arms, buttocks. They are extremely itchy and they are waking her up from sleep. She has had post nasal drip which feels like it is getting stuck and then she coughs and coughs. She is using the flonase. DM: her sugars have been very high because of her steroids. She is giving herself short acting insulin on a sliding scale right now with 1-5 units. She is still taking the trulicity. She has long acting insulin at home but she has not been taking it.     She also was seeing an ENT for her laryngeal mass and had an ultrasound of her thyroid done that showed several concerning thyroid nodules. She needs a biopsy and it had been scheduled but she got covid and it had to be cancelled. She would like to have this scheduled in [place] instead of [facility] to ease of getting to the appointment. Inpatient course: Discharge summary reviewed- see chart. Interval history/Current status: improving    Patient Active Problem List   Diagnosis    Gastroesophageal reflux disease    Irritable bowel syndrome with diarrhea    Chronic bronchitis (HCC)    Uncontrolled type 2 DM with hyperosmolar nonketotic hyperglycemia (HCC)    Depression    Tobacco use    Allergic rhinitis    Carpal tunnel syndrome of right wrist    Spinal stenosis, thoracic region    Cervical spondylosis with myelopathy    Lumbar disc disease with radiculopathy    Bilateral carpal tunnel syndrome    Chronic radicular lumbar pain    Chronic bilateral thoracic back pain    Chronic cervical radiculopathy    Numbness and tingling    Chronic cerebral ischemia    Acquired cerebral atrophy (HCC)    Polyneuropathy, peripheral sensorimotor axonal    H/O fall    Balance problem    Neuropathic pain    Muscle spasm    Positive test for herpes simplex virus (HSV) antibody    Episode of shaking    Flexural eczema    COPD without exacerbation (HCC)    Laryngeal mass    Multiple thyroid nodules    Adrenal adenoma, right    Obesity (BMI 30-39. 9)    Dysphonia    Essential (primary) hypertension    Diabetic peripheral neuropathy ([facility] [address]    Arrhythmia       Medications listed as ordered at the time of discharge from hospital     Medication List            Accurate as of January 13, 2023  5:27 PM. If you have any questions, ask your nurse or doctor.                 START taking these medications      cetirizine 10 MG tablet  Commonly known as: ZYRTEC  Take 1 tablet by mouth in the morning and at bedtime  Started by: Angela Chin MD     Lantus SoloStar 100 UNIT/ML injection pen  Generic drug: insulin glargine  Inject 10 Units into the skin nightly  Started by: Sarah Hernandez MD     triamcinolone 0.1 % ointment  Commonly known as: KENALOG  Apply topically 2 times daily as needed (itching)  Started by: Sarah Hernandez MD     Trulicity 1.5 GD/1.2XT SC injection  Generic drug: dulaglutide  Inject 0.5 mLs into the skin once a week  Replaces: Trulicity 8.51 TD/2.9QX Sopn  Started by: Sarah Hernandez MD            CONTINUE taking these medications      albuterol sulfate  (90 Base) MCG/ACT inhaler  Commonly known as: Proventil HFA  Inhale 2 puffs into the lungs every 4 hours as needed for Wheezing     Cane Misc  Use daily     fluticasone 50 MCG/ACT nasal spray  Commonly known as: FLONASE  2 sprays by Each Nostril route 2 times daily for 5 days     lisinopril 10 MG tablet  Commonly known as: PRINIVIL;ZESTRIL  Take 2 tablets by mouth daily     metoprolol tartrate 25 MG tablet  Commonly known as: LOPRESSOR  Take 1 tablet by mouth 2 times daily     pantoprazole 40 MG tablet  Commonly known as: Protonix  Take 1 tablet by mouth daily     predniSONE 10 MG tablet  Commonly known as: DELTASONE  Take 3 tablets by mouth daily for 14 days     venlafaxine 150 MG extended release capsule  Commonly known as: EFFEXOR XR  TAKE 1 CAPSULE BY MOUTH ONE TIME A DAY            STOP taking these medications      Trulicity 5.00 TI/0.0KX Sopn  Generic drug: Dulaglutide  Replaced by: Trulicity 1.5 SB/2.2FU SC injection  Stopped by: Sarah Hernandez MD               Where to Get Your Medications        These medications were sent to [address] [place] [address] Alexandrea Landry, [place] [address]      Phone: [phone]   cetirizine 10 MG tablet  triamcinolone 0.1 % ointment  Trulicity 1.5 XD/3.7GI SC injection  venlafaxine 150 MG extended release capsule       Information about where to get these medications is not yet available Ask your nurse or doctor about these medications  Lantus SoloStar 100 UNIT/ML injection pen          Medications marked \"taking\" at this time  Outpatient Medications Marked as Taking for the 1/13/23 encounter (Office Visit) with Danielle Junior MD   Medication Sig Dispense Refill    venlafaxine (EFFEXOR XR) 150 MG extended release capsule TAKE 1 CAPSULE BY MOUTH ONE TIME A DAY 90 capsule 1    cetirizine (ZYRTEC) 10 MG tablet Take 1 tablet by mouth in the morning and at bedtime 60 tablet 2    triamcinolone (KENALOG) 0.1 % ointment Apply topically 2 times daily as needed (itching) 80 g 0    dulaglutide (TRULICITY) 1.5 NI/0.6LS SC injection Inject 0.5 mLs into the skin once a week 4 Adjustable Dose Pre-filled Pen Syringe 1    insulin glargine (LANTUS SOLOSTAR) 100 UNIT/ML injection pen Inject 10 Units into the skin nightly 5 Adjustable Dose Pre-filled Pen Syringe 0    fluticasone (FLONASE) 50 MCG/ACT nasal spray 2 sprays by Each Nostril route 2 times daily for 5 days 15.8 each 0    predniSONE (DELTASONE) 10 MG tablet Take 3 tablets by mouth daily for 14 days 42 tablet 0    metoprolol tartrate (LOPRESSOR) 25 MG tablet Take 1 tablet by mouth 2 times daily 60 tablet 3    lisinopril (PRINIVIL;ZESTRIL) 10 MG tablet Take 2 tablets by mouth daily 60 tablet 3    pantoprazole (PROTONIX) 40 MG tablet Take 1 tablet by mouth daily 90 tablet 3    albuterol sulfate HFA (PROVENTIL HFA) 108 (90 Base) MCG/ACT inhaler Inhale 2 puffs into the lungs every 4 hours as needed for Wheezing 1 Inhaler 0        Medications patient taking as of now reconciled against medications ordered at time of hospital discharge: Yes    Review of Systems   Constitutional:  Positive for fatigue. Negative for activity change, appetite change, chills and fever. HENT:  Positive for postnasal drip. Eyes:  Negative for visual disturbance. Respiratory:  Negative for cough, chest tightness, shortness of breath and wheezing.     Cardiovascular:  Positive for palpitations. Negative for chest pain and leg swelling. Genitourinary:  Negative for difficulty urinating. Musculoskeletal:  Positive for back pain. Skin:  Positive for rash. Neurological:  Negative for dizziness, syncope, weakness, light-headedness and headaches. Objective:    /80   Pulse (!) 109   Temp 97.6 °F (36.4 °C)   Ht 5' 3\" (1.6 m)   Wt 193 lb (87.5 kg)   LMP  (LMP Unknown)   SpO2 97%   BMI 34.19 kg/m²   Physical Exam  Vitals and nursing note reviewed. Constitutional:       General: She is not in acute distress. Appearance: She is well-developed. Eyes:      Conjunctiva/sclera: Conjunctivae normal.      Pupils: Pupils are equal, round, and reactive to light. Neck:      Thyroid: Thyroid mass present. No thyromegaly. Cardiovascular:      Rate and Rhythm: Regular rhythm. Tachycardia present. Heart sounds: Normal heart sounds. No murmur heard. Pulmonary:      Effort: Pulmonary effort is normal. No respiratory distress. Breath sounds: Normal breath sounds. No wheezing. Musculoskeletal:      Cervical back: Normal range of motion and neck supple. Lymphadenopathy:      Cervical: No cervical adenopathy. Skin:     General: Skin is warm and dry. Findings: No erythema or rash. Neurological:      Mental Status: She is alert and oriented to person, place, and time. An electronic signature was used to authenticate this note.   --Nicole Gerard MD

## 2023-01-15 LAB
ESTIMATED AVERAGE GLUCOSE: 335 MG/DL
HBA1C MFR BLD: 13.3 % (ref 4–6)

## 2023-01-16 RX ORDER — INSULIN GLARGINE 100 [IU]/ML
15 INJECTION, SOLUTION SUBCUTANEOUS NIGHTLY
Qty: 5 ADJUSTABLE DOSE PRE-FILLED PEN SYRINGE | Refills: 0
Start: 2023-01-16

## 2023-01-17 ENCOUNTER — TELEPHONE (OUTPATIENT)
Dept: FAMILY MEDICINE CLINIC | Age: 63
End: 2023-01-17

## 2023-01-17 NOTE — TELEPHONE ENCOUNTER
4EF PT Note:    Patient was not available for their therapy session at this time.  Reason not seen: Other (comment) (need new orders to resume therapy, transfer to ICU) (03/05/18 1631).    Re-Attempt Plan: Other (comment) (d/c PT) (03/05/18 1631). Per RN, pt is to be transferred to ICU. D/C PT at this time, with therapy efforts to be resumed with new orders when medically appropriate.   Pt returning nurses call, pt advised to increase insulin dose to 15 units nightly, pt comprehended and will do.

## 2023-02-07 ENCOUNTER — HOSPITAL ENCOUNTER (OUTPATIENT)
Dept: MAMMOGRAPHY | Age: 63
Discharge: HOME | End: 2023-02-09
Payer: COMMERCIAL

## 2023-02-07 DIAGNOSIS — Z12.31 ENCOUNTER FOR SCREENING MAMMOGRAM FOR MALIGNANT NEOPLASM OF BREAST: ICD-10-CM

## 2023-02-07 PROCEDURE — 77063 BREAST TOMOSYNTHESIS BI: CPT

## 2023-02-10 ENCOUNTER — OFFICE VISIT (OUTPATIENT)
Dept: CARDIOLOGY | Age: 63
End: 2023-02-10
Payer: COMMERCIAL

## 2023-02-10 VITALS
HEIGHT: 63 IN | OXYGEN SATURATION: 99 % | DIASTOLIC BLOOD PRESSURE: 88 MMHG | WEIGHT: 192.4 LBS | BODY MASS INDEX: 34.09 KG/M2 | SYSTOLIC BLOOD PRESSURE: 130 MMHG | HEART RATE: 110 BPM

## 2023-02-10 DIAGNOSIS — I10 PRIMARY HYPERTENSION: Primary | ICD-10-CM

## 2023-02-10 PROCEDURE — 99214 OFFICE O/P EST MOD 30 MIN: CPT | Performed by: INTERNAL MEDICINE

## 2023-02-10 PROCEDURE — G8417 CALC BMI ABV UP PARAM F/U: HCPCS | Performed by: INTERNAL MEDICINE

## 2023-02-10 PROCEDURE — 1036F TOBACCO NON-USER: CPT | Performed by: INTERNAL MEDICINE

## 2023-02-10 PROCEDURE — G8427 DOCREV CUR MEDS BY ELIG CLIN: HCPCS | Performed by: INTERNAL MEDICINE

## 2023-02-10 PROCEDURE — 3017F COLORECTAL CA SCREEN DOC REV: CPT | Performed by: INTERNAL MEDICINE

## 2023-02-10 PROCEDURE — 3075F SYST BP GE 130 - 139MM HG: CPT | Performed by: INTERNAL MEDICINE

## 2023-02-10 PROCEDURE — G8484 FLU IMMUNIZE NO ADMIN: HCPCS | Performed by: INTERNAL MEDICINE

## 2023-02-10 PROCEDURE — 3079F DIAST BP 80-89 MM HG: CPT | Performed by: INTERNAL MEDICINE

## 2023-02-10 ASSESSMENT — ENCOUNTER SYMPTOMS
NAUSEA: 0
CHEST TIGHTNESS: 0
SHORTNESS OF BREATH: 1
VOMITING: 0
ABDOMINAL DISTENTION: 0

## 2023-02-10 NOTE — PROGRESS NOTES
Today's Date: 2/10/2023  Patient's Name: Santiago Martins  Patient's age: 60 y. o., 1960    Subjective: The patient is a 60y.o. year old, , female is in the office for SOB. Denies exertional chest pain, does feel SOB with exertion. Does limit activity. Relieved by inhalers. Occasional dizziness and no syncope. Past Medical History:   has a past medical history of Allergic rhinitis, Arthritis, Asthma, Bronchitis, COPD (chronic obstructive pulmonary disease) ([address] Depression, Diabetes mellitus ([address] Headache, Hyperlipidemia, Hypertension, Incontinence, Irritable bowel syndrome, Kidney stone, Osteoarthritis, Pneumonia, Type 2 diabetes mellitus without complication ([address] and Ulcer. Past Surgical History:   has a past surgical history that includes Tonsillectomy; Colonoscopy (7/11/208); Meckel's diverticulum excision (1970); Cholecystectomy, laparoscopic (1997); Hysterectomy, total abdominal (1986); laparoscopy (2008); Colonoscopy (08/02/2017); Upper gastrointestinal endoscopy (08/02/2017); Carpal tunnel release (Right, 05/07/2019); Colonoscopy (Left, 10/08/2019); Upper gastrointestinal endoscopy (Left, 10/08/2019); Pain management procedure (N/A, 07/08/2021); laryngoscopy (08/06/2022); laryngoscopy (N/A, 08/06/2022); bronchoscopy (N/A, 08/06/2022); and Ovary removal (Right). Home Medications:  Prior to Admission medications    Medication Sig Start Date End Date Taking?  Authorizing Provider   insulin glargine (LANTUS SOLOSTAR) 100 UNIT/ML injection pen Inject 15 Units into the skin nightly 1/16/23  Yes Beckie Gallo MD   venlafaxine (EFFEXOR XR) 150 MG extended release capsule TAKE 1 CAPSULE BY MOUTH ONE TIME A DAY 1/13/23  Yes Beckie Gallo MD   cetirizine (ZYRTEC) 10 MG tablet Take 1 tablet by mouth in the morning and at bedtime 1/13/23 2/12/23 Yes Beckie Gallo MD   triamcinolone (KENALOG) 0.1 % ointment Apply topically 2 times daily as needed (itching) 1/13/23  Yes Chanda Rocha Liz Peña MD   dulaglutide (TRULICITY) 1.5 WA/9.1QC SC injection Inject 0.5 mLs into the skin once a week 1/13/23  Yes Terrie Patten MD   metoprolol tartrate (LOPRESSOR) 25 MG tablet Take 1 tablet by mouth 2 times daily 1/9/23  Yes Amy Staples MD   lisinopril (PRINIVIL;ZESTRIL) 10 MG tablet Take 2 tablets by mouth daily 12/20/22  Yes Terrie Patten MD   pantoprazole (PROTONIX) 40 MG tablet Take 1 tablet by mouth daily 9/20/22  Yes Terrie Patten MD   albuterol sulfate HFA (PROVENTIL HFA) 108 (90 Base) MCG/ACT inhaler Inhale 2 puffs into the lungs every 4 hours as needed for Wheezing 6/14/21  Yes Terrie Patten MD   Misc. Devices (CANE) MISC Use daily 6/14/21  Yes Terrie Patten MD   fluticasone [facility]) 50 MCG/ACT nasal spray 2 sprays by Each Nostril route 2 times daily for 5 days 1/9/23 1/14/23  Amy Staples MD   buPROPion [facility]) 75 MG tablet Take 1 tablet by mouth 2 times daily  Patient not taking: Reported on 7/29/2022 9/20/21 8/7/22  Terrie Patten MD   Multiple Vitamin (MULTI-VITAMIN DAILY PO) Take by mouth  Patient not taking: Reported on 8/6/2022 8/7/22  Historical Provider, MD   gabapentin (NEURONTIN) 300 MG capsule Take 1 capsule by mouth 3 times daily for 60 days. Intended supply: 30 days  Patient taking differently: Take 300 mg by mouth 3 times daily. Intended supply: 30 days  Taking PRN 4/30/21 8/7/22  Terrie Patten MD   montelukast (SINGULAIR) 10 MG tablet Take 1 tablet by mouth nightly  Patient not taking: No sig reported 4/30/21 8/7/22  Terrie Patten MD   Naproxen Sodium (ALEVE PO) Take by mouth daily as needed  8/7/22  Historical Provider, MD       Allergies:  Pcn [penicillins], Sulfa antibiotics, and Metformin and related    Social History:   reports that she quit smoking about 6 months ago. Her smoking use included cigarettes. She started smoking about 52 years ago. She has a 45.00 pack-year smoking history.  She has never used smokeless tobacco. She reports that she does not drink alcohol and does not use drugs. Review of Systems:  Review of Systems   Constitutional:  Negative for chills, fatigue and fever. HENT:  Negative for congestion and dental problem. Respiratory:  Positive for shortness of breath. Negative for chest tightness. Cardiovascular:  Positive for palpitations. Negative for chest pain. Gastrointestinal:  Negative for abdominal distention, nausea and vomiting. Neurological:  Positive for dizziness. Negative for light-headedness. Psychiatric/Behavioral:  Negative for agitation. Physical Exam:  /88   Pulse (!) 110   Ht 5' 3\" (1.6 m)   Wt 192 lb 6.4 oz (87.3 kg)   LMP  (LMP Unknown)   SpO2 99%   BMI 34.08 kg/m²    Physical Exam  Constitutional:       Appearance: Normal appearance. Cardiovascular:      Rate and Rhythm: Normal rate and regular rhythm. Pulses: Normal pulses. Heart sounds: Normal heart sounds. No murmur heard. Pulmonary:      Effort: Pulmonary effort is normal. No respiratory distress. Breath sounds: Normal breath sounds. No stridor. Abdominal:      General: There is no distension. Palpations: There is no mass. Musculoskeletal:         General: No swelling. Cervical back: No rigidity or tenderness. Skin:     Coloration: Skin is not jaundiced or pale. Neurological:      General: No focal deficit present. Mental Status: She is alert and oriented to person, place, and time. Psychiatric:         Mood and Affect: Mood normal.         Behavior: Behavior normal.       Cardiac Data:  EKG:     Labs:     CBC: No results for input(s): WBC, HGB, HCT, PLT in the last 72 hours. BMP:No results for input(s): NA, K, CO2, BUN, CREATININE, LABGLOM, GLUCOSE in the last 72 hours. PT/INR: No results for input(s): PROTIME, INR in the last 72 hours.   FASTING LIPID PANEL:  Lab Results   Component Value Date/Time    HDL 50 10/07/2022 12:34 PM    LDLCALC 87 09/15/2016 12:00 AM    TRIG 109 10/07/2022 12:34 PM LIVER PROFILE:No results for input(s): AST, ALT, LABALBU in the last 72 hours. IMPRESSION:    Patient Active Problem List   Diagnosis    Gastroesophageal reflux disease    Irritable bowel syndrome with diarrhea    Chronic bronchitis (HCC)    Uncontrolled type 2 DM with hyperosmolar nonketotic hyperglycemia (HCC)    Depression    Tobacco use    Allergic rhinitis    Carpal tunnel syndrome of right wrist    Spinal stenosis, thoracic region    Cervical spondylosis with myelopathy    Lumbar disc disease with radiculopathy    Bilateral carpal tunnel syndrome    Chronic radicular lumbar pain    Chronic bilateral thoracic back pain    Chronic cervical radiculopathy    Numbness and tingling    Chronic cerebral ischemia    Acquired cerebral atrophy (HCC)    Polyneuropathy, peripheral sensorimotor axonal    H/O fall    Balance problem    Neuropathic pain    Muscle spasm    Positive test for herpes simplex virus (HSV) antibody    Episode of shaking    Flexural eczema    COPD without exacerbation (HCC)    Laryngeal mass    Multiple thyroid nodules    Adrenal adenoma, right    Obesity (BMI 30-39. 9)    Dysphonia    Essential (primary) hypertension    Diabetic peripheral neuropathy (HCC)    Arrhythmia       Assessment/Plan:  Palpitations. Monitor 1/2023. Occasional PACs, runs of PAT up-to 14 beats. PVCs and ventricular bigeminy. 3 beats run of VT. Well controlled on Metoprolol. Cardiolite stress test 1/2023. Normal EF 69%. Echo 1/2023 EF 40-45%. Trivial MR.   4. COPD. Causing SOB. Followed by PCP. Was a chronic smoker for 50 years and quit recently. 5. Essential HTN. Well controlled.        Tomy Essex, MD, MD  [facility]           [phone]

## 2023-04-08 ENCOUNTER — OFFICE VISIT (OUTPATIENT)
Dept: PRIMARY CARE CLINIC | Age: 63
End: 2023-04-08
Payer: COMMERCIAL

## 2023-04-08 VITALS
TEMPERATURE: 98.2 F | WEIGHT: 195 LBS | HEART RATE: 72 BPM | SYSTOLIC BLOOD PRESSURE: 120 MMHG | HEIGHT: 63 IN | OXYGEN SATURATION: 97 % | DIASTOLIC BLOOD PRESSURE: 80 MMHG | BODY MASS INDEX: 34.55 KG/M2

## 2023-04-08 DIAGNOSIS — L50.9 URTICARIA: Primary | ICD-10-CM

## 2023-04-08 PROCEDURE — 3074F SYST BP LT 130 MM HG: CPT | Performed by: NURSE PRACTITIONER

## 2023-04-08 PROCEDURE — 99213 OFFICE O/P EST LOW 20 MIN: CPT | Performed by: NURSE PRACTITIONER

## 2023-04-08 PROCEDURE — 3079F DIAST BP 80-89 MM HG: CPT | Performed by: NURSE PRACTITIONER

## 2023-04-08 ASSESSMENT — ENCOUNTER SYMPTOMS
NAUSEA: 0
VOMITING: 0
SORE THROAT: 0
RESPIRATORY NEGATIVE: 1
ABDOMINAL PAIN: 0
COUGH: 0

## 2023-04-08 NOTE — PROGRESS NOTES
tobacco.      Objective:    Vitals:    04/08/23 0931   BP: 120/80   Pulse: 72   Temp: 98.2 °F (36.8 °C)   SpO2: 97%   Weight: 195 lb (88.5 kg)   Height: 5' 3\" (1.6 m)     Body mass index is 34.54 kg/m². Review of Systems   Constitutional: Negative. Negative for chills and fever. HENT:  Negative for congestion and sore throat. Respiratory: Negative. Negative for cough. Cardiovascular: Negative. Negative for chest pain. Gastrointestinal:  Negative for abdominal pain, nausea and vomiting. Skin:  Positive for rash. Physical Exam  Vitals and nursing note reviewed. Constitutional:       Appearance: Normal appearance. She is well-developed. HENT:      Head: Normocephalic. Jaw: There is normal jaw occlusion. Mouth/Throat:      Lips: Pink. Mouth: Mucous membranes are moist.      Pharynx: Oropharynx is clear. Uvula midline. Eyes:      Pupils: Pupils are equal, round, and reactive to light. Cardiovascular:      Rate and Rhythm: Normal rate and regular rhythm. Heart sounds: Normal heart sounds. Pulmonary:      Effort: Pulmonary effort is normal.      Breath sounds: Normal breath sounds and air entry. Musculoskeletal:      Cervical back: Normal range of motion and neck supple. Skin:     General: Skin is warm and dry. Findings: Erythema and rash present. Rash is urticarial.             Comments: Swelling of right hand and fingers. Neurological:      General: No focal deficit present. Mental Status: She is alert and oriented to person, place, and time. Psychiatric:         Behavior: Behavior normal.         Thought Content: Thought content normal.       Assessment and Plan:    No results found for this visit on 04/08/23. Diagnosis Orders   1. Urticaria  triamcinolone (KENALOG) 0.1 % ointment        Take Zyrtec daily until resolved. Apply triamcinolone ointment as directed to affected areas. She was instructed not to apply ointment to face or genital area.

## 2023-06-09 RX ORDER — LISINOPRIL 20 MG/1
TABLET ORAL
Qty: 30 TABLET | Refills: 3 | Status: SHIPPED | OUTPATIENT
Start: 2023-06-09

## 2023-07-03 NOTE — TELEPHONE ENCOUNTER
Alayna called requesting a refill of the below medication which has been pended for you:     Requested Prescriptions     Pending Prescriptions Disp Refills    metoprolol tartrate (LOPRESSOR) 25 MG tablet 60 tablet 3     Sig: Take 1 tablet by mouth 2 times daily       Last Appointment Date: 2/10/2023  Next Appointment Date: 8/4/2023    Allergies   Allergen Reactions    Pcn [Penicillins] Anaphylaxis     \"can't breath\"    Sulfa Antibiotics Anaphylaxis     \"can't breathe\"    Metformin And Related Other (See Comments)     Sweating and diarrhea       Pharmacy:  [facility]

## 2023-07-24 RX ORDER — VENLAFAXINE HYDROCHLORIDE 150 MG/1
CAPSULE, EXTENDED RELEASE ORAL
Qty: 90 CAPSULE | Refills: 0 | Status: SHIPPED | OUTPATIENT
Start: 2023-07-24

## 2023-07-24 NOTE — TELEPHONE ENCOUNTER
Alayna called requesting a refill of the below medication which has been pended for you:     Requested Prescriptions     Pending Prescriptions Disp Refills    venlafaxine (EFFEXOR XR) 150 MG extended release capsule [Pharmacy Med Name: Venlafaxine HCl  MG Oral Capsule Extended Release 24 Hour] 90 capsule 0     Sig: Take 1 capsule by mouth once daily       Last Appointment Date: 1/13/2023  Next Appointment Date: 8/14/2023    Allergies   Allergen Reactions    Pcn [Penicillins] Anaphylaxis     \"can't breath\"    Sulfa Antibiotics Anaphylaxis     \"can't breathe\"    Metformin And Related Other (See Comments)     Sweating and diarrhea

## 2023-08-14 ENCOUNTER — OFFICE VISIT (OUTPATIENT)
Dept: FAMILY MEDICINE CLINIC | Age: 63
End: 2023-08-14
Payer: COMMERCIAL

## 2023-08-14 ENCOUNTER — HOSPITAL ENCOUNTER (OUTPATIENT)
Age: 63
Discharge: HOME | End: 2023-08-14
Payer: COMMERCIAL

## 2023-08-14 VITALS
BODY MASS INDEX: 33.95 KG/M2 | HEART RATE: 94 BPM | SYSTOLIC BLOOD PRESSURE: 130 MMHG | WEIGHT: 191.6 LBS | DIASTOLIC BLOOD PRESSURE: 84 MMHG | HEIGHT: 63 IN | OXYGEN SATURATION: 97 %

## 2023-08-14 DIAGNOSIS — L72.3 SEBACEOUS CYST: ICD-10-CM

## 2023-08-14 DIAGNOSIS — E11.00 UNCONTROLLED TYPE 2 DM WITH HYPEROSMOLAR NONKETOTIC HYPERGLYCEMIA (HCC): ICD-10-CM

## 2023-08-14 DIAGNOSIS — E04.2 MULTIPLE THYROID NODULES: ICD-10-CM

## 2023-08-14 DIAGNOSIS — E11.00 UNCONTROLLED TYPE 2 DM WITH HYPEROSMOLAR NONKETOTIC HYPERGLYCEMIA (HCC): Primary | ICD-10-CM

## 2023-08-14 DIAGNOSIS — I10 ESSENTIAL (PRIMARY) HYPERTENSION: ICD-10-CM

## 2023-08-14 LAB
ANION GAP SERPL CALCULATED.3IONS-SCNC: 8 MMOL/L (ref 9–17)
BUN SERPL-MCNC: 9 MG/DL (ref 8–23)
BUN/CREAT SERPL: 11 (ref 9–20)
CALCIUM SERPL-MCNC: 9.9 MG/DL (ref 8.6–10.4)
CHLORIDE SERPL-SCNC: 92 MMOL/L (ref 98–107)
CO2 SERPL-SCNC: 29 MMOL/L (ref 20–31)
CREAT SERPL-MCNC: 0.8 MG/DL (ref 0.5–0.9)
GFR SERPL CREATININE-BSD FRML MDRD: >60 ML/MIN/1.73M2
GLUCOSE SERPL-MCNC: 420 MG/DL (ref 70–99)
POTASSIUM SERPL-SCNC: 5.2 MMOL/L (ref 3.7–5.3)
SODIUM SERPL-SCNC: 129 MMOL/L (ref 135–144)
T4 FREE SERPL-MCNC: 1.2 NG/DL (ref 0.9–1.7)
TSH SERPL DL<=0.05 MIU/L-ACNC: 0.24 UIU/ML (ref 0.3–5)

## 2023-08-14 PROCEDURE — 84443 ASSAY THYROID STIM HORMONE: CPT

## 2023-08-14 PROCEDURE — 36415 COLL VENOUS BLD VENIPUNCTURE: CPT

## 2023-08-14 PROCEDURE — 84439 ASSAY OF FREE THYROXINE: CPT

## 2023-08-14 PROCEDURE — 3075F SYST BP GE 130 - 139MM HG: CPT | Performed by: FAMILY MEDICINE

## 2023-08-14 PROCEDURE — 80048 BASIC METABOLIC PNL TOTAL CA: CPT

## 2023-08-14 PROCEDURE — 83036 HEMOGLOBIN GLYCOSYLATED A1C: CPT

## 2023-08-14 PROCEDURE — 99214 OFFICE O/P EST MOD 30 MIN: CPT | Performed by: FAMILY MEDICINE

## 2023-08-14 PROCEDURE — 3046F HEMOGLOBIN A1C LEVEL >9.0%: CPT | Performed by: FAMILY MEDICINE

## 2023-08-14 PROCEDURE — 3079F DIAST BP 80-89 MM HG: CPT | Performed by: FAMILY MEDICINE

## 2023-08-14 RX ORDER — SEMAGLUTIDE 1.34 MG/ML
0.25 INJECTION, SOLUTION SUBCUTANEOUS WEEKLY
Qty: 4 ADJUSTABLE DOSE PRE-FILLED PEN SYRINGE | Refills: 1 | Status: SHIPPED | OUTPATIENT
Start: 2023-08-14

## 2023-08-14 RX ORDER — FLUCONAZOLE 150 MG/1
TABLET ORAL
Qty: 2 TABLET | Refills: 0 | Status: SHIPPED | OUTPATIENT
Start: 2023-08-14

## 2023-08-14 RX ORDER — BLOOD-GLUCOSE METER
1 KIT MISCELLANEOUS DAILY
Qty: 1 KIT | Refills: 0 | Status: SHIPPED | OUTPATIENT
Start: 2023-08-14

## 2023-08-14 RX ORDER — LANCETS 30 GAUGE
1 EACH MISCELLANEOUS DAILY
Qty: 100 EACH | Refills: 5 | Status: SHIPPED | OUTPATIENT
Start: 2023-08-14

## 2023-08-14 RX ORDER — GLUCOSAMINE HCL/CHONDROITIN SU 500-400 MG
CAPSULE ORAL
Qty: 100 STRIP | Refills: 0 | Status: SHIPPED | OUTPATIENT
Start: 2023-08-14

## 2023-08-14 SDOH — ECONOMIC STABILITY: INCOME INSECURITY: HOW HARD IS IT FOR YOU TO PAY FOR THE VERY BASICS LIKE FOOD, HOUSING, MEDICAL CARE, AND HEATING?: NOT HARD AT ALL

## 2023-08-14 SDOH — ECONOMIC STABILITY: FOOD INSECURITY: WITHIN THE PAST 12 MONTHS, THE FOOD YOU BOUGHT JUST DIDN'T LAST AND YOU DIDN'T HAVE MONEY TO GET MORE.: NEVER TRUE

## 2023-08-14 SDOH — ECONOMIC STABILITY: FOOD INSECURITY: WITHIN THE PAST 12 MONTHS, YOU WORRIED THAT YOUR FOOD WOULD RUN OUT BEFORE YOU GOT MONEY TO BUY MORE.: NEVER TRUE

## 2023-08-14 SDOH — ECONOMIC STABILITY: HOUSING INSECURITY
IN THE LAST 12 MONTHS, WAS THERE A TIME WHEN YOU DID NOT HAVE A STEADY PLACE TO SLEEP OR SLEPT IN A SHELTER (INCLUDING NOW)?: NO

## 2023-08-14 ASSESSMENT — ENCOUNTER SYMPTOMS
COUGH: 0
SHORTNESS OF BREATH: 0
BACK PAIN: 1
CHEST TIGHTNESS: 0
WHEEZING: 0

## 2023-08-14 NOTE — PROGRESS NOTES
[address]  [address] [facility] [address]  Dept: [phone]  Dept Fax: [phone]  Loc: [phone]    Sergio Mccurdy is a 60 y.o. female who presents today for her medical conditions/complaints as noted below. Sergio Mccurdy is c/o of   Chief Complaint   Patient presents with    Diabetes     6 mo f/u, not taking Trulicity d/t issues at the pharmacy, would like to discuss Ozempic for weight loss as well, diabetic eye exam done Oct - getting records    Cyst     Discuss removing sebaceous cyst on scalp - increasing in size       HPI:     HPI Here today for a follow up of her DM and she has a sebaceous cyst.     DM: she has been having trouble getting trulicity because of availability at the pharmacy. She has not been checking her sugar. She has been having problems with numbness and tingling in her legs. She is drinking a lot of water and having polyuria as well. She always is carrying water with her. She is not currently taking anything for her sugars. She is having polyphagia. She has been having frequent yeast infections.        Past Medical History:   Diagnosis Date    Allergic rhinitis     Arthritis     Asthma     Bronchitis     COPD (chronic obstructive pulmonary disease) ([facility])     Depression     Diabetes mellitus ([address] [place] [address])     Headache     Hyperlipidemia     Hypertension     Incontinence     Irritable bowel syndrome     Kidney stone     Osteoarthritis     Pneumonia     Type 2 diabetes mellitus without complication ([address] [place] [address]) 3378    Ulcer           Social History     Tobacco Use    Smoking status: Former     Packs/day: 1.50     Years: 30.00     Pack years: 45.00     Types: Cigarettes     Start date: 1971     Quit date: 2022     Years since quittin.0    Smokeless tobacco: Never   Substance Use Topics    Alcohol use: No     Current Outpatient Medications   Medication Sig Dispense Refill    Semaglutide,0.25

## 2023-08-15 ENCOUNTER — TELEPHONE (OUTPATIENT)
Dept: FAMILY MEDICINE CLINIC | Age: 63
End: 2023-08-15

## 2023-08-15 LAB
EST. AVERAGE GLUCOSE BLD GHB EST-MCNC: 338 MG/DL
HBA1C MFR BLD: 13.4 % (ref 4–6)

## 2023-08-29 ENCOUNTER — PROCEDURE VISIT (OUTPATIENT)
Dept: SURGERY | Age: 63
End: 2023-08-29
Payer: COMMERCIAL

## 2023-08-29 VITALS
SYSTOLIC BLOOD PRESSURE: 132 MMHG | DIASTOLIC BLOOD PRESSURE: 86 MMHG | OXYGEN SATURATION: 95 % | BODY MASS INDEX: 33.66 KG/M2 | TEMPERATURE: 96.9 F | WEIGHT: 190 LBS | HEART RATE: 98 BPM | HEIGHT: 63 IN

## 2023-08-29 DIAGNOSIS — L72.11 PILAR CYST: Primary | ICD-10-CM

## 2023-08-29 PROCEDURE — 11422 EXC H-F-NK-SP B9+MARG 1.1-2: CPT | Performed by: SURGERY

## 2023-08-29 PROCEDURE — 11421 EXC H-F-NK-SP B9+MARG 0.6-1: CPT | Performed by: SURGERY

## 2023-08-29 PROCEDURE — 99999 PR OFFICE/OUTPT VISIT,PROCEDURE ONLY: CPT | Performed by: SURGERY

## 2023-08-29 RX ORDER — LORATADINE 10 MG/1
10 TABLET ORAL DAILY
COMMUNITY

## 2023-08-29 NOTE — PATIENT INSTRUCTIONS
SIGNS OF INFECTION  - Redness, swelling, skin hot  - Wound bed turns black or stringy yellow  - Foul odor  - Increased drainage or pus  - Increased pain  - Fever greater than 100F    CALL YOUR DOCTOR OR SEEK MEDICAL ATTENTION IF SIGNS OF INFECTION.   DO NOT WAIT UNTIL YOUR NEXT APPOINTMENT    Call the nurse with any other questions or concerns- [phone]

## 2023-08-29 NOTE — PROGRESS NOTES
Patient comes in with 3 symptomatic scalp lumps. Getting larger and get caught as she tries to comb her hair. 3 pilar cysts. Has had at least one previously. Risks and benefits of lesion excision were discussed with Abdifatah Cuellar and she does consent to proceed. The nature of the procedure, along with the risks of bleeding, infection, scarring pain and recurrence were discussed with her. Under local anesthetic the lesions were excised. Hemostasis was obtained with direct pressure; electrocautery No; silver nitrate No; sutures with No. The wound was closed using simple interrupt 4-0 nylon. The excised lesions were about 0.8, 1.2 and 1.6 cm in size. No pathology.

## 2023-09-05 ENCOUNTER — OFFICE VISIT (OUTPATIENT)
Dept: SURGERY | Age: 63
End: 2023-09-05

## 2023-09-05 VITALS
TEMPERATURE: 98 F | SYSTOLIC BLOOD PRESSURE: 130 MMHG | HEIGHT: 63 IN | DIASTOLIC BLOOD PRESSURE: 80 MMHG | BODY MASS INDEX: 33.66 KG/M2 | HEART RATE: 78 BPM | OXYGEN SATURATION: 95 %

## 2023-09-05 DIAGNOSIS — L72.11 PILAR CYST: Primary | ICD-10-CM

## 2023-09-05 PROCEDURE — 99024 POSTOP FOLLOW-UP VISIT: CPT | Performed by: SURGERY

## 2023-09-05 NOTE — PROGRESS NOTES
Jared Nascimento is here today for a post-operative wound check. The wound appears clean, dry, intact, and nontender. Sutures were removed today.   Steri-strips were not applied   Pathology NA   Follow-up prn

## 2023-10-10 ENCOUNTER — TELEPHONE (OUTPATIENT)
Dept: FAMILY MEDICINE CLINIC | Age: 63
End: 2023-10-10

## 2023-10-10 DIAGNOSIS — Z12.31 VISIT FOR SCREENING MAMMOGRAM: ICD-10-CM

## 2023-10-10 RX ORDER — FLUCONAZOLE 150 MG/1
TABLET ORAL
Qty: 2 TABLET | Refills: 0 | Status: SHIPPED | OUTPATIENT
Start: 2023-10-10

## 2023-10-10 RX ORDER — PANTOPRAZOLE SODIUM 40 MG/1
40 TABLET, DELAYED RELEASE ORAL DAILY
Qty: 90 TABLET | Refills: 3 | Status: SHIPPED | OUTPATIENT
Start: 2023-10-10

## 2023-10-10 RX ORDER — GLIMEPIRIDE 1 MG/1
1 TABLET ORAL
Qty: 90 TABLET | Refills: 1 | Status: SHIPPED | OUTPATIENT
Start: 2023-10-10

## 2023-10-10 NOTE — TELEPHONE ENCOUNTER
Did we get a prior auth for the ozempic? She has been on trulicity but it was not available so I sent in the ozempic. Do they have other options they would rather she try?

## 2023-10-10 NOTE — TELEPHONE ENCOUNTER
Pt calling stating she was prescribed Ozempic and her ins won't cover so the coupon you gave her will not work, do you want to prescribe something else, pt uses pended pharmacy, please advise.

## 2023-10-10 NOTE — TELEPHONE ENCOUNTER
The Ozempic did get approved but she has a large deductible plan through the  [employer] insurance. I called [facility] and with the insurance and co pay card her cost is still $570.00 due to unmet deductible. Patient was made aware of this and would like you to send in an oral medication that will be affordable for her. She states her blood sugars have been running high in the 300's the last couple of months. She also has a yeast infection and would like you to send in Diflucan to [facility].

## 2023-11-10 ENCOUNTER — OFFICE VISIT (OUTPATIENT)
Dept: CARDIOLOGY | Age: 63
End: 2023-11-10
Payer: COMMERCIAL

## 2023-11-10 VITALS
WEIGHT: 188 LBS | DIASTOLIC BLOOD PRESSURE: 90 MMHG | HEIGHT: 63 IN | BODY MASS INDEX: 33.31 KG/M2 | SYSTOLIC BLOOD PRESSURE: 150 MMHG | HEART RATE: 104 BPM

## 2023-11-10 DIAGNOSIS — R07.9 CHEST PAIN, UNSPECIFIED TYPE: ICD-10-CM

## 2023-11-10 DIAGNOSIS — I10 PRIMARY HYPERTENSION: Primary | ICD-10-CM

## 2023-11-10 PROCEDURE — 3079F DIAST BP 80-89 MM HG: CPT | Performed by: INTERNAL MEDICINE

## 2023-11-10 PROCEDURE — 93000 ELECTROCARDIOGRAM COMPLETE: CPT | Performed by: INTERNAL MEDICINE

## 2023-11-10 PROCEDURE — 99214 OFFICE O/P EST MOD 30 MIN: CPT | Performed by: INTERNAL MEDICINE

## 2023-11-10 PROCEDURE — 3075F SYST BP GE 130 - 139MM HG: CPT | Performed by: INTERNAL MEDICINE

## 2023-11-10 NOTE — PATIENT INSTRUCTIONS
Your Procedure Will Be Scheduled at:      [facility]    [address] [facility] [facility], [address]   (located across from [facility])    Located on the main floor of the [facility]. Report to our reception desk by the [employer]. Parking is free. Handicap parking is available by the main entrance. You may also park in [place] on Level 2 and enter building on the bridge to [facility]. Take elevator to the main floor. Our  will call you to schedule your procedure within a week. If you do not receive a phone call, please call the  directly at [phone] and leave a message, or call [place] office at [phone]. Date:______________________________    Arrival Time:________________________    Procedure Time:_____________________    Instructions:_____________________________      Bring Photo I.D. and insurance cards. Bring all Medications in the bottles. Pack an overnight bag in case you are required to spend the night. You will need someone to drive you home. The  will instruct you on holding food and drink the night before or morning of your procedure. You are to take your Medications, along with your Cardiac and/or Blood Pressure Medications, with small sips of water on the morning of your Procedure, unless instructed otherwise by your Physician. If you need additional directions please call [phone]. If you have any questions please feel free to call the [facility] office at [phone].

## 2023-11-13 RX ORDER — VENLAFAXINE HYDROCHLORIDE 150 MG/1
CAPSULE, EXTENDED RELEASE ORAL
Qty: 90 CAPSULE | Refills: 0 | Status: SHIPPED | OUTPATIENT
Start: 2023-11-13

## 2023-11-14 ENCOUNTER — OFFICE VISIT (OUTPATIENT)
Dept: FAMILY MEDICINE CLINIC | Age: 63
End: 2023-11-14
Payer: COMMERCIAL

## 2023-11-14 VITALS
WEIGHT: 189.9 LBS | SYSTOLIC BLOOD PRESSURE: 120 MMHG | HEART RATE: 68 BPM | HEIGHT: 63 IN | BODY MASS INDEX: 33.65 KG/M2 | OXYGEN SATURATION: 98 % | DIASTOLIC BLOOD PRESSURE: 80 MMHG

## 2023-11-14 DIAGNOSIS — J01.90 ACUTE BACTERIAL SINUSITIS: ICD-10-CM

## 2023-11-14 DIAGNOSIS — E78.2 MIXED HYPERLIPIDEMIA: ICD-10-CM

## 2023-11-14 DIAGNOSIS — B96.89 ACUTE BACTERIAL SINUSITIS: ICD-10-CM

## 2023-11-14 DIAGNOSIS — E11.00 UNCONTROLLED TYPE 2 DM WITH HYPEROSMOLAR NONKETOTIC HYPERGLYCEMIA (HCC): Primary | ICD-10-CM

## 2023-11-14 LAB — HBA1C MFR BLD: 13.5 %

## 2023-11-14 PROCEDURE — 3074F SYST BP LT 130 MM HG: CPT | Performed by: FAMILY MEDICINE

## 2023-11-14 PROCEDURE — 83036 HEMOGLOBIN GLYCOSYLATED A1C: CPT | Performed by: FAMILY MEDICINE

## 2023-11-14 PROCEDURE — 99214 OFFICE O/P EST MOD 30 MIN: CPT | Performed by: FAMILY MEDICINE

## 2023-11-14 PROCEDURE — 3046F HEMOGLOBIN A1C LEVEL >9.0%: CPT | Performed by: FAMILY MEDICINE

## 2023-11-14 PROCEDURE — 3079F DIAST BP 80-89 MM HG: CPT | Performed by: FAMILY MEDICINE

## 2023-11-14 RX ORDER — PANTOPRAZOLE SODIUM 40 MG/1
40 TABLET, DELAYED RELEASE ORAL DAILY
Qty: 90 TABLET | Refills: 3 | Status: SHIPPED | OUTPATIENT
Start: 2023-11-14

## 2023-11-14 RX ORDER — FLUCONAZOLE 150 MG/1
TABLET ORAL
Qty: 2 TABLET | Refills: 0 | Status: SHIPPED | OUTPATIENT
Start: 2023-11-14

## 2023-11-14 RX ORDER — LISINOPRIL 20 MG/1
20 TABLET ORAL DAILY
Qty: 90 TABLET | Refills: 3 | Status: SHIPPED | OUTPATIENT
Start: 2023-11-14

## 2023-11-14 RX ORDER — INSULIN DEGLUDEC 100 U/ML
20 INJECTION, SOLUTION SUBCUTANEOUS NIGHTLY
Qty: 3 EACH | Refills: 1 | Status: SHIPPED | OUTPATIENT
Start: 2023-11-14 | End: 2023-11-14

## 2023-11-14 RX ORDER — GLIMEPIRIDE 4 MG/1
4 TABLET ORAL
Qty: 90 TABLET | Refills: 1 | Status: SHIPPED | OUTPATIENT
Start: 2023-11-14

## 2023-11-14 RX ORDER — DOXYCYCLINE HYCLATE 100 MG
100 TABLET ORAL 2 TIMES DAILY
Qty: 20 TABLET | Refills: 0 | Status: SHIPPED | OUTPATIENT
Start: 2023-11-14

## 2023-11-14 RX ORDER — INSULIN DEGLUDEC 200 U/ML
20 INJECTION, SOLUTION SUBCUTANEOUS NIGHTLY
Qty: 2 ADJUSTABLE DOSE PRE-FILLED PEN SYRINGE | Refills: 1 | Status: SHIPPED | OUTPATIENT
Start: 2023-11-14

## 2023-11-14 ASSESSMENT — ENCOUNTER SYMPTOMS
WHEEZING: 1
SHORTNESS OF BREATH: 0
DIARRHEA: 0
NAUSEA: 0
CHEST TIGHTNESS: 0
SINUS PRESSURE: 1
CONSTIPATION: 0
SORE THROAT: 0
TROUBLE SWALLOWING: 0
COUGH: 1
CHOKING: 0

## 2023-11-14 NOTE — PATIENT INSTRUCTIONS
In 1 week send me a message to let me know what your sugars are doing. Also the morning of your cath do not take the glimepiride.

## 2023-11-14 NOTE — PROGRESS NOTES
[address]  [address] [facility] [address]  Dept: [phone]  Dept Fax: [phone]  Loc: [phone]    Desirae Ying is a 58 y.o. female who presents today for her medical conditions/complaints as noted below. Desirae Ying is c/o of   Chief Complaint   Patient presents with    Diabetes     3 month follow up    Cough     5 weeks, mucus drainage       HPI:     Here today for a cough and a follow up of her DM    Cough  This is a new problem. The current episode started 1 to 4 weeks ago (5 weeks). The problem has been unchanged. The problem occurs every few minutes. The cough is Productive of sputum. Associated symptoms include ear congestion, headaches, nasal congestion, postnasal drip and wheezing (occasionally). Pertinent negatives include no chest pain, chills, ear pain, fever, rash, sore throat (resolved) or shortness of breath (improved). The symptoms are aggravated by lying down and exercise. She has tried OTC cough suppressant and a beta-agonist inhaler for the symptoms. The treatment provided moderate relief. Her past medical history is significant for COPD. There is no history of environmental allergies. She has been babysitting her grandkids because her daughter is working on the weekends. DM: stable; her sugars have been very high recently. She has been running in the 300-500 range. Things were okay until she took steroids several months ago. She has been having some polydipsia and polyuria. She is not having any lows. She has been having some blurry vision when her sugars are high. Ozempic was not covered by her insurance because of a copay. She is taking some insulin when her sugars are in the 500s and she takes 20 units. She is scheduled for a heart cath later this week. She saw cardiology for intermittent chest pain so they ordered the heart cath.        Past Medical History:   Diagnosis

## 2023-11-15 NOTE — PROGRESS NOTES
Pre-call complete. Questions/concerns addressed. Reminded to remain NPO after 0630 per office. Report to Ent A at 1200.

## 2023-11-16 ENCOUNTER — HOSPITAL ENCOUNTER (OUTPATIENT)
Age: 63
Setting detail: OUTPATIENT SURGERY
Discharge: HOME | End: 2023-11-16
Attending: INTERNAL MEDICINE | Admitting: INTERNAL MEDICINE
Payer: COMMERCIAL

## 2023-11-16 VITALS
BODY MASS INDEX: 33.44 KG/M2 | DIASTOLIC BLOOD PRESSURE: 69 MMHG | OXYGEN SATURATION: 98 % | HEIGHT: 63 IN | HEART RATE: 86 BPM | TEMPERATURE: 97.7 F | WEIGHT: 188.71 LBS | SYSTOLIC BLOOD PRESSURE: 137 MMHG | RESPIRATION RATE: 14 BRPM

## 2023-11-16 DIAGNOSIS — R07.9 CHEST PAIN: ICD-10-CM

## 2023-11-16 PROBLEM — I25.10 CORONARY ARTERY DISEASE INVOLVING NATIVE CORONARY ARTERY OF NATIVE HEART WITHOUT ANGINA PECTORIS: Status: ACTIVE | Noted: 2023-11-16

## 2023-11-16 PROBLEM — R07.89 OTHER CHEST PAIN: Status: ACTIVE | Noted: 2023-11-16

## 2023-11-16 LAB
BUN BLD-MCNC: 14 MG/DL (ref 8–26)
CA-I BLD-SCNC: 1.2 MMOL/L (ref 1.15–1.33)
CHLORIDE BLD-SCNC: 100 MMOL/L (ref 98–107)
CO2 BLD CALC-SCNC: 28 MMOL/L (ref 22–30)
ECHO BSA: 1.95 M2
EGFR, POC: >60 ML/MIN/1.73M2
GLUCOSE BLD-MCNC: 263 MG/DL (ref 74–100)
HCT VFR BLD AUTO: 50 % (ref 36–46)
PLATELET # BLD AUTO: 195 K/UL (ref 140–450)
POC ANION GAP: 10 MMOL/L (ref 7–16)
POC CREATININE: 0.5 MG/DL (ref 0.51–1.19)
POC HEMOGLOBIN (CALC): 17.1 G/DL (ref 12–16)
POTASSIUM BLD-SCNC: 4.1 MMOL/L (ref 3.5–4.5)
SODIUM BLD-SCNC: 137 MMOL/L (ref 138–146)

## 2023-11-16 PROCEDURE — 82947 ASSAY GLUCOSE BLOOD QUANT: CPT

## 2023-11-16 PROCEDURE — C1894 INTRO/SHEATH, NON-LASER: HCPCS | Performed by: INTERNAL MEDICINE

## 2023-11-16 PROCEDURE — 2580000003 HC RX 258: Performed by: INTERNAL MEDICINE

## 2023-11-16 PROCEDURE — 6360000002 HC RX W HCPCS: Performed by: INTERNAL MEDICINE

## 2023-11-16 PROCEDURE — 85049 AUTOMATED PLATELET COUNT: CPT

## 2023-11-16 PROCEDURE — 82565 ASSAY OF CREATININE: CPT

## 2023-11-16 PROCEDURE — 7100000011 HC PHASE II RECOVERY - ADDTL 15 MIN: Performed by: INTERNAL MEDICINE

## 2023-11-16 PROCEDURE — 7100000010 HC PHASE II RECOVERY - FIRST 15 MIN: Performed by: INTERNAL MEDICINE

## 2023-11-16 PROCEDURE — 2500000003 HC RX 250 WO HCPCS: Performed by: INTERNAL MEDICINE

## 2023-11-16 PROCEDURE — 2709999900 HC NON-CHARGEABLE SUPPLY: Performed by: INTERNAL MEDICINE

## 2023-11-16 PROCEDURE — 84520 ASSAY OF UREA NITROGEN: CPT

## 2023-11-16 PROCEDURE — 80051 ELECTROLYTE PANEL: CPT

## 2023-11-16 PROCEDURE — 93454 CORONARY ARTERY ANGIO S&I: CPT | Performed by: INTERNAL MEDICINE

## 2023-11-16 PROCEDURE — 82330 ASSAY OF CALCIUM: CPT

## 2023-11-16 PROCEDURE — 85014 HEMATOCRIT: CPT

## 2023-11-16 PROCEDURE — 6360000004 HC RX CONTRAST MEDICATION: Performed by: INTERNAL MEDICINE

## 2023-11-16 RX ORDER — SODIUM CHLORIDE 0.9 % (FLUSH) 0.9 %
5-40 SYRINGE (ML) INJECTION EVERY 12 HOURS SCHEDULED
Status: DISCONTINUED | OUTPATIENT
Start: 2023-11-16 | End: 2023-11-16 | Stop reason: HOSPADM

## 2023-11-16 RX ORDER — SODIUM CHLORIDE 9 MG/ML
INJECTION, SOLUTION INTRAVENOUS PRN
Status: DISCONTINUED | OUTPATIENT
Start: 2023-11-16 | End: 2023-11-16 | Stop reason: HOSPADM

## 2023-11-16 RX ORDER — SODIUM CHLORIDE 0.9 % (FLUSH) 0.9 %
5-40 SYRINGE (ML) INJECTION PRN
Status: DISCONTINUED | OUTPATIENT
Start: 2023-11-16 | End: 2023-11-16 | Stop reason: HOSPADM

## 2023-11-16 RX ORDER — MIDAZOLAM HYDROCHLORIDE 1 MG/ML
INJECTION INTRAMUSCULAR; INTRAVENOUS PRN
Status: DISCONTINUED | OUTPATIENT
Start: 2023-11-16 | End: 2023-11-16 | Stop reason: HOSPADM

## 2023-11-16 RX ORDER — ACETAMINOPHEN 325 MG/1
650 TABLET ORAL EVERY 4 HOURS PRN
Status: DISCONTINUED | OUTPATIENT
Start: 2023-11-16 | End: 2023-11-16 | Stop reason: HOSPADM

## 2023-11-16 RX ORDER — SODIUM CHLORIDE 9 MG/ML
INJECTION, SOLUTION INTRAVENOUS CONTINUOUS
Status: DISCONTINUED | OUTPATIENT
Start: 2023-11-16 | End: 2023-11-16 | Stop reason: HOSPADM

## 2023-11-16 RX ORDER — FENTANYL CITRATE 50 UG/ML
INJECTION, SOLUTION INTRAMUSCULAR; INTRAVENOUS PRN
Status: DISCONTINUED | OUTPATIENT
Start: 2023-11-16 | End: 2023-11-16 | Stop reason: HOSPADM

## 2023-11-16 RX ORDER — ONDANSETRON 2 MG/ML
4 INJECTION INTRAMUSCULAR; INTRAVENOUS EVERY 6 HOURS PRN
Status: DISCONTINUED | OUTPATIENT
Start: 2023-11-16 | End: 2023-11-16 | Stop reason: HOSPADM

## 2023-11-16 RX ADMIN — SODIUM CHLORIDE: 9 INJECTION, SOLUTION INTRAVENOUS at 12:30

## 2023-11-16 ASSESSMENT — PAIN DESCRIPTION - LOCATION: LOCATION: CHEST

## 2023-11-16 ASSESSMENT — PAIN SCALES - GENERAL: PAINLEVEL_OUTOF10: 4

## 2023-11-16 ASSESSMENT — PAIN DESCRIPTION - ORIENTATION: ORIENTATION: MID

## 2023-11-16 ASSESSMENT — PAIN DESCRIPTION - DESCRIPTORS: DESCRIPTORS: PRESSURE

## 2023-11-16 NOTE — H&P
Patient was referred for coronary angiography and possible PCI due to recurrent chest pains. The procedure benefits risks and alternatives were explained including the risk of bleeding stroke heart attack contrast allergy or nephropathy and death and she does agree to proceed and consent signed.

## 2023-11-16 NOTE — FLOWSHEET NOTE
All discharge instructions reviewed, questions answered, paper signed and given copy. Patient discharged per wheelchair with Zac Jane and belongings.

## 2024-02-09 RX ORDER — VENLAFAXINE HYDROCHLORIDE 150 MG/1
CAPSULE, EXTENDED RELEASE ORAL
Qty: 30 CAPSULE | Refills: 0 | Status: SHIPPED | OUTPATIENT
Start: 2024-02-09

## 2024-02-09 NOTE — TELEPHONE ENCOUNTER
Alayna called requesting a refill of the below medication which has been pended for you: left message for patient to call back to schedule diabetic fup that was requested for Dec    Requested Prescriptions     Pending Prescriptions Disp Refills    venlafaxine (EFFEXOR XR) 150 MG extended release capsule [Pharmacy Med Name: Venlafaxine HCl  MG Oral Capsule Extended Release 24 Hour] 90 capsule 0     Sig: Take 1 capsule by mouth once daily       Last Appointment Date: 11/14/2023  Next Appointment Date: Visit date not found    Allergies   Allergen Reactions    Pcn [Penicillins] Anaphylaxis     \"can't breath\"    Sulfa Antibiotics Anaphylaxis     \"can't breathe\"    Metformin And Related Other (See Comments)     Sweating and diarrhea

## 2024-02-29 ENCOUNTER — OFFICE VISIT (OUTPATIENT)
Dept: FAMILY MEDICINE CLINIC | Age: 64
End: 2024-02-29
Payer: COMMERCIAL

## 2024-02-29 ENCOUNTER — HOSPITAL ENCOUNTER (OUTPATIENT)
Age: 64
Discharge: HOME | End: 2024-02-29
Payer: COMMERCIAL

## 2024-02-29 ENCOUNTER — HOSPITAL ENCOUNTER (OUTPATIENT)
Dept: GENERAL RADIOLOGY | Age: 64
Discharge: HOME | End: 2024-03-02
Payer: COMMERCIAL

## 2024-02-29 ENCOUNTER — TELEPHONE (OUTPATIENT)
Dept: INTERNAL MEDICINE | Age: 64
End: 2024-02-29

## 2024-02-29 VITALS
WEIGHT: 191.3 LBS | HEART RATE: 103 BPM | BODY MASS INDEX: 33.89 KG/M2 | SYSTOLIC BLOOD PRESSURE: 134 MMHG | HEIGHT: 63 IN | DIASTOLIC BLOOD PRESSURE: 76 MMHG | OXYGEN SATURATION: 97 %

## 2024-02-29 DIAGNOSIS — G89.29 CHRONIC PAIN OF RIGHT ANKLE: ICD-10-CM

## 2024-02-29 DIAGNOSIS — E78.2 MIXED HYPERLIPIDEMIA: ICD-10-CM

## 2024-02-29 DIAGNOSIS — E11.00 UNCONTROLLED TYPE 2 DM WITH HYPEROSMOLAR NONKETOTIC HYPERGLYCEMIA (HCC): ICD-10-CM

## 2024-02-29 DIAGNOSIS — N39.41 URGE INCONTINENCE OF URINE: ICD-10-CM

## 2024-02-29 DIAGNOSIS — E11.00 UNCONTROLLED TYPE 2 DM WITH HYPEROSMOLAR NONKETOTIC HYPERGLYCEMIA (HCC): Primary | ICD-10-CM

## 2024-02-29 DIAGNOSIS — R15.1 FECAL SMEARING: ICD-10-CM

## 2024-02-29 DIAGNOSIS — M25.571 CHRONIC PAIN OF RIGHT ANKLE: ICD-10-CM

## 2024-02-29 DIAGNOSIS — Z23 NEED FOR VACCINATION: ICD-10-CM

## 2024-02-29 DIAGNOSIS — E55.9 VITAMIN D DEFICIENCY: ICD-10-CM

## 2024-02-29 LAB
25(OH)D3 SERPL-MCNC: 21.1 NG/ML
ANION GAP SERPL CALCULATED.3IONS-SCNC: 11 MMOL/L (ref 9–17)
BUN SERPL-MCNC: 17 MG/DL (ref 8–23)
BUN/CREAT SERPL: 24 (ref 9–20)
CALCIUM SERPL-MCNC: 9.6 MG/DL (ref 8.6–10.4)
CHLORIDE SERPL-SCNC: 89 MMOL/L (ref 98–107)
CHOLEST SERPL-MCNC: 204 MG/DL
CHOLESTEROL/HDL RATIO: 3.3
CO2 SERPL-SCNC: 26 MMOL/L (ref 20–31)
CREAT SERPL-MCNC: 0.7 MG/DL (ref 0.5–0.9)
CREAT UR-MCNC: 20.8 MG/DL (ref 28–217)
EST. AVERAGE GLUCOSE BLD GHB EST-MCNC: 303 MG/DL
GFR SERPL CREATININE-BSD FRML MDRD: >60 ML/MIN/1.73M2
GLUCOSE SERPL-MCNC: 561 MG/DL (ref 70–99)
HBA1C MFR BLD: 12.2 % (ref 4–6)
HDLC SERPL-MCNC: 62 MG/DL
LDLC SERPL CALC-MCNC: 113 MG/DL (ref 0–130)
MICROALBUMIN UR-MCNC: <12 MG/L
MICROALBUMIN/CREAT UR-RTO: ABNORMAL MCG/MG CREAT
POTASSIUM SERPL-SCNC: 4.1 MMOL/L (ref 3.7–5.3)
SODIUM SERPL-SCNC: 126 MMOL/L (ref 135–144)
TRIGL SERPL-MCNC: 146 MG/DL

## 2024-02-29 PROCEDURE — 36415 COLL VENOUS BLD VENIPUNCTURE: CPT

## 2024-02-29 PROCEDURE — 80061 LIPID PANEL: CPT

## 2024-02-29 PROCEDURE — 82306 VITAMIN D 25 HYDROXY: CPT

## 2024-02-29 PROCEDURE — 3078F DIAST BP <80 MM HG: CPT | Performed by: FAMILY MEDICINE

## 2024-02-29 PROCEDURE — 90471 IMMUNIZATION ADMIN: CPT | Performed by: FAMILY MEDICINE

## 2024-02-29 PROCEDURE — 3046F HEMOGLOBIN A1C LEVEL >9.0%: CPT | Performed by: FAMILY MEDICINE

## 2024-02-29 PROCEDURE — 3075F SYST BP GE 130 - 139MM HG: CPT | Performed by: FAMILY MEDICINE

## 2024-02-29 PROCEDURE — 73610 X-RAY EXAM OF ANKLE: CPT

## 2024-02-29 PROCEDURE — 90686 IIV4 VACC NO PRSV 0.5 ML IM: CPT | Performed by: FAMILY MEDICINE

## 2024-02-29 PROCEDURE — 99214 OFFICE O/P EST MOD 30 MIN: CPT | Performed by: FAMILY MEDICINE

## 2024-02-29 PROCEDURE — 83036 HEMOGLOBIN GLYCOSYLATED A1C: CPT

## 2024-02-29 PROCEDURE — 82043 UR ALBUMIN QUANTITATIVE: CPT

## 2024-02-29 PROCEDURE — 82570 ASSAY OF URINE CREATININE: CPT

## 2024-02-29 PROCEDURE — 80048 BASIC METABOLIC PNL TOTAL CA: CPT

## 2024-02-29 RX ORDER — INSULIN GLARGINE 100 [IU]/ML
20 INJECTION, SOLUTION SUBCUTANEOUS NIGHTLY
Qty: 5 ADJUSTABLE DOSE PRE-FILLED PEN SYRINGE | Refills: 0 | OUTPATIENT
Start: 2024-02-29

## 2024-02-29 RX ORDER — POLYETHYLENE GLYCOL 3350, SODIUM CHLORIDE, SODIUM BICARBONATE, POTASSIUM CHLORIDE 420; 11.2; 5.72; 1.48 G/4L; G/4L; G/4L; G/4L
4000 POWDER, FOR SOLUTION ORAL ONCE
Qty: 4000 ML | Refills: 0 | Status: SHIPPED | OUTPATIENT
Start: 2024-02-29 | End: 2024-02-29

## 2024-02-29 ASSESSMENT — PATIENT HEALTH QUESTIONNAIRE - PHQ9
1. LITTLE INTEREST OR PLEASURE IN DOING THINGS: 0
9. THOUGHTS THAT YOU WOULD BE BETTER OFF DEAD, OR OF HURTING YOURSELF: 0
SUM OF ALL RESPONSES TO PHQ QUESTIONS 1-9: 0
6. FEELING BAD ABOUT YOURSELF - OR THAT YOU ARE A FAILURE OR HAVE LET YOURSELF OR YOUR FAMILY DOWN: 0
2. FEELING DOWN, DEPRESSED OR HOPELESS: 0
10. IF YOU CHECKED OFF ANY PROBLEMS, HOW DIFFICULT HAVE THESE PROBLEMS MADE IT FOR YOU TO DO YOUR WORK, TAKE CARE OF THINGS AT HOME, OR GET ALONG WITH OTHER PEOPLE: 0
8. MOVING OR SPEAKING SO SLOWLY THAT OTHER PEOPLE COULD HAVE NOTICED. OR THE OPPOSITE, BEING SO FIGETY OR RESTLESS THAT YOU HAVE BEEN MOVING AROUND A LOT MORE THAN USUAL: 0
4. FEELING TIRED OR HAVING LITTLE ENERGY: 0
SUM OF ALL RESPONSES TO PHQ9 QUESTIONS 1 & 2: 0
3. TROUBLE FALLING OR STAYING ASLEEP: 0
SUM OF ALL RESPONSES TO PHQ QUESTIONS 1-9: 0
5. POOR APPETITE OR OVEREATING: 0
7. TROUBLE CONCENTRATING ON THINGS, SUCH AS READING THE NEWSPAPER OR WATCHING TELEVISION: 0
SUM OF ALL RESPONSES TO PHQ QUESTIONS 1-9: 0
SUM OF ALL RESPONSES TO PHQ QUESTIONS 1-9: 0

## 2024-02-29 ASSESSMENT — ENCOUNTER SYMPTOMS
CHEST TIGHTNESS: 0
SHORTNESS OF BREATH: 0
COUGH: 0
WHEEZING: 0

## 2024-02-29 NOTE — TELEPHONE ENCOUNTER
Lab called Dr Aponte as he is on call this Evening for Dr Polo and stated her Glucose was 561.    Dr Aponte advised the patient to have no food or calories until her blood sugar was below 200. Along with increasing her fluid intake and then he would like her to re check her BS in 2 hours and then every 1-2 hours after that if her sugar has not come down in 2 hours.    Patient was notified of this and she voiced understanding.

## 2024-02-29 NOTE — PROGRESS NOTES
Cervical back: Normal range of motion and neck supple.      Right ankle: Swelling present. No deformity or lacerations. Tenderness present over the lateral malleolus. Normal range of motion. Anterior drawer test negative. Normal pulse.      Right Achilles Tendon: Normal.   Lymphadenopathy:      Cervical: No cervical adenopathy.   Skin:     General: Skin is warm and dry.      Findings: No erythema or rash.   Neurological:      Mental Status: She is alert and oriented to person, place, and time.       /76 (Site: Left Upper Arm, Position: Sitting, Cuff Size: Medium Adult)   Pulse (!) 103   Ht 1.6 m (5' 3\")   Wt 86.8 kg (191 lb 4.8 oz)   LMP  (LMP Unknown)   SpO2 97%   BMI 33.89 kg/m²     Assessment:       Diagnosis Orders   1. Uncontrolled type 2 DM with hyperosmolar nonketotic hyperglycemia (HCC)  Basic Metabolic Panel    Hemoglobin A1C      2. Chronic pain of right ankle  XR ANKLE RIGHT (MIN 3 VIEWS)      3. Urge incontinence of urine  [facility] [place]      4. Fecal smearing  polyethylene glycol-electrolytes (NULYTELY) 420 g solution      5. Vitamin D deficiency  Vitamin D 25 Hydroxy      6. Need for vaccination  Influenza, FLULAVAL, (age 6 mo+), IM, Preservative Free, 0.5mL                Plan:       DM: stable; her last A1c was 13.4 and she has a lot of insulin at home from her  mother in law. I told he to start taking 20 units of lantus at night and continue taking 10-20 units of humalog with meals. She will let me know in a week how her sugars are running so I can adjust her lantus.     Ankle pain: new; unclear of the cause. I ordered an xray and will refer to ortho or PT based on results.     Urine incontinence: worsening; I referred her to urology for further recommendations.     Fecal smearing; new; possibly due to constipation. I recommended a bowel clean out to see if that helps.     Health Maintenance reviewed - Flu shot given.    Return in about 3 months (around

## 2024-03-18 RX ORDER — VENLAFAXINE HYDROCHLORIDE 150 MG/1
CAPSULE, EXTENDED RELEASE ORAL
Qty: 30 CAPSULE | Refills: 3 | Status: SHIPPED | OUTPATIENT
Start: 2024-03-18

## 2024-05-15 DIAGNOSIS — E11.00 UNCONTROLLED TYPE 2 DM WITH HYPEROSMOLAR NONKETOTIC HYPERGLYCEMIA (HCC): ICD-10-CM

## 2024-05-15 RX ORDER — GLIMEPIRIDE 4 MG/1
TABLET ORAL
Qty: 90 TABLET | Refills: 0 | Status: SHIPPED | OUTPATIENT
Start: 2024-05-15

## 2024-05-15 NOTE — TELEPHONE ENCOUNTER
Alayna called requesting a refill of the below medication which has been pended for you:     Requested Prescriptions     Pending Prescriptions Disp Refills    glimepiride (AMARYL) 4 MG tablet [Pharmacy Med Name: Glimepiride 4 MG Oral Tablet] 90 tablet 0     Sig: TAKE 1 TABLET BY MOUTH ONCE DAILY IN THE MORNING BEFORE BREAKFAST       Last Appointment Date: 2/29/2024  Next Appointment Date: 5/30/2024    Allergies   Allergen Reactions    Pcn [Penicillins] Anaphylaxis     \"can't breath\"    Sulfa Antibiotics Anaphylaxis     \"can't breathe\"    Metformin And Related Other (See Comments)     Sweating and diarrhea

## 2024-05-31 ENCOUNTER — HOSPITAL ENCOUNTER (OUTPATIENT)
Dept: GENERAL RADIOLOGY | Age: 64
Discharge: HOME | End: 2024-05-31
Payer: COMMERCIAL

## 2024-05-31 ENCOUNTER — OFFICE VISIT (OUTPATIENT)
Dept: FAMILY MEDICINE CLINIC | Age: 64
End: 2024-05-31

## 2024-05-31 ENCOUNTER — OFFICE VISIT (OUTPATIENT)
Dept: CARDIOLOGY | Age: 64
End: 2024-05-31
Payer: COMMERCIAL

## 2024-05-31 ENCOUNTER — HOSPITAL ENCOUNTER (OUTPATIENT)
Age: 64
Discharge: HOME | End: 2024-05-31
Payer: COMMERCIAL

## 2024-05-31 VITALS
SYSTOLIC BLOOD PRESSURE: 150 MMHG | BODY MASS INDEX: 33.84 KG/M2 | DIASTOLIC BLOOD PRESSURE: 82 MMHG | HEIGHT: 63 IN | HEART RATE: 84 BPM | WEIGHT: 191 LBS

## 2024-05-31 VITALS
OXYGEN SATURATION: 96 % | HEART RATE: 99 BPM | HEIGHT: 63 IN | BODY MASS INDEX: 36.32 KG/M2 | SYSTOLIC BLOOD PRESSURE: 136 MMHG | DIASTOLIC BLOOD PRESSURE: 78 MMHG | WEIGHT: 205 LBS

## 2024-05-31 DIAGNOSIS — E11.00 UNCONTROLLED TYPE 2 DM WITH HYPEROSMOLAR NONKETOTIC HYPERGLYCEMIA (HCC): ICD-10-CM

## 2024-05-31 DIAGNOSIS — Z87.891 PERSONAL HISTORY OF TOBACCO USE: ICD-10-CM

## 2024-05-31 DIAGNOSIS — I10 ESSENTIAL HYPERTENSION: ICD-10-CM

## 2024-05-31 DIAGNOSIS — I10 PRIMARY HYPERTENSION: ICD-10-CM

## 2024-05-31 DIAGNOSIS — M25.552 LEFT HIP PAIN: ICD-10-CM

## 2024-05-31 DIAGNOSIS — I25.10 STENOSIS OF LEFT ANTERIOR DESCENDING (LAD) ARTERY: ICD-10-CM

## 2024-05-31 DIAGNOSIS — R07.9 CHEST PAIN, UNSPECIFIED TYPE: Primary | ICD-10-CM

## 2024-05-31 DIAGNOSIS — E78.2 MIXED HYPERLIPIDEMIA: ICD-10-CM

## 2024-05-31 DIAGNOSIS — J44.9 COPD WITHOUT EXACERBATION (HCC): ICD-10-CM

## 2024-05-31 DIAGNOSIS — I25.10 ATHEROSCLEROSIS OF NATIVE CORONARY ARTERY OF NATIVE HEART WITHOUT ANGINA PECTORIS: ICD-10-CM

## 2024-05-31 DIAGNOSIS — F32.1 CURRENT MODERATE EPISODE OF MAJOR DEPRESSIVE DISORDER WITHOUT PRIOR EPISODE (HCC): ICD-10-CM

## 2024-05-31 DIAGNOSIS — E11.00 UNCONTROLLED TYPE 2 DM WITH HYPEROSMOLAR NONKETOTIC HYPERGLYCEMIA (HCC): Primary | ICD-10-CM

## 2024-05-31 LAB
ANION GAP SERPL CALCULATED.3IONS-SCNC: 11 MMOL/L (ref 9–17)
BUN SERPL-MCNC: 16 MG/DL (ref 8–23)
BUN/CREAT SERPL: 20 (ref 9–20)
CALCIUM SERPL-MCNC: 9.5 MG/DL (ref 8.6–10.4)
CHLORIDE SERPL-SCNC: 94 MMOL/L (ref 98–107)
CO2 SERPL-SCNC: 27 MMOL/L (ref 20–31)
CREAT SERPL-MCNC: 0.8 MG/DL (ref 0.5–0.9)
GFR, ESTIMATED: 82 ML/MIN/1.73M2
GLUCOSE SERPL-MCNC: 397 MG/DL (ref 70–99)
POTASSIUM SERPL-SCNC: 4.2 MMOL/L (ref 3.7–5.3)
SODIUM SERPL-SCNC: 132 MMOL/L (ref 135–144)

## 2024-05-31 PROCEDURE — 3077F SYST BP >= 140 MM HG: CPT | Performed by: INTERNAL MEDICINE

## 2024-05-31 PROCEDURE — 73502 X-RAY EXAM HIP UNI 2-3 VIEWS: CPT

## 2024-05-31 PROCEDURE — 36415 COLL VENOUS BLD VENIPUNCTURE: CPT

## 2024-05-31 PROCEDURE — 99214 OFFICE O/P EST MOD 30 MIN: CPT | Performed by: INTERNAL MEDICINE

## 2024-05-31 PROCEDURE — 83036 HEMOGLOBIN GLYCOSYLATED A1C: CPT

## 2024-05-31 PROCEDURE — 80048 BASIC METABOLIC PNL TOTAL CA: CPT

## 2024-05-31 PROCEDURE — 3079F DIAST BP 80-89 MM HG: CPT | Performed by: INTERNAL MEDICINE

## 2024-05-31 RX ORDER — ROSUVASTATIN CALCIUM 40 MG/1
40 TABLET, COATED ORAL DAILY
Qty: 90 TABLET | Refills: 3 | Status: SHIPPED | OUTPATIENT
Start: 2024-05-31

## 2024-05-31 RX ORDER — ASPIRIN 81 MG/1
81 TABLET ORAL DAILY
Qty: 90 TABLET | Refills: 3 | Status: SHIPPED | OUTPATIENT
Start: 2024-05-31

## 2024-05-31 RX ORDER — FLUCONAZOLE 150 MG/1
TABLET ORAL
Qty: 2 TABLET | Refills: 0 | Status: SHIPPED | OUTPATIENT
Start: 2024-05-31

## 2024-05-31 RX ORDER — LISINOPRIL 40 MG/1
40 TABLET ORAL DAILY
Qty: 90 TABLET | Refills: 3 | Status: SHIPPED | OUTPATIENT
Start: 2024-05-31

## 2024-05-31 RX ORDER — INSULIN GLARGINE 100 [IU]/ML
30 INJECTION, SOLUTION SUBCUTANEOUS NIGHTLY
Qty: 5 ADJUSTABLE DOSE PRE-FILLED PEN SYRINGE | Refills: 0
Start: 2024-05-31

## 2024-05-31 RX ORDER — BUPROPION HYDROCHLORIDE 150 MG/1
150 TABLET ORAL EVERY MORNING
Qty: 30 TABLET | Refills: 3 | Status: SHIPPED | OUTPATIENT
Start: 2024-05-31

## 2024-05-31 RX ORDER — VENLAFAXINE HYDROCHLORIDE 150 MG/1
150 CAPSULE, EXTENDED RELEASE ORAL DAILY
Qty: 90 CAPSULE | Refills: 1 | Status: SHIPPED | OUTPATIENT
Start: 2024-05-31

## 2024-05-31 RX ORDER — ALBUTEROL SULFATE 90 UG/1
2 AEROSOL, METERED RESPIRATORY (INHALATION) EVERY 4 HOURS PRN
Qty: 1 EACH | Refills: 0 | Status: SHIPPED | OUTPATIENT
Start: 2024-05-31

## 2024-05-31 ASSESSMENT — ENCOUNTER SYMPTOMS
SHORTNESS OF BREATH: 0
ABDOMINAL PAIN: 0
WHEEZING: 0
DIARRHEA: 0
CONSTIPATION: 0
COUGH: 0
CHEST TIGHTNESS: 0

## 2024-05-31 NOTE — PROGRESS NOTES
[facility]  [facility]  [phone]    This note was created with the assistance of a speech-recognition program.  Although the intention is to generate a document that actually reflects the content of the visit, no guarantees can be provided that every mistake has been identified and corrected by editing.

## 2024-05-31 NOTE — PROGRESS NOTES
[facility] A DEPARTMENT OF [facility]  [address]  [facility] [address]  Dept: [phone]  Dept Fax: [phone]  Loc: [phone]    Alayna Naqvi is a 63 y.o. female who presents today for her medical conditions/complaints as noted below.  Alayna Naqvi is c/o of   Chief Complaint   Patient presents with    Diabetes     3 months       HPI:     HPI Here today for a follow up of her DM and depression.     Depression: worsening; she doesn't feel like her effexor is working very well. It has been getting worse for a few months. She is not sleeping well. She is feeling on edge and down and depressed. She watches her grandkids every weekend. She is still taking the effexor regularly. She has noticed that this has been worsening over the past 4 months.     DM: stable; Her blood sugars have been about the same. She has one hypoglycemic episode down to 72 last week. She is having high blood sugars in the 250-300 range. She is excessively thirsty and she urinates frequently. She has to meet her deducible so she can't afford meds like jardiance. She uses humalog if her sugars are over 250. She is taking 20 units of lantus. She does have a yeast infection. No dysuria.  No worsening neuropathy in her toes.     Past Medical History:   Diagnosis Date    Allergic rhinitis     Arthritis     Asthma     Bronchitis     COPD (chronic obstructive pulmonary disease) (HCC)     Depression     Diabetes mellitus (HCC)     Headache     Hyperlipidemia     Hypertension     Incontinence     Irritable bowel syndrome     Kidney stone     Osteoarthritis     Pneumonia     Type 2 diabetes mellitus without complication ([facility]) 2014    Ulcer           Social History     Tobacco Use    Smoking status: Former     Current packs/day: 0.00     Average packs/day: 1.5 packs/day for 51.6 years (77.4 ttl pk-yrs)     Types: Cigarettes     Start date: 1/1/1971     Quit date: 8/1/2022

## 2024-06-01 LAB
EST. AVERAGE GLUCOSE BLD GHB EST-MCNC: 263 MG/DL
HBA1C MFR BLD: 10.8 % (ref 4–6)

## 2024-06-13 ENCOUNTER — OFFICE VISIT (OUTPATIENT)
Dept: ORTHOPEDIC SURGERY | Age: 64
End: 2024-06-13

## 2024-06-13 VITALS
DIASTOLIC BLOOD PRESSURE: 64 MMHG | HEIGHT: 63 IN | WEIGHT: 205 LBS | HEART RATE: 96 BPM | BODY MASS INDEX: 36.32 KG/M2 | OXYGEN SATURATION: 97 % | SYSTOLIC BLOOD PRESSURE: 130 MMHG

## 2024-06-13 DIAGNOSIS — M70.62 TROCHANTERIC BURSITIS OF LEFT HIP: Primary | ICD-10-CM

## 2024-06-13 RX ORDER — MELOXICAM 15 MG/1
15 TABLET ORAL DAILY
Qty: 30 TABLET | Refills: 3 | Status: SHIPPED | OUTPATIENT
Start: 2024-06-13

## 2024-06-13 RX ORDER — BUPIVACAINE HYDROCHLORIDE 5 MG/ML
3 INJECTION, SOLUTION PERINEURAL ONCE
Status: COMPLETED | OUTPATIENT
Start: 2024-06-13 | End: 2024-06-13

## 2024-06-13 RX ORDER — TRIAMCINOLONE ACETONIDE 40 MG/ML
40 INJECTION, SUSPENSION INTRA-ARTICULAR; INTRAMUSCULAR ONCE
Status: COMPLETED | OUTPATIENT
Start: 2024-06-13 | End: 2024-06-13

## 2024-06-13 RX ADMIN — TRIAMCINOLONE ACETONIDE 40 MG: 40 INJECTION, SUSPENSION INTRA-ARTICULAR; INTRAMUSCULAR at 10:48

## 2024-06-13 RX ADMIN — BUPIVACAINE HYDROCHLORIDE 15 MG: 5 INJECTION, SOLUTION PERINEURAL at 10:33

## 2024-06-13 NOTE — PROGRESS NOTES
Orthopaedic Progress Note      CHIEF COMPLAINT: Left hip pain    HISTORY OF PRESENT ILLNESS:       Ms. Naqvi  is a 63 y.o. female  who presents with left hip pain.  Patient states she has began having pain over the last few months.  No injury or trauma noted.  She states that she has pain at rest and while walking.  Her pain is located over her left lateral hip.  She has tried taking over-the-counter Aleve and Excedrin.  She has tried resting.  She is here today for initial orthopedic evaluation.  Patient states she has also had a history of sciatica type pain.  She has seen pain management several years prior with injections.  She notes the pain will radiate down her left leg posterior through the buttock and to the posterior knee.      Past Medical History:    Past Medical History:   Diagnosis Date    Allergic rhinitis     Arthritis     Asthma     Bronchitis     COPD (chronic obstructive pulmonary disease) (HCC)     Depression     Diabetes mellitus (HCC)     Headache     Hyperlipidemia     Hypertension     Incontinence     Irritable bowel syndrome     Kidney stone     Osteoarthritis     Pneumonia     Type 2 diabetes mellitus without complication (HCC) 2014    Ulcer        Past Surgical History:    Past Surgical History:   Procedure Laterality Date    BRONCHOSCOPY N/A 08/06/2022    BRONCHOSCOPY RIGID performed by Christina Jesus MD at [facility] OR    CARDIAC PROCEDURE N/A 11/16/2023    Left heart cath / coronary angiography performed by Dhaval Mendez MD at [facility] Cardiac Cath/IR    CARPAL TUNNEL RELEASE Right 05/07/2019    Right Carpal Tunnel Release performed by Pepe Morris MD at [facility] OR    CHOLECYSTECTOMY, LAPAROSCOPIC  1997    COLONOSCOPY  7/11/208    Serated polyp (1)    COLONOSCOPY  08/02/2017    [id number]    COLONOSCOPY Left 10/08/2019    COLONOSCOPY WITH BIOPSY performed by Shadi Rendon MD at [facility]    CYST REMOVAL N/A 08/29/2023    excision of pilar cyst x 3,

## 2024-08-14 DIAGNOSIS — E11.00 UNCONTROLLED TYPE 2 DM WITH HYPEROSMOLAR NONKETOTIC HYPERGLYCEMIA (HCC): ICD-10-CM

## 2024-08-14 RX ORDER — GLIMEPIRIDE 4 MG/1
TABLET ORAL
Qty: 90 TABLET | Refills: 0 | Status: SHIPPED | OUTPATIENT
Start: 2024-08-14

## 2024-08-14 NOTE — TELEPHONE ENCOUNTER
Alayna called requesting a refill of the below medication which has been pended for you:     Requested Prescriptions     Pending Prescriptions Disp Refills    glimepiride (AMARYL) 4 MG tablet [Pharmacy Med Name: Glimepiride 4 MG Oral Tablet] 90 tablet 0     Sig: TAKE 1 TABLET BY MOUTH ONCE DAILY IN THE MORNING BEFORE BREAKFAST       Last Appointment Date: 5/31/2024  Next Appointment Date: 9/5/2024    Allergies   Allergen Reactions    Pcn [Penicillins] Anaphylaxis     \"can't breath\"    Sulfa Antibiotics Anaphylaxis     \"can't breathe\"    Metformin And Related Other (See Comments)     Sweating and diarrhea

## 2024-08-21 ENCOUNTER — OFFICE VISIT (OUTPATIENT)
Dept: UROLOGY | Age: 64
End: 2024-08-21
Payer: COMMERCIAL

## 2024-08-21 VITALS
SYSTOLIC BLOOD PRESSURE: 126 MMHG | WEIGHT: 211 LBS | DIASTOLIC BLOOD PRESSURE: 84 MMHG | BODY MASS INDEX: 37.39 KG/M2 | HEIGHT: 63 IN | HEART RATE: 72 BPM

## 2024-08-21 DIAGNOSIS — N32.81 OVERACTIVE BLADDER: Primary | ICD-10-CM

## 2024-08-21 DIAGNOSIS — R39.15 URINARY URGENCY: ICD-10-CM

## 2024-08-21 PROCEDURE — 3079F DIAST BP 80-89 MM HG: CPT | Performed by: UROLOGY

## 2024-08-21 PROCEDURE — 99204 OFFICE O/P NEW MOD 45 MIN: CPT | Performed by: UROLOGY

## 2024-08-21 PROCEDURE — 3074F SYST BP LT 130 MM HG: CPT | Performed by: UROLOGY

## 2024-08-21 RX ORDER — TOLTERODINE 4 MG/1
4 CAPSULE, EXTENDED RELEASE ORAL DAILY
Qty: 90 CAPSULE | Refills: 3 | Status: SHIPPED | OUTPATIENT
Start: 2024-08-21

## 2024-08-21 NOTE — PROGRESS NOTES
HPI  Musculoskeletal: negative  Skin: negative   Neurological: negative  Hematological/Lymphatic: negative  Psychological: negative    Physical Exam:    This a 63 y.o. female      Vitals:    08/21/24 1000   BP: 126/84   Pulse: 72     Constitutional: Patient in no acute distress.  Neuro: Alert and oriented to person, place and time.  Psych: mood and affect normal  HEENT negative  Lungs: Respiratory effort is normal  Cardiovascular: Normal peripheral pulses  Abdomen: Soft, non-tender, non-distended   Lymphatics: No palpable lymphadenopathy.  Bladder non-tender and not distended.      Assessment and Plan      1. Overactive bladder    2. Urinary urgency           Plan:   Assessment & Plan     Try Vesicare or Detrol   PFPT referral    Fu 2-3 months    No follow-ups on file.         Prescriptions Ordered:  Orders Placed This Encounter   Medications    tolterodine (DETROL LA) 4 MG extended release capsule     Sig: Take 1 capsule by mouth daily     Dispense:  90 capsule     Refill:  3      Orders Placed:  No orders of the defined types were placed in this encounter.           MD Александр MADRID MD  [facility]

## 2024-10-07 RX ORDER — BUPROPION HYDROCHLORIDE 150 MG/1
150 TABLET ORAL EVERY MORNING
Qty: 30 TABLET | Refills: 2 | Status: SHIPPED | OUTPATIENT
Start: 2024-10-07

## 2024-10-07 NOTE — TELEPHONE ENCOUNTER
Alayna called requesting a refill of the below medication which has been pended for you:     Requested Prescriptions     Pending Prescriptions Disp Refills    buPROPion (WELLBUTRIN XL) 150 MG extended release tablet [Pharmacy Med Name: buPROPion HCl ER (XL) 150 MG Oral Tablet Extended Release 24 Hour] 30 tablet 2     Sig: TAKE 1 TABLET BY MOUTH ONCE DAILY IN THE MORNING       Last Appointment Date: 5/31/2024  Next Appointment Date: 10/18/2024    Allergies   Allergen Reactions    Pcn [Penicillins] Anaphylaxis     \"can't breath\"    Sulfa Antibiotics Anaphylaxis     \"can't breathe\"    Metformin And Related Other (See Comments)     Sweating and diarrhea

## 2024-10-18 ENCOUNTER — OFFICE VISIT (OUTPATIENT)
Dept: FAMILY MEDICINE CLINIC | Age: 64
End: 2024-10-18
Payer: COMMERCIAL

## 2024-10-18 ENCOUNTER — HOSPITAL ENCOUNTER (OUTPATIENT)
Age: 64
Discharge: HOME | End: 2024-10-18
Payer: COMMERCIAL

## 2024-10-18 ENCOUNTER — TELEPHONE (OUTPATIENT)
Dept: SURGERY | Age: 64
End: 2024-10-18

## 2024-10-18 VITALS
DIASTOLIC BLOOD PRESSURE: 88 MMHG | HEART RATE: 75 BPM | HEIGHT: 63 IN | BODY MASS INDEX: 36.46 KG/M2 | OXYGEN SATURATION: 98 % | WEIGHT: 205.8 LBS | SYSTOLIC BLOOD PRESSURE: 132 MMHG

## 2024-10-18 DIAGNOSIS — Z12.31 ENCOUNTER FOR SCREENING MAMMOGRAM FOR MALIGNANT NEOPLASM OF BREAST: ICD-10-CM

## 2024-10-18 DIAGNOSIS — E11.00 UNCONTROLLED TYPE 2 DM WITH HYPEROSMOLAR NONKETOTIC HYPERGLYCEMIA (HCC): ICD-10-CM

## 2024-10-18 DIAGNOSIS — M17.11 ARTHRITIS OF RIGHT KNEE: ICD-10-CM

## 2024-10-18 DIAGNOSIS — Z12.11 SCREENING FOR COLON CANCER: ICD-10-CM

## 2024-10-18 DIAGNOSIS — Z23 NEED FOR VACCINATION: ICD-10-CM

## 2024-10-18 DIAGNOSIS — M19.012 ARTHRITIS OF LEFT SHOULDER REGION: ICD-10-CM

## 2024-10-18 DIAGNOSIS — F41.1 GAD (GENERALIZED ANXIETY DISORDER): ICD-10-CM

## 2024-10-18 DIAGNOSIS — E11.00 UNCONTROLLED TYPE 2 DM WITH HYPEROSMOLAR NONKETOTIC HYPERGLYCEMIA (HCC): Primary | ICD-10-CM

## 2024-10-18 LAB
ANION GAP SERPL CALCULATED.3IONS-SCNC: 10 MMOL/L (ref 9–17)
BUN SERPL-MCNC: 10 MG/DL (ref 8–23)
BUN/CREAT SERPL: 14 (ref 9–20)
CALCIUM SERPL-MCNC: 9.8 MG/DL (ref 8.6–10.4)
CHLORIDE SERPL-SCNC: 98 MMOL/L (ref 98–107)
CO2 SERPL-SCNC: 28 MMOL/L (ref 20–31)
CREAT SERPL-MCNC: 0.7 MG/DL (ref 0.5–0.9)
EST. AVERAGE GLUCOSE BLD GHB EST-MCNC: 237 MG/DL
GFR, ESTIMATED: >90 ML/MIN/1.73M2
GLUCOSE SERPL-MCNC: 228 MG/DL (ref 70–99)
HBA1C MFR BLD: 9.9 % (ref 4–6)
POTASSIUM SERPL-SCNC: 5 MMOL/L (ref 3.7–5.3)
SODIUM SERPL-SCNC: 136 MMOL/L (ref 135–144)

## 2024-10-18 PROCEDURE — 20610 DRAIN/INJ JOINT/BURSA W/O US: CPT | Performed by: FAMILY MEDICINE

## 2024-10-18 PROCEDURE — 80048 BASIC METABOLIC PNL TOTAL CA: CPT

## 2024-10-18 PROCEDURE — 3079F DIAST BP 80-89 MM HG: CPT | Performed by: FAMILY MEDICINE

## 2024-10-18 PROCEDURE — 90480 ADMN SARSCOV2 VAC 1/ONLY CMP: CPT | Performed by: FAMILY MEDICINE

## 2024-10-18 PROCEDURE — 36415 COLL VENOUS BLD VENIPUNCTURE: CPT

## 2024-10-18 PROCEDURE — 3075F SYST BP GE 130 - 139MM HG: CPT | Performed by: FAMILY MEDICINE

## 2024-10-18 PROCEDURE — 91320 SARSCV2 VAC 30MCG TRS-SUC IM: CPT | Performed by: FAMILY MEDICINE

## 2024-10-18 PROCEDURE — 90656 IIV3 VACC NO PRSV 0.5 ML IM: CPT | Performed by: FAMILY MEDICINE

## 2024-10-18 PROCEDURE — 83036 HEMOGLOBIN GLYCOSYLATED A1C: CPT

## 2024-10-18 PROCEDURE — 3046F HEMOGLOBIN A1C LEVEL >9.0%: CPT | Performed by: FAMILY MEDICINE

## 2024-10-18 PROCEDURE — 99214 OFFICE O/P EST MOD 30 MIN: CPT | Performed by: FAMILY MEDICINE

## 2024-10-18 RX ORDER — BUSPIRONE HYDROCHLORIDE 10 MG/1
TABLET ORAL
Qty: 90 TABLET | Refills: 3 | Status: SHIPPED | OUTPATIENT
Start: 2024-10-18

## 2024-10-18 RX ORDER — LEVOFLOXACIN 750 MG/1
TABLET, FILM COATED ORAL
COMMUNITY
Start: 2024-10-09

## 2024-10-18 RX ORDER — TRIAMCINOLONE ACETONIDE 40 MG/ML
40 INJECTION, SUSPENSION INTRA-ARTICULAR; INTRAMUSCULAR ONCE
Status: COMPLETED | OUTPATIENT
Start: 2024-10-18 | End: 2024-10-18

## 2024-10-18 RX ORDER — IPRATROPIUM BROMIDE AND ALBUTEROL SULFATE 2.5; .5 MG/3ML; MG/3ML
SOLUTION RESPIRATORY (INHALATION)
COMMUNITY
Start: 2024-10-11 | End: 2024-11-20

## 2024-10-18 RX ORDER — LIDOCAINE HYDROCHLORIDE 20 MG/ML
1 INJECTION, SOLUTION EPIDURAL; INFILTRATION; INTRACAUDAL; PERINEURAL ONCE
Status: COMPLETED | OUTPATIENT
Start: 2024-10-18 | End: 2024-10-18

## 2024-10-18 RX ORDER — GLIMEPIRIDE 4 MG/1
4 TABLET ORAL
Qty: 90 TABLET | Refills: 1 | Status: SHIPPED | OUTPATIENT
Start: 2024-10-18

## 2024-10-18 RX ORDER — INSULIN GLARGINE 100 [IU]/ML
20 INJECTION, SOLUTION SUBCUTANEOUS 2 TIMES DAILY
Qty: 5 ADJUSTABLE DOSE PRE-FILLED PEN SYRINGE | Refills: 3 | Status: SHIPPED | OUTPATIENT
Start: 2024-10-18

## 2024-10-18 RX ADMIN — LIDOCAINE HYDROCHLORIDE 1 ML: 20 INJECTION, SOLUTION EPIDURAL; INFILTRATION; INTRACAUDAL; PERINEURAL at 12:14

## 2024-10-18 RX ADMIN — LIDOCAINE HYDROCHLORIDE 1 ML: 20 INJECTION, SOLUTION EPIDURAL; INFILTRATION; INTRACAUDAL; PERINEURAL at 12:15

## 2024-10-18 RX ADMIN — TRIAMCINOLONE ACETONIDE 40 MG: 40 INJECTION, SUSPENSION INTRA-ARTICULAR; INTRAMUSCULAR at 12:15

## 2024-10-18 RX ADMIN — TRIAMCINOLONE ACETONIDE 40 MG: 40 INJECTION, SUSPENSION INTRA-ARTICULAR; INTRAMUSCULAR at 12:16

## 2024-10-18 SDOH — ECONOMIC STABILITY: FOOD INSECURITY: WITHIN THE PAST 12 MONTHS, THE FOOD YOU BOUGHT JUST DIDN'T LAST AND YOU DIDN'T HAVE MONEY TO GET MORE.: NEVER TRUE

## 2024-10-18 SDOH — ECONOMIC STABILITY: FOOD INSECURITY: WITHIN THE PAST 12 MONTHS, YOU WORRIED THAT YOUR FOOD WOULD RUN OUT BEFORE YOU GOT MONEY TO BUY MORE.: NEVER TRUE

## 2024-10-18 SDOH — ECONOMIC STABILITY: INCOME INSECURITY: HOW HARD IS IT FOR YOU TO PAY FOR THE VERY BASICS LIKE FOOD, HOUSING, MEDICAL CARE, AND HEATING?: NOT HARD AT ALL

## 2024-10-18 ASSESSMENT — ENCOUNTER SYMPTOMS
DIARRHEA: 0
CHEST TIGHTNESS: 0
COUGH: 0
SHORTNESS OF BREATH: 0
ABDOMINAL PAIN: 0
NAUSEA: 0
CONSTIPATION: 0
WHEEZING: 0

## 2024-10-18 NOTE — PROGRESS NOTES
the appointment consented to the use of AI, including the recording.        Electronically signed by Grace Polo MD on 10/18/2024 at 12:41 PM

## 2024-12-06 ENCOUNTER — PATIENT MESSAGE (OUTPATIENT)
Dept: FAMILY MEDICINE CLINIC | Age: 64
End: 2024-12-06

## 2024-12-06 DIAGNOSIS — E11.00 UNCONTROLLED TYPE 2 DM WITH HYPEROSMOLAR NONKETOTIC HYPERGLYCEMIA (HCC): Primary | ICD-10-CM

## 2024-12-09 RX ORDER — INSULIN GLARGINE 100 [IU]/ML
30 INJECTION, SOLUTION SUBCUTANEOUS NIGHTLY
Qty: 4 ADJUSTABLE DOSE PRE-FILLED PEN SYRINGE | Refills: 3 | Status: SHIPPED | OUTPATIENT
Start: 2024-12-09

## 2024-12-09 RX ORDER — HYDROCHLOROTHIAZIDE 12.5 MG/1
CAPSULE ORAL
Qty: 2 EACH | Refills: 5 | Status: SHIPPED | OUTPATIENT
Start: 2024-12-09

## 2024-12-09 RX ORDER — KETOROLAC TROMETHAMINE 30 MG/ML
INJECTION, SOLUTION INTRAMUSCULAR; INTRAVENOUS
Qty: 1 EACH | Refills: 3 | Status: SHIPPED | OUTPATIENT
Start: 2024-12-09

## 2024-12-09 RX ORDER — SEMAGLUTIDE 1.34 MG/ML
0.25 INJECTION, SOLUTION SUBCUTANEOUS WEEKLY
Qty: 1 ADJUSTABLE DOSE PRE-FILLED PEN SYRINGE | Refills: 1 | Status: SHIPPED | OUTPATIENT
Start: 2024-12-09

## 2024-12-09 RX ORDER — PEN NEEDLE, DIABETIC 31 G X1/4"
1 NEEDLE, DISPOSABLE MISCELLANEOUS DAILY
Qty: 100 EACH | Refills: 3 | Status: SHIPPED | OUTPATIENT
Start: 2024-12-09

## 2025-01-08 RX ORDER — BUPROPION HYDROCHLORIDE 150 MG/1
150 TABLET ORAL EVERY MORNING
Qty: 30 TABLET | Refills: 0 | Status: SHIPPED | OUTPATIENT
Start: 2025-01-08

## 2025-01-08 NOTE — TELEPHONE ENCOUNTER
Alayna called requesting a refill of the below medication which has been pended for you:     Requested Prescriptions     Pending Prescriptions Disp Refills    buPROPion (WELLBUTRIN XL) 150 MG extended release tablet [Pharmacy Med Name: buPROPion HCl ER (XL) 150 MG Oral Tablet Extended Release 24 Hour] 30 tablet 0     Sig: TAKE 1 TABLET BY MOUTH ONCE DAILY IN THE MORNING       Last Appointment Date: 10/18/2024  Next Appointment Date: 1/21/2025    Allergies   Allergen Reactions    Pcn [Penicillins] Anaphylaxis     \"can't breath\"    Sulfa Antibiotics Anaphylaxis     \"can't breathe\"    Metformin And Related Other (See Comments)     Sweating and diarrhea

## 2025-01-18 SDOH — ECONOMIC STABILITY: FOOD INSECURITY: WITHIN THE PAST 12 MONTHS, YOU WORRIED THAT YOUR FOOD WOULD RUN OUT BEFORE YOU GOT MONEY TO BUY MORE.: NEVER TRUE

## 2025-01-18 SDOH — ECONOMIC STABILITY: FOOD INSECURITY: WITHIN THE PAST 12 MONTHS, THE FOOD YOU BOUGHT JUST DIDN'T LAST AND YOU DIDN'T HAVE MONEY TO GET MORE.: NEVER TRUE

## 2025-01-18 SDOH — ECONOMIC STABILITY: INCOME INSECURITY: IN THE LAST 12 MONTHS, WAS THERE A TIME WHEN YOU WERE NOT ABLE TO PAY THE MORTGAGE OR RENT ON TIME?: NO

## 2025-01-18 SDOH — ECONOMIC STABILITY: TRANSPORTATION INSECURITY
IN THE PAST 12 MONTHS, HAS LACK OF TRANSPORTATION KEPT YOU FROM MEETINGS, WORK, OR FROM GETTING THINGS NEEDED FOR DAILY LIVING?: NO

## 2025-01-18 SDOH — ECONOMIC STABILITY: TRANSPORTATION INSECURITY
IN THE PAST 12 MONTHS, HAS THE LACK OF TRANSPORTATION KEPT YOU FROM MEDICAL APPOINTMENTS OR FROM GETTING MEDICATIONS?: NO

## 2025-01-18 ASSESSMENT — PATIENT HEALTH QUESTIONNAIRE - PHQ9
10. IF YOU CHECKED OFF ANY PROBLEMS, HOW DIFFICULT HAVE THESE PROBLEMS MADE IT FOR YOU TO DO YOUR WORK, TAKE CARE OF THINGS AT HOME, OR GET ALONG WITH OTHER PEOPLE: NOT DIFFICULT AT ALL
4. FEELING TIRED OR HAVING LITTLE ENERGY: NOT AT ALL
8. MOVING OR SPEAKING SO SLOWLY THAT OTHER PEOPLE COULD HAVE NOTICED. OR THE OPPOSITE - BEING SO FIDGETY OR RESTLESS THAT YOU HAVE BEEN MOVING AROUND A LOT MORE THAN USUAL: NOT AT ALL
3. TROUBLE FALLING OR STAYING ASLEEP: NOT AT ALL
4. FEELING TIRED OR HAVING LITTLE ENERGY: NOT AT ALL
1. LITTLE INTEREST OR PLEASURE IN DOING THINGS: SEVERAL DAYS
6. FEELING BAD ABOUT YOURSELF - OR THAT YOU ARE A FAILURE OR HAVE LET YOURSELF OR YOUR FAMILY DOWN: NOT AT ALL
9. THOUGHTS THAT YOU WOULD BE BETTER OFF DEAD, OR OF HURTING YOURSELF: NOT AT ALL
SUM OF ALL RESPONSES TO PHQ9 QUESTIONS 1 & 2: 1
5. POOR APPETITE OR OVEREATING: NOT AT ALL
9. THOUGHTS THAT YOU WOULD BE BETTER OFF DEAD, OR OF HURTING YOURSELF: NOT AT ALL
8. MOVING OR SPEAKING SO SLOWLY THAT OTHER PEOPLE COULD HAVE NOTICED. OR THE OPPOSITE, BEING SO FIGETY OR RESTLESS THAT YOU HAVE BEEN MOVING AROUND A LOT MORE THAN USUAL: NOT AT ALL
SUM OF ALL RESPONSES TO PHQ QUESTIONS 1-9: 1
10. IF YOU CHECKED OFF ANY PROBLEMS, HOW DIFFICULT HAVE THESE PROBLEMS MADE IT FOR YOU TO DO YOUR WORK, TAKE CARE OF THINGS AT HOME, OR GET ALONG WITH OTHER PEOPLE: NOT DIFFICULT AT ALL
5. POOR APPETITE OR OVEREATING: NOT AT ALL
SUM OF ALL RESPONSES TO PHQ QUESTIONS 1-9: 1
7. TROUBLE CONCENTRATING ON THINGS, SUCH AS READING THE NEWSPAPER OR WATCHING TELEVISION: NOT AT ALL
2. FEELING DOWN, DEPRESSED OR HOPELESS: NOT AT ALL
SUM OF ALL RESPONSES TO PHQ QUESTIONS 1-9: 1
6. FEELING BAD ABOUT YOURSELF - OR THAT YOU ARE A FAILURE OR HAVE LET YOURSELF OR YOUR FAMILY DOWN: NOT AT ALL
SUM OF ALL RESPONSES TO PHQ QUESTIONS 1-9: 1
1. LITTLE INTEREST OR PLEASURE IN DOING THINGS: SEVERAL DAYS
SUM OF ALL RESPONSES TO PHQ QUESTIONS 1-9: 1
3. TROUBLE FALLING OR STAYING ASLEEP: NOT AT ALL
7. TROUBLE CONCENTRATING ON THINGS, SUCH AS READING THE NEWSPAPER OR WATCHING TELEVISION: NOT AT ALL

## 2025-01-21 ENCOUNTER — OFFICE VISIT (OUTPATIENT)
Dept: FAMILY MEDICINE CLINIC | Age: 65
End: 2025-01-21

## 2025-01-21 ENCOUNTER — HOSPITAL ENCOUNTER (OUTPATIENT)
Age: 65
Discharge: HOME | End: 2025-01-21
Payer: MEDICARE

## 2025-01-21 VITALS
SYSTOLIC BLOOD PRESSURE: 134 MMHG | WEIGHT: 215.2 LBS | OXYGEN SATURATION: 97 % | HEIGHT: 63 IN | BODY MASS INDEX: 38.13 KG/M2 | HEART RATE: 81 BPM | DIASTOLIC BLOOD PRESSURE: 86 MMHG

## 2025-01-21 DIAGNOSIS — I10 ESSENTIAL (PRIMARY) HYPERTENSION: ICD-10-CM

## 2025-01-21 DIAGNOSIS — E55.9 VITAMIN D DEFICIENCY: ICD-10-CM

## 2025-01-21 DIAGNOSIS — E11.00 UNCONTROLLED TYPE 2 DM WITH HYPEROSMOLAR NONKETOTIC HYPERGLYCEMIA (HCC): Primary | ICD-10-CM

## 2025-01-21 DIAGNOSIS — M54.12 CHRONIC CERVICAL RADICULOPATHY: ICD-10-CM

## 2025-01-21 DIAGNOSIS — Z91.81 AT HIGH RISK FOR FALLS: ICD-10-CM

## 2025-01-21 DIAGNOSIS — I73.00 RAYNAUD'S DISEASE WITHOUT GANGRENE: ICD-10-CM

## 2025-01-21 DIAGNOSIS — E78.2 MIXED HYPERLIPIDEMIA: ICD-10-CM

## 2025-01-21 DIAGNOSIS — Z13.820 ENCOUNTER FOR OSTEOPOROSIS SCREENING IN ASYMPTOMATIC POSTMENOPAUSAL PATIENT: ICD-10-CM

## 2025-01-21 DIAGNOSIS — E11.00 UNCONTROLLED TYPE 2 DM WITH HYPEROSMOLAR NONKETOTIC HYPERGLYCEMIA (HCC): ICD-10-CM

## 2025-01-21 DIAGNOSIS — Z78.0 ENCOUNTER FOR OSTEOPOROSIS SCREENING IN ASYMPTOMATIC POSTMENOPAUSAL PATIENT: ICD-10-CM

## 2025-01-21 LAB
25(OH)D3 SERPL-MCNC: 25.5 NG/ML (ref 30–100)
ANION GAP SERPL CALCULATED.3IONS-SCNC: 9 MMOL/L (ref 9–17)
BUN SERPL-MCNC: 28 MG/DL (ref 8–23)
BUN/CREAT SERPL: 40 (ref 9–20)
CALCIUM SERPL-MCNC: 10.1 MG/DL (ref 8.6–10.4)
CHLORIDE SERPL-SCNC: 98 MMOL/L (ref 98–107)
CHOLEST SERPL-MCNC: 154 MG/DL (ref 0–199)
CHOLESTEROL/HDL RATIO: 2.5
CO2 SERPL-SCNC: 29 MMOL/L (ref 20–31)
CREAT SERPL-MCNC: 0.7 MG/DL (ref 0.5–0.9)
EST. AVERAGE GLUCOSE BLD GHB EST-MCNC: 200 MG/DL
GFR, ESTIMATED: >90 ML/MIN/1.73M2
GLUCOSE SERPL-MCNC: 133 MG/DL (ref 70–99)
HBA1C MFR BLD: 8.6 % (ref 4–6)
HDLC SERPL-MCNC: 61 MG/DL
LDLC SERPL CALC-MCNC: 77 MG/DL (ref 0–100)
POTASSIUM SERPL-SCNC: 4.6 MMOL/L (ref 3.7–5.3)
SODIUM SERPL-SCNC: 136 MMOL/L (ref 135–144)
TRIGL SERPL-MCNC: 78 MG/DL
TSH SERPL DL<=0.05 MIU/L-ACNC: 0.46 UIU/ML (ref 0.3–5)
VLDLC SERPL CALC-MCNC: 16 MG/DL (ref 1–30)

## 2025-01-21 PROCEDURE — 84443 ASSAY THYROID STIM HORMONE: CPT

## 2025-01-21 PROCEDURE — 82306 VITAMIN D 25 HYDROXY: CPT

## 2025-01-21 PROCEDURE — 80048 BASIC METABOLIC PNL TOTAL CA: CPT

## 2025-01-21 PROCEDURE — 83036 HEMOGLOBIN GLYCOSYLATED A1C: CPT

## 2025-01-21 PROCEDURE — 36415 COLL VENOUS BLD VENIPUNCTURE: CPT

## 2025-01-21 PROCEDURE — 80061 LIPID PANEL: CPT

## 2025-01-21 RX ORDER — BUPROPION HYDROCHLORIDE 150 MG/1
150 TABLET ORAL EVERY MORNING
Qty: 90 TABLET | Refills: 1 | Status: SHIPPED | OUTPATIENT
Start: 2025-01-21

## 2025-01-21 RX ORDER — VENLAFAXINE HYDROCHLORIDE 150 MG/1
150 CAPSULE, EXTENDED RELEASE ORAL DAILY
Qty: 90 CAPSULE | Refills: 1 | Status: SHIPPED | OUTPATIENT
Start: 2025-01-21

## 2025-01-21 RX ORDER — FLUTICASONE PROPIONATE 50 MCG
2 SPRAY, SUSPENSION (ML) NASAL 2 TIMES DAILY
Qty: 15.8 EACH | Refills: 0 | Status: SHIPPED | OUTPATIENT
Start: 2025-01-21 | End: 2025-01-26

## 2025-01-21 RX ORDER — INSULIN GLARGINE 100 [IU]/ML
25 INJECTION, SOLUTION SUBCUTANEOUS NIGHTLY
Qty: 4 ADJUSTABLE DOSE PRE-FILLED PEN SYRINGE | Refills: 3
Start: 2025-01-21

## 2025-01-21 RX ORDER — LISINOPRIL 2.5 MG/1
2.5 TABLET ORAL DAILY
Qty: 90 TABLET | Refills: 3 | Status: SHIPPED | OUTPATIENT
Start: 2025-01-21

## 2025-01-21 RX ORDER — PANTOPRAZOLE SODIUM 40 MG/1
40 TABLET, DELAYED RELEASE ORAL DAILY
Qty: 90 TABLET | Refills: 3 | Status: SHIPPED | OUTPATIENT
Start: 2025-01-21

## 2025-01-21 RX ORDER — SEMAGLUTIDE 1.34 MG/ML
0.5 INJECTION, SOLUTION SUBCUTANEOUS WEEKLY
Qty: 1 ADJUSTABLE DOSE PRE-FILLED PEN SYRINGE | Refills: 1 | Status: SHIPPED | OUTPATIENT
Start: 2025-01-21

## 2025-01-21 ASSESSMENT — ENCOUNTER SYMPTOMS
COUGH: 0
CHEST TIGHTNESS: 0
WHEEZING: 0
SHORTNESS OF BREATH: 0

## 2025-01-21 NOTE — PROGRESS NOTES
Standing Expiration Date:   2026    External Referral To Physical Therapy     Referral Priority:   Routine     Referral Type:   Eval and Treat     Referral Reason:   Specialty Services Required     Requested Specialty:   Physical Therapy     Number of Visits Requested:   1     Orders Placed This Encounter   Medications    Semaglutide,0.25 or 0.5MG/DOS, (OZEMPIC, 0.25 OR 0.5 MG/DOSE,) 2 MG/1.5ML SOPN     Sig: Inject 0.5 mg into the skin once a week     Dispense:  1 Adjustable Dose Pre-filled Pen Syringe     Refill:  1    insulin glargine (LANTUS SOLOSTAR) 100 UNIT/ML injection pen     Sig: Inject 25 Units into the skin nightly     Dispense:  4 Adjustable Dose Pre-filled Pen Syringe     Refill:  3    lisinopril (PRINIVIL;ZESTRIL) 2.5 MG tablet     Sig: Take 1 tablet by mouth daily     Dispense:  90 tablet     Refill:  3    buPROPion (WELLBUTRIN XL) 150 MG extended release tablet     Sig: Take 1 tablet by mouth every morning     Dispense:  90 tablet     Refill:  1    fluticasone (FLONASE) 50 MCG/ACT nasal spray     Si sprays by Each Nostril route 2 times daily for 5 days     Dispense:  15.8 each     Refill:  0    pantoprazole (PROTONIX) 40 MG tablet     Sig: Take 1 tablet by mouth daily     Dispense:  90 tablet     Refill:  3    venlafaxine (EFFEXOR XR) 150 MG extended release capsule     Sig: Take 1 capsule by mouth daily     Dispense:  90 capsule     Refill:  1       Patientgiven educational materials - see patient instructions.  Discussed use, benefit,and side effects of prescribed medications.  All patient questions answered. Ptvoiced understanding. Reviewed health maintenance.  Instructed to continue currentmedications, diet and exercise.  Patient agreed with treatment plan. Follow up asdirected.     Attestation      The patient (or guardian, if applicable) and other individuals in attendance with the patient were advised that Artificial Intelligence will be utilized during this visit to record, process

## 2025-01-22 ENCOUNTER — PATIENT MESSAGE (OUTPATIENT)
Dept: FAMILY MEDICINE CLINIC | Age: 65
End: 2025-01-22

## 2025-02-04 ENCOUNTER — HOSPITAL ENCOUNTER (OUTPATIENT)
Dept: MAMMOGRAPHY | Age: 65
Discharge: HOME | End: 2025-02-06
Attending: FAMILY MEDICINE
Payer: MEDICARE

## 2025-02-04 VITALS — WEIGHT: 190 LBS | BODY MASS INDEX: 33.66 KG/M2

## 2025-02-04 DIAGNOSIS — Z12.31 ENCOUNTER FOR SCREENING MAMMOGRAM FOR MALIGNANT NEOPLASM OF BREAST: ICD-10-CM

## 2025-02-04 PROCEDURE — 77067 SCR MAMMO BI INCL CAD: CPT

## 2025-03-14 DIAGNOSIS — E11.00 UNCONTROLLED TYPE 2 DM WITH HYPEROSMOLAR NONKETOTIC HYPERGLYCEMIA (HCC): ICD-10-CM

## 2025-03-14 RX ORDER — SEMAGLUTIDE 0.68 MG/ML
0.5 INJECTION, SOLUTION SUBCUTANEOUS WEEKLY
Qty: 3 ML | Refills: 0 | Status: SHIPPED | OUTPATIENT
Start: 2025-03-14

## 2025-03-14 NOTE — TELEPHONE ENCOUNTER
Alayna called requesting a refill of the below medication which has been pended for you:     Requested Prescriptions     Pending Prescriptions Disp Refills    OZEMPIC, 0.25 OR 0.5 MG/DOSE, 2 MG/3ML SOPN [Pharmacy Med Name: Ozempic (0.25 or 0.5 MG/DOSE) 2 MG/3ML Subcutaneous Solution Pen-injector] 3 mL 0     Sig: INJECT 0.5 MG INTO THE SKIN ONCE A WEEK       Last Appointment Date: 1/21/2025  Next Appointment Date: 4/22/2025    Allergies   Allergen Reactions    Pcn [Penicillins] Anaphylaxis     \"can't breath\"    Sulfa Antibiotics Anaphylaxis     \"can't breathe\"    Metformin And Related Other (See Comments)     Sweating and diarrhea

## 2025-04-08 DIAGNOSIS — E11.00 UNCONTROLLED TYPE 2 DM WITH HYPEROSMOLAR NONKETOTIC HYPERGLYCEMIA (HCC): ICD-10-CM

## 2025-04-08 RX ORDER — GLIMEPIRIDE 4 MG/1
TABLET ORAL
Qty: 90 TABLET | Refills: 1 | Status: SHIPPED | OUTPATIENT
Start: 2025-04-08

## 2025-04-08 NOTE — TELEPHONE ENCOUNTER
Alayna called requesting a refill of the below medication which has been pended for you:     Requested Prescriptions     Pending Prescriptions Disp Refills    glimepiride (AMARYL) 4 MG tablet [Pharmacy Med Name: Glimepiride 4 MG Oral Tablet] 90 tablet 1     Sig: TAKE 1 TABLET BY MOUTH ONCE DAILY IN THE MORNING BEFORE BREAKFAST       Last Appointment Date: 1/21/2025  Next Appointment Date: 4/29/2025    Allergies   Allergen Reactions    Pcn [Penicillins] Anaphylaxis     \"can't breath\"    Sulfa Antibiotics Anaphylaxis     \"can't breathe\"    Metformin And Related Other (See Comments)     Sweating and diarrhea

## 2025-04-17 DIAGNOSIS — E11.00 UNCONTROLLED TYPE 2 DM WITH HYPEROSMOLAR NONKETOTIC HYPERGLYCEMIA (HCC): ICD-10-CM

## 2025-04-17 RX ORDER — SEMAGLUTIDE 0.68 MG/ML
INJECTION, SOLUTION SUBCUTANEOUS
Qty: 3 ML | Refills: 0 | Status: SHIPPED | OUTPATIENT
Start: 2025-04-17

## 2025-04-17 NOTE — TELEPHONE ENCOUNTER
Alayna called requesting a refill of the below medication which has been pended for you:     Requested Prescriptions     Pending Prescriptions Disp Refills    OZEMPIC, 0.25 OR 0.5 MG/DOSE, 2 MG/3ML SOPN [Pharmacy Med Name: Ozempic (0.25 or 0.5 MG/DOSE) 2 MG/3ML Subcutaneous Solution Pen-injector] 3 mL 0     Sig: INJECT 0.5MG INTO THE SKIN ONCE A WEEK.       Last Appointment Date: 1/21/2025  Next Appointment Date: 4/29/2025    Allergies   Allergen Reactions    Pcn [Penicillins] Anaphylaxis     \"can't breath\"    Sulfa Antibiotics Anaphylaxis     \"can't breathe\"    Metformin And Related Other (See Comments)     Sweating and diarrhea

## 2025-04-21 RX ORDER — BUSPIRONE HYDROCHLORIDE 10 MG/1
TABLET ORAL
Qty: 90 TABLET | Refills: 0 | Status: SHIPPED | OUTPATIENT
Start: 2025-04-21

## 2025-04-21 NOTE — TELEPHONE ENCOUNTER
Alayna called requesting a refill of the below medication which has been pended for you:     Requested Prescriptions     Pending Prescriptions Disp Refills    busPIRone (BUSPAR) 10 MG tablet [Pharmacy Med Name: busPIRone HCl 10 MG Oral Tablet] 90 tablet 0     Sig: TAKE 1 TABLET BY MOUTH TWICE DAILY ON A SCHEDULE THEN ONE EXTRA TIME IF NEEDED DURING THE DAY FOR ANXIETY       Last Appointment Date: 1/21/2025  Next Appointment Date: 4/29/2025    Allergies   Allergen Reactions    Pcn [Penicillins] Anaphylaxis     \"can't breath\"    Sulfa Antibiotics Anaphylaxis     \"can't breathe\"    Metformin And Related Other (See Comments)     Sweating and diarrhea     
No

## 2025-04-23 NOTE — PROGRESS NOTES
Physical Therapy    Outpatient Physical Therapy    [x] [place]  Phone: [phone]  Fax: [phone]      [] [place]  Phone: [phone]  Fax: [phone]    Physical Therapy  Cancellation/No-show Note  Patient Name:  Elizabeth Yusuf  :  1960   Date:  2021  Cancelled visits to date: 5  No-shows to date: 1    For today's appointment patient:  [x]  Cancelled  []  Rescheduled appointment  []  No-show     Reason given by patient:  []  Patient ill  []  Conflicting appointment  []  No transportation    []  Conflict with work  []  No reason given  []  Other:   Too much pain   Comments:      Electronically signed by: Mary Egan PTA [id number]

## 2025-04-29 ENCOUNTER — OFFICE VISIT (OUTPATIENT)
Dept: FAMILY MEDICINE CLINIC | Age: 65
End: 2025-04-29
Payer: MEDICARE

## 2025-04-29 ENCOUNTER — HOSPITAL ENCOUNTER (OUTPATIENT)
Age: 65
Setting detail: SPECIMEN
Discharge: HOME | End: 2025-04-29
Payer: MEDICARE

## 2025-04-29 VITALS
BODY MASS INDEX: 36.02 KG/M2 | OXYGEN SATURATION: 97 % | SYSTOLIC BLOOD PRESSURE: 124 MMHG | HEART RATE: 83 BPM | DIASTOLIC BLOOD PRESSURE: 72 MMHG | HEIGHT: 63 IN | WEIGHT: 203.3 LBS

## 2025-04-29 DIAGNOSIS — Z78.0 ENCOUNTER FOR OSTEOPOROSIS SCREENING IN ASYMPTOMATIC POSTMENOPAUSAL PATIENT: ICD-10-CM

## 2025-04-29 DIAGNOSIS — E11.42 TYPE 2 DIABETES MELLITUS WITH DIABETIC POLYNEUROPATHY, WITHOUT LONG-TERM CURRENT USE OF INSULIN (HCC): Primary | ICD-10-CM

## 2025-04-29 DIAGNOSIS — E66.812 CLASS 2 SEVERE OBESITY DUE TO EXCESS CALORIES WITH SERIOUS COMORBIDITY AND BODY MASS INDEX (BMI) OF 36.0 TO 36.9 IN ADULT (HCC): ICD-10-CM

## 2025-04-29 DIAGNOSIS — N39.41 URGE INCONTINENCE OF URINE: ICD-10-CM

## 2025-04-29 DIAGNOSIS — I10 ESSENTIAL (PRIMARY) HYPERTENSION: ICD-10-CM

## 2025-04-29 DIAGNOSIS — R89.4 POSITIVE TEST FOR HERPES SIMPLEX VIRUS (HSV) ANTIBODY: ICD-10-CM

## 2025-04-29 DIAGNOSIS — J42 CHRONIC BRONCHITIS, UNSPECIFIED CHRONIC BRONCHITIS TYPE (HCC): ICD-10-CM

## 2025-04-29 DIAGNOSIS — E66.01 CLASS 2 SEVERE OBESITY DUE TO EXCESS CALORIES WITH SERIOUS COMORBIDITY AND BODY MASS INDEX (BMI) OF 36.0 TO 36.9 IN ADULT (HCC): ICD-10-CM

## 2025-04-29 DIAGNOSIS — E11.42 TYPE 2 DIABETES MELLITUS WITH DIABETIC POLYNEUROPATHY, WITHOUT LONG-TERM CURRENT USE OF INSULIN (HCC): ICD-10-CM

## 2025-04-29 DIAGNOSIS — Z13.820 ENCOUNTER FOR OSTEOPOROSIS SCREENING IN ASYMPTOMATIC POSTMENOPAUSAL PATIENT: ICD-10-CM

## 2025-04-29 LAB
ALBUMIN SERPL-MCNC: 4 G/DL (ref 3.5–5.2)
ALBUMIN/GLOB SERPL: 1.2 {RATIO} (ref 1–2.5)
ALP SERPL-CCNC: 107 U/L (ref 35–104)
ALT SERPL-CCNC: 15 U/L (ref 10–35)
ANION GAP SERPL CALCULATED.3IONS-SCNC: 13 MMOL/L (ref 9–16)
AST SERPL-CCNC: 19 U/L (ref 10–35)
BILIRUB SERPL-MCNC: <0.2 MG/DL (ref 0–1.2)
BUN SERPL-MCNC: 15 MG/DL (ref 8–23)
BUN/CREAT SERPL: 19 (ref 9–20)
CALCIUM SERPL-MCNC: 9.4 MG/DL (ref 8.6–10.4)
CHLORIDE SERPL-SCNC: 100 MMOL/L (ref 98–107)
CO2 SERPL-SCNC: 24 MMOL/L (ref 20–31)
CREAT SERPL-MCNC: 0.8 MG/DL (ref 0.6–0.9)
CREAT UR-MCNC: 161 MG/DL (ref 28–217)
GFR, ESTIMATED: 82 ML/MIN/1.73M2
GLUCOSE SERPL-MCNC: 220 MG/DL (ref 74–99)
MICROALBUMIN UR-MCNC: 15 MG/L (ref 0–20)
MICROALBUMIN/CREAT UR-RTO: 9 MCG/MG CREAT (ref 0–25)
POTASSIUM SERPL-SCNC: 3.9 MMOL/L (ref 3.7–5.3)
PROT SERPL-MCNC: 7.4 G/DL (ref 6.6–8.7)
SODIUM SERPL-SCNC: 137 MMOL/L (ref 136–145)

## 2025-04-29 PROCEDURE — 82043 UR ALBUMIN QUANTITATIVE: CPT

## 2025-04-29 PROCEDURE — 82570 ASSAY OF URINE CREATININE: CPT

## 2025-04-29 PROCEDURE — G2211 COMPLEX E/M VISIT ADD ON: HCPCS | Performed by: FAMILY MEDICINE

## 2025-04-29 PROCEDURE — 3052F HG A1C>EQUAL 8.0%<EQUAL 9.0%: CPT | Performed by: FAMILY MEDICINE

## 2025-04-29 PROCEDURE — 80053 COMPREHEN METABOLIC PANEL: CPT

## 2025-04-29 PROCEDURE — 3078F DIAST BP <80 MM HG: CPT | Performed by: FAMILY MEDICINE

## 2025-04-29 PROCEDURE — 1123F ACP DISCUSS/DSCN MKR DOCD: CPT | Performed by: FAMILY MEDICINE

## 2025-04-29 PROCEDURE — 83036 HEMOGLOBIN GLYCOSYLATED A1C: CPT

## 2025-04-29 PROCEDURE — 99214 OFFICE O/P EST MOD 30 MIN: CPT | Performed by: FAMILY MEDICINE

## 2025-04-29 PROCEDURE — 3074F SYST BP LT 130 MM HG: CPT | Performed by: FAMILY MEDICINE

## 2025-04-29 RX ORDER — M-VIT,TX,IRON,MINS/CALC/FOLIC 27MG-0.4MG
1 TABLET ORAL DAILY
COMMUNITY

## 2025-04-29 ASSESSMENT — ENCOUNTER SYMPTOMS
CONSTIPATION: 0
COUGH: 0
CHEST TIGHTNESS: 1
ABDOMINAL PAIN: 0
NAUSEA: 1
DIARRHEA: 0
VOMITING: 0
WHEEZING: 0
SHORTNESS OF BREATH: 0

## 2025-04-29 NOTE — PROGRESS NOTES
[facility] DEPARTMENT OF [facility]  [address] [facility] [address]  Dept: [phone]  Dept Fax: [phone]  Loc: [phone]    Alayna Naqvi  is a 64 y.o. female who presents today for her medical conditions/complaints as noted below.  Alayna Naqvi is c/o of   Chief Complaint   Patient presents with    Diabetes     3 months;  Discuss increasing Ozempic    Hypertension     3 months       HPI:     History of Present Illness  The patient is a 64-year-old female who presents today for a follow-up of her diabetes, hypertension, and COPD.    She reports an improvement in her blood glucose levels, with morning readings ranging from 90 to 120. Approximately a week ago, a hypoglycemic episode occurred with a reading of 59, accompanied by arm weakness and shakiness, which resolved after food intake. These symptoms are attributed to prolonged periods without eating. Consideration is being given to increasing her Ozempic dosage from 0.5 mg. Transient nausea post-injection is experienced, lasting about a day. Some weight loss has been achieved, with her highest recorded weight being 203 pounds. Increased appetite is reported since quitting smoking. Occasional numbness and tingling in the feet are noted, but no non-healing sores or chest pain are reported. The FreeStyle Baudilio system continues to be used for glucose monitoring and she checks her surgars 4-6 times a day.    An inhaler is used for respiratory distress, which is found effective. No coughing is reported, but postnasal drip due to year-round allergies is experienced.    Detrol has reduced urinary frequency, although occasional leakage still occurs. Severe dry mouth is reported as a side effect of the medication.    A history of cold sores is noted, and L-lysine is currently being taken. A neurologist previously diagnosed shingles and treated with Valtrex for 6 weeks, resulting in a

## 2025-04-30 ENCOUNTER — RESULTS FOLLOW-UP (OUTPATIENT)
Dept: FAMILY MEDICINE CLINIC | Age: 65
End: 2025-04-30

## 2025-04-30 LAB
EST. AVERAGE GLUCOSE BLD GHB EST-MCNC: 183 MG/DL
HBA1C MFR BLD: 8 % (ref 4–6)

## 2025-05-01 ENCOUNTER — PATIENT MESSAGE (OUTPATIENT)
Dept: FAMILY MEDICINE CLINIC | Age: 65
End: 2025-05-01

## 2025-05-20 ENCOUNTER — HOSPITAL ENCOUNTER (OUTPATIENT)
Dept: BONE DENSITY | Age: 65
Discharge: HOME | End: 2025-05-22
Attending: FAMILY MEDICINE
Payer: MEDICARE

## 2025-05-20 DIAGNOSIS — Z78.0 ENCOUNTER FOR OSTEOPOROSIS SCREENING IN ASYMPTOMATIC POSTMENOPAUSAL PATIENT: ICD-10-CM

## 2025-05-20 DIAGNOSIS — Z13.820 ENCOUNTER FOR OSTEOPOROSIS SCREENING IN ASYMPTOMATIC POSTMENOPAUSAL PATIENT: ICD-10-CM

## 2025-05-20 PROCEDURE — 77080 DXA BONE DENSITY AXIAL: CPT

## 2025-05-23 RX ORDER — BUSPIRONE HYDROCHLORIDE 10 MG/1
TABLET ORAL
Qty: 90 TABLET | Refills: 0 | Status: SHIPPED | OUTPATIENT
Start: 2025-05-23

## 2025-05-23 NOTE — TELEPHONE ENCOUNTER
Alayna called requesting a refill of the below medication which has been pended for you:     Requested Prescriptions     Pending Prescriptions Disp Refills    busPIRone (BUSPAR) 10 MG tablet [Pharmacy Med Name: busPIRone HCl 10 MG Oral Tablet] 90 tablet 0     Sig: TAKE 1 TABLET BY MOUTH TWICE A DAY AND ONE EXTRA TIME IF NEEDED DURING THE DAY FOR ANXIETY.       Last Appointment Date: 4/29/2025  Next Appointment Date: 7/29/2025    Allergies   Allergen Reactions    Pcn [Penicillins] Anaphylaxis     \"can't breath\"    Sulfa Antibiotics Anaphylaxis     \"can't breathe\"    Metformin And Related Other (See Comments)     Sweating and diarrhea

## 2025-05-27 ENCOUNTER — PATIENT MESSAGE (OUTPATIENT)
Dept: FAMILY MEDICINE CLINIC | Age: 65
End: 2025-05-27

## 2025-06-19 RX ORDER — METOPROLOL TARTRATE 25 MG/1
25 TABLET, FILM COATED ORAL 2 TIMES DAILY
Qty: 180 TABLET | Refills: 0 | Status: SHIPPED | OUTPATIENT
Start: 2025-06-19

## 2025-06-20 RX ORDER — BUSPIRONE HYDROCHLORIDE 10 MG/1
TABLET ORAL
Qty: 90 TABLET | Refills: 0 | Status: SHIPPED | OUTPATIENT
Start: 2025-06-20

## 2025-06-20 NOTE — TELEPHONE ENCOUNTER
Alayna called requesting a refill of the below medication which has been pended for you:     Requested Prescriptions     Pending Prescriptions Disp Refills    busPIRone (BUSPAR) 10 MG tablet [Pharmacy Med Name: busPIRone HCl 10 MG Oral Tablet] 90 tablet 0     Sig: TAKE 1 TABLET BY MOUTH TWICE DAILY AND  ONE  EXTRA  TIME  IF  NEEDED  DURING  THE  DAY  FOR  ANXIETY       Last Appointment Date: 4/29/2025  Next Appointment Date: 7/29/2025    Allergies   Allergen Reactions    Pcn [Penicillins] Anaphylaxis     \"can't breath\"    Sulfa Antibiotics Anaphylaxis     \"can't breathe\"    Metformin And Related Other (See Comments)     Sweating and diarrhea

## 2025-06-24 DIAGNOSIS — E11.00 UNCONTROLLED TYPE 2 DM WITH HYPEROSMOLAR NONKETOTIC HYPERGLYCEMIA (HCC): ICD-10-CM

## 2025-06-25 RX ORDER — INSULIN GLARGINE 100 [IU]/ML
25 INJECTION, SOLUTION SUBCUTANEOUS NIGHTLY
Qty: 4 ADJUSTABLE DOSE PRE-FILLED PEN SYRINGE | Refills: 0 | Status: SHIPPED | OUTPATIENT
Start: 2025-06-25

## 2025-06-25 NOTE — TELEPHONE ENCOUNTER
Alayna called requesting a refill of the below medication which has been pended for you:     Requested Prescriptions     Pending Prescriptions Disp Refills    insulin glargine (LANTUS SOLOSTAR) 100 UNIT/ML injection pen 4 Adjustable Dose Pre-filled Pen Syringe 0     Sig: Inject 25 Units into the skin nightly       Last Appointment Date: 4/29/2025  Next Appointment Date: 7/29/2025    Allergies   Allergen Reactions    Pcn [Penicillins] Anaphylaxis     \"can't breath\"    Sulfa Antibiotics Anaphylaxis     \"can't breathe\"    Metformin And Related Other (See Comments)     Sweating and diarrhea

## 2025-07-18 ENCOUNTER — OFFICE VISIT (OUTPATIENT)
Dept: PRIMARY CARE CLINIC | Age: 65
End: 2025-07-18
Payer: MEDICARE

## 2025-07-18 VITALS
RESPIRATION RATE: 20 BRPM | TEMPERATURE: 97.5 F | BODY MASS INDEX: 35.44 KG/M2 | WEIGHT: 200 LBS | OXYGEN SATURATION: 98 % | DIASTOLIC BLOOD PRESSURE: 70 MMHG | SYSTOLIC BLOOD PRESSURE: 138 MMHG | HEART RATE: 98 BPM | HEIGHT: 63 IN

## 2025-07-18 DIAGNOSIS — H69.93 EUSTACHIAN TUBE DYSFUNCTION, BILATERAL: ICD-10-CM

## 2025-07-18 DIAGNOSIS — R42 DIZZINESS: ICD-10-CM

## 2025-07-18 DIAGNOSIS — J01.40 ACUTE NON-RECURRENT PANSINUSITIS: Primary | ICD-10-CM

## 2025-07-18 PROCEDURE — 3075F SYST BP GE 130 - 139MM HG: CPT | Performed by: FAMILY MEDICINE

## 2025-07-18 PROCEDURE — 99213 OFFICE O/P EST LOW 20 MIN: CPT | Performed by: FAMILY MEDICINE

## 2025-07-18 PROCEDURE — 3078F DIAST BP <80 MM HG: CPT | Performed by: FAMILY MEDICINE

## 2025-07-18 PROCEDURE — 1123F ACP DISCUSS/DSCN MKR DOCD: CPT | Performed by: FAMILY MEDICINE

## 2025-07-18 RX ORDER — MECLIZINE HCL 12.5 MG 12.5 MG/1
12.5 TABLET ORAL 3 TIMES DAILY PRN
Qty: 15 TABLET | Refills: 0 | Status: SHIPPED | OUTPATIENT
Start: 2025-07-18 | End: 2025-07-28

## 2025-07-18 RX ORDER — PREDNISONE 20 MG/1
TABLET ORAL
Qty: 10 TABLET | Refills: 0 | Status: SHIPPED | OUTPATIENT
Start: 2025-07-18

## 2025-07-18 RX ORDER — DOXYCYCLINE HYCLATE 100 MG
100 TABLET ORAL 2 TIMES DAILY
Qty: 20 TABLET | Refills: 0 | Status: SHIPPED | OUTPATIENT
Start: 2025-07-18

## 2025-07-19 DIAGNOSIS — E11.00 UNCONTROLLED TYPE 2 DM WITH HYPEROSMOLAR NONKETOTIC HYPERGLYCEMIA (HCC): ICD-10-CM

## 2025-07-21 RX ORDER — HYDROCHLOROTHIAZIDE 12.5 MG/1
CAPSULE ORAL
Qty: 2 EACH | Refills: 5 | Status: SHIPPED | OUTPATIENT
Start: 2025-07-21

## 2025-07-21 RX ORDER — BUSPIRONE HYDROCHLORIDE 10 MG/1
TABLET ORAL
Qty: 90 TABLET | Refills: 0 | Status: SHIPPED | OUTPATIENT
Start: 2025-07-21

## 2025-07-21 NOTE — TELEPHONE ENCOUNTER
Alayna called requesting a refill of the below medication which has been pended for you:     Requested Prescriptions     Pending Prescriptions Disp Refills    busPIRone (BUSPAR) 10 MG tablet [Pharmacy Med Name: busPIRone HCl 10 MG Oral Tablet] 90 tablet 0     Sig: TAKE 1 TABLET BY MOUTH TWICE DAILY AND  1  EXTRA  TIME IF NEEDED DURING  THE  DAY FOR  ANXIETY       Last Appointment Date: 4/29/2025  Next Appointment Date: 7/29/2025    Allergies   Allergen Reactions    Pcn [Penicillins] Anaphylaxis     \"can't breath\"    Sulfa Antibiotics Anaphylaxis     \"can't breathe\"    Metformin And Related Other (See Comments)     Sweating and diarrhea

## 2025-07-21 NOTE — TELEPHONE ENCOUNTER
Alayna called requesting a refill of the below medication which has been pended for you:     Requested Prescriptions     Pending Prescriptions Disp Refills    Continuous Glucose Sensor (FREESTYLE KATELYN 3 PLUS SENSOR) MISC [Pharmacy Med Name: FREESTYLE KATELYN 3+ SEN 15D KIT] 2 each 5     Sig: APPLY EVERY 2 WEEKS       Last Appointment Date: 4/29/2025  Next Appointment Date: 7/20/2025    Allergies   Allergen Reactions    Pcn [Penicillins] Anaphylaxis     \"can't breath\"    Sulfa Antibiotics Anaphylaxis     \"can't breathe\"    Metformin And Related Other (See Comments)     Sweating and diarrhea

## 2025-07-22 ASSESSMENT — ENCOUNTER SYMPTOMS
COUGH: 1
SINUS PRESSURE: 1
VOMITING: 0
CONSTIPATION: 0
SINUS PAIN: 1
WHEEZING: 0
EYE REDNESS: 0
RHINORRHEA: 1
SORE THROAT: 1
NAUSEA: 0
ABDOMINAL PAIN: 0
SHORTNESS OF BREATH: 0
DIARRHEA: 0
TROUBLE SWALLOWING: 0
EYE DISCHARGE: 0

## 2025-07-22 NOTE — PROGRESS NOTES
Quit date: 2022     Years since quittin.9     Passive exposure: Current    Smokeless tobacco: Never   Substance Use Topics    Alcohol use: No        Vitals:    25 1605   BP: 138/70   BP Site: Left Upper Arm   Patient Position: Sitting   BP Cuff Size: Large Adult   Pulse: 98   Resp: 20   Temp: 97.5 °F (36.4 °C)   TempSrc: Tympanic   SpO2: 98%   Weight: 90.7 kg (200 lb)   Height: 1.6 m (5' 3\")     Estimated body mass index is 35.43 kg/m² as calculated from the following:    Height as of this encounter: 1.6 m (5' 3\").    Weight as of this encounter: 90.7 kg (200 lb).    Physical Exam  Vitals and nursing note reviewed.   Constitutional:       General: She is not in acute distress.     Appearance: Normal appearance. She is well-developed. She is not diaphoretic.   HENT:      Head: Normocephalic and atraumatic.      Right Ear: External ear normal.      Left Ear: External ear normal.      Ears:      Comments: TMs dull with fluid behind the TM     Nose: Congestion and rhinorrhea present.      Comments: Maxillary and frontal sinus tenderness with palpation bilaterally     Mouth/Throat:      Pharynx: No posterior oropharyngeal erythema.      Comments: Post nasal drainage noted  Eyes:      General: No scleral icterus.        Right eye: No discharge.         Left eye: No discharge.      Conjunctiva/sclera: Conjunctivae normal.      Pupils: Pupils are equal, round, and reactive to light.   Neck:      Thyroid: No thyromegaly.   Cardiovascular:      Rate and Rhythm: Normal rate and regular rhythm.      Heart sounds: Normal heart sounds.   Pulmonary:      Effort: Pulmonary effort is normal. No respiratory distress.      Breath sounds: Normal breath sounds. No wheezing.   Musculoskeletal:      Cervical back: Normal range of motion and neck supple.   Lymphadenopathy:      Cervical: Cervical adenopathy present.   Skin:     General: Skin is warm and dry.      Findings: No rash.   Neurological:      Mental Status: She is

## 2025-08-04 RX ORDER — VENLAFAXINE HYDROCHLORIDE 150 MG/1
150 CAPSULE, EXTENDED RELEASE ORAL DAILY
Qty: 90 CAPSULE | Refills: 1 | Status: SHIPPED | OUTPATIENT
Start: 2025-08-04

## 2025-08-07 DIAGNOSIS — E11.00 UNCONTROLLED TYPE 2 DM WITH HYPEROSMOLAR NONKETOTIC HYPERGLYCEMIA (HCC): ICD-10-CM

## 2025-08-07 RX ORDER — INSULIN GLARGINE-YFGN 100 [IU]/ML
INJECTION, SOLUTION SUBCUTANEOUS
Qty: 12 ML | Refills: 1 | Status: SHIPPED | OUTPATIENT
Start: 2025-08-07

## 2025-08-13 RX ORDER — BUPROPION HYDROCHLORIDE 150 MG/1
150 TABLET ORAL EVERY MORNING
Qty: 90 TABLET | Refills: 0 | Status: SHIPPED | OUTPATIENT
Start: 2025-08-13

## 2025-08-14 DIAGNOSIS — E11.42 TYPE 2 DIABETES MELLITUS WITH DIABETIC POLYNEUROPATHY, WITHOUT LONG-TERM CURRENT USE OF INSULIN (HCC): ICD-10-CM

## 2025-08-14 RX ORDER — SEMAGLUTIDE 1.34 MG/ML
INJECTION, SOLUTION SUBCUTANEOUS
Qty: 3 ML | Refills: 3 | Status: SHIPPED | OUTPATIENT
Start: 2025-08-14

## 2025-08-18 RX ORDER — BUSPIRONE HYDROCHLORIDE 10 MG/1
TABLET ORAL
Qty: 90 TABLET | Refills: 0 | Status: SHIPPED | OUTPATIENT
Start: 2025-08-18

## 2025-08-25 RX ORDER — ROSUVASTATIN CALCIUM 40 MG/1
40 TABLET, COATED ORAL DAILY
Qty: 90 TABLET | Refills: 0 | Status: SHIPPED | OUTPATIENT
Start: 2025-08-25

## 2025-08-26 ENCOUNTER — TELEPHONE (OUTPATIENT)
Dept: FAMILY MEDICINE CLINIC | Age: 65
End: 2025-08-26

## 2025-09-04 ENCOUNTER — TELEPHONE (OUTPATIENT)
Dept: PHARMACY | Facility: CLINIC | Age: 65
End: 2025-09-04

## (undated) DEVICE — KIT INF CTRL 2OZ LUB TBNG L12FT DBL END BRSH SYR OP4

## (undated) DEVICE — SUTURE ETHLN SZ 3-0 L18IN NONABSORBABLE BLK FS-1 L24MM 3/8 663H

## (undated) DEVICE — SET LNR RED GRN W/ BASE CLEANASCOPE

## (undated) DEVICE — LINER SUCT CANSTR 1500CC SEMI RIG W/ POR HYDROPHOBIC SHUT

## (undated) DEVICE — CONTAINER,SPECIMEN,4OZ,OR STRL: Brand: MEDLINE

## (undated) DEVICE — CONMED SCOPE SAVER BITE BLOCK, 20X27 MM: Brand: SCOPE SAVER

## (undated) DEVICE — GLOVE SURG SZ 65 THK91MIL LTX FREE SYN POLYISOPRENE

## (undated) DEVICE — GLOVE ORANGE PI 7   MSG9070

## (undated) DEVICE — DRESSING,GAUZE,XEROFORM,CURAD,1"X8",ST: Brand: CURAD

## (undated) DEVICE — PAD,NON-ADHERENT,3X8,STERILE,LF,1/PK: Brand: MEDLINE

## (undated) DEVICE — SOLUTION IV IRRIG POUR BRL 0.9% SODIUM CHL 2F7124

## (undated) DEVICE — SVMMC HD AND NK PK

## (undated) DEVICE — Z DISCONTINUED USE 2624850 GLOVE SURG SZ 6 L12IN THK91MIL BRN LTX FREE POLYCHLOROPRENE

## (undated) DEVICE — BANDAGE COMPR W4INXL5YD WHT BGE POLY COT M E WRP WV HK AND

## (undated) DEVICE — INTENDED FOR TISSUE SEPARATION, AND OTHER PROCEDURES THAT REQUIRE A SHARP SURGICAL BLADE TO PUNCTURE OR CUT.: Brand: BARD-PARKER ® CARBON RIB-BACK BLADES

## (undated) DEVICE — BLADE 1884024 SKIMMER 3PK 4MM 27.5CM: Brand: SKIMMER®

## (undated) DEVICE — ZIMMER® STERILE DISPOSABLE TOURNIQUET CUFF WITH PLC, DUAL PORT, SINGLE BLADDER, 18 IN. (46 CM)

## (undated) DEVICE — CONNECTOR TBNG AUX H2O JET DISP FOR OLY 160/180 SER

## (undated) DEVICE — ANGIOGRAPHIC CATHETER: Brand: EXPO™

## (undated) DEVICE — TRAY PAIN CUST

## (undated) DEVICE — COVER LT HNDL BLU PLAS

## (undated) DEVICE — GLOVE ORANGE PI 8   MSG9080

## (undated) DEVICE — BANDAGE ADH DIA7/8IN NAT FLEX-FABRIC WVN FOR WND PROTCT

## (undated) DEVICE — 9165 UNIVERSAL PATIENT PLATE: Brand: 3M™

## (undated) DEVICE — CATHETER SUCT 14FR BVL TIP SGL W/ CTRL PRT STR PK AIRLFE TR

## (undated) DEVICE — GLOVE SURG SZ 85 L12IN FNGR THK13MIL WHT ISOLEX POLYISOPRENE

## (undated) DEVICE — BLADE 1884030 RAD TRICUT 3PK 4MM 22.5CM: Brand: TRICUT®

## (undated) DEVICE — SCRUB DRY SURG EZ SCRUB BRUSH PREOPERATIVE GRN

## (undated) DEVICE — NEEDLE, QUINCKE, 22GX5": Brand: MEDLINE

## (undated) DEVICE — BLADE 1882925HRE SKIMMER 18CM 2.9MM M4: Brand: SKIMMER

## (undated) DEVICE — MARKER W/PRE PRINTED CUSTOM LABEL

## (undated) DEVICE — SURGICAL PROCEDURE TRAY CRD CATH SVMMC

## (undated) DEVICE — GOWN,AURORA,NON-REINFORCED,2XL: Brand: MEDLINE

## (undated) DEVICE — BLADE 1882923HRE SKIMMER 22CM 2.9MM M4: Brand: SKIMMER

## (undated) DEVICE — LINE SAMP O2 6.5FT W/FEMALE CONN F/ADULT CAPNOLINE PLUS

## (undated) DEVICE — CHLORAPREP 26ML ORANGE

## (undated) DEVICE — KIT,ANTI FOG,W/SPONGE & FLUID,SOFT PACK: Brand: MEDLINE

## (undated) DEVICE — ELECTRODE PT RET AD L9FT HI MOIST COND ADH HYDRGEL CORDED

## (undated) DEVICE — PENCIL ES L3M BTTN SWCH HOLSTER W/ BLDE ELECTRD EDGE

## (undated) DEVICE — SYRINGE MED 7ML PLAS LUERSLIP LOSS OF RESISTANCE EPILOR

## (undated) DEVICE — SHEATH RAIN RAD INTRO SHTH W/ BARE WIRE 10 CM 506610S

## (undated) DEVICE — CHLORAPREP 26ML CLEAR

## (undated) DEVICE — PACK PROCEDURE SURG EXTREMITY MIN

## (undated) DEVICE — BAND COMPR L24CM REG CLR PLAS HEMSTAT EXT HK AND LOOP RETEN

## (undated) DEVICE — DRAPE,U/ SHT,SPLIT,PLAS,STERIL: Brand: MEDLINE

## (undated) DEVICE — SUTURE CHROMIC GUT SZ 2-0 L27IN ABSRB BRN L26MM CT-2 1/2 883H